# Patient Record
Sex: FEMALE | Race: WHITE | Employment: FULL TIME | URBAN - NONMETROPOLITAN AREA
[De-identification: names, ages, dates, MRNs, and addresses within clinical notes are randomized per-mention and may not be internally consistent; named-entity substitution may affect disease eponyms.]

---

## 2017-12-28 ENCOUNTER — HOSPITAL ENCOUNTER (OUTPATIENT)
Dept: WOMENS IMAGING | Age: 52
Discharge: HOME | End: 2017-12-28
Payer: COMMERCIAL

## 2017-12-28 DIAGNOSIS — R22.31 LUMP IN ARMPIT, RIGHT: ICD-10-CM

## 2017-12-28 PROCEDURE — G0279 TOMOSYNTHESIS, MAMMO: HCPCS

## 2017-12-28 PROCEDURE — 76642 ULTRASOUND BREAST LIMITED: CPT

## 2018-12-31 ENCOUNTER — HOSPITAL ENCOUNTER (OUTPATIENT)
Dept: WOMENS IMAGING | Age: 53
Discharge: HOME | End: 2018-12-31
Payer: COMMERCIAL

## 2018-12-31 DIAGNOSIS — Z12.31 VISIT FOR SCREENING MAMMOGRAM: ICD-10-CM

## 2018-12-31 PROCEDURE — 77063 BREAST TOMOSYNTHESIS BI: CPT

## 2019-01-04 ENCOUNTER — HOSPITAL ENCOUNTER (OUTPATIENT)
Dept: WOMENS IMAGING | Age: 54
Discharge: HOME | End: 2019-01-04
Payer: COMMERCIAL

## 2019-01-04 DIAGNOSIS — R92.0 ABNORMAL FINDING ON MAMMOGRAPHY, MICROCALCIFICATION: ICD-10-CM

## 2019-01-04 PROCEDURE — G0279 TOMOSYNTHESIS, MAMMO: HCPCS

## 2019-01-18 ENCOUNTER — HOSPITAL ENCOUNTER (OUTPATIENT)
Dept: WOMENS IMAGING | Age: 54
Discharge: HOME | End: 2019-01-18
Payer: COMMERCIAL

## 2019-01-18 DIAGNOSIS — R92.1 BREAST CALCIFICATIONS: ICD-10-CM

## 2019-01-18 PROCEDURE — 19081 BX BREAST 1ST LESION STRTCTC: CPT

## 2019-01-18 PROCEDURE — A4648 IMPLANTABLE TISSUE MARKER: HCPCS

## 2019-01-18 PROCEDURE — 2709999900 HC NON-CHARGEABLE SUPPLY

## 2019-01-18 PROCEDURE — 77065 DX MAMMO INCL CAD UNI: CPT

## 2019-01-18 PROCEDURE — 88305 TISSUE EXAM BY PATHOLOGIST: CPT

## 2019-01-18 PROCEDURE — 2720000010 HC SURG SUPPLY STERILE

## 2020-07-21 ENCOUNTER — HOSPITAL ENCOUNTER (OUTPATIENT)
Dept: WOMENS IMAGING | Age: 55
Discharge: HOME | End: 2020-07-21
Payer: COMMERCIAL

## 2020-07-21 PROCEDURE — 77063 BREAST TOMOSYNTHESIS BI: CPT

## 2021-11-08 ENCOUNTER — HOSPITAL ENCOUNTER (OUTPATIENT)
Dept: WOMENS IMAGING | Age: 56
Discharge: HOME | End: 2021-11-08
Payer: COMMERCIAL

## 2021-11-08 DIAGNOSIS — Z12.31 VISIT FOR SCREENING MAMMOGRAM: ICD-10-CM

## 2021-11-08 PROCEDURE — 77063 BREAST TOMOSYNTHESIS BI: CPT

## 2022-11-23 ENCOUNTER — HOSPITAL ENCOUNTER (OUTPATIENT)
Dept: WOMENS IMAGING | Age: 57
Discharge: HOME | End: 2022-11-23
Payer: COMMERCIAL

## 2022-11-23 DIAGNOSIS — Z12.31 VISIT FOR SCREENING MAMMOGRAM: ICD-10-CM

## 2022-11-23 PROCEDURE — 77063 BREAST TOMOSYNTHESIS BI: CPT

## 2022-12-02 ENCOUNTER — HOSPITAL ENCOUNTER (OUTPATIENT)
Dept: ULTRASOUND IMAGING | Age: 57
Discharge: HOME | End: 2022-12-02
Payer: COMMERCIAL

## 2022-12-02 DIAGNOSIS — E04.9 ENLARGEMENT OF THYROID: ICD-10-CM

## 2022-12-02 PROCEDURE — 76536 US EXAM OF HEAD AND NECK: CPT

## 2023-02-17 ENCOUNTER — NURSE ONLY (OUTPATIENT)
Dept: LAB | Age: 58
End: 2023-02-17

## 2023-02-22 ENCOUNTER — TRANSCRIBE ORDERS (OUTPATIENT)
Dept: ADMINISTRATIVE | Age: 58
End: 2023-02-22

## 2023-02-22 DIAGNOSIS — C15.9 MALIGNANT NEOPLASM OF ESOPHAGUS, UNSPECIFIED LOCATION (HCC): Primary | ICD-10-CM

## 2023-02-23 ENCOUNTER — OFFICE VISIT (OUTPATIENT)
Dept: ENT CLINIC | Age: 58
End: 2023-02-23
Payer: COMMERCIAL

## 2023-02-23 VITALS
OXYGEN SATURATION: 94 % | HEIGHT: 63 IN | RESPIRATION RATE: 16 BRPM | TEMPERATURE: 97.5 F | SYSTOLIC BLOOD PRESSURE: 124 MMHG | BODY MASS INDEX: 33.82 KG/M2 | HEART RATE: 83 BPM | DIASTOLIC BLOOD PRESSURE: 76 MMHG | WEIGHT: 190.9 LBS

## 2023-02-23 DIAGNOSIS — C15.5 PRIMARY ADENOCARCINOMA OF DISTAL THIRD OF ESOPHAGUS (HCC): Primary | ICD-10-CM

## 2023-02-23 DIAGNOSIS — E04.2 MULTINODULAR GOITER: ICD-10-CM

## 2023-02-23 PROCEDURE — 99205 OFFICE O/P NEW HI 60 MIN: CPT | Performed by: OTOLARYNGOLOGY

## 2023-02-23 RX ORDER — ACETAMINOPHEN 160 MG
TABLET,DISINTEGRATING ORAL
COMMUNITY

## 2023-02-23 RX ORDER — INSULIN GLARGINE-YFGN 100 [IU]/ML
INJECTION, SOLUTION SUBCUTANEOUS
COMMUNITY
Start: 2023-01-31

## 2023-02-23 RX ORDER — CHLORAL HYDRATE 500 MG
CAPSULE ORAL DAILY
COMMUNITY

## 2023-02-23 RX ORDER — BLOOD SUGAR DIAGNOSTIC
STRIP MISCELLANEOUS
COMMUNITY
Start: 2023-02-02

## 2023-02-23 RX ORDER — AMPICILLIN TRIHYDRATE 250 MG
CAPSULE ORAL
COMMUNITY

## 2023-02-23 RX ORDER — FLUTICASONE PROPIONATE 50 MCG
1 SPRAY, SUSPENSION (ML) NASAL DAILY
COMMUNITY

## 2023-02-23 RX ORDER — OMEPRAZOLE 10 MG/1
10 CAPSULE, DELAYED RELEASE ORAL DAILY
COMMUNITY

## 2023-02-23 RX ORDER — INSULIN LISPRO 100 [IU]/ML
INJECTION, SOLUTION INTRAVENOUS; SUBCUTANEOUS
COMMUNITY
Start: 2023-01-31

## 2023-02-23 RX ORDER — METFORMIN HYDROCHLORIDE 500 MG/1
TABLET, EXTENDED RELEASE ORAL
COMMUNITY
Start: 2023-01-25

## 2023-02-23 ASSESSMENT — ENCOUNTER SYMPTOMS
SHORTNESS OF BREATH: 0
TROUBLE SWALLOWING: 0
NAUSEA: 0
DIARRHEA: 0
ABDOMINAL PAIN: 0
COLOR CHANGE: 0
FACIAL SWELLING: 0
CHEST TIGHTNESS: 0
SORE THROAT: 0
WHEEZING: 0
SINUS PRESSURE: 0
RHINORRHEA: 0
VOMITING: 0
APNEA: 0
VOICE CHANGE: 0
COUGH: 0
STRIDOR: 0
CHOKING: 0

## 2023-02-23 NOTE — PROGRESS NOTES
[address] EAR, NOSE AND THROAT  [address] [place] [address]  [address] [facility],4Th Floor  Dept: [phone]  Dept Fax: [phone]  Loc: [phone]    Rusty Sanon is a 62 y.o. female who was referred byPhilly Blakely DO for:  Chief Complaint   Patient presents with    New Patient     Patient is here for  Enlargement of Thyroid. Referred by Sadie James DO   .    HPI:     Rusty Sanon is a 62 y.o. female who presents today for evaluation of thyroid enlargement. Dr. 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 This coming Monday she has an appointment at [address] [place] [address]  to get a CT of chest and Abdomen. Wednesday she will see Dr. Francesco Sanchez the Medical Oncologist.  On March 9th she will have an endoscopic ultrasound. Former smoker. No drinking. No trouble swallowing. Has an appointment with [employer] today. She's had cold intolerance, broke out in hives her whole life. Had long haul Covid and her hair started falling out after. Had 14 cysts surrounding her ovary had them since she was age 15, they were taken out. Had 48 biopsies and hysterectomy. Hasn't been genetically tested. She's adopted and didn't know any other genetic diseases. Ultrasound:  1. Tiny cystic nodules are seen in both thyroid lobes likely benign. 2. Nonaggressive lymph nodes are seen. 3. A 1 x 0.6 x 0.3 cm hypoechoic nodule is seen inferior to the right thyroid gland is nonspecific. It can represent a parathyroid adenoma. Correlate with calcium and  ionized calcium levels. **ACR TI-RADS Category and recommendations**       TR 1: 0 point. Benign. No FNA    TR 2: 1,2 points-Not suspicious. No FNA    TR 3: 3 points -Mildly suspicion. FNA is recommended for lesions greater than 2.5 cm; follow up if the nodule is greater than or equal to 1.5 cm.  Follow-up can be done at 1, 3 and 5 years. Continued follow-up after 5 years can be obtained if there is no    stability in size. TR 4: 4-6 points: Moderately suspicion. FNA greater than or equal to 1.5 cm; follow-up if the nodule is greater than or equal to 1 cm- follow-up can be done at 1, 2, 3 and 5 years. Continued follow-up after 5 years can be obtained if there is no    stability in size. TR 5: 7+points -Highly Suspicious. FNA greater than 1 cm, follow-up if greater than or equal to 0.5 cm       **This report has been created using voice recognition software. It may contain minor errors which are inherent in voice recognition technology. **       Final report electronically signed by Dr Sully Witt on 12/2/2022 2:35 PM         Study shows TR 1 nodules no further work up needed. Also, there is a small soft tissue mass inferior to the right lobe of the thyroid. Path report showed invasive adeno carcinoma esophagus. A PET/CT would help determine whether the nodule inferior to the right lobe of the thyroid is a metastasis. Her serum calcium is normal.    History:      Allergies   Allergen Reactions    Demerol Hcl [Meperidine] Other (See Comments)     hallucinations    Codeine     Entex Lq [Phenylephrine-Guaifenesin]     Sulfa Antibiotics      Current Outpatient Medications   Medication Sig Dispense Refill    ONETOUCH VERIO strip TEST FIVE TIMES PER DAY      fluticasone (FLONASE) 50 MCG/ACT nasal spray 1 spray by Nasal route daily      SEMGLEE, YFGN, 100 UNIT/ML SOPN ADMINISTER 22 UNITS UNDER THE SKIN DAILY      insulin lispro, 1 Unit Dial, (HUMALOG/ADMELOG) 100 UNIT/ML SOPN       metFORMIN (GLUCOPHAGE-XR) 500 MG extended release tablet TAKE 1 TABLET BY MOUTH DAILY      loratadine-pseudoephedrine (CLARITIN-D 24HR)  MG per extended release tablet Take 1 tablet by mouth daily      omeprazole (PRILOSEC) 10 MG delayed release capsule Take 10 mg by mouth daily      Omega-3 Fatty Acids (FISH OIL) 1000 MG capsule Take by mouth daily      Cholecalciferol (VITAMIN D3) 50 MCG ( UT) CAPS Take by mouth      Red Yeast Rice 600 MG CAPS Take by mouth       No current facility-administered medications for this visit.     Past Medical History:   Diagnosis Date    Ovarian cancer in remission     age 46, no XRT or Chemo per patient, pt stated 2022 that she didn't actually have Ovarian CA      Past Surgical History:   Procedure Laterality Date    COLONOSCOPY  2023    HYSTERECTOMY (CERVIX STATUS UNKNOWN)      total    KAN STEROTACTIC LOC BREAST BIOPSY RIGHT Right 2019    benign    OVARY REMOVAL Bilateral     total    US BREAST BIOPSY W LOC DEVICE 1ST LESION RIGHT Right 2011    benign     Family History   Adopted: Yes   Family history unknown: Yes     Social History     Tobacco Use    Smoking status: Former     Types: Cigarettes     Quit date: 1986     Years since quittin.1    Smokeless tobacco: Never   Substance Use Topics    Alcohol use: Not on file       Subjective:       Review of Systems   Constitutional:  Negative for activity change, appetite change, chills, diaphoresis, fatigue, fever and unexpected weight change.   HENT:  Negative for congestion, dental problem, ear discharge, ear pain, facial swelling, hearing loss, mouth sores, nosebleeds, postnasal drip, rhinorrhea, sinus pressure, sneezing, sore throat, tinnitus, trouble swallowing and voice change.    Eyes:  Negative for visual disturbance.   Respiratory:  Negative for apnea, cough, choking, chest tightness, shortness of breath, wheezing and stridor.    Cardiovascular:  Negative for chest pain, palpitations and leg swelling.   Gastrointestinal:  Negative for abdominal pain, diarrhea, nausea and vomiting.   Endocrine: Negative for cold intolerance, heat intolerance, polydipsia and polyuria.   Genitourinary:  Negative for dysuria, enuresis and hematuria.   Musculoskeletal:  Negative for arthralgias, gait problem, neck pain and neck stiffness.  Skin:  Negative for color change and rash. Allergic/Immunologic: Negative for environmental allergies, food allergies and immunocompromised state. Neurological:  Negative for dizziness, syncope, facial asymmetry, speech difficulty, light-headedness and headaches. Hematological:  Negative for adenopathy. Does not bruise/bleed easily. Psychiatric/Behavioral:  Negative for confusion and sleep disturbance. The patient is not nervous/anxious. Objective:     /76 (Site: Left Upper Arm, Position: Sitting)   Pulse 83   Temp 97.5 °F (36.4 °C) (Infrared)   Resp 16   Ht 5' 3\" (1.6 m)   Wt 190 lb 14.4 oz (86.6 kg)   SpO2 94%   BMI 33.82 kg/m²     Physical Exam  Vitals and nursing note reviewed. Constitutional:       Appearance: She is well-developed. HENT:      Head: Normocephalic and atraumatic. No laceration. Salivary Glands: Right salivary gland is not diffusely enlarged or tender. Left salivary gland is not diffusely enlarged or tender. Comments:        Right Ear: Hearing, tympanic membrane, ear canal and external ear normal. No drainage or swelling. No middle ear effusion. Tympanic membrane is not perforated or erythematous. Left Ear: Hearing, tympanic membrane, ear canal and external ear normal. No drainage or swelling. No middle ear effusion. Tympanic membrane is not perforated or erythematous. Nose: Nose normal. No septal deviation, mucosal edema or rhinorrhea. Mouth/Throat:      Mouth: Mucous membranes are moist. Mucous membranes are not pale and not dry. No oral lesions. Tongue: No lesions. Pharynx: Oropharynx is clear. Uvula midline. No oropharyngeal exudate or posterior oropharyngeal erythema. Comments: LIps: lips normal     Mallampati 1  Base of tongue: symmetric  Mirror exam deferred due to gag reflex. Doesn't feel nodules, no adenopathy  Eyes:      Extraocular Movements: Extraocular movements intact.       Comments: Conjugate gaze   Neck: Thyroid: No thyroid mass or thyromegaly. Trachea: Phonation normal. No tracheal deviation. Comments:     Pulmonary:      Effort: Pulmonary effort is normal. No retractions. Breath sounds: No stridor. Musculoskeletal:      Cervical back: Normal range of motion and neck supple. Lymphadenopathy:      Cervical: No cervical adenopathy. Neurological:      Mental Status: She is alert and oriented to person, place, and time. Cranial Nerves: Cranial nerves 2-12 are intact. Cranial nerve deficit: VIIth N function intact bilat. Psychiatric:         Mood and Affect: Mood and affect normal.         Behavior: Behavior is cooperative. Data:  All of the past medical history, past surgical history, family history,social history, allergies and current medications were reviewed with the patient. Assessment & Plan   Diagnoses and all orders for this visit:     Diagnosis Orders   1. 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 Multinodular goiter            The findings were explained and her questions were answered. Her TSH is more than 3.2 which is considerably higher than the ideal range of 0.5-1.5. Most likely cause of that would be a mild thyroiditis. Options were discussed. I also phoned Dr. Joe Olea and Dr. Alda Christensen and discussed ordering    PET/CT. Will follow-up with Dr. Alda Christensen and Dr. Christiano Pizarro CMA ([facility]), am scribing for, and in the presence of Dr. Jaymie Dinh. Electronically signed by Heydi Flores CMA ([facility]) on 2/23/23 at 11:25 AM EST. (Please note that portions of this note were completed with a voice recognition program. Efforts were made to edit the dictations butoccasionally words are mis-transcribed.)    I agree to the above documentation placed by my scribe. I have personally evaluated this patient. Additional findings are as noted.   I reviewed the scribe's note and agree with the documented findings and plan of care. Any areas of disagreement are corrected. I agree with the chief complaint, past medical history, past surgical history, allergies, medications, social and family history as documented unless otherwise noted below.      Electronically signed by Laura Hogna MD on 3/18/2023 at 8:24 PM

## 2023-02-27 ENCOUNTER — HOSPITAL ENCOUNTER (OUTPATIENT)
Dept: CT IMAGING | Age: 58
Discharge: HOME | End: 2023-02-27
Payer: COMMERCIAL

## 2023-02-27 DIAGNOSIS — C15.9 MALIGNANT NEOPLASM OF ESOPHAGUS, UNSPECIFIED LOCATION (HCC): ICD-10-CM

## 2023-02-27 LAB
CREAT BLD-MCNC: 0.7 MG/DL (ref 0.5–1.2)
GFR SERPL CREATININE-BSD FRML MDRD: > 60 ML/MIN/1.73M2

## 2023-02-27 PROCEDURE — 82565 ASSAY OF CREATININE: CPT

## 2023-02-27 PROCEDURE — 71260 CT THORAX DX C+: CPT

## 2023-02-27 PROCEDURE — 6360000004 HC RX CONTRAST MEDICATION: Performed by: INTERNAL MEDICINE

## 2023-02-27 PROCEDURE — 74160 CT ABDOMEN W/CONTRAST: CPT

## 2023-02-27 RX ADMIN — IOHEXOL 25 ML: 240 INJECTION, SOLUTION INTRATHECAL; INTRAVASCULAR; INTRAVENOUS; ORAL at 13:02

## 2023-02-27 RX ADMIN — IOPAMIDOL 85 ML: 755 INJECTION, SOLUTION INTRAVENOUS at 13:02

## 2023-02-27 NOTE — H&P
[facility]  Sedation/Analgesia History & Physical    Patient: Angel Browning : 1965  Med Rec#: [id number] Acc#: [id number]   Provider Performing Procedure: Elio Gil MD  Primary Care Physician: ADDISON Drummond CNP    PRE-PROCEDURE   Brief History/Pre-Procedure Diagnosis:The patient is a 62 y.o.,  female with significant past medical history of newly diagnosed esophageal cancer. Pt. Here today for staging. MEDICAL HISTORY  []CAD/Valve  []Liver Disease  []Lung Disease []Diabetes  []Hypertension []Renal Disease  [x]Additional information:       has a past medical history of Ovarian cancer in remission. SURGICAL HISTORY   has a past surgical history that includes [address] LOC DEVICE 1ST LESION RIGHT (Right, 2011); KAN STEREO BREAST BX W LOC DEVICE 1ST LESION RIGHT (Right, 2019); Hysterectomy (); Ovary removal (Bilateral, ); and Colonoscopy (2023). Additional information:       ALLERGIES   Allergies as of 2023 - Fully Reviewed 2023   Allergen Reaction Noted    Demerol hcl [meperidine] Other (See Comments) 2019    Codeine  2019    Entex lq [phenylephrine-guaifenesin]  2019    Sulfa antibiotics  2019     Additional information:       MEDICATIONS     No current facility-administered medications for this encounter.     Current Outpatient Medications:     ONETOUCH VERIO strip, TEST FIVE TIMES PER DAY, Disp: , Rfl:     fluticasone (FLONASE) 50 MCG/ACT nasal spray, 1 spray by Nasal route daily, Disp: , Rfl:     SEMGLEE, YFGN, 100 UNIT/ML SOPN, ADMINISTER 22 UNITS UNDER THE SKIN DAILY, Disp: , Rfl:     insulin lispro, 1 Unit Dial, (HUMALOG/ADMELOG) 100 UNIT/ML SOPN, , Disp: , Rfl:     metFORMIN (GLUCOPHAGE-XR) 500 MG extended release tablet, TAKE 1 TABLET BY MOUTH DAILY, Disp: , Rfl:     loratadine-pseudoephedrine (CLARITIN-D 24HR)  MG per extended release tablet, Take 1 tablet by mouth daily, Disp: , Rfl:     omeprazole (PRILOSEC) 10 MG delayed release capsule, Take 10 mg by mouth daily, Disp: , Rfl:     Omega-3 Fatty Acids (FISH OIL) 1000 MG capsule, Take by mouth daily, Disp: , Rfl:     Cholecalciferol (VITAMIN D3) 50 MCG (2000 UT) CAPS, Take by mouth, Disp: , Rfl:     Red Yeast Rice 600 MG CAPS, Take by mouth, Disp: , Rfl:   Prior to Admission medications    Medication Sig Start Date End Date Taking? Authorizing Provider   ONETOUCH VERIO strip TEST FIVE TIMES PER DAY 2/2/23   Historical Provider, MD   fluticasone (FLONASE) 50 MCG/ACT nasal spray 1 spray by Nasal route daily    Historical Provider, MD Cobos Bigger, 100 UNIT/ML SOPN ADMINISTER [address] SKIN DAILY 1/31/23   Historical Provider, MD   insulin lispro, 1 Unit Dial, (HUMALOG/ADMELOG) 100 UNIT/ML SOPN  1/31/23   Historical Provider, MD   metFORMIN (GLUCOPHAGE-XR) 500 MG extended release tablet TAKE 1 TABLET BY MOUTH DAILY 1/25/23   Historical Provider, MD   loratadine-pseudoephedrine (CLARITIN-D 24HR)  MG per extended release tablet Take 1 tablet by mouth daily    Historical Provider, MD   omeprazole (PRILOSEC) 10 MG delayed release capsule Take 10 mg by mouth daily    Historical Provider, MD   Omega-3 Fatty Acids (FISH OIL) 1000 MG capsule Take by mouth daily    Historical Provider, MD   Cholecalciferol (VITAMIN D3) 50 MCG (2000 UT) CAPS Take by mouth    Historical Provider, MD   Red Yeast Rice 600 MG CAPS Take by mouth    Historical Provider, MD     Additional information:       PHYSICAL:    vitals were not taken for this visit. Heart:  [x]Regular rate and rhythm  []Other:    Lungs:  [x]Clear    []Other:    Abdomen: [x]Soft    []Other:    Mental Status: [x]Alert & Oriented  []Other:      VITAL SIGNS   No data found.     PLANNED PROCEDURE   [x]EGD  []Colonoscopy []Flex Sigmoid  []ERCP [x]EUS   []Cystoscopy  [] CATH [] BRONCH       Consent: I have discussed with the patient and/or the patient representative the indication, alternatives, and the possible risks and/or complications of the planned procedure and the anesthesia methods. The patient and/or patient representative appear to understand and agree to proceed. SEDATION (see Anesthesia note)    ASA Classification: Class 2 - A normal healthy patient with mild systemic disease    Airway Assessment: normal    Monitoring and Safety: The patient will be placed on a cardiac monitor and vital signs, pulse oximetry and level of consciousness will be continuously evaluated throughout the procedure. The patient will be closely monitored until recovery from the medications is complete and the patient has returned to baseline status. Respiratory therapy will be on standby during the procedure. [x]Pre-procedure diagnostic studies complete and results available. Comment:    [x]Previous sedation/anesthesia experiences assessed. Comment:    [x]The patient is an appropriate candidate to undergo the planned procedure sedation and anesthesia. (Refer to nursing sedation/analgesia documentation record)  [x]Formulation and discussion of sedation/procedure plan, risks, and expectations with patient and/or responsible adult completed. [x]Patient examined immediately prior to the procedure.  (Refer to nursing sedation/analgesia documentation record)    Gypsy Valdez MD   Electronically signed  2/28/2023 @ [id number]

## 2023-02-27 NOTE — PROCEDURES
[address] [place] [address] Endoscopy   EUS of the Esophagus/ Gastric /Duodenum    Patient: Hassie Duverney  : 1965  Acct#: [de-identified]    BRIEF HISTORY AND INDICATIONS:    The patient is a 62 y.o.,  female with significant past medical history of newly diagnosed esophageal cancer diagnosed by Dr. Jami Amezcua (me). This appeared as a large pedunculated polyp on EGD recently. Has a prior history of ovarian cancer. Pt. Follows with Dr. Jonnathan Garrison (Oncology) and Dr. [facility] ENT. PREMEDICATION:     General anesthesia was used, see Anesthesia note for details. PT. IS AT RISK FOR ASPIRATION , HAS SILENT REFLUX. The risks and benefits of upper endoscopy with biopsy and dilation were  described to the patient, including but not limited to bleeding, infection, poking a hole someplace requiring surgery to fix it, having reaction to medication, and death. The patient understood these risks and provided informed consent. The patient was placed in the left lateral decubitus position. Medications were administered consisting of Propofol anesthesia and other agents administered by anesthesia. The patient was continuously monitored to ensure adequate sedation and patient safety. Under direct visualization, the upper esophagus was intubated. Scope was slowly withdrawn with good views of mucosal surfaces. Findings and maneuvers are listed in impression below. The patient tolerated the procedure well. The scope was removed. The patient was removed to the recovery area. There were no immediate complications. TYPE OF ECHOENDOSCOPE: Radial advanced to the 2ND portion of the duodenum    DESCRIPTION OF PROCEDURE:  . The quality of imaging was good. Esophageal Adenocarcinoma staging. T stage: 1B - Invades the submucosa. N stage: 1 - two lymph nodes. Mx    EUS: Normal gallbladder, pancreas body/tail  Normal left lobe of the liver.      Endoscopic:    Esophagus: Distal esophagus pedunculated tumor over 2.5 cm. Stomach: Normal.     Duodenum: Normal to 2nd portion of the duodenum. Gurmeet Postal EBL : < 10 mL    COMPLICATIONS:  There were no unplanned events during the procedure. IMPRESSION:   1. T 1B, N 1, Mx. - Largest lymph nodes located in the celiac  axis area, perigastric  14.1 mm, 2nd lymph nodes adjacent , 1.0 cm .               2.         Endoscopic findings- Distal esophagus pedunculated tumor over 2.5 cm. Hiatal hernia. Otherwise, normal to the 2nd portion of the duodenum. 3.         EUS - normal gallbladder. No mets in the left lobe of the liver. RECOMMENDATIONS:    1. Bruce Aleman is to follow up with Dr. Burton Trejo, Dr. Dinesh Martinez- ENT. 2. Resume all medications. 3. Has PET scan tomorrow. 4. I will cancel follow up appt. With me on 3/23/2023, can follow up as needed. Will need to start chemotherapy and radiation therapy.                Specimens: were not obtained    (The following sections must be completed)  Post-Sedation Vital Signs: Vital signs were reviewed and were stable after the procedure (see flow sheet for vitals)            Post-Sedation Exam: Lungs: clear to auscultation bilaterally and Cardiovascular: regular rate and rhythm           Complications: none      Ryan Washington MD, [facility]

## 2023-02-28 ENCOUNTER — HOSPITAL ENCOUNTER (OUTPATIENT)
Age: 58
Setting detail: OUTPATIENT SURGERY
Discharge: HOME | End: 2023-02-28
Attending: INTERNAL MEDICINE | Admitting: INTERNAL MEDICINE
Payer: COMMERCIAL

## 2023-02-28 ENCOUNTER — ANESTHESIA (OUTPATIENT)
Dept: ENDOSCOPY | Age: 58
End: 2023-02-28
Payer: COMMERCIAL

## 2023-02-28 ENCOUNTER — ANESTHESIA EVENT (OUTPATIENT)
Dept: ENDOSCOPY | Age: 58
End: 2023-02-28
Payer: COMMERCIAL

## 2023-02-28 VITALS
BODY MASS INDEX: 33.81 KG/M2 | OXYGEN SATURATION: 93 % | TEMPERATURE: 97.4 F | HEIGHT: 63 IN | SYSTOLIC BLOOD PRESSURE: 146 MMHG | DIASTOLIC BLOOD PRESSURE: 86 MMHG | RESPIRATION RATE: 18 BRPM | WEIGHT: 190.8 LBS | HEART RATE: 76 BPM

## 2023-02-28 LAB — GLUCOSE BLD STRIP.AUTO-MCNC: 195 MG/DL (ref 70–108)

## 2023-02-28 PROCEDURE — 7100000010 HC PHASE II RECOVERY - FIRST 15 MIN: Performed by: INTERNAL MEDICINE

## 2023-02-28 PROCEDURE — 7100000001 HC PACU RECOVERY - ADDTL 15 MIN: Performed by: INTERNAL MEDICINE

## 2023-02-28 PROCEDURE — 2500000003 HC RX 250 WO HCPCS: Performed by: NURSE ANESTHETIST, CERTIFIED REGISTERED

## 2023-02-28 PROCEDURE — 2720000010 HC SURG SUPPLY STERILE: Performed by: INTERNAL MEDICINE

## 2023-02-28 PROCEDURE — 3609018500 HC EGD US SCOPE W/ADJACENT STRUCTURES: Performed by: INTERNAL MEDICINE

## 2023-02-28 PROCEDURE — 7100000000 HC PACU RECOVERY - FIRST 15 MIN: Performed by: INTERNAL MEDICINE

## 2023-02-28 PROCEDURE — 2580000003 HC RX 258: Performed by: INTERNAL MEDICINE

## 2023-02-28 PROCEDURE — 82948 REAGENT STRIP/BLOOD GLUCOSE: CPT

## 2023-02-28 PROCEDURE — 3700000001 HC ADD 15 MINUTES (ANESTHESIA): Performed by: INTERNAL MEDICINE

## 2023-02-28 PROCEDURE — 3700000000 HC ANESTHESIA ATTENDED CARE: Performed by: INTERNAL MEDICINE

## 2023-02-28 PROCEDURE — 7100000011 HC PHASE II RECOVERY - ADDTL 15 MIN: Performed by: INTERNAL MEDICINE

## 2023-02-28 PROCEDURE — 6360000002 HC RX W HCPCS: Performed by: NURSE ANESTHETIST, CERTIFIED REGISTERED

## 2023-02-28 PROCEDURE — C1753 CATH, INTRAVAS ULTRASOUND: HCPCS | Performed by: INTERNAL MEDICINE

## 2023-02-28 RX ORDER — PROPOFOL 10 MG/ML
INJECTION, EMULSION INTRAVENOUS PRN
Status: DISCONTINUED | OUTPATIENT
Start: 2023-02-28 | End: 2023-02-28 | Stop reason: SDUPTHER

## 2023-02-28 RX ORDER — ONDANSETRON 2 MG/ML
INJECTION INTRAMUSCULAR; INTRAVENOUS PRN
Status: DISCONTINUED | OUTPATIENT
Start: 2023-02-28 | End: 2023-02-28 | Stop reason: SDUPTHER

## 2023-02-28 RX ORDER — SUCCINYLCHOLINE/SOD CL,ISO/PF 200MG/10ML
SYRINGE (ML) INTRAVENOUS PRN
Status: DISCONTINUED | OUTPATIENT
Start: 2023-02-28 | End: 2023-02-28 | Stop reason: SDUPTHER

## 2023-02-28 RX ORDER — SODIUM CHLORIDE 0.9 % (FLUSH) 0.9 %
5-40 SYRINGE (ML) INJECTION PRN
Status: DISCONTINUED | OUTPATIENT
Start: 2023-02-28 | End: 2023-02-28 | Stop reason: HOSPADM

## 2023-02-28 RX ORDER — DEXAMETHASONE SODIUM PHOSPHATE 4 MG/ML
INJECTION, SOLUTION INTRA-ARTICULAR; INTRALESIONAL; INTRAMUSCULAR; INTRAVENOUS; SOFT TISSUE PRN
Status: DISCONTINUED | OUTPATIENT
Start: 2023-02-28 | End: 2023-02-28 | Stop reason: SDUPTHER

## 2023-02-28 RX ORDER — MORPHINE SULFATE 2 MG/ML
1 INJECTION, SOLUTION INTRAMUSCULAR; INTRAVENOUS EVERY 5 MIN PRN
Status: DISCONTINUED | OUTPATIENT
Start: 2023-02-28 | End: 2023-02-28 | Stop reason: HOSPADM

## 2023-02-28 RX ORDER — FENTANYL CITRATE 50 UG/ML
INJECTION, SOLUTION INTRAMUSCULAR; INTRAVENOUS PRN
Status: DISCONTINUED | OUTPATIENT
Start: 2023-02-28 | End: 2023-02-28 | Stop reason: SDUPTHER

## 2023-02-28 RX ORDER — SODIUM CHLORIDE 9 MG/ML
INJECTION, SOLUTION INTRAVENOUS PRN
Status: DISCONTINUED | OUTPATIENT
Start: 2023-02-28 | End: 2023-02-28 | Stop reason: HOSPADM

## 2023-02-28 RX ORDER — SODIUM CHLORIDE 0.9 % (FLUSH) 0.9 %
5-40 SYRINGE (ML) INJECTION EVERY 12 HOURS SCHEDULED
Status: DISCONTINUED | OUTPATIENT
Start: 2023-02-28 | End: 2023-02-28 | Stop reason: HOSPADM

## 2023-02-28 RX ORDER — SODIUM CHLORIDE, SODIUM LACTATE, POTASSIUM CHLORIDE, CALCIUM CHLORIDE 600; 310; 30; 20 MG/100ML; MG/100ML; MG/100ML; MG/100ML
INJECTION, SOLUTION INTRAVENOUS CONTINUOUS
Status: DISCONTINUED | OUTPATIENT
Start: 2023-02-28 | End: 2023-02-28 | Stop reason: HOSPADM

## 2023-02-28 RX ORDER — LABETALOL HYDROCHLORIDE 5 MG/ML
10 INJECTION, SOLUTION INTRAVENOUS
Status: DISCONTINUED | OUTPATIENT
Start: 2023-02-28 | End: 2023-02-28 | Stop reason: HOSPADM

## 2023-02-28 RX ORDER — FENTANYL CITRATE 50 UG/ML
50 INJECTION, SOLUTION INTRAMUSCULAR; INTRAVENOUS EVERY 5 MIN PRN
Status: DISCONTINUED | OUTPATIENT
Start: 2023-02-28 | End: 2023-02-28 | Stop reason: HOSPADM

## 2023-02-28 RX ADMIN — PROPOFOL 50 MG: 10 INJECTION, EMULSION INTRAVENOUS at 14:21

## 2023-02-28 RX ADMIN — PROPOFOL 150 MG: 10 INJECTION, EMULSION INTRAVENOUS at 14:19

## 2023-02-28 RX ADMIN — DEXAMETHASONE SODIUM PHOSPHATE 8 MG: 4 INJECTION, SOLUTION INTRAMUSCULAR; INTRAVENOUS at 14:32

## 2023-02-28 RX ADMIN — FENTANYL CITRATE 50 MCG: 50 INJECTION, SOLUTION INTRAMUSCULAR; INTRAVENOUS at 14:18

## 2023-02-28 RX ADMIN — FENTANYL CITRATE 50 MCG: 50 INJECTION, SOLUTION INTRAMUSCULAR; INTRAVENOUS at 14:21

## 2023-02-28 RX ADMIN — SODIUM CHLORIDE, POTASSIUM CHLORIDE, SODIUM LACTATE AND CALCIUM CHLORIDE: 600; 310; 30; 20 INJECTION, SOLUTION INTRAVENOUS at 13:57

## 2023-02-28 RX ADMIN — Medication 140 MG: at 14:19

## 2023-02-28 RX ADMIN — ONDANSETRON 4 MG: 2 INJECTION INTRAMUSCULAR; INTRAVENOUS at 14:32

## 2023-02-28 ASSESSMENT — PAIN - FUNCTIONAL ASSESSMENT: PAIN_FUNCTIONAL_ASSESSMENT: 0-10

## 2023-02-28 NOTE — DISCHARGE INSTRUCTIONS
IMPRESSION:   1. T 1B, N 1, Mx. - Largest lymph nodes located in the celiac  axis area, perigastric  14.1 mm, 2nd lymph nodes adjacent , 1.0 cm .               2.         Endoscopic findings- Distal esophagus pedunculated tumor over 2.5 cm. Hiatal hernia. Otherwise, normal to the 2nd portion of the duodenum. 3.         EUS - normal gallbladder. No mets in the left lobe of the liver. RECOMMENDATIONS:    1. [facility] is to follow up with Dr. Ivette White, Dr. Bao Dickson- ENT. 2. Resume all medications. 3. Has PET scan tomorrow. 4. I will cancel follow up appt. With me on 3/23/2023, can follow up as needed. Will need to start chemotherapy and radiation therapy.

## 2023-02-28 NOTE — PROGRESS NOTES
1450 - pt arrives to pacu, respirations unlabored on 2 L NC, pt denies pain, VSS    1505 - pt denies pain at this time    1510 - Dr. Carol Orozco at bedside explaining to pt the plan of care    1520 -pt meets criteria for discharge from pacu at this time, pt transported to [facility] in stable condition

## 2023-02-28 NOTE — ANESTHESIA PRE PROCEDURE
Department of Anesthesiology  Preprocedure Note       Name:  Dotty May   Age:  62 y.o.  :  1965                                          MRN:  [id number]         Date:  2023      Surgeon: Terrell Richardson):  Epi Werner MD    Procedure: Procedure(s):  ENDOSCOPIC ULTRASOUND WITH RADIAL SCOPE    Medications prior to admission:   Prior to Admission medications    Medication Sig Start Date End Date Taking? Authorizing Provider   ONETOUCH VERIO strip TEST FIVE TIMES PER DAY 23   Historical Provider, MD   fluticasone (FLONASE) 50 MCG/ACT nasal spray 1 spray by Nasal route daily    Historical Provider, MD Brooke Soja, 100 UNIT/ML SOPN ADMINISTER [address] [facility] SKIN DAILY 23   Historical Provider, MD   insulin lispro, 1 Unit Dial, (HUMALOG/ADMELOG) 100 UNIT/ML SOPN  23   Historical Provider, MD   metFORMIN (GLUCOPHAGE-XR) 500 MG extended release tablet TAKE 1 TABLET BY MOUTH DAILY 23   Historical Provider, MD   loratadine-pseudoephedrine (CLARITIN-D 24HR)  MG per extended release tablet Take 1 tablet by mouth daily    Historical Provider, MD   omeprazole (PRILOSEC) 10 MG delayed release capsule Take 10 mg by mouth daily    Historical Provider, MD   Omega-3 Fatty Acids (FISH OIL) 1000 MG capsule Take by mouth daily    Historical Provider, MD   Cholecalciferol (VITAMIN D3) 50 MCG ( UT) CAPS Take by mouth    Historical Provider, MD   Red Yeast Rice 600 MG CAPS Take by mouth    Historical Provider, MD       Current medications:    No current facility-administered medications for this encounter. Allergies: Allergies   Allergen Reactions    Demerol Hcl [Meperidine] Other (See Comments)     hallucinations    Codeine     Entex Lq [Phenylephrine-Guaifenesin]     Sulfa Antibiotics        Problem List:  There is no problem list on file for this patient.       Past Medical History:        Diagnosis Date    Ovarian cancer in remission     age 55, no XRT or Chemo per patient, pt stated 2022 that she didn't actually have Ovarian CA       Past Surgical History:        Procedure Laterality Date    COLONOSCOPY  2023    HYSTERECTOMY (CERVIX STATUS UNKNOWN)      total    KAN STEROTACTIC LOC BREAST BIOPSY RIGHT Right 2019    benign    OVARY REMOVAL Bilateral     total    US BREAST BIOPSY W LOC DEVICE 1ST LESION RIGHT Right 2011    benign       Social History:    Social History     Tobacco Use    Smoking status: Former     Types: Cigarettes     Quit date: 1986     Years since quittin.1    Smokeless tobacco: Never   Substance Use Topics    Alcohol use: Not on file                                Counseling given: Not Answered      Vital Signs (Current):   Vitals:    23 1343   BP: (!) 146/101   Pulse: 88   Resp: 16   Temp: 97.1 °F (36.2 °C)   TempSrc: Temporal   SpO2: 97%   Weight: 190 lb 12.8 oz (86.5 kg)   Height: 5' 3\" (1.6 m)                                              BP Readings from Last 3 Encounters:   23 (!) 146/101   23 124/76       NPO Status: Time of last liquid consumption: 630                        Time of last solid consumption:                         Date of last liquid consumption: 23                        Date of last solid food consumption: 23    BMI:   Wt Readings from Last 3 Encounters:   23 190 lb 12.8 oz (86.5 kg)   23 190 lb 14.4 oz (86.6 kg)     Body mass index is 33.8 kg/m².     CBC:   Lab Results   Component Value Date/Time    WBC 5.6 10/07/2011 12:00 PM    RBC 4.48 10/07/2011 12:00 PM    HCT 40.2 10/07/2011 12:00 PM    MCV 89.9 10/07/2011 12:00 PM    RDW 12.8 10/07/2011 12:00 PM     10/07/2011 12:00 PM       CMP:   Lab Results   Component Value Date/Time    CREATININE 0.7 2023 12:57 PM    LABGLOM >60 2023 12:57 PM    CALCIUM 9.20 2022 03:13 PM       POC Tests: No results for input(s): POCGLU, POCNA, POCK, POCCL, POCBUN, POCHEMO, POCHCT in the last 72 hours. Coags: No results found for: PROTIME, INR, APTT    HCG (If Applicable): No results found for: PREGTESTUR, PREGSERUM, HCG, HCGQUANT     ABGs: No results found for: PHART, PO2ART, CYH1OQP, VLE0GHU, BEART, V4WAQYMY     Type & Screen (If Applicable):  No results found for: LABABO, LABRH    Drug/Infectious Status (If Applicable):  No results found for: HIV, HEPCAB    COVID-19 Screening (If Applicable): No results found for: COVID19        Anesthesia Evaluation    Airway: Mallampati: II  TM distance: >3 FB   Neck ROM: full  Mouth opening: > = 3 FB   Dental:          Pulmonary: breath sounds clear to auscultation                             Cardiovascular:            Rhythm: regular                      Neuro/Psych:               GI/Hepatic/Renal:   (+) GERD:,           Endo/Other:    (+) Diabetes, . Abdominal:   (+) obese,           Vascular: Other Findings:           Anesthesia Plan      general     ASA 2       Induction: intravenous. MIPS: Postoperative opioids intended and Prophylactic antiemetics administered. Anesthetic plan and risks discussed with patient. Plan discussed with CRNA.                     Tayla Sandoval MD   2/28/2023

## 2023-03-01 NOTE — ANESTHESIA POSTPROCEDURE EVALUATION
Department of Anesthesiology  Postprocedure Note    Patient: Enzo Jeong  MRN: [id number]  YOB: 1965  Date of evaluation: 3/1/2023      Procedure Summary     Date: 02/28/23 Room / Location: [facility] / [address] [place] [address]    Anesthesia Start: 2124 Anesthesia Stop: 1698    Procedure: ENDOSCOPIC ULTRASOUND WITH RADIAL SCOPE (Left) Diagnosis:       Malignant neoplasm of esophagus, unspecified location ([address]      (Malignant neoplasm of esophagus, unspecified location ([address] [C15.9])    Surgeons: Romi Rolle MD Responsible Provider: Jazmine Mathew MD    Anesthesia Type: general ASA Status: 2          Anesthesia Type: No value filed.     Bharath Phase I: Bharath Score: 10    Bharath Phase II: Bharath Score: 10      Anesthesia Post Evaluation    Patient location during evaluation: PACU  Patient participation: complete - patient participated  Level of consciousness: awake  Airway patency: patent  Nausea & Vomiting: no vomiting and no nausea  Complications: no  Cardiovascular status: hemodynamically stable  Respiratory status: acceptable  Hydration status: stable

## 2023-03-06 ENCOUNTER — HOSPITAL ENCOUNTER (OUTPATIENT)
Dept: PET IMAGING | Age: 58
Discharge: HOME | End: 2023-03-06
Payer: COMMERCIAL

## 2023-03-06 ENCOUNTER — HOSPITAL ENCOUNTER (OUTPATIENT)
Dept: PET IMAGING | Age: 58
Discharge: HOME | End: 2023-03-01

## 2023-03-06 DIAGNOSIS — C15.5 PRIMARY ADENOCARCINOMA OF DISTAL THIRD OF ESOPHAGUS (HCC): ICD-10-CM

## 2023-03-06 PROCEDURE — 3430000000 HC RX DIAGNOSTIC RADIOPHARMACEUTICAL: Performed by: OTOLARYNGOLOGY

## 2023-03-06 PROCEDURE — 78815 PET IMAGE W/CT SKULL-THIGH: CPT

## 2023-03-06 PROCEDURE — A9552 F18 FDG: HCPCS | Performed by: OTOLARYNGOLOGY

## 2023-03-06 RX ORDER — FLUDEOXYGLUCOSE F 18 200 MCI/ML
14.4 INJECTION, SOLUTION INTRAVENOUS
Status: COMPLETED | OUTPATIENT
Start: 2023-03-06 | End: 2023-03-06

## 2023-03-06 RX ADMIN — FLUDEOXYGLUCOSE F 18 14.4 MILLICURIE: 200 INJECTION, SOLUTION INTRAVENOUS at 07:33

## 2023-03-09 ENCOUNTER — HOSPITAL ENCOUNTER (OUTPATIENT)
Dept: CT IMAGING | Age: 58
Discharge: HOME | End: 2023-03-09

## 2023-03-09 ENCOUNTER — HOSPITAL ENCOUNTER (OUTPATIENT)
Dept: RADIATION ONCOLOGY | Age: 58
Discharge: HOME | End: 2023-03-09
Payer: COMMERCIAL

## 2023-03-09 VITALS
RESPIRATION RATE: 16 BRPM | SYSTOLIC BLOOD PRESSURE: 156 MMHG | TEMPERATURE: 98.2 F | OXYGEN SATURATION: 94 % | HEART RATE: 69 BPM | BODY MASS INDEX: 33.95 KG/M2 | DIASTOLIC BLOOD PRESSURE: 85 MMHG | HEIGHT: 63 IN | WEIGHT: 191.6 LBS

## 2023-03-09 DIAGNOSIS — C15.5 MALIGNANT NEOPLASM OF LOWER THIRD OF ESOPHAGUS (HCC): ICD-10-CM

## 2023-03-09 PROCEDURE — 77332 RADIATION TREATMENT AID(S): CPT | Performed by: RADIOLOGY

## 2023-03-09 PROCEDURE — 77470 SPECIAL RADIATION TREATMENT: CPT | Performed by: RADIOLOGY

## 2023-03-09 PROCEDURE — 3209999900 CT GUIDE RADIATION THERAPY NO CHARGE

## 2023-03-09 PROCEDURE — 99202 OFFICE O/P NEW SF 15 MIN: CPT | Performed by: RADIOLOGY

## 2023-03-09 PROCEDURE — 77334 RADIATION TREATMENT AID(S): CPT | Performed by: RADIOLOGY

## 2023-03-09 ASSESSMENT — ENCOUNTER SYMPTOMS
SHORTNESS OF BREATH: 1
COUGH: 1
ABDOMINAL PAIN: 1
NAUSEA: 0
BLOOD IN STOOL: 0
APNEA: 0
TROUBLE SWALLOWING: 0
CHEST TIGHTNESS: 0
SORE THROAT: 1
VOMITING: 0
BACK PAIN: 1
WHEEZING: 0

## 2023-03-09 NOTE — PROGRESS NOTES
Radiation Oncology Consultation  Encounter Date: 3/9/2023     Ms. Fartun Bonilla is a 62 y.o. female  : 1965  MRN: [id number]  [facility]  Requesting Provider: Dr. Jm Castillo     Reason for request: ***      CONSULTANT: Rufina Lay PhD, MD      DIAGNOSIS: ***      ASSESSMENT:  ***      PLAN:  ***      HISTORY OF PRESENT ILLNESS: ***        Past Medical History:   Diagnosis Date    Cancer ([facility]     Esophagus    Diabetes mellitus ([facility]     Ovarian cancer in remission     age 55, no XRT or Chemo per patient, pt stated 2022 that she didn't actually have Ovarian CA        Past Surgical History:   Procedure Laterality Date    COLONOSCOPY  2023    HYSTERECTOMY (CERVIX STATUS UNKNOWN)      total    KAN STEROTACTIC LOC BREAST BIOPSY RIGHT Right 2019    benign    NECK SURGERY      Ruptured disc    OVARY REMOVAL Bilateral     total    TONSILLECTOMY      UPPER GASTROINTESTINAL ENDOSCOPY Left 2023    ENDOSCOPIC ULTRASOUND WITH RADIAL SCOPE performed by Maida Shepherd MD at [address] [place] [address] RIGHT Right 2011    benign       Family History   Adopted: Yes   Family history unknown: Yes       Social History     Socioeconomic History    Marital status:      Spouse name: Jef    Number of children: 1    Years of education: Not on file    Highest education level: Not on file   Occupational History    Not on file   Tobacco Use    Smoking status: Former     Packs/day: 2.50     Years: 11.00     Pack years: 27.50     Types: Cigarettes     Quit date:      Years since quittin.2    Smokeless tobacco: Never   Vaping Use    Vaping Use: Never used   Substance and Sexual Activity    Alcohol use: Not Currently    Drug use: Never    Sexual activity: Not on file   Other Topics Concern    Not on file   Social History Narrative    Not on file     Social Determinants of Health     Financial Resource Strain: Not on file   Food Insecurity: Not on file   Transportation Needs: Not on file   Physical Activity: Not on file   Stress: Not on file   Social Connections: Not on file   Intimate Partner Violence: Not on file   Housing Stability: Not on file     Exposure to Industrial/ environmental Carcinogens: {YES***/NO:60}      ALLERGIES:   Allergies   Allergen Reactions    Demerol Hcl [Meperidine] Other (See Comments)     hallucinations    Codeine     Entex Lq [Phenylephrine-Guaifenesin]     Sulfa Antibiotics           Current Outpatient Medications   Medication Sig Dispense Refill    ONETOUCH VERIO strip TEST FIVE TIMES PER DAY      fluticasone (FLONASE) 50 MCG/ACT nasal spray 1 spray by Nasal route daily      SEMGLEE, YFGN, 100 UNIT/ML SOPN ADMINISTER 22 UNITS UNDER THE SKIN DAILY      insulin lispro, 1 Unit Dial, (HUMALOG/ADMELOG) 100 UNIT/ML SOPN       metFORMIN (GLUCOPHAGE-XR) 500 MG extended release tablet TAKE 1 TABLET BY MOUTH DAILY      loratadine-pseudoephedrine (CLARITIN-D 24HR)  MG per extended release tablet Take 1 tablet by mouth daily      omeprazole (PRILOSEC) 10 MG delayed release capsule Take 10 mg by mouth daily      Omega-3 Fatty Acids (FISH OIL) 1000 MG capsule Take by mouth daily      Cholecalciferol (VITAMIN D3) 50 MCG (2000 UT) CAPS Take by mouth      Red Yeast Rice 600 MG CAPS Take by mouth       No current facility-administered medications for this encounter.      No outpatient medications have been marked as taking for the 3/9/23 encounter [facility] Encounter) with Giovanni Gonzalez MD.            LABORATORY STUDIES: ***  Onc labs: No results found for: PSA, CEA, LDH, AFP    Lab Results   Component Value Date    CREATININE 0.7 02/27/2023     No results found for: BUN    CBC:   Lab Results   Component Value Date/Time    WBC 5.6 10/07/2011 12:00 PM    RBC 4.48 10/07/2011 12:00 PM    HCT 40.2 10/07/2011 12:00 PM    MCV 89.9 10/07/2011 12:00 PM    MCH 30.4 10/07/2011 12:00 PM    MCHC 33.8 10/07/2011 12:00 PM    RDW 12.8 10/07/2011 12:00 PM     10/07/2011 12:00 PM    MPV 8.3 10/07/2011 28:40 PM     MetabolicPanel:  Lab Results   Component Value Date/Time    CREATININE 0.7 2023 12:57 PM    LABGLOM >60 2023 12:57 PM    CALCIUM 9.20 2022 03:13 PM           PATHOLOGY: ***      RADIOLOGIC STUDIES: ***                  Review of Systems   Constitutional:  Positive for fatigue (chronic). Negative for activity change, appetite change and unexpected weight change. HENT:  Positive for sore throat. Negative for congestion and trouble swallowing. Respiratory:  Positive for cough and shortness of breath (at times). Negative for apnea, chest tightness and wheezing. Cardiovascular:  Negative for chest pain and leg swelling. Gastrointestinal:  Positive for abdominal pain (tenderness). Negative for blood in stool, nausea and vomiting. Genitourinary:  Negative for dysuria, frequency, hematuria and urgency. Musculoskeletal:  Positive for back pain. Negative for arthralgias and myalgias. Neurological:  Negative for dizziness, weakness, numbness and headaches. Psychiatric/Behavioral:  Negative for confusion. A complete review of systems was performed and found to be negative except as presented above. PHYSICAL EXAMINATION:   VITAL SIGNS: BP (!) 156/85   Pulse 69   Temp 98.2 °F (36.8 °C) (Infrared)   Resp 16   Ht 5' 3\" (1.6 m)   Wt 191 lb 9.6 oz (86.9 kg)   SpO2 94%   BMI 33.94 kg/m²   ECOG PERFORMANCE STATUS: ***  PAIN RATIN/10  GENERAL: Pleasant well-developed adult ***. In no acute distress. Alert and oriented ×3; clear mentation with appropriate affect  SKIN: Warm and dry, without jaundice, ecchymoses, or petechiae. HEENT: Normocephalic, atraumatic. PERRL/EOMI; ears, nose and lips unremarkable on external examination; oral cavity/oropharyngeal mucosa moist without lesion or exudate  NECK:  No JVD; no palpable cervical adenopathy.   THORAX: No palpable supraclavicular or axillary adenopathy;  LUNGS: clear bilaterally. Good inspiratory effort, no accessory muscle use. CARDIAC: Regular rate and rhythm, without murmur  BREASTS: ***  ABDOMEN: Soft, nontender, bowel sounds present  PELVIS/RECTAL: No significant external hemorrhoidal tissue. Sphincter tone normal. Prostate within limits of this exam, no intrinsic rectal mass is appreciated. EXTREMITIES: ***  NEUROLOGIC: No grossly apparent focal deficits. Cranial nerves grossly intact      Thank you for allowing my assistance in the care of Ms. Hatfielddaniel Avitia PhD MD  Radiation Oncology     ATTESTATION: *** minutes face-to-face time,  >50% spent in counseling/discussion/education. CC: Poonam Almanzar, Torri Georeg, ADDISON  ACC: Tumor Registry: [address]      This document was created using a voice-recognition program.  Computer generated transcription errors may be present.

## 2023-03-09 NOTE — PROGRESS NOTES
Emani Lopez  3/9/2023    Chaperone: No    Advance Directives       Power of  Living Will ACP-Advance Directive ACP-Power of     Not on File Not on File Not on File Not on File            Vitals:    03/09/23 1302   BP: (!) 156/85   Pulse: 69   Resp: 16   Temp: 98.2 °F (36.8 °C)   SpO2: 94%       Wt Readings from Last 1 Encounters:   03/09/23 191 lb 9.6 oz (86.9 kg)        Lab Results   Component Value Date    CREATININE 0.7 02/27/2023     No results found for: BUN    Mediport: no    Pacemaker/ICD: no    Previous XRT: no    Past Medical History:   Diagnosis Date    Cancer ([facility]     Esophagus    Diabetes mellitus ([facility]     Ovarian cancer in remission 2011    age 55, no XRT or Chemo per patient, pt stated 11/23/2022 that she didn't actually have Ovarian CA     Past Surgical History:   Procedure Laterality Date    COLONOSCOPY  02/17/2023    HYSTERECTOMY (CERVIX STATUS UNKNOWN)  2011    total    KAN STEROTACTIC LOC BREAST BIOPSY RIGHT Right 01/18/2019    benign    NECK SURGERY  1989    Ruptured disc    OVARY REMOVAL Bilateral 2011    total    TONSILLECTOMY      UPPER GASTROINTESTINAL ENDOSCOPY Left 02/28/2023    ENDOSCOPIC ULTRASOUND WITH RADIAL SCOPE performed by Emily Patel MD at [address] [place] [address] RIGHT Right 11/25/2011    benign       Allergies   Allergen Reactions    Demerol Hcl [Meperidine] Other (See Comments)     hallucinations    Codeine     Entex Lq [Phenylephrine-Guaifenesin]     Sulfa Antibiotics           Current Outpatient Medications:     ONETOUCH VERIO strip, TEST FIVE TIMES PER DAY, Disp: , Rfl:     fluticasone (FLONASE) 50 MCG/ACT nasal spray, 1 spray by Nasal route daily, Disp: , Rfl:     SEMGLEE, YFGN, 100 UNIT/ML SOPN, ADMINISTER 22 UNITS UNDER THE SKIN DAILY, Disp: , Rfl:     insulin lispro, 1 Unit Dial, (HUMALOG/ADMELOG) 100 UNIT/ML SOPN, , Disp: , Rfl:     metFORMIN (GLUCOPHAGE-XR) 500 MG extended release tablet, TAKE 1 TABLET BY MOUTH DAILY, Disp: , Rfl:     loratadine-pseudoephedrine (CLARITIN-D 24HR)  MG per extended release tablet, Take 1 tablet by mouth daily, Disp: , Rfl:     omeprazole (PRILOSEC) 10 MG delayed release capsule, Take 10 mg by mouth daily, Disp: , Rfl:     Omega-3 Fatty Acids (FISH OIL) 1000 MG capsule, Take by mouth daily, Disp: , Rfl:     Cholecalciferol (VITAMIN D3) 50 MCG (2000 UT) CAPS, Take by mouth, Disp: , Rfl:     Red Yeast Rice 600 MG CAPS, Take by mouth, Disp: , Rfl:       ADDITIONAL COMMENTS: Patient here for consult with Dr. Arnold Stanford.       Soheila Carpio, RN BSN

## 2023-03-14 ENCOUNTER — SOCIAL WORK (OUTPATIENT)
Dept: RADIATION ONCOLOGY | Age: 58
End: 2023-03-14

## 2023-03-14 NOTE — PROGRESS NOTES
Oncology Social Work    Date: 3/14/2023  Time: 9:47 AM  Name: Agustina Hebert  MRN: [id number]     Contact Type: Follow-up    Note:   Situation: This staff contacted Agustina Hebert via phone support to introduce myself as their assigned Oncology Social Worker. Background:  Rima Manzo attended her initial treatment last week and completed her Distress Assessment. During this visit she indicated she would like assistance in completing her Advance Directive (AD). There were no other high stress indicator areas referenced on her assessment. Assessment: Rima Manzo was not available at the time of this call, therefore this staff had to leave a voicemail on her phone. Explained the options to complete her AD during her next visit if she would like. Recommendation: Patient will initiate further follow-up based on need.  provided Rima Manzo with my contact information.  will continue to follow up as needed.       JAI Avendano, DEAN, ESTEBAN  Oncology Social Worker      Electronically signed by JAI Avendano, DEAN, ESTEBAN on 3/14/2023 at 9:47 AM

## 2023-03-15 ENCOUNTER — HOSPITAL ENCOUNTER (OUTPATIENT)
Dept: RADIATION ONCOLOGY | Age: 58
Discharge: HOME | End: 2023-03-15
Payer: COMMERCIAL

## 2023-03-15 PROCEDURE — 77386 HC NTSTY MODUL RAD TX DLVR CPLX: CPT | Performed by: RADIOLOGY

## 2023-03-15 PROCEDURE — 77338 DESIGN MLC DEVICE FOR IMRT: CPT | Performed by: RADIOLOGY

## 2023-03-16 ENCOUNTER — HOSPITAL ENCOUNTER (OUTPATIENT)
Dept: RADIATION ONCOLOGY | Age: 58
Discharge: HOME | End: 2023-03-16
Payer: COMMERCIAL

## 2023-03-16 VITALS
BODY MASS INDEX: 34.29 KG/M2 | RESPIRATION RATE: 16 BRPM | OXYGEN SATURATION: 94 % | HEART RATE: 75 BPM | DIASTOLIC BLOOD PRESSURE: 85 MMHG | SYSTOLIC BLOOD PRESSURE: 141 MMHG | TEMPERATURE: 98.1 F | WEIGHT: 193.56 LBS

## 2023-03-16 PROCEDURE — 77386 HC NTSTY MODUL RAD TX DLVR CPLX: CPT | Performed by: RADIOLOGY

## 2023-03-17 ENCOUNTER — HOSPITAL ENCOUNTER (OUTPATIENT)
Dept: RADIATION ONCOLOGY | Age: 58
Discharge: HOME | End: 2023-03-17
Payer: COMMERCIAL

## 2023-03-17 PROCEDURE — 77386 HC NTSTY MODUL RAD TX DLVR CPLX: CPT | Performed by: RADIOLOGY

## 2023-03-17 NOTE — PROGRESS NOTES
Comprehensive Nutrition Assessment    Type and Reason for Visit:  Initial (lower third esophagus)    Nutrition Recommendations/Plan:   Encouraged PO at best efforts  Modify food texture with any signs of dysphagia  ONS trail (Glucerna, premier protein samples provided), ONS coupons provided  Call RD with needs/questoins      Nutrition Related Findings:    Patient seen and reports that she had chemo a couple days ago so is feeling a bit worn out. Patient says that she has changed how she eats some (more soft foods). Patient denies issues with swallowing. Patient does admit that she has had some diarrhea in the morning and attributes it to her metformin (patient is on extended release metformin) and changes in eating some foods. Patient reports decreased intake, has only had eggs and a small crossiant today so far. Patient says that she has been sipping on gatorade zero. Patient has not had ONS yet but is agreeable to try. Patient denies nutrition impact symptoms: no dry mouth, no sore mouth/throat, no taste changes, no difficulty swallowing. Wound Type: None       Current Nutrition Intake & Therapies:    Average Meal Intake: 26-50%  Average Supplements Intake:  (patient will trial)    Anthropometric Measures:  Height: 5'3\"  Ideal Body Weight (IBW):   ( )    Admission Body Weight: 190 lb (86.2 kg) (per patient)  Current Body Weight: 193 lb (87.5 kg) (3/16/23),   IBW. Current BMI (kg/m2): 34.2  Usual Body Weight: 190 lb (86.2 kg) (per patient)  % Weight Change (Calculated): 1.6  BMI Categories: Obese Class 1 (BMI 30.0-34. 9)    Nutrition Diagnosis:   Inadequate oral intake related to catabolic illness (head and neck cancer on chemo and radiation therapy) as evidenced by intake 26-50%    Nutrition Interventions:   Food and/or Nutrient Delivery: Continue Current Diet, Modify Current Diet (modify diet textures as needed with any signs of dysphagia)  Nutrition Education/Counseling: Education initiated (Provided Nutrition Consideration for Head and Neck patients)  Coordination of Nutrition Care:  (Continue to monitor while on treatment)  Plan of Care discussed with: patient    Goals:  Goal:  (goals set)  Goals: Meet at least 75% of estimated needs, by next RD assessment (maintain weight)     Nutrition Monitoring and Evaluation:   Food/Nutrient Intake Outcomes: Food and Nutrient Intake, Supplement Intake  Physical Signs/Symptoms Outcomes: Chewing or Swallowing, GI Status, Nutrition Focused Physical Findings, Weight      Jennie Gallardo RD, LD  Contact: [phone]

## 2023-03-20 ENCOUNTER — HOSPITAL ENCOUNTER (OUTPATIENT)
Dept: RADIATION ONCOLOGY | Age: 58
Discharge: HOME | End: 2023-03-20
Payer: COMMERCIAL

## 2023-03-20 PROCEDURE — 77386 HC NTSTY MODUL RAD TX DLVR CPLX: CPT | Performed by: RADIOLOGY

## 2023-03-21 ENCOUNTER — HOSPITAL ENCOUNTER (OUTPATIENT)
Dept: RADIATION ONCOLOGY | Age: 58
Discharge: HOME | End: 2023-03-21
Payer: COMMERCIAL

## 2023-03-21 PROCEDURE — 77386 HC NTSTY MODUL RAD TX DLVR CPLX: CPT | Performed by: RADIOLOGY

## 2023-03-21 NOTE — PROGRESS NOTES
[facility] [address]  Phone: [phone]   Toll Free: [phone]   Fax: [phone]    RADIATION ONCOLOGY EDUCATION    CHIEF COMPLAINT: Kenny Almonte presents to radiation oncology today for education regarding treatment to the Esophagus. PLAN:   Expected and potential side effects were discussed in detail, along with written handouts. Skin care and moisturization instructions were discussed in detail. Patient was not agreeable to physical therapy referral. Explained referral could be placed at any time if patient changes their mind. Patient was informed of dietician that is available weekly and as needed. Educated on weekly on treatment visit to meet with Physician to monitor side effects and treatment course. Informed patient that Physician is available at any time to discuss radiation side effects/concerns through out treatment course. Kenny Almonte had the opportunity to ask questions, and indicated that all questions were satisfactorily addressed.

## 2023-03-22 ENCOUNTER — HOSPITAL ENCOUNTER (OUTPATIENT)
Dept: RADIATION ONCOLOGY | Age: 58
Discharge: HOME | End: 2023-03-22
Payer: COMMERCIAL

## 2023-03-22 PROCEDURE — 77386 HC NTSTY MODUL RAD TX DLVR CPLX: CPT | Performed by: RADIOLOGY

## 2023-03-23 ENCOUNTER — HOSPITAL ENCOUNTER (OUTPATIENT)
Dept: RADIATION ONCOLOGY | Age: 58
Discharge: HOME | End: 2023-03-23
Payer: COMMERCIAL

## 2023-03-23 VITALS
RESPIRATION RATE: 16 BRPM | BODY MASS INDEX: 33.9 KG/M2 | HEART RATE: 71 BPM | TEMPERATURE: 98 F | DIASTOLIC BLOOD PRESSURE: 77 MMHG | WEIGHT: 191.36 LBS | OXYGEN SATURATION: 96 % | SYSTOLIC BLOOD PRESSURE: 126 MMHG

## 2023-03-23 PROCEDURE — 77386 HC NTSTY MODUL RAD TX DLVR CPLX: CPT | Performed by: RADIOLOGY

## 2023-03-23 NOTE — PROGRESS NOTES
Outpatient Medications   Medication Sig Dispense Refill    ONETOUCH VERIO strip TEST FIVE TIMES PER DAY      fluticasone (FLONASE) 50 MCG/ACT nasal spray 1 spray by Nasal route daily      SEMGLEE, YFGN, 100 UNIT/ML SOPN ADMINISTER 22 UNITS UNDER THE SKIN DAILY      insulin lispro, 1 Unit Dial, (HUMALOG/ADMELOG) 100 UNIT/ML SOPN       metFORMIN (GLUCOPHAGE-XR) 500 MG extended release tablet TAKE 1 TABLET BY MOUTH DAILY      loratadine-pseudoephedrine (CLARITIN-D 24HR)  MG per extended release tablet Take 1 tablet by mouth daily      omeprazole (PRILOSEC) 10 MG delayed release capsule Take 10 mg by mouth daily      Omega-3 Fatty Acids (FISH OIL) 1000 MG capsule Take by mouth daily      Cholecalciferol (VITAMIN D3) 50 MCG ( UT) CAPS Take by mouth      Red Yeast Rice 600 MG CAPS Take by mouth       No current facility-administered medications for this encounter. * New    PHYSICAL EXAM:       ECO - Asymptomatic (Fully active, able to carry on all pre-disease activities without restriction)     General: NAD, AO x 3, Mentation is clear with appropriate affect. HEENT: Normocephalic, atraumatic  Thorax:  Unlabored  COR: Nontachycardic  Abdomen:  Non-distended  Neuro:  Cranial nerves grossly intact; no focal deficits  Skin - treatment portal: SEE above. Unremarkable    Chemotherapy Update: Concurrent chemotherapy    Treatment Imaging: CBCT and All imaging reviewed and approved by Dr. Debbie Tineo: No significant radiation side effects. New medications, diagnostic results: No new images, medications, or changes to current recommendations    PLAN: Again reviewed potential side effects of radiation for the patient's treatment. Continue local/topical care. Continue current radiation course as prescribed.

## 2023-03-24 ENCOUNTER — HOSPITAL ENCOUNTER (OUTPATIENT)
Dept: RADIATION ONCOLOGY | Age: 58
Discharge: HOME | End: 2023-03-24
Payer: COMMERCIAL

## 2023-03-24 PROCEDURE — 77386 HC NTSTY MODUL RAD TX DLVR CPLX: CPT | Performed by: RADIOLOGY

## 2023-03-27 ENCOUNTER — HOSPITAL ENCOUNTER (OUTPATIENT)
Dept: RADIATION ONCOLOGY | Age: 58
Discharge: HOME | End: 2023-03-27
Payer: COMMERCIAL

## 2023-03-27 DIAGNOSIS — C15.5 MALIGNANT NEOPLASM OF DISTAL THIRD OF ESOPHAGUS (HCC): Primary | ICD-10-CM

## 2023-03-27 PROCEDURE — 77014 CHG CT GUIDANCE RADIATION THERAPY FLDS PLACEMENT: CPT | Performed by: RADIOLOGY

## 2023-03-27 PROCEDURE — 77386 HC NTSTY MODUL RAD TX DLVR CPLX: CPT | Performed by: RADIOLOGY

## 2023-03-28 ENCOUNTER — HOSPITAL ENCOUNTER (OUTPATIENT)
Dept: RADIATION ONCOLOGY | Age: 58
Discharge: HOME | End: 2023-03-28
Payer: COMMERCIAL

## 2023-03-28 PROCEDURE — 77014 CHG CT GUIDANCE RADIATION THERAPY FLDS PLACEMENT: CPT | Performed by: RADIOLOGY

## 2023-03-28 PROCEDURE — 77386 HC NTSTY MODUL RAD TX DLVR CPLX: CPT | Performed by: RADIOLOGY

## 2023-03-29 ENCOUNTER — HOSPITAL ENCOUNTER (OUTPATIENT)
Dept: RADIATION ONCOLOGY | Age: 58
Discharge: HOME | End: 2023-03-29
Payer: COMMERCIAL

## 2023-03-29 PROCEDURE — 77014 CHG CT GUIDANCE RADIATION THERAPY FLDS PLACEMENT: CPT | Performed by: RADIOLOGY

## 2023-03-29 PROCEDURE — 77386 HC NTSTY MODUL RAD TX DLVR CPLX: CPT | Performed by: RADIOLOGY

## 2023-03-30 ENCOUNTER — HOSPITAL ENCOUNTER (OUTPATIENT)
Dept: RADIATION ONCOLOGY | Age: 58
Discharge: HOME | End: 2023-03-30
Payer: COMMERCIAL

## 2023-03-30 VITALS
OXYGEN SATURATION: 98 % | HEART RATE: 83 BPM | RESPIRATION RATE: 16 BRPM | TEMPERATURE: 98 F | SYSTOLIC BLOOD PRESSURE: 142 MMHG | WEIGHT: 186.73 LBS | BODY MASS INDEX: 33.08 KG/M2 | DIASTOLIC BLOOD PRESSURE: 88 MMHG

## 2023-03-30 PROCEDURE — 77386 HC NTSTY MODUL RAD TX DLVR CPLX: CPT | Performed by: RADIOLOGY

## 2023-03-30 ASSESSMENT — PAIN DESCRIPTION - LOCATION: LOCATION: BACK

## 2023-03-30 ASSESSMENT — PAIN SCALES - GENERAL: PAINLEVEL_OUTOF10: 6

## 2023-03-30 NOTE — PROGRESS NOTES
[address] [facility] [address], [address]        [facility], [address] [facility]        Ruthann Jadon: [phone]        F: [phone]       [email or web address]            Dr. Barbara Guzman MD MS          Dr. Zeyad Pepper MD PhD    ON TREATMENT VISIT (OTV) NOTE     Date of Service: 3/30/2023  Patient ID: Adra Sicard   : 1965  MRN: [id number]   Acct Number: [de-identified]     RADIATION ONCOLOGY ATTENDING:  Nehemias Sims PhD, MD    DIAGNOSIS:  Cancer Staging  No matching staging information was found for the patient. Treatment Area: Thoracic    Current Total Dose(cGy): 2160  Current Fraction:   Final/Cumulative Rx. Dose (cGy): 5040    Patient was seen today for weekly visit. Wt Readings from Last 3 Encounters:   23 186 lb 11.7 oz (84.7 kg)   23 191 lb 5.8 oz (86.8 kg)   23 193 lb 9 oz (87.8 kg)       BP (!) 142/88   Pulse 83   Temp 98 °F (36.7 °C) (Infrared)   Resp 16   Wt 186 lb 11.7 oz (84.7 kg)   SpO2 98%   BMI 33.08 kg/m²     Lab Results   Component Value Date    WBC 5.6 10/07/2011     10/07/2011       Comfort Alteration  Fatigue:Able to perform daily activities with rest periods    Pain Location: Back  Pain Intensity (Current): 6 Severe pain  Pain Treatment: N/A, planning on starting OTC meds  Pain Relief: n/a    Emotional Alteration:   Coping: effective    Nutritional Alteration  Anorexia: none   Nausea: 1-2 episodes of nausea in 24 hours, occasionally  Vomiting: No vomiting   Dyspepsia/Heartburn: None  Dysphagia/Esophagitis: Mild dysphagia, but can eat regular diet, some softer foods    Skin Alteration   Skin reaction: Bright erythema SH recommended antifungal cream OTC.     Mucous Membrane Alteration  Mucositis XRT Related: None  Pharynx and Esophagus- Acute: No change over baseline  Voice Changes: Normal    Ventilation Alteration  Cough: None  Hemoptysis: None  Dyspnea: Normal  Mucous

## 2023-03-31 ENCOUNTER — HOSPITAL ENCOUNTER (OUTPATIENT)
Dept: RADIATION ONCOLOGY | Age: 58
Discharge: HOME | End: 2023-03-31
Payer: COMMERCIAL

## 2023-03-31 PROCEDURE — 77386 HC NTSTY MODUL RAD TX DLVR CPLX: CPT | Performed by: RADIOLOGY

## 2023-04-03 ENCOUNTER — HOSPITAL ENCOUNTER (OUTPATIENT)
Dept: RADIATION ONCOLOGY | Age: 58
Discharge: HOME | End: 2023-04-03
Payer: COMMERCIAL

## 2023-04-03 PROCEDURE — 77386 HC NTSTY MODUL RAD TX DLVR CPLX: CPT | Performed by: RADIOLOGY

## 2023-04-04 ENCOUNTER — HOSPITAL ENCOUNTER (OUTPATIENT)
Dept: RADIATION ONCOLOGY | Age: 58
Discharge: HOME | End: 2023-04-04
Payer: COMMERCIAL

## 2023-04-04 ENCOUNTER — HOSPITAL ENCOUNTER (OUTPATIENT)
Dept: PHYSICAL THERAPY | Age: 58
Setting detail: THERAPIES SERIES
Discharge: HOME | End: 2023-04-04
Payer: COMMERCIAL

## 2023-04-04 PROCEDURE — 97162 PT EVAL MOD COMPLEX 30 MIN: CPT

## 2023-04-04 PROCEDURE — 97110 THERAPEUTIC EXERCISES: CPT

## 2023-04-04 PROCEDURE — 77386 HC NTSTY MODUL RAD TX DLVR CPLX: CPT | Performed by: RADIOLOGY

## 2023-04-04 NOTE — PROGRESS NOTES
** PLEASE SIGN, DATE AND TIME CERTIFICATION BELOW AND RETURN TO [facility] OUTPATIENT REHABILITATION (FAX #: [phone]). ATTEST/CO-SIGN IF ACCESSING VIA [employer] [place]. THANK YOU.**    I certify that I have examined the patient below and determined that Physical Medicine and Rehabilitation service is necessary and that I approve the established plan of care for up to 90 days or as specifically noted.   Attestation, signature or co-signature of physician indicates approval of certification requirements.    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 ([facility] [address]    Treatment Diagnosis Deconditioning, generalized muscle weakness   Date of Evaluation 23    Additional Pertinent History DM, ovarian CA       Functional Outcome Measure Used O: BFI   Functional Outcome Score 4.7 (23)       Insurance: Primary: Payor: Marita Plan /  /  / ,   Secondary:    Authorization Information: PRE CERTIFICATION REQUIRED: NO  INSURANCE THERAPY BENEFIT:  21 VISITS ALLOWED, ANY MORE, A MEDICAL REVIEW  [address] [phone]  AQUATIC THERAPY COVERED: YES  MODALITIES COVERED:  YES   Visit # 1, 1/10 for progress note   Visits Allowed: 20   Recertification Date:    Physician Follow-Up: Radiation thru 23, follows with Dr. Salvador Cagle (23) chemo will be on Wednesday    Physician Orders: Preconditioning for GEJ CA   History of Present Illness: 2/15/23 esophageal CA, concurrent chemoradiation with carboplatin and Taxol       SUBJECTIVE: Notes fatigue since starting chemo and her appetite has been

## 2023-04-05 ENCOUNTER — HOSPITAL ENCOUNTER (OUTPATIENT)
Dept: RADIATION ONCOLOGY | Age: 58
Discharge: HOME | End: 2023-04-05
Payer: COMMERCIAL

## 2023-04-05 PROCEDURE — 77386 HC NTSTY MODUL RAD TX DLVR CPLX: CPT | Performed by: RADIOLOGY

## 2023-04-06 ENCOUNTER — HOSPITAL ENCOUNTER (OUTPATIENT)
Dept: RADIATION ONCOLOGY | Age: 58
Discharge: HOME | End: 2023-04-06
Payer: COMMERCIAL

## 2023-04-06 VITALS
BODY MASS INDEX: 33.43 KG/M2 | HEART RATE: 75 BPM | WEIGHT: 188.71 LBS | OXYGEN SATURATION: 96 % | TEMPERATURE: 98 F | RESPIRATION RATE: 16 BRPM | SYSTOLIC BLOOD PRESSURE: 128 MMHG | DIASTOLIC BLOOD PRESSURE: 80 MMHG

## 2023-04-06 PROCEDURE — 77386 HC NTSTY MODUL RAD TX DLVR CPLX: CPT | Performed by: RADIOLOGY

## 2023-04-06 NOTE — PROGRESS NOTES
Mima Aguirre prescribed a medication for this, but her pharmacy had not received it yet as of last evening. Going to check on availability again today. New medications, diagnostic results: No new images, medications, or changes to current recommendations    PLAN: Again reviewed potential side effects of radiation for the patient's treatment. Continue local/topical care. Continue current radiation course as prescribed.

## 2023-04-07 ENCOUNTER — HOSPITAL ENCOUNTER (OUTPATIENT)
Dept: RADIATION ONCOLOGY | Age: 58
Discharge: HOME | End: 2023-04-07
Payer: COMMERCIAL

## 2023-04-07 PROCEDURE — 77386 HC NTSTY MODUL RAD TX DLVR CPLX: CPT | Performed by: RADIOLOGY

## 2023-04-17 ENCOUNTER — HOSPITAL ENCOUNTER (OUTPATIENT)
Dept: PHYSICAL THERAPY | Age: 58
Setting detail: THERAPIES SERIES
End: 2023-04-17
Payer: COMMERCIAL

## 2023-04-17 ENCOUNTER — HOSPITAL ENCOUNTER (OUTPATIENT)
Dept: RADIATION ONCOLOGY | Age: 58
Discharge: HOME | End: 2023-04-17
Payer: COMMERCIAL

## 2023-04-17 PROCEDURE — 77386 HC NTSTY MODUL RAD TX DLVR CPLX: CPT | Performed by: RADIOLOGY

## 2023-04-18 ENCOUNTER — HOSPITAL ENCOUNTER (OUTPATIENT)
Dept: RADIATION ONCOLOGY | Age: 58
Discharge: HOME | End: 2023-04-18
Payer: COMMERCIAL

## 2023-04-18 PROCEDURE — 77338 DESIGN MLC DEVICE FOR IMRT: CPT | Performed by: RADIOLOGY

## 2023-04-18 PROCEDURE — 77386 HC NTSTY MODUL RAD TX DLVR CPLX: CPT | Performed by: RADIOLOGY

## 2023-04-19 ENCOUNTER — HOSPITAL ENCOUNTER (OUTPATIENT)
Dept: RADIATION ONCOLOGY | Age: 58
Discharge: HOME | End: 2023-04-19
Payer: COMMERCIAL

## 2023-04-19 PROCEDURE — 77386 HC NTSTY MODUL RAD TX DLVR CPLX: CPT | Performed by: RADIOLOGY

## 2023-04-20 ENCOUNTER — HOSPITAL ENCOUNTER (OUTPATIENT)
Dept: RADIATION ONCOLOGY | Age: 58
Discharge: HOME | End: 2023-04-20
Payer: COMMERCIAL

## 2023-04-20 VITALS
HEART RATE: 99 BPM | WEIGHT: 184.08 LBS | BODY MASS INDEX: 32.61 KG/M2 | OXYGEN SATURATION: 97 % | DIASTOLIC BLOOD PRESSURE: 78 MMHG | SYSTOLIC BLOOD PRESSURE: 121 MMHG | RESPIRATION RATE: 16 BRPM | TEMPERATURE: 98 F

## 2023-04-20 PROCEDURE — 77386 HC NTSTY MODUL RAD TX DLVR CPLX: CPT | Performed by: RADIOLOGY

## 2023-04-20 NOTE — DISCHARGE INSTRUCTIONS
PATIENT DISCHARGE INSTRUCTIONS    Remember that side effects present at the end of your treatments will improve within a few weeks after the last treatment. Eat well balanced meals even though your treatments are finished. This will help speed the healing process. Continue any special diets prescribed to control side effects until these side effects have been resolved. Get plenty of rest.  If you have experienced fatigue and/or weakness, this may continue for several weeks after your last treatment. Continue with your daily activities according to the way you feel. Continue to be gentle with your skin. Follow your present skin care instructions until your follow-up visit. IF YOU DEVELOP ANY CHANGES IN YOUR SKIN IN THE AREA TREATED WITH RADIATION, PLEASE CALL THE RADIATION ONCOLOGY NURSE [phone]. Protect your skin from any injury and avoid direct sun exposure in the treatment area. The skin in the treated area may always be more sensitive than the rest of your skin. Always use SPF 27 or higher sun block if you will be in the sun and cannot avoid exposure. Please contact your referring physician for a follow-up appointment in addition to your Radiation Oncology appointment. You may receive a survey via text message or e-mail at some point after treatment. We would appreciate your time in filling out this survey and giving us your honest feedback about your experience with us. We strive for excellence and hope that you were \"Very Satisfied\" with your care and our service. Presence of pain: No  Medication Taper: No    See Instructions Dated: N/A  Follow up orders:  To be ordered by Dr. Lisa Murray

## 2023-04-20 NOTE — PROGRESS NOTES
n/a    Additional Comments: Using Eucerin nightly. CeraVe daily. MEDICATIONS:     Current Outpatient Medications   Medication Sig Dispense Refill    fluconazole (DIFLUCAN) 200 MG tablet Take 1 tablet by mouth daily      ONETOUCH VERIO strip TEST FIVE TIMES PER DAY      fluticasone (FLONASE) 50 MCG/ACT nasal spray 1 spray by Nasal route daily      SEMGLEE, YFGN, 100 UNIT/ML SOPN ADMINISTER 22 UNITS UNDER THE SKIN DAILY      insulin lispro, 1 Unit Dial, (HUMALOG/ADMELOG) 100 UNIT/ML SOPN       metFORMIN (GLUCOPHAGE-XR) 500 MG extended release tablet TAKE 1 TABLET BY MOUTH DAILY      loratadine-pseudoephedrine (CLARITIN-D 24HR)  MG per extended release tablet Take 1 tablet by mouth daily      omeprazole (PRILOSEC) 10 MG delayed release capsule Take 10 mg by mouth daily      Omega-3 Fatty Acids (FISH OIL) 1000 MG capsule Take by mouth daily      Cholecalciferol (VITAMIN D3) 50 MCG (2000 UT) CAPS Take by mouth      Red Yeast Rice 600 MG CAPS Take by mouth       No current facility-administered medications for this encounter. * New    PHYSICAL EXAM:       ECO - Asymptomatic (Fully active, able to carry on all pre-disease activities without restriction)     General: NAD, AO x 3, Mentation is clear with appropriate affect. HEENT: Normocephalic, atraumatic  Thorax:  Unlabored  COR: Nontachycardic  Abdomen:  Non-distended  Neuro:  Cranial nerves grossly intact; no focal deficits  Skin - treatment portal: SEE above. Unremarkable    Chemotherapy Update: Concurrent chemotherapy    Treatment Imaging: CBCT and All imaging reviewed and approved by Dr. Escobar Fears: No significant radiation side effects. Skin this week looks fantastic. Previous areas of rash/fungal infection have resolved completely. She denies any complaints this week with the skin. She will be finishing treatment tomorrow.       New medications, diagnostic results: No new images, medications, or changes to current

## 2023-04-21 ENCOUNTER — HOSPITAL ENCOUNTER (OUTPATIENT)
Dept: RADIATION ONCOLOGY | Age: 58
Discharge: HOME | End: 2023-04-21
Payer: COMMERCIAL

## 2023-04-21 PROCEDURE — 77386 HC NTSTY MODUL RAD TX DLVR CPLX: CPT | Performed by: RADIOLOGY

## 2023-05-15 ENCOUNTER — APPOINTMENT (OUTPATIENT)
Dept: PHYSICAL THERAPY | Age: 58
End: 2023-05-15
Payer: COMMERCIAL

## 2023-05-22 ENCOUNTER — APPOINTMENT (OUTPATIENT)
Dept: RADIATION ONCOLOGY | Age: 58
End: 2023-05-22
Payer: COMMERCIAL

## 2023-05-23 ENCOUNTER — HOSPITAL ENCOUNTER (OUTPATIENT)
Dept: PHYSICAL THERAPY | Age: 58
Setting detail: THERAPIES SERIES
Discharge: HOME | End: 2023-05-23
Payer: COMMERCIAL

## 2023-05-23 PROCEDURE — 97110 THERAPEUTIC EXERCISES: CPT

## 2023-05-23 PROCEDURE — 97535 SELF CARE MNGMENT TRAINING: CPT

## 2023-05-23 NOTE — PROGRESS NOTES
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 ([facility] [address]    Treatment Diagnosis Deconditioning, generalized muscle weakness   Date of Evaluation 23    Additional Pertinent History DM, ovarian CA        Functional Outcome Measure Used O: BFI   Functional Outcome Score 4.7 (23)        Insurance: Primary: Payor: Sharyle Blower /  /  / ,   Secondary:    Authorization Information: PRE CERTIFICATION REQUIRED: NO  INSURANCE THERAPY BENEFIT:  21 VISITS ALLOWED, ANY MORE, A MEDICAL REVIEW IS REQUIRED [address] [phone]  AQUATIC THERAPY COVERED: YES  MODALITIES COVERED:  YES   Visit # 2, 0/10 for progress note   Visits Allowed: 20   Recertification Date:    Physician Follow-Up: Dr. Raj Gonzalez 23, 23 Rad Onc Christen Reveal   Physician Orders: Preconditioning for GEJ CA   History of Present Illness: 2/15/23 esophageal CA, concurrent chemoradiation with carboplatin and Taxol thru 23, 23 endoscope, 23 CT scans      SUBJECTIVE: Pt reports by the end of the radiation, started with significant fatigue, weakness and GI issues. Started to require use of shower chair due to fatigue with ADLs. Abel Dumont has now taken 2 showers where she has not needed the AD. Reports missed prior appointments due to these side effects. States has lost 18-20# since starting treatment.     TREATMENT   Precautions: Esophageal CA   Pain: 5/10 center of back     X in shaded column indicates activity completed today   Modalities Parameters/  Location   Notes             Manual Therapy Time/Technique   Notes             Exercise/Intervention     Notes

## 2023-05-30 ENCOUNTER — HOSPITAL ENCOUNTER (OUTPATIENT)
Dept: CT IMAGING | Age: 58
Discharge: HOME | End: 2023-05-30
Payer: COMMERCIAL

## 2023-05-30 DIAGNOSIS — C15.5 MALIGNANT NEOPLASM OF ABDOMINAL ESOPHAGUS (HCC): ICD-10-CM

## 2023-05-30 PROCEDURE — 71260 CT THORAX DX C+: CPT

## 2023-05-30 PROCEDURE — 6360000004 HC RX CONTRAST MEDICATION: Performed by: INTERNAL MEDICINE

## 2023-05-30 RX ADMIN — IOPAMIDOL 80 ML: 755 INJECTION, SOLUTION INTRAVENOUS at 13:05

## 2023-05-31 ENCOUNTER — HOSPITAL ENCOUNTER (OUTPATIENT)
Dept: PHYSICAL THERAPY | Age: 58
Setting detail: THERAPIES SERIES
Discharge: HOME | End: 2023-05-31
Payer: COMMERCIAL

## 2023-05-31 PROCEDURE — 97110 THERAPEUTIC EXERCISES: CPT

## 2023-05-31 NOTE — PROGRESS NOTES
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 ([address]    Treatment Diagnosis Deconditioning, generalized muscle weakness   Date of Evaluation 23    Additional Pertinent History DM, ovarian CA        Functional Outcome Measure Used O: BFI   Functional Outcome Score 4.7 (23)        Insurance: Primary: Payor: Skylar King /  /  / ,   Secondary:    Authorization Information: PRE CERTIFICATION REQUIRED: NO  INSURANCE THERAPY BENEFIT:  21 VISITS ALLOWED, ANY MORE, A MEDICAL REVIEW IS REQUIRED [address] [phone]  AQUATIC THERAPY COVERED: YES  MODALITIES COVERED:  YES   Visit # 3, 1/10 for progress note   Visits Allowed: 20   Recertification Date:    Physician Follow-Up: Dr. Raymundo Powell 23, 23 Rad Onc Rocco Ramirez   Physician Orders: Preconditioning for GEJ CA   History of Present Illness: 2/15/23 esophageal CA, concurrent chemoradiation with carboplatin and Taxol thru 23, 23 endoscope, 23 CT scans      SUBJECTIVE:   Pt arrives reporting \"soreness\" in mid back. Had CT scan just recently but has not got the results back. Has been doing a little walking in her yard.        TREATMENT   Precautions: Esophageal CA   Pain: \"Sore\" center of back     X in shaded column indicates activity completed today   Modalities Parameters/  Location   Notes             Manual Therapy Time/Technique   Notes             Exercise/Intervention     Notes   Nustep  6' L2 x           Standing:       HR/TR 10x  x    3 Way Hip 10x  x    HS Curl 10x  x    Mini Squat 10x  x    Step Ups - fwd/lat 10x  x           Tband: ext, rows, ER,

## 2023-06-07 ENCOUNTER — HOSPITAL ENCOUNTER (OUTPATIENT)
Age: 58
Setting detail: SPECIMEN
Discharge: HOME | End: 2023-06-07
Payer: COMMERCIAL

## 2023-06-07 LAB
PD-L1 BLOCK ID: NORMAL
PD-L1 TISSUE SOURCE: NORMAL

## 2023-06-07 PROCEDURE — 88360 TUMOR IMMUNOHISTOCHEM/MANUAL: CPT

## 2023-06-09 ENCOUNTER — HOSPITAL ENCOUNTER (OUTPATIENT)
Dept: RADIATION ONCOLOGY | Age: 58
Discharge: HOME | End: 2023-06-09
Payer: COMMERCIAL

## 2023-06-09 VITALS
DIASTOLIC BLOOD PRESSURE: 91 MMHG | OXYGEN SATURATION: 95 % | WEIGHT: 181 LBS | RESPIRATION RATE: 16 BRPM | SYSTOLIC BLOOD PRESSURE: 144 MMHG | HEART RATE: 97 BPM | TEMPERATURE: 97.9 F | BODY MASS INDEX: 32.06 KG/M2

## 2023-06-09 PROCEDURE — 99212 OFFICE O/P EST SF 10 MIN: CPT | Performed by: NURSE PRACTITIONER

## 2023-06-09 ASSESSMENT — ENCOUNTER SYMPTOMS
WHEEZING: 0
TROUBLE SWALLOWING: 0
BACK PAIN: 0
BLOOD IN STOOL: 0
NAUSEA: 0
SHORTNESS OF BREATH: 1
APNEA: 0
SORE THROAT: 0
CHEST TIGHTNESS: 0
VOMITING: 0
ABDOMINAL PAIN: 0
COUGH: 0

## 2023-06-09 NOTE — PROGRESS NOTES
to have a lesion in the distal esophagus. Biopsy confirmed invasive adenocarcinoma, with endoscopic ultrasound staging showing uT1b. CT imaging showed lymphadenopathy around the celiac axis, confirmed on subsequent PET scan. Bessie Smart was seen for medical oncology evaluation and received a recommendation for chemoradiotherapy. She was seen in our clinic 3/9/2023 for evaluation and discussion regarding the role of radiation therapy in the management of her node positive distal esophageal cancer. After reviewing the diagnosis, clinical practice guidelines and specifics of the recommended radiation therapy, Bessie Smart was agreeable to proceeding. Bessie Smart tolerated her radiation therapy very well. She had incidental development of a skin rash unrelated to her radiation therapy which appeared to be multifactorial.  There was what appeared to be a fungal rash underneath the breasts and then she also developed a sparse maculopapular rash involving other areas especially on the trunk. She was placed on steroid medication, clotrimazole cream and fluconazole with subsequent resolution of the rash. Otherwise, Bessie Smart had mild dysphagia, but was able to maintain her fluid and nutritional intake. She did not have any unexpected side effects during the treatment course. INTERVAL HISTORY:   Bessie Smart returns to the [facility] today, 06/09/2023, for post-treatment follow-up after receiving radiation therapy for her node positive distal esophageal cancer. She did experience a skin rash that did not appear to be related to her radiation therapy. Subsequent treatment with multiple medications ultimately resolved the skin reaction. She experienced some mild dysphagia but was still able to eat and drink fluids without incident. Bessie Smart denies any complications related to radiation therapy. Since finishing treatment, she has followed with medical oncology and gastroenterology.   Most recent EGD and biopsy were reportedly

## 2023-06-29 ENCOUNTER — HOSPITAL ENCOUNTER (OUTPATIENT)
Dept: PHYSICAL THERAPY | Age: 58
Setting detail: THERAPIES SERIES
Discharge: HOME | End: 2023-06-29
Payer: COMMERCIAL

## 2023-06-29 ENCOUNTER — TRANSCRIBE ORDERS (OUTPATIENT)
Dept: ADMINISTRATIVE | Age: 58
End: 2023-06-29

## 2023-06-29 DIAGNOSIS — C15.5 CANCER OF ABDOMINAL ESOPHAGUS (HCC): Primary | ICD-10-CM

## 2023-06-29 PROCEDURE — 97110 THERAPEUTIC EXERCISES: CPT

## 2023-07-13 ENCOUNTER — HOSPITAL ENCOUNTER (OUTPATIENT)
Dept: PHYSICAL THERAPY | Age: 58
Setting detail: THERAPIES SERIES
End: 2023-07-13
Payer: COMMERCIAL

## 2023-07-24 ENCOUNTER — HOSPITAL ENCOUNTER (OUTPATIENT)
Dept: PET IMAGING | Age: 58
Discharge: HOME | End: 2023-07-24
Attending: INTERNAL MEDICINE
Payer: COMMERCIAL

## 2023-07-24 DIAGNOSIS — C15.5 CANCER OF ABDOMINAL ESOPHAGUS (HCC): ICD-10-CM

## 2023-07-24 PROCEDURE — 78815 PET IMAGE W/CT SKULL-THIGH: CPT

## 2023-07-24 PROCEDURE — A9552 F18 FDG: HCPCS | Performed by: INTERNAL MEDICINE

## 2023-07-24 PROCEDURE — 3430000000 HC RX DIAGNOSTIC RADIOPHARMACEUTICAL: Performed by: INTERNAL MEDICINE

## 2023-07-24 RX ORDER — FLUDEOXYGLUCOSE F 18 200 MCI/ML
14.4 INJECTION, SOLUTION INTRAVENOUS
Status: COMPLETED | OUTPATIENT
Start: 2023-07-24 | End: 2023-07-24

## 2023-07-24 RX ADMIN — FLUDEOXYGLUCOSE F 18 14.4 MILLICURIE: 200 INJECTION, SOLUTION INTRAVENOUS at 07:56

## 2023-07-27 ENCOUNTER — HOSPITAL ENCOUNTER (OUTPATIENT)
Dept: PHYSICAL THERAPY | Age: 58
Setting detail: THERAPIES SERIES
Discharge: HOME | End: 2023-07-27
Payer: COMMERCIAL

## 2023-07-27 PROCEDURE — 97110 THERAPEUTIC EXERCISES: CPT

## 2023-08-10 ENCOUNTER — HOSPITAL ENCOUNTER (OUTPATIENT)
Dept: PHYSICAL THERAPY | Age: 58
Setting detail: THERAPIES SERIES
Discharge: HOME | End: 2023-08-10
Payer: COMMERCIAL

## 2023-08-10 PROCEDURE — 97110 THERAPEUTIC EXERCISES: CPT

## 2023-09-05 ENCOUNTER — HOSPITAL ENCOUNTER (OUTPATIENT)
Dept: PHYSICAL THERAPY | Age: 58
Setting detail: THERAPIES SERIES
End: 2023-09-05
Payer: COMMERCIAL

## 2023-09-07 ENCOUNTER — HOSPITAL ENCOUNTER (OUTPATIENT)
Dept: PHYSICAL THERAPY | Age: 58
Setting detail: THERAPIES SERIES
Discharge: HOME | End: 2023-09-07
Payer: COMMERCIAL

## 2023-09-07 PROCEDURE — 97110 THERAPEUTIC EXERCISES: CPT

## 2023-10-05 ENCOUNTER — HOSPITAL ENCOUNTER (OUTPATIENT)
Dept: PHYSICAL THERAPY | Age: 58
Setting detail: THERAPIES SERIES
Discharge: HOME | End: 2023-10-05
Payer: COMMERCIAL

## 2023-10-05 PROCEDURE — 97110 THERAPEUTIC EXERCISES: CPT

## 2023-10-12 ENCOUNTER — APPOINTMENT (OUTPATIENT)
Dept: PHYSICAL THERAPY | Age: 58
End: 2023-10-12
Payer: COMMERCIAL

## 2023-10-19 ENCOUNTER — HOSPITAL ENCOUNTER (OUTPATIENT)
Dept: PHYSICAL THERAPY | Age: 58
Setting detail: THERAPIES SERIES
Discharge: HOME | End: 2023-10-19
Payer: COMMERCIAL

## 2023-10-19 PROCEDURE — 97110 THERAPEUTIC EXERCISES: CPT

## 2023-10-26 ENCOUNTER — HOSPITAL ENCOUNTER (OUTPATIENT)
Dept: PHYSICAL THERAPY | Age: 58
Setting detail: THERAPIES SERIES
Discharge: HOME | End: 2023-10-26
Payer: COMMERCIAL

## 2023-10-26 PROCEDURE — 97110 THERAPEUTIC EXERCISES: CPT

## 2023-11-02 ENCOUNTER — APPOINTMENT (OUTPATIENT)
Dept: PHYSICAL THERAPY | Age: 58
End: 2023-11-02
Payer: COMMERCIAL

## 2023-11-09 ENCOUNTER — HOSPITAL ENCOUNTER (OUTPATIENT)
Dept: PHYSICAL THERAPY | Age: 58
Setting detail: THERAPIES SERIES
Discharge: HOME | End: 2023-11-09
Payer: COMMERCIAL

## 2023-11-09 PROCEDURE — 97110 THERAPEUTIC EXERCISES: CPT

## 2023-11-09 NOTE — PROGRESS NOTES
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 [phone]  AQUATIC THERAPY COVERED: YES  MODALITIES COVERED:  YES   Visit # 07,  3/3 for progress note   Visits Allowed: 20   Recertification Date:    Physician Follow-Up: 11/10/23 Rad Onc Tima Hassan, Devasculation-23, Esophagectomy-23. Physician Orders: Preconditioning for GEJ CA   History of Present Illness: 2/15/23 esophageal CA, concurrent chemoradiation with carboplatin and Taxol thru 23, 23 endoscope, 23 CT scans      SUBJECTIVE: Patient reports she has not been sleeping well and believes that may be due to anxiety due to upcoming surgery. At times she is feeling like she is getting stronger, but it's not consistent. Brief Fatigue Inventory   Throughout our lives, most of us have times when we feel very tired or fatigued. Have you felt unusually tired or fatigued in the last week? Yes   Scale: 0 (No Fatigue)  <<<<>>>>  10 (As Bad as You Can Imagine)   1. How would you rate your fatigue (weariness, tiredness) right NOW? 6   2.  How

## 2023-11-10 ENCOUNTER — HOSPITAL ENCOUNTER (OUTPATIENT)
Dept: RADIATION ONCOLOGY | Age: 58
Discharge: HOME | End: 2023-11-10
Payer: COMMERCIAL

## 2023-11-10 VITALS
RESPIRATION RATE: 16 BRPM | TEMPERATURE: 97.9 F | OXYGEN SATURATION: 96 % | BODY MASS INDEX: 33.83 KG/M2 | WEIGHT: 191 LBS | HEART RATE: 90 BPM | SYSTOLIC BLOOD PRESSURE: 141 MMHG | DIASTOLIC BLOOD PRESSURE: 84 MMHG

## 2023-11-10 PROCEDURE — 99213 OFFICE O/P EST LOW 20 MIN: CPT | Performed by: NURSE PRACTITIONER

## 2023-11-10 PROCEDURE — 99212 OFFICE O/P EST SF 10 MIN: CPT | Performed by: NURSE PRACTITIONER

## 2023-11-10 ASSESSMENT — ENCOUNTER SYMPTOMS
BACK PAIN: 0
TROUBLE SWALLOWING: 0
COUGH: 1
NAUSEA: 0
SHORTNESS OF BREATH: 1
FACIAL SWELLING: 0
VOICE CHANGE: 0
SINUS PRESSURE: 0
SINUS PAIN: 0
BLOOD IN STOOL: 0
SORE THROAT: 0
RECTAL PAIN: 0
ABDOMINAL PAIN: 0

## 2023-11-10 NOTE — PROGRESS NOTES
[facility],[address], [address] [place] [address]  Phone: [phone]   Toll Free: [phone]   Fax: [phone]    RADIATION ONCOLOGY FOLLOW UP REPORT    PATIENT NAME:  Diego Morrell              : 1965  MEDICAL RECORD NO: [id number]    LOCATION: Radiation Oncology  [facility] NO: [id number]      PROVIDER: Dalton Villalpando CNP    DATE OF SERVICE: 11/10/2023      FOLLOW UP PHYSICIANS: Poonam Laal. Parmjit El; Alisha Bar; Tamar Bill, APRN - CNP       DIAGNOSIS: C15.5 -- Malignant neoplasm of the distal esophagus; Adenocarcinoma, Grade 2   cT1b N2 M0, Stage TARAS  Date of diagnosis: 2/15/2023      ASSESSMENT:  Recuperating well from radiation therapy for distal esophageal cancer. Upcoming surgical procedures on 2023 and 2023. No unexpected complications related to radiation therapy. PLAN:  Follow-up with radiation oncology in 6 months. Reminded Casimer Lieu to call and arrange for an earlier appointment if needed for any problems, questions, or concerns. Continue care in medical oncology, thoracic surgery, gastroenterology, and with all other physicians/providers. Continue surveillance and basic/preventive/supportive health care in accordance with clinical practice guidelines. RADIATION THERAPY TREATMENT HISTORY:   Treatment Course Number: 1     Treatment Site (s) Modality Dose (cGy) From To Fractions/  Elapsed Days   Esophageal Mass + Nodes 6MV Photons 4500 cGy 3/15/2023 2023 25/ 34   Primary Tumor Volume + Node Boost 6MV Photons 540 cGy 2023 2/ 3      Cumulative Dose to Esophageal Mass and Nodes: 5040 cGy        CHAPERONE: Declined      HISTORY OF PRESENT ILLNESS:   Ofelia Donnelly initially presented to radiation oncology as a 80-year-old woman who was complaining of upper abdominal/epigastric discomfort, belching and abdominal bloating.   She underwent upper endoscopy and was found

## 2023-11-22 ENCOUNTER — HOSPITAL ENCOUNTER (EMERGENCY)
Age: 58
Discharge: HOME | End: 2023-11-22
Attending: STUDENT IN AN ORGANIZED HEALTH CARE EDUCATION/TRAINING PROGRAM
Payer: COMMERCIAL

## 2023-11-22 ENCOUNTER — APPOINTMENT (OUTPATIENT)
Dept: CT IMAGING | Age: 58
End: 2023-11-22
Payer: COMMERCIAL

## 2023-11-22 VITALS
BODY MASS INDEX: 33.13 KG/M2 | OXYGEN SATURATION: 99 % | TEMPERATURE: 98.9 F | DIASTOLIC BLOOD PRESSURE: 92 MMHG | HEART RATE: 85 BPM | WEIGHT: 187 LBS | SYSTOLIC BLOOD PRESSURE: 165 MMHG | RESPIRATION RATE: 17 BRPM

## 2023-11-22 DIAGNOSIS — R11.2 NAUSEA AND VOMITING, UNSPECIFIED VOMITING TYPE: Primary | ICD-10-CM

## 2023-11-22 LAB
ALBUMIN SERPL BCG-MCNC: 4.7 G/DL (ref 3.5–5.1)
ALP SERPL-CCNC: 142 U/L (ref 38–126)
ALT SERPL W/O P-5'-P-CCNC: 28 U/L (ref 11–66)
ANION GAP SERPL CALC-SCNC: 14 MEQ/L (ref 8–16)
AST SERPL-CCNC: 29 U/L (ref 5–40)
BACTERIA URNS QL MICRO: ABNORMAL /HPF
BASOPHILS ABSOLUTE: 0 THOU/MM3 (ref 0–0.1)
BASOPHILS NFR BLD AUTO: 0.2 %
BILIRUB CONJ SERPL-MCNC: < 0.2 MG/DL (ref 0–0.3)
BILIRUB SERPL-MCNC: 0.8 MG/DL (ref 0.3–1.2)
BILIRUB UR QL STRIP.AUTO: NEGATIVE
BUN SERPL-MCNC: 13 MG/DL (ref 7–22)
CALCIUM SERPL-MCNC: 10.1 MG/DL (ref 8.5–10.5)
CASTS #/AREA URNS LPF: ABNORMAL /LPF
CASTS 2: ABNORMAL /LPF
CHARACTER UR: CLEAR
CHLORIDE SERPL-SCNC: 96 MEQ/L (ref 98–111)
CO2 SERPL-SCNC: 25 MEQ/L (ref 23–33)
COLOR: YELLOW
CREAT SERPL-MCNC: 0.6 MG/DL (ref 0.4–1.2)
CRYSTALS URNS MICRO: ABNORMAL
DEPRECATED RDW RBC AUTO: 41.1 FL (ref 35–45)
EOSINOPHIL NFR BLD AUTO: 0 %
EOSINOPHILS ABSOLUTE: 0 THOU/MM3 (ref 0–0.4)
EPITHELIAL CELLS, UA: ABNORMAL /HPF
ERYTHROCYTE [DISTWIDTH] IN BLOOD BY AUTOMATED COUNT: 12.8 % (ref 11.5–14.5)
GFR SERPL CREATININE-BSD FRML MDRD: > 60 ML/MIN/1.73M2
GLUCOSE SERPL-MCNC: 271 MG/DL (ref 70–108)
GLUCOSE UR QL STRIP.AUTO: 500 MG/DL
HCT VFR BLD AUTO: 41.1 % (ref 37–47)
HGB BLD-MCNC: 13.4 GM/DL (ref 12–16)
HGB UR QL STRIP.AUTO: NEGATIVE
IMM GRANULOCYTES # BLD AUTO: 0.04 THOU/MM3 (ref 0–0.07)
IMM GRANULOCYTES NFR BLD AUTO: 0.4 %
KETONES UR QL STRIP.AUTO: 40
LIPASE SERPL-CCNC: 17 U/L (ref 5.6–51.3)
LYMPHOCYTES ABSOLUTE: 0.9 THOU/MM3 (ref 1–4.8)
LYMPHOCYTES NFR BLD AUTO: 7.7 %
MCH RBC QN AUTO: 28.8 PG (ref 26–33)
MCHC RBC AUTO-ENTMCNC: 32.6 GM/DL (ref 32.2–35.5)
MCV RBC AUTO: 88.2 FL (ref 81–99)
MISCELLANEOUS 2: ABNORMAL
MONOCYTES ABSOLUTE: 0.9 THOU/MM3 (ref 0.4–1.3)
MONOCYTES NFR BLD AUTO: 8.4 %
NEUTROPHILS NFR BLD AUTO: 83.3 %
NITRITE UR QL STRIP: NEGATIVE
NRBC BLD AUTO-RTO: 0 /100 WBC
OSMOLALITY SERPL CALC.SUM OF ELEC: 279.8 MOSMOL/KG (ref 275–300)
PH UR STRIP.AUTO: 7 [PH] (ref 5–9)
PLATELET # BLD AUTO: 270 THOU/MM3 (ref 130–400)
PMV BLD AUTO: 10.2 FL (ref 9.4–12.4)
POTASSIUM SERPL-SCNC: 4.2 MEQ/L (ref 3.5–5.2)
PROT SERPL-MCNC: 7.5 G/DL (ref 6.1–8)
PROT UR STRIP.AUTO-MCNC: 30 MG/DL
RBC # BLD AUTO: 4.66 MILL/MM3 (ref 4.2–5.4)
RBC URINE: ABNORMAL /HPF
RENAL EPI CELLS #/AREA URNS HPF: ABNORMAL /[HPF]
SEGMENTED NEUTROPHILS ABSOLUTE COUNT: 9.2 THOU/MM3 (ref 1.8–7.7)
SODIUM SERPL-SCNC: 135 MEQ/L (ref 135–145)
SP GR UR REFRACT.AUTO: 1.02 (ref 1–1.03)
UROBILINOGEN, URINE: 0.2 EU/DL (ref 0–1)
WBC # BLD AUTO: 11.1 THOU/MM3 (ref 4.8–10.8)
WBC #/AREA URNS HPF: ABNORMAL /HPF
WBC #/AREA URNS HPF: NEGATIVE /[HPF]
YEAST LIKE FUNGI URNS QL MICRO: ABNORMAL

## 2023-11-22 PROCEDURE — 2580000003 HC RX 258: Performed by: STUDENT IN AN ORGANIZED HEALTH CARE EDUCATION/TRAINING PROGRAM

## 2023-11-22 PROCEDURE — 96361 HYDRATE IV INFUSION ADD-ON: CPT

## 2023-11-22 PROCEDURE — 36415 COLL VENOUS BLD VENIPUNCTURE: CPT

## 2023-11-22 PROCEDURE — 83690 ASSAY OF LIPASE: CPT

## 2023-11-22 PROCEDURE — 96372 THER/PROPH/DIAG INJ SC/IM: CPT

## 2023-11-22 PROCEDURE — 96374 THER/PROPH/DIAG INJ IV PUSH: CPT

## 2023-11-22 PROCEDURE — 6360000002 HC RX W HCPCS: Performed by: STUDENT IN AN ORGANIZED HEALTH CARE EDUCATION/TRAINING PROGRAM

## 2023-11-22 PROCEDURE — 82248 BILIRUBIN DIRECT: CPT

## 2023-11-22 PROCEDURE — 74177 CT ABD & PELVIS W/CONTRAST: CPT

## 2023-11-22 PROCEDURE — 6360000004 HC RX CONTRAST MEDICATION: Performed by: STUDENT IN AN ORGANIZED HEALTH CARE EDUCATION/TRAINING PROGRAM

## 2023-11-22 PROCEDURE — 85025 COMPLETE CBC W/AUTO DIFF WBC: CPT

## 2023-11-22 PROCEDURE — 99285 EMERGENCY DEPT VISIT HI MDM: CPT

## 2023-11-22 PROCEDURE — 80053 COMPREHEN METABOLIC PANEL: CPT

## 2023-11-22 PROCEDURE — 81001 URINALYSIS AUTO W/SCOPE: CPT

## 2023-11-22 RX ORDER — FENTANYL CITRATE 50 UG/ML
50 INJECTION, SOLUTION INTRAMUSCULAR; INTRAVENOUS ONCE
Status: DISCONTINUED | OUTPATIENT
Start: 2023-11-22 | End: 2023-11-22 | Stop reason: HOSPADM

## 2023-11-22 RX ORDER — PROMETHAZINE HYDROCHLORIDE 25 MG/ML
12.5 INJECTION, SOLUTION INTRAMUSCULAR; INTRAVENOUS ONCE
Status: COMPLETED | OUTPATIENT
Start: 2023-11-22 | End: 2023-11-22

## 2023-11-22 RX ORDER — ONDANSETRON 4 MG/1
4 TABLET, ORALLY DISINTEGRATING ORAL 3 TIMES DAILY PRN
Qty: 21 TABLET | Refills: 0 | Status: SHIPPED | OUTPATIENT
Start: 2023-11-22

## 2023-11-22 RX ORDER — 0.9 % SODIUM CHLORIDE 0.9 %
1000 INTRAVENOUS SOLUTION INTRAVENOUS ONCE
Status: COMPLETED | OUTPATIENT
Start: 2023-11-22 | End: 2023-11-22

## 2023-11-22 RX ORDER — ONDANSETRON 2 MG/ML
4 INJECTION INTRAMUSCULAR; INTRAVENOUS ONCE
Status: COMPLETED | OUTPATIENT
Start: 2023-11-22 | End: 2023-11-22

## 2023-11-22 RX ADMIN — PROMETHAZINE HYDROCHLORIDE 12.5 MG: 25 INJECTION INTRAMUSCULAR; INTRAVENOUS at 17:32

## 2023-11-22 RX ADMIN — IOPAMIDOL 80 ML: 755 INJECTION, SOLUTION INTRAVENOUS at 14:36

## 2023-11-22 RX ADMIN — ONDANSETRON 4 MG: 2 INJECTION INTRAMUSCULAR; INTRAVENOUS at 13:48

## 2023-11-22 RX ADMIN — SODIUM CHLORIDE 1000 ML: 9 INJECTION, SOLUTION INTRAVENOUS at 13:47

## 2023-11-22 NOTE — ED PROVIDER NOTES
encounter documents & history available on EMR was reviewed as available. 3)  Treatment and Disposition         ED Reassessment: Patient continues rest comfortably. She had 1 episode of emesis after I left the room for discharge. This was treated with IM Phenergan. She has a prescription for Zofran at the pharmacy. Case discussed with consulting clinician: Dr. Rafa Bruner, [facility] surgeon         Shared Decision-Making was performed and disposition discussed with the        patient/caregiver at bedside with all questions answered. Social determinants of health impacting treatment or disposition: None         Code Status: Not addressed      Summary of Patient Presentation:      MDM  Number of Diagnoses or Management Options  Nausea and vomiting, unspecified vomiting type  Diagnosis management comments: Pleasant 63-year-old female resting on the cot no acute distress. She is 24 hours postop from a procedure to [facility], gastric devascularization. Abdomen is nonperitoneal with generalized tenderness. No hypotension, fever, tachycardia, tachypnea or hypoxia. Low concern for postoperative infection. Suspect this is likely operative/anesthesia related as she had a significant amount of time difficulty resuming consciousness. Symptoms significantly improved with IV Zofran. She has not been taking anything at home and was not discharged with any antiemetics. Nonemergent labs and CT imaging. Reviewed the case with on-call, operative surgeon who is in agreement with supportive care and discharge.   Patient discharged in improved condition with return precautions understood    /  ED Course as of 11/22/23 1707   Wed Nov 22, 2023   1443 BMP(!):    Sodium 135   Potassium 4.2   Chloride 96(!)   CO2 25   Glucose, Random 271(!)   BUN,BUNPL 13   Creatinine 0.6   CALCIUM, SERUM, 781406 10.1  No emergent electrolyte findings [EM]   1443 WBC(!): 11.1  Minimal postoperative leukocytosis [EM]   1506 CT

## 2023-11-22 NOTE — ED NOTES
Pt to ED via intake with c/o nausea after de-vacuolization procedure at [facility]. Pt reports they are on a liquid diet at this time. Pt reports feeling weak and nauseated at this time. Pt VSS. Pt is afebrile. IV inserted.       Cora Shi RN  11/22/23 3849

## 2024-01-22 ENCOUNTER — HOSPITAL ENCOUNTER (OUTPATIENT)
Age: 59
Setting detail: SPECIMEN
Discharge: HOME | End: 2024-01-22

## 2024-01-22 ENCOUNTER — NURSE ONLY (OUTPATIENT)
Dept: FAMILY MEDICINE CLINIC | Age: 59
End: 2024-01-22
Payer: COMMERCIAL

## 2024-01-22 DIAGNOSIS — C15.5 MALIGNANT NEOPLASM OF ABDOMINAL ESOPHAGUS (HCC): Primary | ICD-10-CM

## 2024-01-22 PROCEDURE — 36415 COLL VENOUS BLD VENIPUNCTURE: CPT | Performed by: NURSE PRACTITIONER

## 2024-01-23 LAB
ALBUMIN SERPL-MCNC: 3.5 G/DL (ref 3.5–5.2)
ALBUMIN/GLOB SERPL: 1 {RATIO} (ref 1–2.5)
ALP SERPL-CCNC: 208 U/L (ref 35–104)
ALT SERPL-CCNC: 24 U/L (ref 5–33)
ANION GAP SERPL CALCULATED.3IONS-SCNC: 13 MMOL/L (ref 9–17)
AST SERPL-CCNC: 17 U/L
BASOPHILS # BLD: 0.04 K/UL (ref 0–0.2)
BASOPHILS NFR BLD: 1 % (ref 0–2)
BILIRUB SERPL-MCNC: 0.3 MG/DL (ref 0.3–1.2)
BUN SERPL-MCNC: 14 MG/DL (ref 6–20)
CALCIUM SERPL-MCNC: 9.8 MG/DL (ref 8.6–10.4)
CHLORIDE SERPL-SCNC: 91 MMOL/L (ref 98–107)
CO2 SERPL-SCNC: 28 MMOL/L (ref 20–31)
CREAT SERPL-MCNC: 0.5 MG/DL (ref 0.5–0.9)
EOSINOPHIL # BLD: 0.2 K/UL (ref 0–0.44)
EOSINOPHILS RELATIVE PERCENT: 3 % (ref 1–4)
ERYTHROCYTE [DISTWIDTH] IN BLOOD BY AUTOMATED COUNT: 14.6 % (ref 11.8–14.4)
GFR SERPL CREATININE-BSD FRML MDRD: >60 ML/MIN/1.73M2
GLUCOSE SERPL-MCNC: 314 MG/DL (ref 70–99)
HCT VFR BLD AUTO: 39.6 % (ref 36.3–47.1)
HGB BLD-MCNC: 12.1 G/DL (ref 11.9–15.1)
IMM GRANULOCYTES # BLD AUTO: 0.05 K/UL (ref 0–0.3)
IMM GRANULOCYTES NFR BLD: 1 %
LYMPHOCYTES NFR BLD: 0.84 K/UL (ref 1.1–3.7)
LYMPHOCYTES RELATIVE PERCENT: 11 % (ref 24–43)
MCH RBC QN AUTO: 26.9 PG (ref 25.2–33.5)
MCHC RBC AUTO-ENTMCNC: 30.6 G/DL (ref 28.4–34.8)
MCV RBC AUTO: 88.2 FL (ref 82.6–102.9)
MONOCYTES NFR BLD: 0.76 K/UL (ref 0.1–1.2)
MONOCYTES NFR BLD: 10 % (ref 3–12)
NEUTROPHILS NFR BLD: 74 % (ref 36–65)
NEUTS SEG NFR BLD: 6.04 K/UL (ref 1.5–8.1)
NRBC BLD-RTO: 0 PER 100 WBC
PLATELET # BLD AUTO: 411 K/UL (ref 138–453)
PMV BLD AUTO: 10 FL (ref 8.1–13.5)
POTASSIUM SERPL-SCNC: 4.8 MMOL/L (ref 3.7–5.3)
PROT SERPL-MCNC: 7.1 G/DL (ref 6.4–8.3)
RBC # BLD AUTO: 4.49 M/UL (ref 3.95–5.11)
RBC # BLD: ABNORMAL 10*6/UL
SODIUM SERPL-SCNC: 132 MMOL/L (ref 135–144)
WBC OTHER # BLD: 7.9 K/UL (ref 3.5–11.3)

## 2024-02-09 ENCOUNTER — APPOINTMENT (OUTPATIENT)
Dept: INTERVENTIONAL RADIOLOGY/VASCULAR | Age: 59
End: 2024-02-09
Payer: COMMERCIAL

## 2024-02-09 ENCOUNTER — HOSPITAL ENCOUNTER (EMERGENCY)
Age: 59
Discharge: HOME | End: 2024-02-09
Attending: EMERGENCY MEDICINE
Payer: COMMERCIAL

## 2024-02-09 VITALS
TEMPERATURE: 98.3 F | OXYGEN SATURATION: 95 % | RESPIRATION RATE: 18 BRPM | HEIGHT: 63 IN | DIASTOLIC BLOOD PRESSURE: 86 MMHG | WEIGHT: 165 LBS | BODY MASS INDEX: 29.23 KG/M2 | SYSTOLIC BLOOD PRESSURE: 122 MMHG | HEART RATE: 103 BPM

## 2024-02-09 DIAGNOSIS — Z98.890 HISTORY OF ESOPHAGECTOMY: ICD-10-CM

## 2024-02-09 DIAGNOSIS — Z90.49 HISTORY OF ESOPHAGECTOMY: ICD-10-CM

## 2024-02-09 DIAGNOSIS — T85.528A DISLODGED JEJUNOSTOMY TUBE: Primary | ICD-10-CM

## 2024-02-09 PROCEDURE — 2709999900 IR GUIDED INS DUOD OR JEJUN TUBE PERC

## 2024-02-09 PROCEDURE — 49451 REPLACE DUOD/JEJ TUBE PERC: CPT

## 2024-02-09 PROCEDURE — 99282 EMERGENCY DEPT VISIT SF MDM: CPT

## 2024-02-09 ASSESSMENT — PAIN - FUNCTIONAL ASSESSMENT: PAIN_FUNCTIONAL_ASSESSMENT: NONE - DENIES PAIN

## 2024-02-09 ASSESSMENT — PAIN SCALES - GENERAL: PAINLEVEL_OUTOF10: 0

## 2024-02-09 NOTE — ED TRIAGE NOTES
Pt presents to the ED through lobby with c/o J tube complication. Pt states the tube fell out approx 1-2 hours pta. States she is currently nauseous but denies pain, or vomiting. Denies change in bowel movements.

## 2024-02-09 NOTE — PROGRESS NOTES
1740 Patient received in IR for J tube reinsertion.   1745 This procedure has been fully reviewed with the patient and written informed consent has been obtained.  1818 Procedure started with Dr. Roy.  1823 14fr Jtube replaced without difficulty.   1827 Procedure completed; patient tolerated well. Dressing to abdomen; no bleeding noted.  1829 Patient on cart; comfort ensured.  1830 Patient taken to ER34 via cart.

## 2024-02-09 NOTE — OP NOTE
Department of Radiology  Post Procedure Progress Note      Pre-Procedure Diagnosis:  History of esophageal cancer and esophagectomy.  J-Tube was accidentally pulled out.    Procedure Performed:  J-tube replacement under fluoroscopic guidance    Anesthesia: local    Findings: successful    Immediate Complications:  None    Estimated Blood Loss: minimal    SEE DICTATED PROCEDURE NOTE FOR COMPLETE DETAILS.    Electronically signed by Renaldo Roy MD on 2/9/2024 at 6:39 PM

## 2024-02-09 NOTE — H&P
Formulation and discussion of sedation / procedure plans, risks, benefits, side effects and alternatives with patient and/or responsible adult completed.    Electronically signed by Renaldo Roy MD on 2/9/2024 at 6:39 PM

## 2024-02-09 NOTE — ED PROVIDER NOTES
[facility] EMERGENCY DEPT      EMERGENCY MEDICINE     Room # 34/034A    Pt Name: Susan K Sturgess Meyers  MRN: [id number]  Birthdate 1965  Date of evaluation: 2/9/2024  Provider: Chino Garcia MD    CHIEF COMPLAINT       Chief Complaint   Patient presents with    J Tube Complications     HISTORY OF PRESENT ILLNESS   Susan K Sturgess Meyers is a pleasant 58 y.o. female who presents to the emergency department from from home, as a walk in to the ED lobby for evaluation of J-tube came out 2 hours ago.  The patient had known history of esophageal cancer and underwent radiation and esophagectomy last December 2023 at [facility].  Patient since then had a J-tube that she used for nutrition medication and hydration.  Patient today states that he accidentally came out 2 hours ago..    PASTMEDICAL HISTORY     Past Medical History:   Diagnosis Date    Cancer (HCC)     Esophagus    Diabetes mellitus (HCC)     Ovarian cancer in remission 2011    age 46, no XRT or Chemo per patient, pt stated 11/23/2022 that she didn't actually have Ovarian CA       There is no problem list on file for this patient.    SURGICAL HISTORY       Past Surgical History:   Procedure Laterality Date    COLONOSCOPY  02/17/2023    HYSTERECTOMY (CERVIX STATUS UNKNOWN)  2011    total    IR INS DUOD OR JEJUN TUBE PERC  2/9/2024    IR INS DUOD OR JEJUN TUBE PERC 2/9/2024 [facility] SPECIAL PROCEDURES    KAN STEROTACTIC LOC BREAST BIOPSY RIGHT Right 01/18/2019    benign    NECK SURGERY  1989    Ruptured disc    OVARY REMOVAL Bilateral 2011    total    TONSILLECTOMY      UPPER GASTROINTESTINAL ENDOSCOPY Left 02/28/2023    ENDOSCOPIC ULTRASOUND WITH RADIAL SCOPE performed by Zee Crowe MD at [facility] Endoscopy    US BREAST BIOPSY W LOC DEVICE 1ST LESION RIGHT Right 11/25/2011    benign       CURRENT MEDICATIONS       Previous Medications    CHOLECALCIFEROL (VITAMIN D3) 50 MCG (2000 UT) CAPS    Take by mouth    FLUCONAZOLE (DIFLUCAN)  visit)    MEDICAL DECISION MAKING / ED COURSE:     1) Number and Complexity of Problems            Problem List This Visit:         Chief Complaint   Patient presents with    J Tube Complications            Differential Diagnosis includes (but not limited to):  Dislodged J-tube, history of esophageal cancer with esophagectomy        Diagnoses Considered but I have low suspicion of:   Bowel obstruction             Pertinent Comorbid Conditions:    Esophagectomy secondary to esophageal cancer    2)  Data Reviewed (none if left blank)          My Independent interpretations:       Imaging: Per radiologist    Labs:      None                 Decision Rules/Clinical Scores utilized: None            Controlled Substance Monitoring:                   External Documentation Reviewed:         Previous patient encounter documents & history available on EMR was reviewed NA             See Formal Diagnostic Results above for the lab and radiology tests and orders.    3)  Treatment and Disposition           ED Medications administered this visit:  (None if blank)             Medications - No data to display         ED Reassessment: Medically stable.  Patient was sent to interventional radiologist for replacement of the J-tube.  This was done by IR Dr. Roy and did well         Case discussed with consulting clinician: Interventional radiologist Dr. Roy         Shared Decision-Making was performed and disposition discussed with the        Patient/Family and questions answered          Social determinants of health impacting treatment or disposition: NA         Code Status: Full code      Summary of Patient Presentation:      MDM  Number of Diagnoses or Management Options  Dislodged jejunostomy tube: new, no workup  History of esophagectomy: established, improving     Amount and/or Complexity of Data Reviewed  Tests in the radiology section of CPT®: ordered  Discussion of test results with the performing providers:

## 2024-02-28 ENCOUNTER — HOSPITAL ENCOUNTER (OUTPATIENT)
Age: 59
Discharge: HOME | End: 2024-02-28
Payer: COMMERCIAL

## 2024-02-28 ENCOUNTER — HOSPITAL ENCOUNTER (OUTPATIENT)
Dept: GENERAL RADIOLOGY | Age: 59
Discharge: HOME | End: 2024-02-28
Payer: COMMERCIAL

## 2024-02-28 DIAGNOSIS — K22.89 ESOPHAGEAL FISTULA: ICD-10-CM

## 2024-02-28 PROCEDURE — 71046 X-RAY EXAM CHEST 2 VIEWS: CPT

## 2024-03-31 ENCOUNTER — APPOINTMENT (OUTPATIENT)
Dept: CT IMAGING | Age: 59
DRG: 871 | End: 2024-03-31
Payer: COMMERCIAL

## 2024-03-31 ENCOUNTER — HOSPITAL ENCOUNTER (INPATIENT)
Age: 59
LOS: 1 days | Discharge: ANOTHER HOSPITAL | DRG: 871 | End: 2024-04-01
Attending: EMERGENCY MEDICINE | Admitting: STUDENT IN AN ORGANIZED HEALTH CARE EDUCATION/TRAINING PROGRAM
Payer: COMMERCIAL

## 2024-03-31 ENCOUNTER — APPOINTMENT (OUTPATIENT)
Dept: GENERAL RADIOLOGY | Age: 59
DRG: 871 | End: 2024-03-31
Payer: COMMERCIAL

## 2024-03-31 DIAGNOSIS — J96.01 ACUTE RESPIRATORY FAILURE WITH HYPOXIA (HCC): ICD-10-CM

## 2024-03-31 DIAGNOSIS — J96.01 ACUTE HYPOXIC RESPIRATORY FAILURE (HCC): Primary | ICD-10-CM

## 2024-03-31 DIAGNOSIS — R73.9 HYPERGLYCEMIA: ICD-10-CM

## 2024-03-31 DIAGNOSIS — R79.89 ELEVATED TROPONIN: ICD-10-CM

## 2024-03-31 DIAGNOSIS — R65.21 SEPTIC SHOCK (HCC): ICD-10-CM

## 2024-03-31 DIAGNOSIS — A41.9 SEPTIC SHOCK (HCC): ICD-10-CM

## 2024-03-31 LAB
ACINETOBACTER CALCOACETICUS-BAUMANNII BY PCR: NOT DETECTED
ALBUMIN SERPL BCG-MCNC: 3.9 G/DL (ref 3.5–5.1)
ALP SERPL-CCNC: 189 U/L (ref 38–126)
ALT SERPL W/O P-5'-P-CCNC: 27 U/L (ref 11–66)
ANION GAP SERPL CALC-SCNC: 16 MEQ/L (ref 8–16)
ARTERIAL PATENCY WRIST A: POSITIVE
AST SERPL-CCNC: 24 U/L (ref 5–40)
B-OH-BUTYR SERPL-MSCNC: 15.86 MG/DL (ref 0.2–2.81)
BACTERIA URNS QL MICRO: ABNORMAL /HPF
BASE EXCESS BLDA CALC-SCNC: -1.1 MMOL/L (ref -2.5–2.5)
BASE EXCESS BLDA CALC-SCNC: 0.4 MMOL/L (ref -2.5–2.5)
BASE EXCESS BLDA CALC-SCNC: 2.8 MMOL/L (ref -2–3)
BASOPHILS ABSOLUTE: 0 THOU/MM3 (ref 0–0.1)
BASOPHILS NFR BLD AUTO: 0.4 %
BDY SITE: ABNORMAL
BDY SITE: ABNORMAL
BILIRUB SERPL-MCNC: 0.6 MG/DL (ref 0.3–1.2)
BILIRUB UR QL STRIP.AUTO: NEGATIVE
BLACTX-M ISLT/SPM QL: NOT DETECTED
BLAIMP ISLT/SPM QL: NOT DETECTED
BLAKPC ISLT/SPM QL: NOT DETECTED
BLAOXA-48-LIKE ISLT/SPM QL: NOT DETECTED
BLAVIM ISLT/SPM QL: NOT DETECTED
BREATHS SETTING VENT: 16 BPM
BUN SERPL-MCNC: 12 MG/DL (ref 7–22)
C PNEUM DNA LOWER RESP QL NAA+NON-PROBE: NOT DETECTED
CA-I BLD ISE-SCNC: 1.17 MMOL/L (ref 1.12–1.32)
CALCIUM SERPL-MCNC: 9 MG/DL (ref 8.5–10.5)
CASTS #/AREA URNS LPF: ABNORMAL /LPF
CASTS 2: ABNORMAL /LPF
CHARACTER UR: CLEAR
CHLORIDE BLD-SCNC: 95 MEQ/L (ref 98–109)
CHLORIDE SERPL-SCNC: 87 MEQ/L (ref 98–111)
CHOLEST SERPL-MCNC: 124 MG/DL (ref 100–199)
CO2 SERPL-SCNC: 23 MEQ/L (ref 23–33)
COLLECTED BY:: ABNORMAL
COLOR: YELLOW
CREAT SERPL-MCNC: 0.4 MG/DL (ref 0.4–1.2)
CRP SERPL-MCNC: 16.89 MG/DL (ref 0–1)
CRYSTALS URNS MICRO: ABNORMAL
D DIMER PPP IA.FEU-MCNC: 1174 NG/ML FEU (ref 0–500)
DEPRECATED MEAN GLUCOSE BLD GHB EST-ACNC: 231 MG/DL (ref 70–126)
DEPRECATED RDW RBC AUTO: 51.6 FL (ref 35–45)
DEVICE: ABNORMAL
ENTEROBACTER CLOACAE COMPLEX BY PCR: NOT DETECTED
EOSINOPHIL NFR BLD AUTO: 0.1 %
EOSINOPHILS ABSOLUTE: 0 THOU/MM3 (ref 0–0.4)
EPITHELIAL CELLS, UA: ABNORMAL /HPF
ERYTHROCYTE [DISTWIDTH] IN BLOOD BY AUTOMATED COUNT: 16.4 % (ref 11.5–14.5)
ERYTHROCYTE [SEDIMENTATION RATE] IN BLOOD BY WESTERGREN METHOD: 51 MM/HR (ref 0–20)
ESCHERICHIA COLI BY PCR: DETECTED
FIO2 ON VENT O2 ANALYZER: 100 %
FIO2 ON VENT O2 ANALYZER: 100 %
FIO2 ON VENT O2 ANALYZER: 55 %
FLUAV RNA LOWER RESP QL NAA+NON-PROBE: NOT DETECTED
FLUAV RNA RESP QL NAA+PROBE: NOT DETECTED
FLUBV RNA LOWER RESP QL NAA+NON-PROBE: NOT DETECTED
FLUBV RNA RESP QL NAA+PROBE: NOT DETECTED
GFR SERPL CREATININE-BSD FRML MDRD: > 90 ML/MIN/1.73M2
GLUCOSE BLD STRIP.AUTO-MCNC: 217 MG/DL (ref 70–108)
GLUCOSE BLD STRIP.AUTO-MCNC: 227 MG/DL (ref 70–108)
GLUCOSE BLD STRIP.AUTO-MCNC: 268 MG/DL (ref 70–108)
GLUCOSE BLD STRIP.AUTO-MCNC: 312 MG/DL (ref 70–108)
GLUCOSE BLD STRIP.AUTO-MCNC: 343 MG/DL (ref 70–108)
GLUCOSE BLD STRIP.AUTO-MCNC: 382 MG/DL (ref 70–108)
GLUCOSE BLD-MCNC: 528 MG/DL (ref 70–108)
GLUCOSE SERPL-MCNC: 476 MG/DL (ref 70–108)
GLUCOSE UR QL STRIP.AUTO: 500 MG/DL
HADV DNA LOWER RESP QL NAA+NON-PROBE: NOT DETECTED
HAEMOPHILUS INFLUENZAE BY PCR: NOT DETECTED
HBA1C MFR BLD HPLC: 9.7 % (ref 4.4–6.4)
HCO3 BLDA-SCNC: 25 MMOL/L (ref 23–28)
HCO3 BLDA-SCNC: 27 MMOL/L (ref 23–28)
HCO3 BLDA-SCNC: 30 MMOL/L (ref 23–28)
HCOV RNA LOWER RESP QL NAA+NON-PROBE: ABNORMAL
HCT VFR BLD AUTO: 40.2 % (ref 37–47)
HDLC SERPL-MCNC: 52 MG/DL
HEPARIN UNFRACTIONATED: 0.13 U/ML (ref 0.3–0.7)
HEPARIN UNFRACTIONATED: 0.66 U/ML (ref 0.3–0.7)
HGB BLD-MCNC: 12.6 GM/DL (ref 12–16)
HGB UR QL STRIP.AUTO: NEGATIVE
HMPV RNA LOWER RESP QL NAA+NON-PROBE: NOT DETECTED
HPIV RNA LOWER RESP QL NAA+NON-PROBE: NOT DETECTED
IMM GRANULOCYTES # BLD AUTO: 0.04 THOU/MM3 (ref 0–0.07)
IMM GRANULOCYTES NFR BLD AUTO: 0.3 %
KETONES UR QL STRIP.AUTO: ABNORMAL
KLEBSIELLA AEROGENES BY PCR: NOT DETECTED
KLEBSIELLA OXYTOCA BY PCR: NOT DETECTED
KLEBSIELLA PNEUMONIAE GROUP BY PCR: DETECTED
L PNEUMO DNA LOWER RESP QL NAA+NON-PROBE: NOT DETECTED
LACTATE BLD-SCNC: 1.6 MMOL/L (ref 0.5–1.9)
LACTATE SERPL-SCNC: 1.3 MMOL/L (ref 0.5–2)
LACTIC ACID, SEPSIS: 2.2 MMOL/L (ref 0.5–1.9)
LACTIC ACID, SEPSIS: 2.2 MMOL/L (ref 0.5–1.9)
LACTIC ACID, SEPSIS: 3.4 MMOL/L (ref 0.5–1.9)
LDLC SERPL CALC-MCNC: 61 MG/DL
LYMPHOCYTES ABSOLUTE: 0.8 THOU/MM3 (ref 1–4.8)
LYMPHOCYTES NFR BLD AUTO: 6.7 %
M PNEUMO DNA LOWER RESP QL NAA+NON-PROBE: NOT DETECTED
MAGNESIUM SERPL-MCNC: 1.7 MG/DL (ref 1.6–2.4)
MCH RBC QN AUTO: 26.9 PG (ref 26–33)
MCHC RBC AUTO-ENTMCNC: 31.3 GM/DL (ref 32.2–35.5)
MCV RBC AUTO: 85.9 FL (ref 81–99)
MISCELLANEOUS 2: ABNORMAL
MONOCYTES ABSOLUTE: 0.8 THOU/MM3 (ref 0.4–1.3)
MONOCYTES NFR BLD AUTO: 7.2 %
MORAXELLA CATARRHALIS BY PCR: NOT DETECTED
MRSA DNA SPEC QL NAA+PROBE: NEGATIVE
NEUTROPHILS NFR BLD AUTO: 85.3 %
NITRITE UR QL STRIP: NEGATIVE
NRBC BLD AUTO-RTO: 0 /100 WBC
NT-PROBNP SERPL IA-MCNC: 182.3 PG/ML (ref 0–124)
OSMOLALITY SERPL CALC.SUM OF ELEC: 274.1 MOSMOL/KG (ref 275–300)
PCO2 BLDA: 41 MMHG (ref 35–45)
PCO2 BLDA: 56 MMHG (ref 35–45)
PCO2 TEMP ADJ BLDMV: 52 MMHG (ref 41–51)
PEEP SETTING VENT: 10 MMHG
PEEP SETTING VENT: 10 MMHG
PH BLDA: 7.29 [PH] (ref 7.35–7.45)
PH BLDA: 7.4 [PH] (ref 7.35–7.45)
PH BLDMV: 7.36 [PH] (ref 7.31–7.41)
PH UR STRIP.AUTO: 5.5 [PH] (ref 5–9)
PIP: 18 CMH2O
PIP: 18 CMH2O
PIP: 26 CMH2O
PLATELET # BLD AUTO: 334 THOU/MM3 (ref 130–400)
PMV BLD AUTO: 9.7 FL (ref 9.4–12.4)
PO2 BLDA: 219 MMHG (ref 71–104)
PO2 BLDA: 317 MMHG (ref 71–104)
PO2 BLDMV: 46 MMHG (ref 25–40)
POC CREATININE WHOLE BLOOD: 0.4 MG/DL (ref 0.5–1.2)
POTASSIUM BLD-SCNC: 4.6 MEQ/L (ref 3.5–4.9)
POTASSIUM SERPL-SCNC: 4.3 MEQ/L (ref 3.5–5.2)
PROCALCITONIN SERPL IA-MCNC: 0.28 NG/ML (ref 0.01–0.09)
PROCALCITONIN SERPL IA-MCNC: 1.98 NG/ML (ref 0.01–0.09)
PROT SERPL-MCNC: 7.5 G/DL (ref 6.1–8)
PROT UR STRIP.AUTO-MCNC: ABNORMAL MG/DL
PROTEUS SPECIES BY PCR: NOT DETECTED
PSEUDOMONAS AERUGINOSA BY PCR: NOT DETECTED
RBC # BLD AUTO: 4.68 MILL/MM3 (ref 4.2–5.4)
RBC URINE: ABNORMAL /HPF
RENAL EPI CELLS #/AREA URNS HPF: ABNORMAL /[HPF]
RESISTANT GENE MECA/C & MREJ BY PCR: ABNORMAL
RESISTANT GENE NDM BY PCR: NOT DETECTED
RSV RNA LOWER RESP QL NAA+NON-PROBE: NOT DETECTED
RV+EV RNA LOWER RESP QL NAA+NON-PROBE: NOT DETECTED
SAO2 % BLDA: 100 %
SAO2 % BLDA: 100 %
SAO2 % BLDMV: 79 %
SARS-COV-2 RNA RESP QL NAA+PROBE: NOT DETECTED
SEGMENTED NEUTROPHILS ABSOLUTE COUNT: 10.1 THOU/MM3 (ref 1.8–7.7)
SERRATIA MARCESCENS BY PCR: NOT DETECTED
SET PEEP: 10 MMHG
SODIUM BLD-SCNC: 129 MEQ/L (ref 138–146)
SODIUM SERPL-SCNC: 126 MEQ/L (ref 135–145)
SOURCE: ABNORMAL
SP GR UR REFRACT.AUTO: 1.02 (ref 1–1.03)
SPECIMEN ACCEPTABILITY: ABNORMAL
STAPH AUREUS BY PCR: NOT DETECTED
STREP AGALACTIAE BY PCR: NOT DETECTED
STREP PNEUMONIAE BY PCR: NOT DETECTED
STREP PYOGENES BY PCR: NOT DETECTED
TRIGL SERPL-MCNC: 54 MG/DL (ref 0–199)
TROPONIN, HIGH SENSITIVITY: 24 NG/L (ref 0–12)
TROPONIN, HIGH SENSITIVITY: 48 NG/L (ref 0–12)
TSH SERPL DL<=0.005 MIU/L-ACNC: 2.56 UIU/ML (ref 0.4–4.2)
UROBILINOGEN, URINE: 0.2 EU/DL (ref 0–1)
VENTILATION MODE VENT: ABNORMAL
WBC # BLD AUTO: 11.8 THOU/MM3 (ref 4.8–10.8)
WBC #/AREA URNS HPF: ABNORMAL /HPF
WBC #/AREA URNS HPF: NEGATIVE /[HPF]
YEAST LIKE FUNGI URNS QL MICRO: ABNORMAL

## 2024-03-31 PROCEDURE — 82435 ASSAY OF BLOOD CHLORIDE: CPT

## 2024-03-31 PROCEDURE — 2580000003 HC RX 258: Performed by: PHYSICIAN ASSISTANT

## 2024-03-31 PROCEDURE — 5A1935Z RESPIRATORY VENTILATION, LESS THAN 24 CONSECUTIVE HOURS: ICD-10-PCS

## 2024-03-31 PROCEDURE — 2580000003 HC RX 258: Performed by: INTERNAL MEDICINE

## 2024-03-31 PROCEDURE — 6370000000 HC RX 637 (ALT 250 FOR IP): Performed by: PHYSICIAN ASSISTANT

## 2024-03-31 PROCEDURE — 6360000002 HC RX W HCPCS: Performed by: PHYSICIAN ASSISTANT

## 2024-03-31 PROCEDURE — 85025 COMPLETE CBC W/AUTO DIFF WBC: CPT

## 2024-03-31 PROCEDURE — 87798 DETECT AGENT NOS DNA AMP: CPT

## 2024-03-31 PROCEDURE — 94640 AIRWAY INHALATION TREATMENT: CPT

## 2024-03-31 PROCEDURE — 93010 ELECTROCARDIOGRAM REPORT: CPT | Performed by: INTERNAL MEDICINE

## 2024-03-31 PROCEDURE — 71045 X-RAY EXAM CHEST 1 VIEW: CPT

## 2024-03-31 PROCEDURE — 3E033XZ INTRODUCTION OF VASOPRESSOR INTO PERIPHERAL VEIN, PERCUTANEOUS APPROACH: ICD-10-PCS

## 2024-03-31 PROCEDURE — 6370000000 HC RX 637 (ALT 250 FOR IP)

## 2024-03-31 PROCEDURE — 87150 DNA/RNA AMPLIFIED PROBE: CPT

## 2024-03-31 PROCEDURE — 6360000002 HC RX W HCPCS: Performed by: STUDENT IN AN ORGANIZED HEALTH CARE EDUCATION/TRAINING PROGRAM

## 2024-03-31 PROCEDURE — 94761 N-INVAS EAR/PLS OXIMETRY MLT: CPT

## 2024-03-31 PROCEDURE — 83036 HEMOGLOBIN GLYCOSYLATED A1C: CPT

## 2024-03-31 PROCEDURE — 36556 INSERT NON-TUNNEL CV CATH: CPT | Performed by: INTERNAL MEDICINE

## 2024-03-31 PROCEDURE — 2500000003 HC RX 250 WO HCPCS: Performed by: INTERNAL MEDICINE

## 2024-03-31 PROCEDURE — 87077 CULTURE AEROBIC IDENTIFY: CPT

## 2024-03-31 PROCEDURE — 96375 TX/PRO/DX INJ NEW DRUG ADDON: CPT

## 2024-03-31 PROCEDURE — 82947 ASSAY GLUCOSE BLOOD QUANT: CPT

## 2024-03-31 PROCEDURE — 0BH17EZ INSERTION OF ENDOTRACHEAL AIRWAY INTO TRACHEA, VIA NATURAL OR ARTIFICIAL OPENING: ICD-10-PCS

## 2024-03-31 PROCEDURE — 87581 M.PNEUMON DNA AMP PROBE: CPT

## 2024-03-31 PROCEDURE — 87641 MR-STAPH DNA AMP PROBE: CPT

## 2024-03-31 PROCEDURE — 2500000003 HC RX 250 WO HCPCS

## 2024-03-31 PROCEDURE — 84132 ASSAY OF SERUM POTASSIUM: CPT

## 2024-03-31 PROCEDURE — 93005 ELECTROCARDIOGRAM TRACING: CPT

## 2024-03-31 PROCEDURE — 99223 1ST HOSP IP/OBS HIGH 75: CPT | Performed by: INTERNAL MEDICINE

## 2024-03-31 PROCEDURE — 80061 LIPID PANEL: CPT

## 2024-03-31 PROCEDURE — 99255 IP/OBS CONSLTJ NEW/EST HI 80: CPT | Performed by: INTERNAL MEDICINE

## 2024-03-31 PROCEDURE — 5A09357 ASSISTANCE WITH RESPIRATORY VENTILATION, LESS THAN 24 CONSECUTIVE HOURS, CONTINUOUS POSITIVE AIRWAY PRESSURE: ICD-10-PCS

## 2024-03-31 PROCEDURE — 96365 THER/PROPH/DIAG IV INF INIT: CPT

## 2024-03-31 PROCEDURE — 89220 SPUTUM SPECIMEN COLLECTION: CPT

## 2024-03-31 PROCEDURE — 87486 CHLMYD PNEUM DNA AMP PROBE: CPT

## 2024-03-31 PROCEDURE — 87040 BLOOD CULTURE FOR BACTERIA: CPT

## 2024-03-31 PROCEDURE — 36600 WITHDRAWAL OF ARTERIAL BLOOD: CPT

## 2024-03-31 PROCEDURE — 6360000004 HC RX CONTRAST MEDICATION: Performed by: EMERGENCY MEDICINE

## 2024-03-31 PROCEDURE — 82948 REAGENT STRIP/BLOOD GLUCOSE: CPT

## 2024-03-31 PROCEDURE — 2580000003 HC RX 258

## 2024-03-31 PROCEDURE — 83880 ASSAY OF NATRIURETIC PEPTIDE: CPT

## 2024-03-31 PROCEDURE — 87070 CULTURE OTHR SPECIMN AEROBIC: CPT

## 2024-03-31 PROCEDURE — 94660 CPAP INITIATION&MGMT: CPT

## 2024-03-31 PROCEDURE — 02HV33Z INSERTION OF INFUSION DEVICE INTO SUPERIOR VENA CAVA, PERCUTANEOUS APPROACH: ICD-10-PCS

## 2024-03-31 PROCEDURE — 6360000002 HC RX W HCPCS: Performed by: INTERNAL MEDICINE

## 2024-03-31 PROCEDURE — 6370000000 HC RX 637 (ALT 250 FOR IP): Performed by: STUDENT IN AN ORGANIZED HEALTH CARE EDUCATION/TRAINING PROGRAM

## 2024-03-31 PROCEDURE — 99285 EMERGENCY DEPT VISIT HI MDM: CPT

## 2024-03-31 PROCEDURE — 82010 KETONE BODYS QUAN: CPT

## 2024-03-31 PROCEDURE — 51702 INSERT TEMP BLADDER CATH: CPT

## 2024-03-31 PROCEDURE — 86140 C-REACTIVE PROTEIN: CPT

## 2024-03-31 PROCEDURE — 93005 ELECTROCARDIOGRAM TRACING: CPT | Performed by: STUDENT IN AN ORGANIZED HEALTH CARE EDUCATION/TRAINING PROGRAM

## 2024-03-31 PROCEDURE — 2700000000 HC OXYGEN THERAPY PER DAY

## 2024-03-31 PROCEDURE — 71275 CT ANGIOGRAPHY CHEST: CPT

## 2024-03-31 PROCEDURE — 80053 COMPREHEN METABOLIC PANEL: CPT

## 2024-03-31 PROCEDURE — 87636 SARSCOV2 & INF A&B AMP PRB: CPT

## 2024-03-31 PROCEDURE — 96376 TX/PRO/DX INJ SAME DRUG ADON: CPT

## 2024-03-31 PROCEDURE — 81001 URINALYSIS AUTO W/SCOPE: CPT

## 2024-03-31 PROCEDURE — 6360000002 HC RX W HCPCS

## 2024-03-31 PROCEDURE — 87631 RESP VIRUS 3-5 TARGETS: CPT

## 2024-03-31 PROCEDURE — 31500 INSERT EMERGENCY AIRWAY: CPT

## 2024-03-31 PROCEDURE — 85520 HEPARIN ASSAY: CPT

## 2024-03-31 PROCEDURE — 36415 COLL VENOUS BLD VENIPUNCTURE: CPT

## 2024-03-31 PROCEDURE — 82803 BLOOD GASES ANY COMBINATION: CPT

## 2024-03-31 PROCEDURE — 82330 ASSAY OF CALCIUM: CPT

## 2024-03-31 PROCEDURE — 84443 ASSAY THYROID STIM HORMONE: CPT

## 2024-03-31 PROCEDURE — 2580000003 HC RX 258: Performed by: STUDENT IN AN ORGANIZED HEALTH CARE EDUCATION/TRAINING PROGRAM

## 2024-03-31 PROCEDURE — 84484 ASSAY OF TROPONIN QUANT: CPT

## 2024-03-31 PROCEDURE — 87541 LEGION PNEUMO DNA AMP PROB: CPT

## 2024-03-31 PROCEDURE — 96374 THER/PROPH/DIAG INJ IV PUSH: CPT

## 2024-03-31 PROCEDURE — 84145 PROCALCITONIN (PCT): CPT

## 2024-03-31 PROCEDURE — 87186 SC STD MICRODIL/AGAR DIL: CPT

## 2024-03-31 PROCEDURE — 85651 RBC SED RATE NONAUTOMATED: CPT

## 2024-03-31 PROCEDURE — C9113 INJ PANTOPRAZOLE SODIUM, VIA: HCPCS

## 2024-03-31 PROCEDURE — 84295 ASSAY OF SERUM SODIUM: CPT

## 2024-03-31 PROCEDURE — 85379 FIBRIN DEGRADATION QUANT: CPT

## 2024-03-31 PROCEDURE — 87205 SMEAR GRAM STAIN: CPT

## 2024-03-31 PROCEDURE — 82565 ASSAY OF CREATININE: CPT

## 2024-03-31 PROCEDURE — 83605 ASSAY OF LACTIC ACID: CPT

## 2024-03-31 PROCEDURE — 31500 INSERT EMERGENCY AIRWAY: CPT | Performed by: INTERNAL MEDICINE

## 2024-03-31 PROCEDURE — 2100000000 HC CCU R&B

## 2024-03-31 PROCEDURE — 83735 ASSAY OF MAGNESIUM: CPT

## 2024-03-31 PROCEDURE — 94002 VENT MGMT INPAT INIT DAY: CPT

## 2024-03-31 RX ORDER — LORAZEPAM 2 MG/ML
0.5 INJECTION INTRAMUSCULAR ONCE
Status: COMPLETED | OUTPATIENT
Start: 2024-03-31 | End: 2024-03-31

## 2024-03-31 RX ORDER — HEPARIN SODIUM 1000 [USP'U]/ML
2000 INJECTION, SOLUTION INTRAVENOUS; SUBCUTANEOUS PRN
Status: DISCONTINUED | OUTPATIENT
Start: 2024-03-31 | End: 2024-03-31

## 2024-03-31 RX ORDER — NOREPINEPHRINE BITARTRATE 0.06 MG/ML
INJECTION, SOLUTION INTRAVENOUS
Status: COMPLETED
Start: 2024-03-31 | End: 2024-03-31

## 2024-03-31 RX ORDER — HEPARIN SODIUM 10000 [USP'U]/100ML
5-30 INJECTION, SOLUTION INTRAVENOUS CONTINUOUS
Status: DISCONTINUED | OUTPATIENT
Start: 2024-03-31 | End: 2024-03-31

## 2024-03-31 RX ORDER — NOREPINEPHRINE BITARTRATE 0.06 MG/ML
1-100 INJECTION, SOLUTION INTRAVENOUS CONTINUOUS
Status: DISCONTINUED | OUTPATIENT
Start: 2024-03-31 | End: 2024-04-01 | Stop reason: HOSPADM

## 2024-03-31 RX ORDER — ETOMIDATE 2 MG/ML
20 INJECTION INTRAVENOUS ONCE
Status: COMPLETED | OUTPATIENT
Start: 2024-03-31 | End: 2024-03-31

## 2024-03-31 RX ORDER — INSULIN LISPRO 100 [IU]/ML
0-16 INJECTION, SOLUTION INTRAVENOUS; SUBCUTANEOUS EVERY 4 HOURS
Status: DISCONTINUED | OUTPATIENT
Start: 2024-03-31 | End: 2024-04-01 | Stop reason: HOSPADM

## 2024-03-31 RX ORDER — PROPOFOL 10 MG/ML
5-50 INJECTION, EMULSION INTRAVENOUS CONTINUOUS
Status: DISCONTINUED | OUTPATIENT
Start: 2024-03-31 | End: 2024-04-01 | Stop reason: HOSPADM

## 2024-03-31 RX ORDER — POTASSIUM CHLORIDE 20 MEQ/1
40 TABLET, EXTENDED RELEASE ORAL PRN
Status: DISCONTINUED | OUTPATIENT
Start: 2024-03-31 | End: 2024-04-01 | Stop reason: HOSPADM

## 2024-03-31 RX ORDER — POLYETHYLENE GLYCOL 3350 17 G/17G
17 POWDER, FOR SOLUTION ORAL DAILY PRN
Status: DISCONTINUED | OUTPATIENT
Start: 2024-03-31 | End: 2024-04-01 | Stop reason: HOSPADM

## 2024-03-31 RX ORDER — DEXTROSE MONOHYDRATE 100 MG/ML
INJECTION, SOLUTION INTRAVENOUS CONTINUOUS PRN
Status: DISCONTINUED | OUTPATIENT
Start: 2024-03-31 | End: 2024-04-01 | Stop reason: HOSPADM

## 2024-03-31 RX ORDER — FUROSEMIDE 10 MG/ML
40 INJECTION INTRAMUSCULAR; INTRAVENOUS ONCE
Status: COMPLETED | OUTPATIENT
Start: 2024-03-31 | End: 2024-03-31

## 2024-03-31 RX ORDER — ASPIRIN 81 MG/1
324 TABLET, CHEWABLE ORAL ONCE
Status: COMPLETED | OUTPATIENT
Start: 2024-03-31 | End: 2024-03-31

## 2024-03-31 RX ORDER — NITROGLYCERIN 20 MG/100ML
5-200 INJECTION INTRAVENOUS CONTINUOUS
Status: DISCONTINUED | OUTPATIENT
Start: 2024-03-31 | End: 2024-03-31

## 2024-03-31 RX ORDER — SODIUM CHLORIDE 0.9 % (FLUSH) 0.9 %
5-40 SYRINGE (ML) INJECTION EVERY 12 HOURS SCHEDULED
Status: DISCONTINUED | OUTPATIENT
Start: 2024-03-31 | End: 2024-04-01 | Stop reason: HOSPADM

## 2024-03-31 RX ORDER — INSULIN LISPRO 100 [IU]/ML
0-8 INJECTION, SOLUTION INTRAVENOUS; SUBCUTANEOUS EVERY 6 HOURS
Status: DISCONTINUED | OUTPATIENT
Start: 2024-03-31 | End: 2024-03-31

## 2024-03-31 RX ORDER — ACETAMINOPHEN 325 MG/1
650 TABLET ORAL EVERY 6 HOURS PRN
Status: DISCONTINUED | OUTPATIENT
Start: 2024-03-31 | End: 2024-03-31

## 2024-03-31 RX ORDER — POTASSIUM CHLORIDE 7.45 MG/ML
10 INJECTION INTRAVENOUS PRN
Status: DISCONTINUED | OUTPATIENT
Start: 2024-03-31 | End: 2024-04-01 | Stop reason: HOSPADM

## 2024-03-31 RX ORDER — NITROGLYCERIN 0.4 MG/1
0.4 TABLET SUBLINGUAL ONCE
Status: DISCONTINUED | OUTPATIENT
Start: 2024-03-31 | End: 2024-03-31

## 2024-03-31 RX ORDER — LEVALBUTEROL INHALATION SOLUTION 1.25 MG/3ML
1.25 SOLUTION RESPIRATORY (INHALATION)
Status: DISCONTINUED | OUTPATIENT
Start: 2024-03-31 | End: 2024-04-01 | Stop reason: HOSPADM

## 2024-03-31 RX ORDER — GLUCAGON 1 MG/ML
1 KIT INJECTION PRN
Status: DISCONTINUED | OUTPATIENT
Start: 2024-03-31 | End: 2024-04-01 | Stop reason: HOSPADM

## 2024-03-31 RX ORDER — MAGNESIUM SULFATE IN WATER 40 MG/ML
2000 INJECTION, SOLUTION INTRAVENOUS ONCE
Status: COMPLETED | OUTPATIENT
Start: 2024-03-31 | End: 2024-03-31

## 2024-03-31 RX ORDER — ACETAMINOPHEN 650 MG/1
650 SUPPOSITORY RECTAL EVERY 6 HOURS PRN
Status: DISCONTINUED | OUTPATIENT
Start: 2024-03-31 | End: 2024-03-31

## 2024-03-31 RX ORDER — SODIUM CHLORIDE 9 MG/ML
INJECTION, SOLUTION INTRAVENOUS PRN
Status: DISCONTINUED | OUTPATIENT
Start: 2024-03-31 | End: 2024-04-01 | Stop reason: HOSPADM

## 2024-03-31 RX ORDER — SODIUM CHLORIDE 9 MG/ML
INJECTION, SOLUTION INTRAVENOUS ONCE
Status: COMPLETED | OUTPATIENT
Start: 2024-03-31 | End: 2024-03-31

## 2024-03-31 RX ORDER — MAGNESIUM SULFATE IN WATER 40 MG/ML
2000 INJECTION, SOLUTION INTRAVENOUS PRN
Status: DISCONTINUED | OUTPATIENT
Start: 2024-03-31 | End: 2024-04-01 | Stop reason: HOSPADM

## 2024-03-31 RX ORDER — INSULIN GLARGINE 100 [IU]/ML
5 INJECTION, SOLUTION SUBCUTANEOUS NIGHTLY
Status: DISCONTINUED | OUTPATIENT
Start: 2024-03-31 | End: 2024-04-01 | Stop reason: HOSPADM

## 2024-03-31 RX ORDER — SUCCINYLCHOLINE/SOD CL,ISO/PF 200MG/10ML
0.6 SYRINGE (ML) INTRAVENOUS ONCE
Status: COMPLETED | OUTPATIENT
Start: 2024-03-31 | End: 2024-03-31

## 2024-03-31 RX ORDER — ONDANSETRON 4 MG/1
4 TABLET, ORALLY DISINTEGRATING ORAL EVERY 8 HOURS PRN
Status: DISCONTINUED | OUTPATIENT
Start: 2024-03-31 | End: 2024-04-01 | Stop reason: HOSPADM

## 2024-03-31 RX ORDER — ACETAMINOPHEN 325 MG/1
650 TABLET ORAL EVERY 6 HOURS PRN
Status: DISCONTINUED | OUTPATIENT
Start: 2024-03-31 | End: 2024-04-01 | Stop reason: HOSPADM

## 2024-03-31 RX ORDER — HEPARIN SODIUM 1000 [USP'U]/ML
60 INJECTION, SOLUTION INTRAVENOUS; SUBCUTANEOUS ONCE
Status: DISCONTINUED | OUTPATIENT
Start: 2024-03-31 | End: 2024-03-31 | Stop reason: CLARIF

## 2024-03-31 RX ORDER — FENTANYL CITRATE-0.9 % NACL/PF 10 MCG/ML
25-200 PLASTIC BAG, INJECTION (ML) INTRAVENOUS CONTINUOUS
Status: DISCONTINUED | OUTPATIENT
Start: 2024-03-31 | End: 2024-04-01 | Stop reason: HOSPADM

## 2024-03-31 RX ORDER — 0.9 % SODIUM CHLORIDE 0.9 %
1000 INTRAVENOUS SOLUTION INTRAVENOUS ONCE
Status: COMPLETED | OUTPATIENT
Start: 2024-03-31 | End: 2024-03-31

## 2024-03-31 RX ORDER — FENTANYL CITRATE-0.9 % NACL/PF 20 MCG/2ML
50 SYRINGE (ML) INTRAVENOUS EVERY 30 MIN PRN
Status: DISCONTINUED | OUTPATIENT
Start: 2024-03-31 | End: 2024-04-01 | Stop reason: HOSPADM

## 2024-03-31 RX ORDER — SODIUM CHLORIDE 0.9 % (FLUSH) 0.9 %
5-40 SYRINGE (ML) INJECTION PRN
Status: DISCONTINUED | OUTPATIENT
Start: 2024-03-31 | End: 2024-04-01 | Stop reason: HOSPADM

## 2024-03-31 RX ORDER — ACETAMINOPHEN 650 MG/1
650 SUPPOSITORY RECTAL EVERY 6 HOURS PRN
Status: DISCONTINUED | OUTPATIENT
Start: 2024-03-31 | End: 2024-04-01 | Stop reason: HOSPADM

## 2024-03-31 RX ORDER — MIDAZOLAM HYDROCHLORIDE 1 MG/ML
2 INJECTION INTRAMUSCULAR; INTRAVENOUS ONCE
Status: COMPLETED | OUTPATIENT
Start: 2024-03-31 | End: 2024-03-31

## 2024-03-31 RX ORDER — LEVALBUTEROL INHALATION SOLUTION 1.25 MG/3ML
1.25 SOLUTION RESPIRATORY (INHALATION) EVERY 8 HOURS PRN
Status: DISCONTINUED | OUTPATIENT
Start: 2024-03-31 | End: 2024-03-31

## 2024-03-31 RX ORDER — SODIUM CHLORIDE 9 MG/ML
INJECTION, SOLUTION INTRAVENOUS CONTINUOUS
Status: DISCONTINUED | OUTPATIENT
Start: 2024-03-31 | End: 2024-04-01 | Stop reason: HOSPADM

## 2024-03-31 RX ORDER — HEPARIN SODIUM 1000 [USP'U]/ML
4000 INJECTION, SOLUTION INTRAVENOUS; SUBCUTANEOUS PRN
Status: DISCONTINUED | OUTPATIENT
Start: 2024-03-31 | End: 2024-03-31

## 2024-03-31 RX ORDER — ONDANSETRON 2 MG/ML
4 INJECTION INTRAMUSCULAR; INTRAVENOUS EVERY 6 HOURS PRN
Status: DISCONTINUED | OUTPATIENT
Start: 2024-03-31 | End: 2024-04-01 | Stop reason: HOSPADM

## 2024-03-31 RX ORDER — HEPARIN SODIUM 1000 [USP'U]/ML
4000 INJECTION, SOLUTION INTRAVENOUS; SUBCUTANEOUS ONCE
Status: COMPLETED | OUTPATIENT
Start: 2024-03-31 | End: 2024-03-31

## 2024-03-31 RX ORDER — PANTOPRAZOLE SODIUM 40 MG/10ML
40 INJECTION, POWDER, LYOPHILIZED, FOR SOLUTION INTRAVENOUS DAILY
Status: DISCONTINUED | OUTPATIENT
Start: 2024-03-31 | End: 2024-04-01 | Stop reason: HOSPADM

## 2024-03-31 RX ADMIN — FUROSEMIDE 40 MG: 10 INJECTION, SOLUTION INTRAMUSCULAR; INTRAVENOUS at 02:20

## 2024-03-31 RX ADMIN — HEPARIN SODIUM 2000 UNITS: 1000 INJECTION INTRAVENOUS; SUBCUTANEOUS at 13:51

## 2024-03-31 RX ADMIN — LORAZEPAM 0.5 MG: 2 INJECTION INTRAMUSCULAR; INTRAVENOUS at 02:41

## 2024-03-31 RX ADMIN — ASPIRIN 324 MG: 81 TABLET, CHEWABLE ORAL at 06:01

## 2024-03-31 RX ADMIN — SODIUM CHLORIDE, PRESERVATIVE FREE 10 ML: 5 INJECTION INTRAVENOUS at 20:52

## 2024-03-31 RX ADMIN — HEPARIN SODIUM 4000 UNITS: 1000 INJECTION INTRAVENOUS; SUBCUTANEOUS at 06:37

## 2024-03-31 RX ADMIN — INSULIN LISPRO 4 UNITS: 100 INJECTION, SOLUTION INTRAVENOUS; SUBCUTANEOUS at 18:13

## 2024-03-31 RX ADMIN — HEPARIN SODIUM AND DEXTROSE 12 UNITS/KG/HR: 10000; 5 INJECTION INTRAVENOUS at 06:39

## 2024-03-31 RX ADMIN — INSULIN LISPRO 6 UNITS: 100 INJECTION, SOLUTION INTRAVENOUS; SUBCUTANEOUS at 09:16

## 2024-03-31 RX ADMIN — SODIUM CHLORIDE: 9 INJECTION, SOLUTION INTRAVENOUS at 16:34

## 2024-03-31 RX ADMIN — INSULIN LISPRO 4 UNITS: 100 INJECTION, SOLUTION INTRAVENOUS; SUBCUTANEOUS at 20:52

## 2024-03-31 RX ADMIN — Medication 80 MG: at 16:07

## 2024-03-31 RX ADMIN — NOREPINEPHRINE BITARTRATE 5 MCG/MIN: 0.06 INJECTION, SOLUTION INTRAVENOUS at 16:53

## 2024-03-31 RX ADMIN — PROPOFOL 20 MCG/KG/MIN: 10 INJECTION, EMULSION INTRAVENOUS at 16:05

## 2024-03-31 RX ADMIN — LORAZEPAM 0.5 MG: 2 INJECTION, SOLUTION INTRAMUSCULAR; INTRAVENOUS at 06:01

## 2024-03-31 RX ADMIN — Medication 50 MCG/HR: at 16:44

## 2024-03-31 RX ADMIN — SODIUM CHLORIDE, PRESERVATIVE FREE 10 ML: 5 INJECTION INTRAVENOUS at 09:19

## 2024-03-31 RX ADMIN — SODIUM CHLORIDE: 9 INJECTION, SOLUTION INTRAVENOUS at 15:30

## 2024-03-31 RX ADMIN — VANCOMYCIN HYDROCHLORIDE 1750 MG: 5 INJECTION, POWDER, LYOPHILIZED, FOR SOLUTION INTRAVENOUS at 11:02

## 2024-03-31 RX ADMIN — MIDAZOLAM 2 MG: 1 INJECTION INTRAMUSCULAR; INTRAVENOUS at 15:58

## 2024-03-31 RX ADMIN — LEVALBUTEROL HYDROCHLORIDE 1.25 MG: 1.25 SOLUTION RESPIRATORY (INHALATION) at 16:16

## 2024-03-31 RX ADMIN — ETOMIDATE 18 MG: 2 INJECTION, SOLUTION INTRAVENOUS at 15:59

## 2024-03-31 RX ADMIN — LEVALBUTEROL HYDROCHLORIDE 1.25 MG: 1.25 SOLUTION RESPIRATORY (INHALATION) at 21:55

## 2024-03-31 RX ADMIN — PIPERACILLIN AND TAZOBACTAM 4500 MG: 4; .5 INJECTION, POWDER, LYOPHILIZED, FOR SOLUTION INTRAVENOUS; PARENTERAL at 09:57

## 2024-03-31 RX ADMIN — Medication 5 MCG/MIN: at 16:53

## 2024-03-31 RX ADMIN — INSULIN GLARGINE 5 UNITS: 100 INJECTION, SOLUTION SUBCUTANEOUS at 20:52

## 2024-03-31 RX ADMIN — LEVALBUTEROL HYDROCHLORIDE 1.25 MG: 1.25 SOLUTION RESPIRATORY (INHALATION) at 08:50

## 2024-03-31 RX ADMIN — ACETAMINOPHEN 650 MG: 650 SUPPOSITORY RECTAL at 09:55

## 2024-03-31 RX ADMIN — VANCOMYCIN HYDROCHLORIDE 1750 MG: 5 INJECTION, POWDER, LYOPHILIZED, FOR SOLUTION INTRAVENOUS at 22:22

## 2024-03-31 RX ADMIN — MAGNESIUM SULFATE HEPTAHYDRATE 2000 MG: 40 INJECTION, SOLUTION INTRAVENOUS at 03:10

## 2024-03-31 RX ADMIN — SODIUM CHLORIDE 1000 ML: 9 INJECTION, SOLUTION INTRAVENOUS at 06:01

## 2024-03-31 RX ADMIN — Medication 10 UNITS: at 03:07

## 2024-03-31 RX ADMIN — PIPERACILLIN AND TAZOBACTAM 3375 MG: 3; .375 INJECTION, POWDER, LYOPHILIZED, FOR SOLUTION INTRAVENOUS at 15:43

## 2024-03-31 RX ADMIN — INSULIN LISPRO 8 UNITS: 100 INJECTION, SOLUTION INTRAVENOUS; SUBCUTANEOUS at 14:28

## 2024-03-31 RX ADMIN — PANTOPRAZOLE SODIUM 40 MG: 40 INJECTION, POWDER, FOR SOLUTION INTRAVENOUS at 09:18

## 2024-03-31 RX ADMIN — PROPOFOL 30 MCG/KG/MIN: 10 INJECTION, EMULSION INTRAVENOUS at 22:10

## 2024-03-31 RX ADMIN — IOPAMIDOL 80 ML: 755 INJECTION, SOLUTION INTRAVENOUS at 07:48

## 2024-03-31 RX ADMIN — ACETAMINOPHEN 650 MG: 650 SUPPOSITORY RECTAL at 15:09

## 2024-03-31 ASSESSMENT — PAIN - FUNCTIONAL ASSESSMENT
PAIN_FUNCTIONAL_ASSESSMENT: ADULT NONVERBAL PAIN SCALE (NPVS)
PAIN_FUNCTIONAL_ASSESSMENT: NONE - DENIES PAIN

## 2024-03-31 ASSESSMENT — PULMONARY FUNCTION TESTS
PIF_VALUE: 26
PIF_VALUE: 23

## 2024-03-31 NOTE — PROCEDURES
PROCEDURE NOTE  Date: 3/31/2024   Name: Susan K Sturgess Meyers  YOB: 1965    Central Line    Date/Time: 3/31/2024 6:01 PM    Performed by: Melchor Cardoza DO  Authorized by: Melchor aCrdoza DO  Consent: Verbal consent obtained.  Risks and benefits: risks, benefits and alternatives were discussed  Consent given by: spouse  Patient understanding: patient states understanding of the procedure being performed  Patient consent: the patient's understanding of the procedure matches consent given  Procedure consent: procedure consent matches procedure scheduled  Test results: test results available and properly labeled  Patient identity confirmed: arm band  Time out: Immediately prior to procedure a \"time out\" was called to verify the correct patient, procedure, equipment, support staff and site/side marked as required.  Indications: vascular access  Anesthesia: local infiltration    Anesthesia:  Local Anesthetic: lidocaine 1% without epinephrine  Anesthetic total: 3 mL    Sedation:  Patient sedated: yes  Sedatives: propofol  Analgesia: fentanyl    Preparation: skin prepped with 2% chlorhexidine  Skin prep agent dried: skin prep agent completely dried prior to procedure  Sterile barriers: all five maximum sterile barriers used - cap, mask, sterile gown, sterile gloves, and large sterile sheet  Hand hygiene: hand hygiene performed prior to central venous catheter insertion  Location details: right internal jugular  Patient position: Trendelenburg  Catheter type: triple lumen  Catheter size: 7 Fr  Ultrasound guidance: yes  Sterile ultrasound techniques: sterile gel and sterile probe covers were used  Number of attempts: 1  Successful placement: yes  Post-procedure: line sutured and dressing applied  Assessment: blood return through all ports, free fluid flow, placement verified by x-ray and no pneumothorax on x-ray  Patient tolerance: patient tolerated the procedure well with no immediate

## 2024-03-31 NOTE — PLAN OF CARE
Patient was admitted after midnight.  She is currently awaiting a bed at [facility] secondary to [facility] for continuity of care.    She is at this time developed a fever of 102 -she continues to be tachypneic and tachycardic.  Patient was started on IV antibiotics with broad-spectrum including IV Zosyn and IV vancomycin.      Currently she is on BiPAP. ABG with pH 7.36, pCO2 52, pO2 46, HCO3 30.    Please see this from [facility] H&P  \"Susan Sturgess-Meyers is a 58 y.o. female with a history of distal esophageal cancer s/p neoadjuvant chemotherapy and robotic Dave Federico esophagectomy on 12/05/23 (final pathology negative for residual disease) which was complicated by a contained leak requiring esophageal stent and jejunostomy tube placement on 12/18/23. She was admitted to the hospital for EGD, removal of esophageal stent (persistent fistula noted), esophageal stent reinsertion and flexible bronchoscopy on 2/12/24. Her post operative course was complicated by hypoxia and she was admitted for observation overnight. She was weaned to room air and discharged to home on 2/13/24.   She presents today for an EGD with esophageal stent removal and needs jejunostomy tube replaced. She continues to endorse coughing and spitting up phlegm/bile that she called in about 1 month ago. She did go to her local ED and CXR showed esophageal stent in the mid esophagus. 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 She underwent botox injection of the pylorus, placement of NG/esophageal endovac to cover fistula site; replacement of jejunostomy tube.\"    Pt may require ICU care should she continue to be tachypneic while on BiPAP.      Evaluated patient, she continues to be tachypneic. Discussed with pt's . Agreeable to intubation. Discussed with ICU. She is to

## 2024-03-31 NOTE — PROCEDURES
PROCEDURE NOTE  Date: 3/31/2024   Name: Susan K Sturgess Meyers  YOB: 1965    Intubation    Date/Time: 3/31/2024 4:09 PM    Performed by: Matthieu Gooden MD  Authorized by: Matthieu Gooden MD  Consent: Verbal consent obtained.  Risks and benefits: risks, benefits and alternatives were discussed  Consent given by: patient  Patient understanding: patient states understanding of the procedure being performed  Patient consent: the patient's understanding of the procedure matches consent given  Procedure consent: procedure consent matches procedure scheduled  Relevant documents: relevant documents present and verified  Imaging studies: imaging studies available  Required items: required blood products, implants, devices, and special equipment available  Patient identity confirmed: arm band and hospital-assigned identification number  Time out: Immediately prior to procedure a \"time out\" was called to verify the correct patient, procedure, equipment, support staff and site/side marked as required.  Indications: airway protection  Intubation method: video-assisted  Patient status: sedated  Preoxygenation: BVM  Pretreatment medications: midazolam  Sedatives: etomidate  Tube size: 7.5 mm  Tube type: cuffed  Number of attempts: 1  Cords visualized: yes  Post-procedure assessment: chest rise, ETCO2 monitor, CO2 detector and esophageal detector  Breath sounds: equal  Cuff inflated: yes  ETT to lip: 23 cm  Tube secured with: ETT camilo  Chest x-ray interpreted by radiologist.  Chest x-ray findings: endotracheal tube in appropriate position  Patient tolerance: patient tolerated the procedure well with no immediate complications  Comments: Dr. Connors proctoring at bedside for assistance as needed.        Addendum by Dr. Dory MD:  I was present at the bed side during the above described procedure.  Matthieu Linder MD performed the above described procedure under my direct supervision.  The procedure

## 2024-03-31 NOTE — PLAN OF CARE
Problem: Discharge Planning  Goal: Discharge to home or other facility with appropriate resources  Outcome: Progressing  Flowsheets (Taken 3/31/2024 1239 by Chastity Sanchez RN)  Discharge to home or other facility with appropriate resources:   Identify barriers to discharge with patient and caregiver   Arrange for needed discharge resources and transportation as appropriate   Identify discharge learning needs (meds, wound care, etc)     Problem: Pain  Goal: Verbalizes/displays adequate comfort level or baseline comfort level  Outcome: Progressing     Problem: Chronic Conditions and Co-morbidities  Goal: Patient's chronic conditions and co-morbidity symptoms are monitored and maintained or improved  Outcome: Progressing     Problem: Safety - Adult  Goal: Free from fall injury  Outcome: Progressing     Problem: Safety - Medical Restraint  Goal: Remains free of injury from restraints (Restraint for Interference with Medical Device)  Description: INTERVENTIONS:  1. Determine that other, less restrictive measures have been tried or would not be effective before applying the restraint  2. Evaluate the patient's condition at the time of restraint application  3. Inform patient/family regarding the reason for restraint  4. Q2H: Monitor safety, psychosocial status, comfort, nutrition and hydration  Outcome: Progressing     Care plan reviewed with patient.  Patient unable to verbalize understanding of the plan of care and contribute to goal setting.

## 2024-03-31 NOTE — PROGRESS NOTES
Pharmacy Note - Extended Infusion Beta-Lactam Adjustment    Piperacillin/Tazobactam  3375 mg Q8H  for treatment of Sepsis of unknown etiology. Per [facility] Extended Infusion Beta-Lactam Policy, piperacillin/tazobactam will be changed to 4500mg loading dose followed by 3375mg Q8h extended infusion    Estimated Creatinine Clearance: Estimated Creatinine Clearance: 146 mL/min (based on SCr of 0.4 mg/dL).    BMI: Body mass index is 28.34 kg/m².    Please call with any questions.    Thank you,    Avelina Dorsey, [facility]

## 2024-03-31 NOTE — ED NOTES
Pt. Resting in bed with even and unlabored respirations. Pt. Resting with eyes closed and lights off. Medicated per mar. Pt. Updated about plan of care and treatment. Family at bedside. Pt. Has no further concerns, questions or needs at this time. Call light within reach.

## 2024-03-31 NOTE — H&P
Hospitalist History and Physical          Patient Info:   Name: Susan K Sturgess Meyers, : 1965, PCP: Torri George, [facility]  Unit/Bed:      Admitted on: 3/31/2024  Date of Service: Pt seen/examined on 24 and Admitted to Inpatient with expected LOS greater than two midnights due to medical therapy.       Assessment and Plan:  Acute respiratory failure with hypoxia, query Sepsis: 2/4 SIRS criteria met but no clear source of infection, RR 31, , T 97.8, WBC 11.8. ABG showed respiratory Acidosis with PH 7.29, PCO2 56, PO2 317, HCO3 27. No prior history of COPD or Asthma located in chart. Query pneumonia as source of possible sepsis. Will need to obtain rectal temperature for accurateness  CTA chest pending to rule out PE  Low threshold for transfer to ICU as patient may decompensate rapidly. There are no beds for stepdown at this time and patient may only last a few hours on stepdown.  Echo pending  Repeat EKG pending  Good pulmonary hygiene  Titrate oxygen to > 92% due to concern of ACS  Inflammatory markers pending    Hyperglycemia: Start on q4h accuchecks with medium scale, hypoglycemia protocol.     NSTEMI: troponin increased from 24 to 48. Initial EKG in ED showed Sinus tachycardia without signs of ischemia noted.  Heparin drip  Cardiology consulted  Repeat EKG pending  Lipid panel and hemoglobin A1c pending    Mild Hyponatremia: Na 126 upon admission, which when corrected for blood glucose of 476 is 132.    Noted history of esophageal cancer with recent dilation, stenting of esophagus and J tube placement while at [facility]. Patient has a pending transfer to [facility] for continuity of care.      Data reviewed  EKG:  I have reviewed the EKG with the following interpretation: Sinus tachycardia  Imaging: I have reviewed chest x-ray with the following interpretation: no acute findings      ===================================================================      Chief Complaint:  shortness of

## 2024-03-31 NOTE — ED NOTES
ED to inpatient nurses report      Chief Complaint:  Chief Complaint   Patient presents with    Shortness of Breath     Present to ED from: home    MOA:     LOC: alert and orientated to name, place, date  Mobility: Requires assistance * 1  Oxygen Baseline: none    Current needs required: Bipap     Code Status:   Prior    What abnormal results were found and what did you give/do to treat them? See labs, imaging.   Any procedures or intervention occur? See MAR    Mental Status:  Level of Consciousness: Alert (0)    Psych Assessment:        Vitals:  Patient Vitals for the past 24 hrs:   BP Temp Temp src Pulse Resp SpO2 Height Weight   03/31/24 0557 110/83 -- -- (!) 132 27 100 % -- --   03/31/24 0537 102/68 -- -- (!) 133 (!) 33 -- -- --   03/31/24 0527 113/83 -- -- (!) 134 27 -- -- --   03/31/24 0508 106/79 -- -- (!) 134 (!) 35 97 % -- --   03/31/24 0459 -- 97.8 °F (36.6 °C) -- -- -- -- -- --   03/31/24 0458 (!) 122/94 -- -- (!) 131 29 96 % -- --   03/31/24 0438 117/88 -- -- (!) 133 (!) 32 99 % -- --   03/31/24 0428 97/63 -- -- (!) 134 (!) 33 91 % -- --   03/31/24 0408 (!) 124/93 -- -- (!) 133 30 92 % -- --   03/31/24 0338 117/89 -- -- (!) 132 (!) 32 92 % -- --   03/31/24 0328 108/80 -- -- (!) 134 30 94 % -- --   03/31/24 0318 111/82 -- -- -- (!) 32 97 % -- --   03/31/24 0258 (!) 125/98 -- -- -- (!) 31 96 % -- --   03/31/24 0243 (!) 131/94 -- -- (!) 138 (!) 31 99 % -- --   03/31/24 0226 -- -- -- -- (!) 32 -- -- --   03/31/24 0223 (!) 151/95 -- -- (!) 129 (!) 32 100 % -- --   03/31/24 0215 -- -- -- -- -- 96 % -- --   03/31/24 0213 (!) 131/92 -- -- -- -- -- -- 72.6 kg (160 lb)   03/31/24 0211 -- -- -- -- -- 91 % -- --   03/31/24 0210 -- -- Oral (!) 140 (!) 32 (!) 88 % 1.6 m (5' 3\") --        LDAs:   Peripheral IV 03/31/24 Left Antecubital (Active)   Site Assessment Clean, dry & intact 03/31/24 0212   Line Status Blood return noted;Flushed 03/31/24 0212       Peripheral IV 03/31/24 Left Wrist (Active)   Site Assessment

## 2024-03-31 NOTE — ED NOTES
ED to inpatient nurses report      Chief Complaint:  Chief Complaint   Patient presents with    Shortness of Breath     Present to ED from: home     MOA:     LOC: alert and orientated to name, place, date  Mobility: Requires assistance * 1  Oxygen Baseline: room air     Current needs required: BIPAP     Code Status:   Full Code    What abnormal results were found and what did you give/do to treat them? Resp failure   Any procedures or intervention occur? Antibiotics, insulin, BIPAP, heparin     Mental Status:  Level of Consciousness: Responds to voice (1)    Psych Assessment:        Vitals:  Patient Vitals for the past 24 hrs:   BP Temp Temp src Pulse Resp SpO2 Height Weight   03/31/24 1219 116/80 -- -- (!) 122 30 99 % -- --   03/31/24 1204 103/84 -- -- (!) 123 26 99 % -- --   03/31/24 1105 110/75 -- -- (!) 123 (!) 31 98 % -- --   03/31/24 1000 99/87 -- -- (!) 123 30 98 % -- --   03/31/24 0926 108/80 (!) 102 °F (38.9 °C) Rectal (!) 128 28 95 % -- --   03/31/24 0830 114/74 -- -- (!) 123 (!) 33 96 % -- --   03/31/24 0829 -- -- -- (!) 130 (!) 35 -- -- --   03/31/24 0712 95/82 -- -- (!) 117 26 100 % -- --   03/31/24 0638 105/81 -- -- (!) 123 (!) 33 99 % -- --   03/31/24 0628 102/77 -- -- (!) 128 (!) 33 98 % -- --   03/31/24 0613 107/86 -- -- (!) 129 (!) 31 98 % -- --   03/31/24 0557 110/83 -- -- (!) 132 27 100 % -- --   03/31/24 0537 102/68 -- -- (!) 133 (!) 33 -- -- --   03/31/24 0527 113/83 -- -- (!) 134 27 -- -- --   03/31/24 0508 106/79 -- -- (!) 134 (!) 35 97 % -- --   03/31/24 0459 -- 97.8 °F (36.6 °C) -- -- -- -- -- --   03/31/24 0458 (!) 122/94 -- -- (!) 131 29 96 % -- --   03/31/24 0438 117/88 -- -- (!) 133 (!) 32 99 % -- --   03/31/24 0428 97/63 -- -- (!) 134 (!) 33 91 % -- --   03/31/24 0408 (!) 124/93 -- -- (!) 133 30 92 % -- --   03/31/24 0338 117/89 -- -- (!) 132 (!) 32 92 % -- --   03/31/24 0328 108/80 -- -- (!) 134 30 94 % -- --   03/31/24 0318 111/82 -- -- -- (!) 32 97 % -- --   03/31/24 0258 (!) 125/98

## 2024-03-31 NOTE — ED NOTES
Pt. Resting in bed with labored respirations. Pt.denies any pain. Medicated per mar. Pt. Updated about plan of care and treatment. Family at bedside. Pt. Has no further concerns, questions or needs at this time. Call light within reach.

## 2024-03-31 NOTE — PROGRESS NOTES
[facility]   Pharmacy Pharmacokinetic Monitoring Service - Vancomycin     Susan K Sturgess Meyers is a 58 y.o. female starting on vancomycin therapy for sepsis of unknown etiology. Pharmacy consulted by KADIE Morales for monitoring and adjustment.    Target Concentration: Goal AUC/ALEX 400-600 mg*hr/L    Additional Antimicrobials: zosyn    Pertinent Laboratory Values:   Wt Readings from Last 1 Encounters:   03/31/24 72.6 kg (160 lb)     Temp Readings from Last 1 Encounters:   03/31/24 (!) 102 °F (38.9 °C) (Rectal)     Estimated Creatinine Clearance: 146 mL/min (based on SCr of 0.4 mg/dL).  Recent Labs     03/31/24  0220   CREATININE 0.4   BUN 12   WBC 11.8*     Pertinent Cultures: None ordered at this time   MRSA Nasal Swab:  ordered but not collected     Plan:  Dosing recommendations based on Bayesian software  Start vancomycin 1750mg (24.1 mg/kg) load x1 followed by 1750mg q12h  Anticipated AUC of 521 and trough concentration of 10.9 at steady state  Renal labs as indicated   Vancomycin concentration ordered for 4/2/24 @ 0600   Pharmacy will continue to monitor patient and adjust therapy as indicated    Thank you for the consult,  Meli Rogers, PharmD   Pharmacy Resident  3/31/2024 10:37 AM

## 2024-03-31 NOTE — ED NOTES
Pt. Resting in bed with even and unlabored respirations. Pt. Denies any pain. Medicated per mar. Pt febrile, provider messaged.  Pt. Updated about plan of care and treatment. Family at bedside. Pt. Has no further concerns, questions or needs at this time. Call light within reach.

## 2024-03-31 NOTE — PROGRESS NOTES
Patient transferred to [facility] in stable condition with all patient belongings. Report given to Yeni MCDONALD. Patient's , Jef, updated on the plan of care.

## 2024-03-31 NOTE — CONSULTS
warm and dry.  Lymph:  No supraclavicular adenopathy.      Data: (All radiographs, tracings, PFTs, and imaging are personally viewed and interpreted unless otherwise noted).   Sodium 129, potassium 4.3, chloride 87, CO2 23, creatinine 0.4, BUN 12  WBC 11.8, RBC 4.7, hemoglobin 12.6, MCV 86, platelet count 334  Telemetry shows sinus rhythm  Chest x-ray diffuse alveolar reticular opacities bilaterally more prominent on bibasilar with atelectasis.  CTA chest without contrast-no PE. Diffuse scattered groundglass and nodular opacities throughout both lungs. There is also an area of consolidation near the right lung base     Seen with multidisciplinary ICU team .  Meets Continued ICU Level Care Criteria:    [x] Yes   [] No - Transfer Planned to listed location:  [] HOSPITALIST CONTACTED-      Case and plan discussed with Dr. Dory ALDANA.  Electronically signed by Loi Serrano DO, PGY-3    Addendum by Dr. Dory MD:  Patient seen by me independently today I.e on 4/1/24 including key components of medical care. Face to face evaluation and examination was performed today I.e on 4/1/24. Case discussed with Dr. Loi Serrano DO-resident physician. Agree with resident's findings and plan as documented in the resident's note. Italicized font, if present,  represents changes to the note made by me.   More than 50% of the encounter time involved with patient care by the Pulmonary & Critical care service team spent by me.    Please see my modifications mentioned below:  Patient remained critically ill  Remained on the ventilator with sedation    Vent Settings:  Vent Mode: AC/PC (PCV+) Resp Rate (Set): 16 bpm/ / /FiO2 : 40 %    Lab Results   Component Value Date/Time    PH 7.40 03/31/2024 05:24 PM    PCO2 41 03/31/2024 05:24 PM    PO2 219 03/31/2024 05:24 PM    HCO3 25 03/31/2024 05:24 PM    O2SAT 100 03/31/2024 05:24 PM     Lab Results   Component Value Date/Time    IFIO2 100 03/31/2024 05:24 PM    MODE PC/PS 
effusion or pneumothorax. There are postsurgical changes from a previous gastric pull-through surgery and there is a large stent within the lumen. There are some prominent lymph nodes near the diaphragmatic hiatus. There are some degenerative changes in the spine.     1. Diffuse scattered groundglass and nodular opacities throughout both lungs. There is also an area of consolidation near the right lung base. These findings are likely related to multifocal pneumonia. 2. No pulmonary embolism. **This report has been created using voice recognition software. It may contain minor errors which are inherent in voice recognition technology.** Final report electronically signed by Dr Renaldo Roy on 3/31/2024 8:14 AM    XR CHEST PORTABLE    Result Date: 3/31/2024  1 view chest x-ray Comparison: DX/KO - XR CHEST STANDARD (2 VW) - 02/28/2024 02:30 PM EST Findings: Heart size normal. Pleural effusion or pneumothorax. No acute airspace opacity. Stent has been extended/elongated in the interval since 02/28/2024 exam.     1. No acute findings. This document has been electronically signed by: Armand Vilchis MD on 03/31/2024 02:38 AM      LABS:  No results for input(s): \"CKTOTAL\", \"CKMB\", \"CKMBINDEX\", \"TROPONINT\" in the last 72 hours.  CBC:   Lab Results   Component Value Date/Time    WBC 11.8 03/31/2024 02:20 AM    RBC 4.68 03/31/2024 02:20 AM    RBC 4.48 10/07/2011 12:00 PM    HGB 12.6 03/31/2024 02:20 AM    HCT 40.2 03/31/2024 02:20 AM    MCV 85.9 03/31/2024 02:20 AM    MCH 26.9 03/31/2024 02:20 AM    MCHC 31.3 03/31/2024 02:20 AM    RDW 14.6 01/22/2024 11:28 PM     03/31/2024 02:20 AM    MPV 9.7 03/31/2024 02:20 AM     BMP:    Lab Results   Component Value Date/Time     03/31/2024 02:37 AM     03/31/2024 02:20 AM    K 4.6 03/31/2024 02:37 AM    K 4.3 03/31/2024 02:20 AM    CL 87 03/31/2024 02:20 AM    CO2 23 03/31/2024 02:20 AM    BUN 12 03/31/2024 02:20 AM    LABALBU 3.9 03/31/2024 02:20 AM    CREATININE

## 2024-03-31 NOTE — PROGRESS NOTES
Dr. Connors, Dr. Gooden, Dr. Garcia, and WANG Dubon, NP at bedside for intubation.  Yeni, RN, Art, RN, Ester, RT, and student RT at [facility] to assist with intubation.  1558 Versed 2mg given.  1559 Etomidate 18mg given.  1600 Succinylcholine 80mg given.  1602 7.5 ETT 23 at the lip.  Good color change on colometric CO2 detector.  Lung sounds equal bilaterally.

## 2024-03-31 NOTE — ED PROVIDER NOTES
[SC]   0256 Glucose, Random(!): 476  Will give 10 units of insulin [SC]   0345 Rechecked patient, decreased work of breathing, on auscultation improved aeration, decreased crackles.  Discussed with patient transfer to [facility] if she wants to go with what ever her team at [facility] recommends. [SC]   0508 Call to [facility] Transfer [facility] - discussed case with transfer center nurse, she will triage case. Facesheet sent. Likely no beds until tomorrow. [SC]   0541 Call to [facility] [SC]   0545 Referral for admission sent to Varghese Kent NP (Hospitalist Service) via [employer] Text Message.   [SC]   0546 Troponin, High Sensitivity(!): 48 [SC]      ED Course User Index  [SC] Jorge A Crespo MD       Procedures: (None if left blank)  Procedures:     Consultants:    Documentation:  N/A    MEDICATION CHANGES     New Prescriptions    No medications on file       FINAL IMPRESSION     Final diagnoses:   Acute hypoxic respiratory failure (HCC)   Elevated troponin   Hyperglycemia       DISPOSITION   DISPOSITION Admitted 03/31/2024 06:14:17 AM      Results and plan discussed with patient at bedside. Patient is agreeable to plan.       This transcription was electronically signed. Parts of this transcriptions may have been dictated by use of voice recognition software and electronically transcribed. The transcription may contain errors not detected in proofreading. Please refer to my supervising physician's documentation if my documentation differs.    Electronically Signed: Jorge A Crespo MD, 03/31/24, 7:23 AM

## 2024-03-31 NOTE — ED NOTES
Pt transported to [facility] by cart in stable condition.   Called [facility] and informed Bridget that the patient was on their way to the unit.

## 2024-03-31 NOTE — ED NOTES
Report provided by HARRY agudelo. Pt. Resting in bed with labored respirations. Pt. Resting with eyes closed, tolerating bipap.  Pt. Has no further concerns, questions or needs at this time. Call light within reach.

## 2024-03-31 NOTE — PROGRESS NOTES
Pt admitted to [facility] from ED.     Complaints: Shortness of breath.      IV site free of s/s of infection or infiltration.     Vital signs obtained. Assessment and data collection initiated. Two nurse skin assessment performed by Chastity MCDONALD and Yeni MCDONALD.    Swallow Screen completed and documented for all patients ages 45 and older.   If patient fails bedside swallow, obtain order for speech therapy consult and keep patient NPO.    Policies and procedures for  explained. All questions answered with no further questions at this time. Fall prevention and safety brochure discussed with patient.  Bed alarm on. Call light in reach. Oriented to room.

## 2024-04-01 ENCOUNTER — APPOINTMENT (OUTPATIENT)
Age: 59
DRG: 871 | End: 2024-04-01
Payer: COMMERCIAL

## 2024-04-01 ENCOUNTER — APPOINTMENT (OUTPATIENT)
Dept: GENERAL RADIOLOGY | Age: 59
DRG: 871 | End: 2024-04-01
Payer: COMMERCIAL

## 2024-04-01 VITALS
TEMPERATURE: 101.7 F | WEIGHT: 161.82 LBS | SYSTOLIC BLOOD PRESSURE: 96 MMHG | BODY MASS INDEX: 27.63 KG/M2 | HEIGHT: 64 IN | RESPIRATION RATE: 30 BRPM | HEART RATE: 116 BPM | DIASTOLIC BLOOD PRESSURE: 53 MMHG | OXYGEN SATURATION: 93 %

## 2024-04-01 PROBLEM — R65.21 SEPTIC SHOCK (HCC): Status: ACTIVE | Noted: 2024-04-01

## 2024-04-01 PROBLEM — A41.9 SEPTIC SHOCK (HCC): Status: ACTIVE | Noted: 2024-04-01

## 2024-04-01 LAB
ANION GAP SERPL CALC-SCNC: 12 MEQ/L (ref 8–16)
BASOPHILS ABSOLUTE: 0 THOU/MM3 (ref 0–0.1)
BASOPHILS NFR BLD AUTO: 0.3 %
BUN SERPL-MCNC: 9 MG/DL (ref 7–22)
BURR CELLS BLD QL SMEAR: ABNORMAL
CA-I BLD ISE-SCNC: 1.18 MMOL/L (ref 1.12–1.32)
CALCIUM SERPL-MCNC: 8.1 MG/DL (ref 8.5–10.5)
CHLORIDE SERPL-SCNC: 103 MEQ/L (ref 98–111)
CO2 SERPL-SCNC: 23 MEQ/L (ref 23–33)
CREAT SERPL-MCNC: 0.4 MG/DL (ref 0.4–1.2)
DEPRECATED RDW RBC AUTO: 53.1 FL (ref 35–45)
ECHO AO ASC DIAM: 3.1 CM
ECHO AO ASCENDING AORTA INDEX: 1.73 CM/M2
ECHO AV CUSP MM: 1.5 CM
ECHO AV PEAK GRADIENT: 15 MMHG
ECHO AV PEAK VELOCITY: 2 M/S
ECHO AV VELOCITY RATIO: 0.55
ECHO BSA: 1.84 M2
ECHO EST RA PRESSURE: 10 MMHG
ECHO LA AREA 2C: 12.9 CM2
ECHO LA AREA 4C: 10.5 CM2
ECHO LA DIAMETER INDEX: 1.28 CM/M2
ECHO LA DIAMETER: 2.3 CM
ECHO LA MAJOR AXIS: 4.7 CM
ECHO LA MINOR AXIS: 4.2 CM
ECHO LA VOL BP: 26 ML (ref 22–52)
ECHO LA VOL MOD A2C: 33 ML (ref 22–52)
ECHO LA VOL MOD A4C: 19 ML (ref 22–52)
ECHO LA VOL/BSA BIPLANE: 15 ML/M2 (ref 16–34)
ECHO LA VOLUME INDEX MOD A2C: 18 ML/M2 (ref 16–34)
ECHO LA VOLUME INDEX MOD A4C: 11 ML/M2 (ref 16–34)
ECHO LV FRACTIONAL SHORTENING: 32 % (ref 28–44)
ECHO LV INTERNAL DIMENSION DIASTOLE INDEX: 2.29 CM/M2
ECHO LV INTERNAL DIMENSION DIASTOLIC: 4.1 CM (ref 3.9–5.3)
ECHO LV INTERNAL DIMENSION SYSTOLIC INDEX: 1.56 CM/M2
ECHO LV INTERNAL DIMENSION SYSTOLIC: 2.8 CM
ECHO LV ISOVOLUMETRIC RELAXATION TIME (IVRT): 77 MS
ECHO LV IVSD: 1.1 CM (ref 0.6–0.9)
ECHO LV MASS 2D: 132.1 G (ref 67–162)
ECHO LV MASS INDEX 2D: 73.8 G/M2 (ref 43–95)
ECHO LV POSTERIOR WALL DIASTOLIC: 0.9 CM (ref 0.6–0.9)
ECHO LV RELATIVE WALL THICKNESS RATIO: 0.44
ECHO LVOT PEAK GRADIENT: 5 MMHG
ECHO LVOT PEAK VELOCITY: 1.1 M/S
ECHO MV A VELOCITY: 1.2 M/S
ECHO MV E DECELERATION TIME (DT): 278 MS
ECHO MV E VELOCITY: 0.97 M/S
ECHO MV E/A RATIO: 0.81
ECHO MV REGURGITANT PEAK GRADIENT: 36 MMHG
ECHO MV REGURGITANT PEAK VELOCITY: 3 M/S
ECHO PV MAX VELOCITY: 1 M/S
ECHO PV PEAK GRADIENT: 4 MMHG
ECHO RIGHT VENTRICULAR SYSTOLIC PRESSURE (RVSP): 49 MMHG
ECHO RV INTERNAL DIMENSION: 1.9 CM
ECHO TV E WAVE: 0.7 M/S
ECHO TV REGURGITANT MAX VELOCITY: 3.11 M/S
ECHO TV REGURGITANT PEAK GRADIENT: 39 MMHG
EOSINOPHIL NFR BLD AUTO: 1.2 %
EOSINOPHILS ABSOLUTE: 0.1 THOU/MM3 (ref 0–0.4)
ERYTHROCYTE [DISTWIDTH] IN BLOOD BY AUTOMATED COUNT: 16.7 % (ref 11.5–14.5)
GFR SERPL CREATININE-BSD FRML MDRD: > 90 ML/MIN/1.73M2
GLUCOSE BLD STRIP.AUTO-MCNC: 171 MG/DL (ref 70–108)
GLUCOSE BLD STRIP.AUTO-MCNC: 199 MG/DL (ref 70–108)
GLUCOSE BLD STRIP.AUTO-MCNC: 201 MG/DL (ref 70–108)
GLUCOSE SERPL-MCNC: 227 MG/DL (ref 70–108)
HCT VFR BLD AUTO: 32.1 % (ref 37–47)
HGB BLD-MCNC: 10.2 GM/DL (ref 12–16)
IMM GRANULOCYTES # BLD AUTO: 0.19 THOU/MM3 (ref 0–0.07)
IMM GRANULOCYTES NFR BLD AUTO: 2 %
LYMPHOCYTES ABSOLUTE: 0.4 THOU/MM3 (ref 1–4.8)
LYMPHOCYTES NFR BLD AUTO: 4.6 %
MAGNESIUM SERPL-MCNC: 1.8 MG/DL (ref 1.6–2.4)
MCH RBC QN AUTO: 27.3 PG (ref 26–33)
MCHC RBC AUTO-ENTMCNC: 31.8 GM/DL (ref 32.2–35.5)
MCV RBC AUTO: 86.1 FL (ref 81–99)
MONOCYTES ABSOLUTE: 0.3 THOU/MM3 (ref 0.4–1.3)
MONOCYTES NFR BLD AUTO: 2.9 %
NEUTROPHILS NFR BLD AUTO: 89 %
NRBC BLD AUTO-RTO: 0 /100 WBC
PHOSPHATE SERPL-MCNC: 1.8 MG/DL (ref 2.4–4.7)
PLATELET # BLD AUTO: 277 THOU/MM3 (ref 130–400)
PLATELET BLD QL SMEAR: ADEQUATE
PMV BLD AUTO: 9.8 FL (ref 9.4–12.4)
POIKILOCYTES: ABNORMAL
POTASSIUM SERPL-SCNC: 3.8 MEQ/L (ref 3.5–5.2)
RBC # BLD AUTO: 3.73 MILL/MM3 (ref 4.2–5.4)
SCAN OF BLOOD SMEAR: NORMAL
SEGMENTED NEUTROPHILS ABSOLUTE COUNT: 8.4 THOU/MM3 (ref 1.8–7.7)
SODIUM SERPL-SCNC: 138 MEQ/L (ref 135–145)
TOXIC GRANULES BLD QL SMEAR: PRESENT
WBC # BLD AUTO: 9.4 THOU/MM3 (ref 4.8–10.8)

## 2024-04-01 PROCEDURE — 84100 ASSAY OF PHOSPHORUS: CPT

## 2024-04-01 PROCEDURE — 2580000003 HC RX 258

## 2024-04-01 PROCEDURE — 2580000003 HC RX 258: Performed by: INTERNAL MEDICINE

## 2024-04-01 PROCEDURE — 6360000002 HC RX W HCPCS

## 2024-04-01 PROCEDURE — 6370000000 HC RX 637 (ALT 250 FOR IP): Performed by: PHYSICIAN ASSISTANT

## 2024-04-01 PROCEDURE — 2700000000 HC OXYGEN THERAPY PER DAY

## 2024-04-01 PROCEDURE — 83735 ASSAY OF MAGNESIUM: CPT

## 2024-04-01 PROCEDURE — 80048 BASIC METABOLIC PNL TOTAL CA: CPT

## 2024-04-01 PROCEDURE — C9113 INJ PANTOPRAZOLE SODIUM, VIA: HCPCS

## 2024-04-01 PROCEDURE — 93306 TTE W/DOPPLER COMPLETE: CPT | Performed by: INTERNAL MEDICINE

## 2024-04-01 PROCEDURE — 36592 COLLECT BLOOD FROM PICC: CPT

## 2024-04-01 PROCEDURE — 36415 COLL VENOUS BLD VENIPUNCTURE: CPT

## 2024-04-01 PROCEDURE — 82948 REAGENT STRIP/BLOOD GLUCOSE: CPT

## 2024-04-01 PROCEDURE — 94640 AIRWAY INHALATION TREATMENT: CPT

## 2024-04-01 PROCEDURE — 94003 VENT MGMT INPAT SUBQ DAY: CPT

## 2024-04-01 PROCEDURE — 6360000002 HC RX W HCPCS: Performed by: INTERNAL MEDICINE

## 2024-04-01 PROCEDURE — 2580000003 HC RX 258: Performed by: PHYSICIAN ASSISTANT

## 2024-04-01 PROCEDURE — 82330 ASSAY OF CALCIUM: CPT

## 2024-04-01 PROCEDURE — 94761 N-INVAS EAR/PLS OXIMETRY MLT: CPT

## 2024-04-01 PROCEDURE — 6360000002 HC RX W HCPCS: Performed by: PHYSICIAN ASSISTANT

## 2024-04-01 PROCEDURE — 71045 X-RAY EXAM CHEST 1 VIEW: CPT

## 2024-04-01 PROCEDURE — 85025 COMPLETE CBC W/AUTO DIFF WBC: CPT

## 2024-04-01 PROCEDURE — 93306 TTE W/DOPPLER COMPLETE: CPT

## 2024-04-01 PROCEDURE — 99291 CRITICAL CARE FIRST HOUR: CPT | Performed by: INTERNAL MEDICINE

## 2024-04-01 PROCEDURE — 2500000003 HC RX 250 WO HCPCS: Performed by: INTERNAL MEDICINE

## 2024-04-01 RX ORDER — ENOXAPARIN SODIUM 100 MG/ML
40 INJECTION SUBCUTANEOUS DAILY
Status: DISCONTINUED | OUTPATIENT
Start: 2024-04-01 | End: 2024-04-01 | Stop reason: HOSPADM

## 2024-04-01 RX ADMIN — PANTOPRAZOLE SODIUM 40 MG: 40 INJECTION, POWDER, FOR SOLUTION INTRAVENOUS at 07:37

## 2024-04-01 RX ADMIN — PROPOFOL 35 MCG/KG/MIN: 10 INJECTION, EMULSION INTRAVENOUS at 14:22

## 2024-04-01 RX ADMIN — LEVALBUTEROL HYDROCHLORIDE 1.25 MG: 1.25 SOLUTION RESPIRATORY (INHALATION) at 08:55

## 2024-04-01 RX ADMIN — PIPERACILLIN AND TAZOBACTAM 3375 MG: 3; .375 INJECTION, POWDER, LYOPHILIZED, FOR SOLUTION INTRAVENOUS at 00:43

## 2024-04-01 RX ADMIN — PROPOFOL 35 MCG/KG/MIN: 10 INJECTION, EMULSION INTRAVENOUS at 10:59

## 2024-04-01 RX ADMIN — ACETAMINOPHEN 650 MG: 325 TABLET ORAL at 04:04

## 2024-04-01 RX ADMIN — INSULIN LISPRO 4 UNITS: 100 INJECTION, SOLUTION INTRAVENOUS; SUBCUTANEOUS at 05:59

## 2024-04-01 RX ADMIN — LEVALBUTEROL HYDROCHLORIDE 1.25 MG: 1.25 SOLUTION RESPIRATORY (INHALATION) at 12:07

## 2024-04-01 RX ADMIN — SODIUM CHLORIDE, PRESERVATIVE FREE 10 ML: 5 INJECTION INTRAVENOUS at 07:33

## 2024-04-01 RX ADMIN — PIPERACILLIN AND TAZOBACTAM 3375 MG: 3; .375 INJECTION, POWDER, LYOPHILIZED, FOR SOLUTION INTRAVENOUS at 07:43

## 2024-04-01 RX ADMIN — SODIUM CHLORIDE: 9 INJECTION, SOLUTION INTRAVENOUS at 03:52

## 2024-04-01 RX ADMIN — Medication 50 MCG/HR: at 07:29

## 2024-04-01 RX ADMIN — ACETAMINOPHEN 650 MG: 650 SUPPOSITORY RECTAL at 13:32

## 2024-04-01 RX ADMIN — VANCOMYCIN HYDROCHLORIDE 1750 MG: 5 INJECTION, POWDER, LYOPHILIZED, FOR SOLUTION INTRAVENOUS at 11:18

## 2024-04-01 RX ADMIN — PROPOFOL 35 MCG/KG/MIN: 10 INJECTION, EMULSION INTRAVENOUS at 06:08

## 2024-04-01 RX ADMIN — SODIUM CHLORIDE: 9 INJECTION, SOLUTION INTRAVENOUS at 13:44

## 2024-04-01 ASSESSMENT — PULMONARY FUNCTION TESTS
PIF_VALUE: 26
PIF_VALUE: 23
PIF_VALUE: 20
PIF_VALUE: 19

## 2024-04-01 NOTE — PROGRESS NOTES
CRITICAL CARE PROGRESS NOTE      Patient:  Susan K Sturgess Meyers    Unit/Bed:4B-02/002-A  YOB: 1965  MRN: [id number]   PCP: Torri George, APRN - CNP  Date of Admission: 3/31/2024  Chief Complaint:- Shortness of Breath      Assessment and Plan:      Patient is being transferred to [facility] for higher level of care not available at this facility, continuity of care patient had esophageal cancer treatment at [facility].    Acute hypoxic respiratory failure 2/2 acute pneumonia: Patient is intubated and sedated on vent.  Pneumonia panel positive for E. coli, Klebsiella pneumonia.  There is suspicion for aspiration event so started Zosyn 3/31, vancomycin start 3/31.  BC's, respiratory culture pending.  Modify antibiotics based on results.  Wean off vent and sedation as able.  T2DM, uncontrolled: On insulin.  Continue Lantus and sliding scale.  Hypoglycemia protocol in place, POC glucose checks.  Target glucose 140-180  Moderate hyponatremia: Likely related to poor p.o. intake and CHF.  Improving, monitor closely.  Elevated troponin: High-sensitivity troponin 3/31 24, repeat 48.  EKG 3/31 sinus tachycardia, otherwise normal.  Cardiology consulted, appreciated.  Echo pending  J-tube: Noted  Esophageal cancer s/p esophageal stenting, s/p radiation/surgery/chemo: Noted  Deconditioning/debilitated: Has multiple conditions contributing, diabetes, esophageal cancer s/p chemo/radiation/surgery with stent.  Social work consulted, appreciated.  Patient may need social aid.  PT/OT once patient is extubated.  GERD: On Protonix    INITIAL H AND P AND ICU COURSE:  Susan K Sturgess Meyers is a 58 y.o. female with PMHx of DMII, esophageal cancer who presents to [facility] with shortness of breath.  Patient is non-contributory for HPI due to not being able to speak well at this time while on bipap. She is however able to answer simple questions with yes and no. She does appear to be in moderate-severe respiratory

## 2024-04-01 NOTE — DISCHARGE SUMMARY
Patient transferred to [facility] via [facility] at this time. No further concerns from family at bedside.     
Tachycardia  Micro: Pneumonia panel E. coli, Klebsiella pneumonia.  Respiratory culture gram-negative bacilli        Seen with multidisciplinary ICU team yes.  Meets Continued ICU Level Care Criteria:    [x] Yes   [] No - Transfer Planned to listed location:  [] HOSPITALIST CONTACTED-      Case and plan discussed with Dr. Sam.        Electronically signed by Lazaro Kuhn MD  CRITICAL CARE SPECIALIST  Patient seen by me including key components of medical care.  Case discussed with resident physician.  Patient transferred in stable but critical condition to [facility].  Italicized font, if present,  represents changes to the note made by me.  My total CC time is 45 minutes.  Time was discontiguous. Time does not include procedure. Time does include my direct assessment of the patient and coordination of care.  Time represents more than 50% of the time involved with patient care by the CC team.  Electronically signed by Steven Sam MD.

## 2024-04-01 NOTE — PLAN OF CARE
Problem: Discharge Planning  Goal: Discharge to home or other facility with appropriate resources  4/1/2024 0812 by Prachi Lindsey RN  Outcome: Progressing  Flowsheets (Taken 3/31/2024 1239 by Chastity Sanchez, RN)  Discharge to home or other facility with appropriate resources:   Identify barriers to discharge with patient and caregiver   Arrange for needed discharge resources and transportation as appropriate   Identify discharge learning needs (meds, wound care, etc)  Note: She is from home with her  and plans on returning there at discharge.    3/31/2024 1946 by Yeni Monzon  Outcome: Progressing  Flowsheets (Taken 3/31/2024 1239 by Chastity Sanchez, RN)  Discharge to home or other facility with appropriate resources:   Identify barriers to discharge with patient and caregiver   Arrange for needed discharge resources and transportation as appropriate   Identify discharge learning needs (meds, wound care, etc)     Problem: Pain  Goal: Verbalizes/displays adequate comfort level or baseline comfort level  4/1/2024 0812 by Prachi Lindsey RN  Outcome: Progressing  Flowsheets (Taken 4/1/2024 0812)  Verbalizes/displays adequate comfort level or baseline comfort level: Encourage patient to monitor pain and request assistance  Note: No signs of pain so far this shift, will continue to monitor.  3/31/2024 1946 by Yeni Monzon  Outcome: Progressing     Problem: Chronic Conditions and Co-morbidities  Goal: Patient's chronic conditions and co-morbidity symptoms are monitored and maintained or improved  4/1/2024 0812 by Prachi Lindsey RN  Outcome: Progressing  Flowsheets (Taken 4/1/2024 0812)  Care Plan - Patient's Chronic Conditions and Co-Morbidity Symptoms are Monitored and Maintained or Improved: Monitor and assess patient's chronic conditions and comorbid symptoms for stability, deterioration, or improvement  Note: Glucose checked & covered per physician's orders.    3/31/2024 1946 by Nicolás

## 2024-04-01 NOTE — FLOWSHEET NOTE
The patient has been accepted to the [facility], [facility] room 1148.  Gave report to Pete the nurse.  His direct phone # is [phone]. Will update with time of transfer when known.

## 2024-04-01 NOTE — PLAN OF CARE
Problem: Respiratory - Adult  Goal: Achieves optimal ventilation and oxygenation  Outcome: Progressing                                                  Patient Weaning Progress    The patient's vent settings was able to be weaned this shift.      Ventilator settings that were weaned              [] Mode   [] Pressure support weaned   [x] Fio2 weaned   [x] Peep weaned      Spontaneous weaning trial  was not attempted.  Due to defined parameters for SBT (spontaneous breathing trial) not being met.     *Results of SBT documented under SBTOUTCOME note.    Reason that defined ventilator parameters for SBT was not met              [] Patient condition requires increased ventilator settings  [] Requires increased sedation   [x] Settings not within weaning range   [] SAT not completed   [] Physician orders    Evac tube was  hooked up with continuous low suction(20-30mmHg)      Cuff was not  deflated to determine cuff leak.     Unable to get agreement for goals because no family is present and patient cannot respond.

## 2024-04-01 NOTE — CARE COORDINATION
04/01/24 1212   Service Assessment   Patient Orientation Sedated  (on vent)   Cognition Other (see comment)  (Sedated on vent)   History Provided By Medical Record  (Pt transferring to [facility] today)   Primary Caregiver Other (Comment)  (RENUKA)   Accompanied By/Relationship n/a   Support Systems Spouse/Significant Other   Patient's Healthcare Decision Maker is: Legal Next of Kin   PCP Verified by CM No  (On vent - transferring to [facility])   Prior Functional Level Other (see comment)  (RENUKA; on vent, transferring to [facility])   Current Functional Level Other (see comment)  (RENUKA; sedated on vent)   Can patient return to prior living arrangement Unknown at present   Ability to make needs known: Other (see comment)  (RENUKA; sedated on vent)   Family able to assist with home care needs: Other (comment)  (RENUKA; on vent, transferring to [facility])   Would you like for me to discuss the discharge plan with any other family members/significant others, and if so, who? No   Financial Resources Other (Comment)  (Social [employer] Benefits)   Community Resources Other (Comment)  (RENUKA; on vent, transferring to [facility])   Social/Functional History   Active  No  (RENUKA; on vent, transferring to [facility])   Discharge Planning   Type of Residence House   Living Arrangements Spouse/Significant Other   Current Services Prior To Admission Other (Comment)  (RENUKA; on vent, transferring to [facility])   Potential Assistance Needed Home Care;Skilled Nursing Facility   DME Ordered? No   Potential Assistance Purchasing Medications No   Type of Home Care Services None   Patient expects to be discharged to: Unknown   Services At/After Discharge   Confirm Follow Up Transport Other (see comment)  (RENUKA; on vent, transferring to [facility])     Patient Goals/Plan/Treatment Preferences: From home with . Presented to ED with c/o SOB. Placed on bipap and admitted to . Failed bipap and was intubated on 3/31. Hx of esophageal cancer s/p esophagectomy and stent placement. Remains

## 2024-04-03 LAB
BACTERIA SPEC RESP CULT: ABNORMAL
GRAM STN SPEC: ABNORMAL
ORGANISM: ABNORMAL
ORGANISM: ABNORMAL

## 2024-04-04 LAB
EKG ATRIAL RATE: 130 BPM
EKG ATRIAL RATE: 130 BPM
EKG ATRIAL RATE: 132 BPM
EKG P AXIS: 42 DEGREES
EKG P AXIS: 53 DEGREES
EKG P AXIS: 68 DEGREES
EKG P-R INTERVAL: 140 MS
EKG P-R INTERVAL: 142 MS
EKG P-R INTERVAL: 148 MS
EKG Q-T INTERVAL: 286 MS
EKG Q-T INTERVAL: 292 MS
EKG Q-T INTERVAL: 300 MS
EKG QRS DURATION: 66 MS
EKG QRS DURATION: 66 MS
EKG QRS DURATION: 76 MS
EKG QTC CALCULATION (BAZETT): 420 MS
EKG QTC CALCULATION (BAZETT): 432 MS
EKG QTC CALCULATION (BAZETT): 441 MS
EKG R AXIS: 0 DEGREES
EKG R AXIS: 27 DEGREES
EKG R AXIS: 4 DEGREES
EKG T AXIS: 25 DEGREES
EKG T AXIS: 41 DEGREES
EKG T AXIS: 64 DEGREES
EKG VENTRICULAR RATE: 130 BPM
EKG VENTRICULAR RATE: 130 BPM
EKG VENTRICULAR RATE: 132 BPM

## 2024-04-04 NOTE — PROGRESS NOTES
Physician Progress Note      PATIENT:               STURGESS MEYERS, SUSAN  [facility] #:                  [id number]  :                       1965  ADMIT DATE:       3/31/2024 2:08 AM  DISCH DATE:        2024 3:01 PM  RESPONDING  PROVIDER #:        Emery Connors MD          QUERY TEXT:    Pt admitted with SOB. Pt noted to have , RR 31, WBC 11.8, procal 1.98,   lactic 2.2, PNA, acute respiratory failure. If possible, please document in   the progress notes and discharge summary if you are evaluating and /or   treating any of the following:    The medical record reflects the following:  Risk Factors: Cancer, DM  Clinical Indicators: , RR 31, WBC 11.8, procal 1.98, lactic 2.2, PNA,   acute respiratory failure  Treatment: ICU placement, [facility] transfer, lab monitoring, imaging, IV ATB's    Thank You!  Nasima Travis RN, CRCR  RN Clinical Documentation Integrity  Options provided:  -- Sepsis, present on admission  -- PNA without Sepsis  -- Other - I will add my own diagnosis  -- Disagree - Not applicable / Not valid  -- Disagree - Clinically unable to determine / Unknown  -- Refer to Clinical Documentation Reviewer    PROVIDER RESPONSE TEXT:    This patient has sepsis which was present on admission.    Query created by: Nasima Travis on 4/3/2024 8:57 AM      Electronically signed by:  Emery Connors MD 4/3/2024 11:36 PM

## 2024-04-05 LAB
BACTERIA BLD AEROBE CULT: NORMAL
BACTERIA BLD AEROBE CULT: NORMAL

## 2024-10-01 ENCOUNTER — HOSPITAL ENCOUNTER (OUTPATIENT)
Dept: CT IMAGING | Age: 59
Discharge: HOME | End: 2024-10-01
Attending: RADIOLOGY

## 2024-10-01 ENCOUNTER — HOSPITAL ENCOUNTER (OUTPATIENT)
Dept: GENERAL RADIOLOGY | Age: 59
Discharge: HOME | End: 2024-10-01

## 2024-10-01 DIAGNOSIS — Z00.6 EXAMINATION FOR NORMAL COMPARISON FOR CLINICAL RESEARCH: ICD-10-CM

## 2024-12-04 ENCOUNTER — APPOINTMENT (OUTPATIENT)
Dept: CT IMAGING | Age: 59
DRG: 193 | End: 2024-12-04
Payer: COMMERCIAL

## 2024-12-04 ENCOUNTER — HOSPITAL ENCOUNTER (INPATIENT)
Age: 59
LOS: 7 days | Discharge: ANOTHER HOSPITAL | DRG: 193 | End: 2024-12-11
Attending: EMERGENCY MEDICINE
Payer: COMMERCIAL

## 2024-12-04 ENCOUNTER — APPOINTMENT (OUTPATIENT)
Dept: GENERAL RADIOLOGY | Age: 59
DRG: 193 | End: 2024-12-04
Payer: COMMERCIAL

## 2024-12-04 DIAGNOSIS — R06.02 SHORTNESS OF BREATH: ICD-10-CM

## 2024-12-04 DIAGNOSIS — E87.5 HYPERKALEMIA: ICD-10-CM

## 2024-12-04 DIAGNOSIS — E87.1 HYPONATREMIA: ICD-10-CM

## 2024-12-04 DIAGNOSIS — R53.1 GENERAL WEAKNESS: Primary | ICD-10-CM

## 2024-12-04 DIAGNOSIS — I26.99 PULMONARY EMBOLISM, UNSPECIFIED CHRONICITY, UNSPECIFIED PULMONARY EMBOLISM TYPE, UNSPECIFIED WHETHER ACUTE COR PULMONALE PRESENT (HCC): ICD-10-CM

## 2024-12-04 DIAGNOSIS — A41.9 SEPTICEMIA (HCC): ICD-10-CM

## 2024-12-04 PROBLEM — J96.01 ACUTE RESPIRATORY FAILURE WITH HYPOXIA: Status: ACTIVE | Noted: 2024-12-04

## 2024-12-04 LAB
ALBUMIN SERPL BCG-MCNC: 3 G/DL (ref 3.5–5.1)
ALP SERPL-CCNC: 196 U/L (ref 38–126)
ALT SERPL W/O P-5'-P-CCNC: 11 U/L (ref 11–66)
ANION GAP SERPL CALC-SCNC: 13 MEQ/L (ref 8–16)
ANION GAP SERPL CALC-SCNC: 16 MEQ/L (ref 8–16)
APTT PPP: 23.1 SECONDS (ref 22–38)
AST SERPL-CCNC: 7 U/L (ref 5–40)
BACTERIA: NORMAL
BASOPHILS ABSOLUTE: 0 THOU/MM3 (ref 0–0.1)
BASOPHILS NFR BLD AUTO: 0.2 %
BILIRUB CONJ SERPL-MCNC: 0.2 MG/DL (ref 0.1–13.8)
BILIRUB SERPL-MCNC: 0.6 MG/DL (ref 0.3–1.2)
BILIRUB UR QL STRIP: NEGATIVE
BUN SERPL-MCNC: 21 MG/DL (ref 7–22)
BUN SERPL-MCNC: 26 MG/DL (ref 7–22)
CALCIUM SERPL-MCNC: 8.6 MG/DL (ref 8.5–10.5)
CALCIUM SERPL-MCNC: 9.4 MG/DL (ref 8.5–10.5)
CASTS #/AREA URNS LPF: NORMAL /LPF
CASTS #/AREA URNS LPF: NORMAL /LPF
CHARACTER UR: CLEAR
CHARCOAL URNS QL MICRO: NORMAL
CHLORIDE SERPL-SCNC: 82 MEQ/L (ref 98–111)
CHLORIDE SERPL-SCNC: 93 MEQ/L (ref 98–111)
CO2 SERPL-SCNC: 21 MEQ/L (ref 23–33)
CO2 SERPL-SCNC: 22 MEQ/L (ref 23–33)
COLOR UR: YELLOW
CREAT SERPL-MCNC: 0.5 MG/DL (ref 0.4–1.2)
CREAT SERPL-MCNC: 0.6 MG/DL (ref 0.4–1.2)
CRYSTALS URNS QL MICRO: NORMAL
DEPRECATED RDW RBC AUTO: 41.1 FL (ref 35–45)
EOSINOPHIL NFR BLD AUTO: 0.1 %
EOSINOPHILS ABSOLUTE: 0 THOU/MM3 (ref 0–0.4)
EPITHELIAL CELLS, UA: NORMAL /HPF
ERYTHROCYTE [DISTWIDTH] IN BLOOD BY AUTOMATED COUNT: 14.2 % (ref 11.5–14.5)
GFR SERPL CREATININE-BSD FRML MDRD: > 90 ML/MIN/1.73M2
GFR SERPL CREATININE-BSD FRML MDRD: > 90 ML/MIN/1.73M2
GLUCOSE BLD STRIP.AUTO-MCNC: 196 MG/DL (ref 70–108)
GLUCOSE SERPL-MCNC: 180 MG/DL (ref 70–108)
GLUCOSE SERPL-MCNC: 310 MG/DL (ref 70–108)
GLUCOSE UR QL STRIP.AUTO: NEGATIVE MG/DL
HCT VFR BLD AUTO: 33.4 % (ref 37–47)
HGB BLD-MCNC: 11.2 GM/DL (ref 12–16)
HGB UR QL STRIP.AUTO: NEGATIVE
IMM GRANULOCYTES # BLD AUTO: 0.16 THOU/MM3 (ref 0–0.07)
IMM GRANULOCYTES NFR BLD AUTO: 0.8 %
INR PPP: 1.16 (ref 0.85–1.13)
KETONES UR QL STRIP.AUTO: NEGATIVE
LACTIC ACID, SEPSIS: 0.9 MMOL/L (ref 0.5–1.9)
LACTIC ACID, SEPSIS: 1.1 MMOL/L (ref 0.5–1.9)
LEUKOCYTE ESTERASE UR QL STRIP.AUTO: NEGATIVE
LIPASE SERPL-CCNC: 12.8 U/L (ref 5.6–51.3)
LYMPHOCYTES ABSOLUTE: 0.6 THOU/MM3 (ref 1–4.8)
LYMPHOCYTES NFR BLD AUTO: 2.8 %
MAGNESIUM SERPL-MCNC: 2 MG/DL (ref 1.6–2.4)
MCH RBC QN AUTO: 26.6 PG (ref 26–33)
MCHC RBC AUTO-ENTMCNC: 33.5 GM/DL (ref 32.2–35.5)
MCV RBC AUTO: 79.3 FL (ref 81–99)
MONOCYTES ABSOLUTE: 0.7 THOU/MM3 (ref 0.4–1.3)
MONOCYTES NFR BLD AUTO: 3.5 %
NEUTROPHILS ABSOLUTE: 19.5 THOU/MM3 (ref 1.8–7.7)
NEUTROPHILS NFR BLD AUTO: 92.6 %
NITRITE UR QL STRIP.AUTO: NEGATIVE
NRBC BLD AUTO-RTO: 0 /100 WBC
NT-PROBNP SERPL IA-MCNC: 292.2 PG/ML (ref 0–124)
OSMOLALITY SERPL CALC.SUM OF ELEC: 258.7 MOSMOL/KG (ref 275–300)
PH UR STRIP.AUTO: 5.5 [PH] (ref 5–9)
PLATELET # BLD AUTO: 614 THOU/MM3 (ref 130–400)
PMV BLD AUTO: 8.8 FL (ref 9.4–12.4)
POTASSIUM SERPL-SCNC: 4.6 MEQ/L (ref 3.5–5.2)
POTASSIUM SERPL-SCNC: 5.3 MEQ/L (ref 3.5–5.2)
PROCALCITONIN SERPL IA-MCNC: 0.24 NG/ML (ref 0.01–0.09)
PROT SERPL-MCNC: 7 G/DL (ref 6.1–8)
PROT UR STRIP.AUTO-MCNC: NEGATIVE MG/DL
RBC # BLD AUTO: 4.21 MILL/MM3 (ref 4.2–5.4)
RBC #/AREA URNS HPF: NORMAL /HPF
RENAL EPI CELLS #/AREA URNS HPF: NORMAL /[HPF]
SODIUM SERPL-SCNC: 120 MEQ/L (ref 135–145)
SODIUM SERPL-SCNC: 127 MEQ/L (ref 135–145)
SODIUM UR-SCNC: < 20 MEQ/L
SPECIFIC GRAVITY UA: 1.01 (ref 1–1.03)
TROPONIN, HIGH SENSITIVITY: 11 NG/L (ref 0–12)
TSH SERPL DL<=0.005 MIU/L-ACNC: 1.6 UIU/ML (ref 0.4–4.2)
UROBILINOGEN, URINE: 0.2 EU/DL (ref 0–1)
WBC # BLD AUTO: 21.1 THOU/MM3 (ref 4.8–10.8)
WBC #/AREA URNS HPF: NORMAL /HPF
YEAST LIKE FUNGI URNS QL MICRO: NORMAL

## 2024-12-04 PROCEDURE — 82948 REAGENT STRIP/BLOOD GLUCOSE: CPT

## 2024-12-04 PROCEDURE — 81001 URINALYSIS AUTO W/SCOPE: CPT

## 2024-12-04 PROCEDURE — 85025 COMPLETE CBC W/AUTO DIFF WBC: CPT

## 2024-12-04 PROCEDURE — 83930 ASSAY OF BLOOD OSMOLALITY: CPT

## 2024-12-04 PROCEDURE — 84540 ASSAY OF URINE/UREA-N: CPT

## 2024-12-04 PROCEDURE — 84145 PROCALCITONIN (PCT): CPT

## 2024-12-04 PROCEDURE — 2580000003 HC RX 258: Performed by: EMERGENCY MEDICINE

## 2024-12-04 PROCEDURE — 84300 ASSAY OF URINE SODIUM: CPT

## 2024-12-04 PROCEDURE — 2060000000 HC ICU INTERMEDIATE R&B

## 2024-12-04 PROCEDURE — 99285 EMERGENCY DEPT VISIT HI MDM: CPT

## 2024-12-04 PROCEDURE — 71046 X-RAY EXAM CHEST 2 VIEWS: CPT

## 2024-12-04 PROCEDURE — 80053 COMPREHEN METABOLIC PANEL: CPT

## 2024-12-04 PROCEDURE — 82248 BILIRUBIN DIRECT: CPT

## 2024-12-04 PROCEDURE — 6360000004 HC RX CONTRAST MEDICATION

## 2024-12-04 PROCEDURE — 96375 TX/PRO/DX INJ NEW DRUG ADDON: CPT

## 2024-12-04 PROCEDURE — 51798 US URINE CAPACITY MEASURE: CPT

## 2024-12-04 PROCEDURE — 87040 BLOOD CULTURE FOR BACTERIA: CPT

## 2024-12-04 PROCEDURE — 84443 ASSAY THYROID STIM HORMONE: CPT

## 2024-12-04 PROCEDURE — 36415 COLL VENOUS BLD VENIPUNCTURE: CPT

## 2024-12-04 PROCEDURE — 85610 PROTHROMBIN TIME: CPT

## 2024-12-04 PROCEDURE — 6360000002 HC RX W HCPCS

## 2024-12-04 PROCEDURE — 83935 ASSAY OF URINE OSMOLALITY: CPT

## 2024-12-04 PROCEDURE — 93005 ELECTROCARDIOGRAM TRACING: CPT | Performed by: EMERGENCY MEDICINE

## 2024-12-04 PROCEDURE — 82570 ASSAY OF URINE CREATININE: CPT

## 2024-12-04 PROCEDURE — 85730 THROMBOPLASTIN TIME PARTIAL: CPT

## 2024-12-04 PROCEDURE — 83690 ASSAY OF LIPASE: CPT

## 2024-12-04 PROCEDURE — 2580000003 HC RX 258

## 2024-12-04 PROCEDURE — 71275 CT ANGIOGRAPHY CHEST: CPT

## 2024-12-04 PROCEDURE — 96374 THER/PROPH/DIAG INJ IV PUSH: CPT

## 2024-12-04 PROCEDURE — 83605 ASSAY OF LACTIC ACID: CPT

## 2024-12-04 PROCEDURE — 83880 ASSAY OF NATRIURETIC PEPTIDE: CPT

## 2024-12-04 PROCEDURE — 6370000000 HC RX 637 (ALT 250 FOR IP)

## 2024-12-04 PROCEDURE — 84484 ASSAY OF TROPONIN QUANT: CPT

## 2024-12-04 PROCEDURE — 83735 ASSAY OF MAGNESIUM: CPT

## 2024-12-04 RX ORDER — INSULIN GLARGINE 100 [IU]/ML
20 INJECTION, SOLUTION SUBCUTANEOUS NIGHTLY
Status: DISCONTINUED | OUTPATIENT
Start: 2024-12-04 | End: 2024-12-10

## 2024-12-04 RX ORDER — ACETAMINOPHEN 325 MG/1
650 TABLET ORAL EVERY 6 HOURS PRN
Status: DISCONTINUED | OUTPATIENT
Start: 2024-12-04 | End: 2024-12-11 | Stop reason: HOSPADM

## 2024-12-04 RX ORDER — POLYETHYLENE GLYCOL 3350 17 G/17G
17 POWDER, FOR SOLUTION ORAL DAILY PRN
Status: DISCONTINUED | OUTPATIENT
Start: 2024-12-04 | End: 2024-12-11 | Stop reason: HOSPADM

## 2024-12-04 RX ORDER — ONDANSETRON 2 MG/ML
4 INJECTION INTRAMUSCULAR; INTRAVENOUS EVERY 6 HOURS PRN
Status: DISCONTINUED | OUTPATIENT
Start: 2024-12-04 | End: 2024-12-11 | Stop reason: HOSPADM

## 2024-12-04 RX ORDER — ALBUTEROL SULFATE 90 UG/1
2 INHALANT RESPIRATORY (INHALATION) EVERY 4 HOURS PRN
COMMUNITY
Start: 2024-08-21

## 2024-12-04 RX ORDER — 0.9 % SODIUM CHLORIDE 0.9 %
1000 INTRAVENOUS SOLUTION INTRAVENOUS ONCE
Status: COMPLETED | OUTPATIENT
Start: 2024-12-04 | End: 2024-12-04

## 2024-12-04 RX ORDER — NUTRITIONAL SUPPLEMENT/FIBER 0.06 G-1.4
80 LIQUID (ML) ORAL DAILY
COMMUNITY
Start: 2024-04-16

## 2024-12-04 RX ORDER — GLUCAGON 1 MG/ML
1 KIT INJECTION PRN
Status: DISCONTINUED | OUTPATIENT
Start: 2024-12-04 | End: 2024-12-11 | Stop reason: HOSPADM

## 2024-12-04 RX ORDER — ACETAMINOPHEN 160 MG/5ML
325 LIQUID ORAL EVERY 6 HOURS PRN
COMMUNITY
Start: 2023-12-22

## 2024-12-04 RX ORDER — SODIUM CHLORIDE 0.9 % (FLUSH) 0.9 %
5-40 SYRINGE (ML) INJECTION PRN
Status: DISCONTINUED | OUTPATIENT
Start: 2024-12-04 | End: 2024-12-11 | Stop reason: HOSPADM

## 2024-12-04 RX ORDER — POTASSIUM CHLORIDE 1500 MG/1
40 TABLET, EXTENDED RELEASE ORAL PRN
Status: DISCONTINUED | OUTPATIENT
Start: 2024-12-04 | End: 2024-12-11 | Stop reason: HOSPADM

## 2024-12-04 RX ORDER — MAGNESIUM SULFATE IN WATER 40 MG/ML
2000 INJECTION, SOLUTION INTRAVENOUS PRN
Status: DISCONTINUED | OUTPATIENT
Start: 2024-12-04 | End: 2024-12-11 | Stop reason: HOSPADM

## 2024-12-04 RX ORDER — POTASSIUM CHLORIDE 7.45 MG/ML
10 INJECTION INTRAVENOUS PRN
Status: DISCONTINUED | OUTPATIENT
Start: 2024-12-04 | End: 2024-12-11 | Stop reason: HOSPADM

## 2024-12-04 RX ORDER — OXYCODONE HCL 5 MG/5 ML
5 SOLUTION, ORAL ORAL EVERY 6 HOURS
COMMUNITY
Start: 2024-11-27

## 2024-12-04 RX ORDER — ACETAMINOPHEN 650 MG/1
650 SUPPOSITORY RECTAL EVERY 6 HOURS PRN
Status: DISCONTINUED | OUTPATIENT
Start: 2024-12-04 | End: 2024-12-11 | Stop reason: HOSPADM

## 2024-12-04 RX ORDER — SODIUM CHLORIDE 0.9 % (FLUSH) 0.9 %
5-40 SYRINGE (ML) INJECTION EVERY 12 HOURS SCHEDULED
Status: DISCONTINUED | OUTPATIENT
Start: 2024-12-04 | End: 2024-12-11 | Stop reason: HOSPADM

## 2024-12-04 RX ORDER — SODIUM CHLORIDE 9 MG/ML
INJECTION, SOLUTION INTRAVENOUS PRN
Status: DISCONTINUED | OUTPATIENT
Start: 2024-12-04 | End: 2024-12-11 | Stop reason: HOSPADM

## 2024-12-04 RX ORDER — GUAIFENESIN AND DEXTROMETHORPHAN HYDROBROMIDE 10; 100 MG/5ML; MG/5ML
5 SYRUP ORAL EVERY 4 HOURS PRN
COMMUNITY

## 2024-12-04 RX ORDER — DEXTROSE MONOHYDRATE 100 MG/ML
INJECTION, SOLUTION INTRAVENOUS CONTINUOUS PRN
Status: DISCONTINUED | OUTPATIENT
Start: 2024-12-04 | End: 2024-12-11 | Stop reason: HOSPADM

## 2024-12-04 RX ORDER — IOPAMIDOL 755 MG/ML
80 INJECTION, SOLUTION INTRAVASCULAR
Status: COMPLETED | OUTPATIENT
Start: 2024-12-04 | End: 2024-12-04

## 2024-12-04 RX ORDER — INSULIN LISPRO 100 [IU]/ML
0-4 INJECTION, SOLUTION INTRAVENOUS; SUBCUTANEOUS
Status: DISCONTINUED | OUTPATIENT
Start: 2024-12-04 | End: 2024-12-09

## 2024-12-04 RX ORDER — ONDANSETRON 4 MG/1
4 TABLET, ORALLY DISINTEGRATING ORAL EVERY 8 HOURS PRN
Status: DISCONTINUED | OUTPATIENT
Start: 2024-12-04 | End: 2024-12-11 | Stop reason: HOSPADM

## 2024-12-04 RX ADMIN — SODIUM CHLORIDE 1000 ML: 9 INJECTION, SOLUTION INTRAVENOUS at 18:58

## 2024-12-04 RX ADMIN — INSULIN LISPRO 1 UNITS: 100 INJECTION, SOLUTION INTRAVENOUS; SUBCUTANEOUS at 23:37

## 2024-12-04 RX ADMIN — WATER 2000 MG: 1 INJECTION INTRAMUSCULAR; INTRAVENOUS; SUBCUTANEOUS at 19:06

## 2024-12-04 RX ADMIN — IOPAMIDOL 80 ML: 755 INJECTION, SOLUTION INTRAVENOUS at 20:03

## 2024-12-04 RX ADMIN — INSULIN GLARGINE 20 UNITS: 100 INJECTION, SOLUTION SUBCUTANEOUS at 23:38

## 2024-12-04 RX ADMIN — SODIUM CHLORIDE 1000 ML: 9 INJECTION, SOLUTION INTRAVENOUS at 17:03

## 2024-12-04 RX ADMIN — VANCOMYCIN HYDROCHLORIDE 1750 MG: 1 INJECTION, POWDER, LYOPHILIZED, FOR SOLUTION INTRAVENOUS at 20:13

## 2024-12-04 ASSESSMENT — PAIN - FUNCTIONAL ASSESSMENT: PAIN_FUNCTIONAL_ASSESSMENT: NONE - DENIES PAIN

## 2024-12-04 NOTE — ED NOTES
Presents to ED with complaints of congestion and fatigue that has been ongoing, but worsening for the past 3 days. Pt reports she has cancer and is currently getting treatments at [facility]. Pt noted to have a frequent congested cough. EKG completed.

## 2024-12-04 NOTE — ED PROVIDER NOTES
[facility] EMERGENCY DEPT  EMERGENCY DEPARTMENT ENCOUNTER          Pt Name: Susan K Sturgess Meyers  MRN: [id number]  Birthdate 1965  Date of evaluation: 12/4/2024  Physician: Nahum Dumont MD  Supervising Attending Physician: Sj Wilkinson DO       CHIEF COMPLAINT       Chief Complaint   Patient presents with    Shortness of Breath    Fatigue         HISTORY OF PRESENT ILLNESS    HPI  Susan K Sturgess Meyers is a 59 y.o. female with past medical history of acute hypoxic respiratory failure, septic shock, ovarian cancer, diabetes mellitus, who presents to the emergency department from home for evaluation of weakness and shortness of breath. The patient stated that the weakness started 3 days ago and can not walk and ca not do anything without her both legs given up on her. The patient has history of the esophagectomy and she is on TPN and J-tube in place. The patient also report congestion and fatigue that has been ongoing for while but worsening for the past 3 days. The patient report yellowish productive cough for while now and stated that after her surgery past year.   The patient denies fever, chills, headache, lightheadedness, dizziness, vision changes, tinnitus, cough, congestion, sore throat, neck pain/stiffness, chest pain, shortness of breath, abdominal pain, nausea, vomiting, constipation, diarrhea, dysuria, blood in the urine or stool, numbness/tingling/weakness in extremities.    The patient has no other acute complaints at this time.      PAST MEDICAL AND SURGICAL HISTORY     Past Medical History:   Diagnosis Date    Cancer (HCC)     Esophagus    Diabetes mellitus (HCC)     Ovarian cancer in remission 2011    age 46, no XRT or Chemo per patient, pt stated 11/23/2022 that she didn't actually have Ovarian CA     Past Surgical History:   Procedure Laterality Date    COLONOSCOPY  02/17/2023    HYSTERECTOMY (CERVIX STATUS UNKNOWN)  2011    total    IR INS DUOD OR JEJUN TUBE PERC

## 2024-12-04 NOTE — ED PROVIDER NOTES
I performed a history and physical examination of the patient and discussed management with the resident. I reviewed the resident’s note and agree with the documented findings and plan of care. Any areas of disagreement are noted on the chart. I was personally present for the key portions of any procedures. I have documented in the chart those procedures where I was not present during the key portions. I have reviewed the emergency nurses triage note. I agree with the chief complaint, past medical history, past surgical history, allergies, medications, social and family history as documented unless otherwise noted below. Documentation of the HPI, Physical Exam and Medical Decision Making performed by medical students or scribes is based on my personal performance of the HPI, PE and MDM. For Phys Assistant/ Nurse Practitioner cases/documentation I have personally evaluated this patient and have completed at least one if not all key elements of the E/M (history, physical exam, and MDM). My findings are as noted below.    In other words, I personally saw and examined the patient I have reviewed and agreed with the resident findings including all diagnostic interpretations and treatment plans as written.  I was present for the key portion of any procedures performed and the inclusive time noted in any critical care statement.    Patient presents today for not feeling well fatigue shortness of breath.  Patient has a history remotely cervical/ovarian cancer, she is recently being treated for esophageal cancer.  They did an esophageal surgery, she apparently has a perforation in this.  She states that she has shortness of breath she is on oxygen.  She has audible rales, she thinks she has had fevers at home although she has not taken her temperature.  She states that she is having some difficulty breathing.  She states that she is not eating or drinking very well.  Patient is awake alert and oriented.  She is slightly  93 (*)     Glucose 165 (*)     All other components within normal limits   CBC - Abnormal; Notable for the following components:    RBC 3.39 (*)     Hemoglobin 8.8 (*)     Hematocrit 28.4 (*)     MCHC 31.0 (*)     Platelets 485 (*)     All other components within normal limits   SODIUM - Abnormal; Notable for the following components:    Sodium 127 (*)     All other components within normal limits   SODIUM - Abnormal; Notable for the following components:    Sodium 128 (*)     All other components within normal limits   SODIUM - Abnormal; Notable for the following components:    Sodium 131 (*)     All other components within normal limits   SODIUM - Abnormal; Notable for the following components:    Sodium 132 (*)     All other components within normal limits   BASIC METABOLIC PANEL - Abnormal; Notable for the following components:    Sodium 134 (*)     Chloride 96 (*)     Glucose 147 (*)     All other components within normal limits   CBC - Abnormal; Notable for the following components:    RBC 3.61 (*)     Hemoglobin 9.3 (*)     Hematocrit 29.9 (*)     MCH 25.8 (*)     MCHC 31.1 (*)     Platelets 468 (*)     MPV 8.8 (*)     All other components within normal limits   HEPATIC FUNCTION PANEL - Abnormal; Notable for the following components:    Albumin 2.4 (*)     Total Bilirubin <0.2 (*)     Alkaline Phosphatase 179 (*)     All other components within normal limits   BASIC METABOLIC PANEL - Abnormal; Notable for the following components:    Sodium 129 (*)     Chloride 92 (*)     Glucose 185 (*)     All other components within normal limits   CBC - Abnormal; Notable for the following components:    RBC 3.72 (*)     Hemoglobin 9.6 (*)     Hematocrit 30.7 (*)     MCH 25.8 (*)     MCHC 31.3 (*)     Platelets 458 (*)     MPV 8.7 (*)     All other components within normal limits   SODIUM - Abnormal; Notable for the following components:    Sodium 131 (*)     All other components within normal limits   ANTI-XA,

## 2024-12-05 ENCOUNTER — APPOINTMENT (OUTPATIENT)
Dept: CT IMAGING | Age: 59
DRG: 193 | End: 2024-12-05
Payer: COMMERCIAL

## 2024-12-05 ENCOUNTER — APPOINTMENT (OUTPATIENT)
Dept: INTERVENTIONAL RADIOLOGY/VASCULAR | Age: 59
DRG: 193 | End: 2024-12-05
Payer: COMMERCIAL

## 2024-12-05 PROBLEM — R53.1 GENERAL WEAKNESS: Status: ACTIVE | Noted: 2024-12-05

## 2024-12-05 PROBLEM — I26.99 ACUTE PULMONARY EMBOLISM (HCC): Status: ACTIVE | Noted: 2024-12-05

## 2024-12-05 PROBLEM — I47.19 AVNRT (AV NODAL RE-ENTRY TACHYCARDIA) (HCC): Status: ACTIVE | Noted: 2024-12-05

## 2024-12-05 PROBLEM — E87.1 HYPONATREMIA: Status: ACTIVE | Noted: 2024-12-05

## 2024-12-05 PROBLEM — E44.0 MODERATE MALNUTRITION (HCC): Chronic | Status: ACTIVE | Noted: 2024-12-05

## 2024-12-05 PROBLEM — J18.9 PNEUMONIA DUE TO INFECTIOUS ORGANISM: Status: ACTIVE | Noted: 2024-12-05

## 2024-12-05 LAB
ACINETOBACTER CALCOACETICUS-BAUMANNII BY PCR: NOT DETECTED
ANION GAP SERPL CALC-SCNC: 12 MEQ/L (ref 8–16)
ANION GAP SERPL CALC-SCNC: 12 MEQ/L (ref 8–16)
APTT PPP: 24.4 SECONDS (ref 22–38)
BLACTX-M ISLT/SPM QL: NOT DETECTED
BLAIMP ISLT/SPM QL: NOT DETECTED
BLAKPC ISLT/SPM QL: NOT DETECTED
BLAOXA-48-LIKE ISLT/SPM QL: NOT DETECTED
BLAVIM ISLT/SPM QL: NOT DETECTED
BUN SERPL-MCNC: 16 MG/DL (ref 7–22)
BUN SERPL-MCNC: 20 MG/DL (ref 7–22)
C PNEUM DNA LOWER RESP QL NAA+NON-PROBE: NOT DETECTED
CALCIUM SERPL-MCNC: 8.7 MG/DL (ref 8.5–10.5)
CALCIUM SERPL-MCNC: 9.1 MG/DL (ref 8.5–10.5)
CHLORIDE SERPL-SCNC: 93 MEQ/L (ref 98–111)
CHLORIDE SERPL-SCNC: 94 MEQ/L (ref 98–111)
CO2 SERPL-SCNC: 23 MEQ/L (ref 23–33)
CO2 SERPL-SCNC: 23 MEQ/L (ref 23–33)
CREAT SERPL-MCNC: 0.6 MG/DL (ref 0.4–1.2)
CREAT SERPL-MCNC: 0.6 MG/DL (ref 0.4–1.2)
CREAT UR-MCNC: 49.7 MG/DL
DEPRECATED RDW RBC AUTO: 43.6 FL (ref 35–45)
ECHO BSA: 1.76 M2
EKG ATRIAL RATE: 106 BPM
EKG ATRIAL RATE: 120 BPM
EKG ATRIAL RATE: 128 BPM
EKG P AXIS: 27 DEGREES
EKG P AXIS: 33 DEGREES
EKG P-R INTERVAL: 142 MS
EKG P-R INTERVAL: 144 MS
EKG P-R INTERVAL: 168 MS
EKG Q-T INTERVAL: 292 MS
EKG Q-T INTERVAL: 300 MS
EKG Q-T INTERVAL: 318 MS
EKG QRS DURATION: 66 MS
EKG QTC CALCULATION (BAZETT): 422 MS
EKG QTC CALCULATION (BAZETT): 424 MS
EKG QTC CALCULATION (BAZETT): 426 MS
EKG R AXIS: -17 DEGREES
EKG R AXIS: -17 DEGREES
EKG R AXIS: -25 DEGREES
EKG T AXIS: 138 DEGREES
EKG T AXIS: 33 DEGREES
EKG T AXIS: 37 DEGREES
EKG VENTRICULAR RATE: 106 BPM
EKG VENTRICULAR RATE: 120 BPM
EKG VENTRICULAR RATE: 128 BPM
ENTEROBACTER CLOACAE COMPLEX BY PCR: NOT DETECTED
ERYTHROCYTE [DISTWIDTH] IN BLOOD BY AUTOMATED COUNT: 14.4 % (ref 11.5–14.5)
ESCHERICHIA COLI BY PCR: DETECTED
FERRITIN SERPL IA-MCNC: 395 NG/ML (ref 10–291)
FLUAV RNA LOWER RESP QL NAA+NON-PROBE: NOT DETECTED
FLUBV RNA LOWER RESP QL NAA+NON-PROBE: NOT DETECTED
FOLATE SERPL-MCNC: 9.9 NG/ML (ref 4.8–24.2)
GFR SERPL CREATININE-BSD FRML MDRD: > 90 ML/MIN/1.73M2
GFR SERPL CREATININE-BSD FRML MDRD: > 90 ML/MIN/1.73M2
GLUCOSE BLD STRIP.AUTO-MCNC: 131 MG/DL (ref 70–108)
GLUCOSE BLD STRIP.AUTO-MCNC: 151 MG/DL (ref 70–108)
GLUCOSE BLD STRIP.AUTO-MCNC: 174 MG/DL (ref 70–108)
GLUCOSE BLD STRIP.AUTO-MCNC: 191 MG/DL (ref 70–108)
GLUCOSE SERPL-MCNC: 149 MG/DL (ref 70–108)
GLUCOSE SERPL-MCNC: 90 MG/DL (ref 70–108)
GRAM STN SPEC: NORMAL
HADV DNA LOWER RESP QL NAA+NON-PROBE: NOT DETECTED
HAEMOPHILUS INFLUENZAE BY PCR: NOT DETECTED
HCOV RNA LOWER RESP QL NAA+NON-PROBE: NOT DETECTED
HCT VFR BLD AUTO: 27.6 % (ref 37–47)
HCT VFR BLD AUTO: 29.3 % (ref 37–47)
HEPARIN UNFRACTIONATED: 0.16 U/ML (ref 0.3–0.7)
HEPARIN UNFRACTIONATED: 0.31 U/ML (ref 0.3–0.7)
HEPARIN UNFRACTIONATED: 0.35 U/ML (ref 0.3–0.7)
HGB BLD-MCNC: 8.9 GM/DL (ref 12–16)
HGB BLD-MCNC: 9.6 GM/DL (ref 12–16)
HGB RETIC QN AUTO: 27.3 PG (ref 28.2–35.7)
HMPV RNA LOWER RESP QL NAA+NON-PROBE: NOT DETECTED
HPIV RNA LOWER RESP QL NAA+NON-PROBE: NOT DETECTED
IMM RETICS NFR: 14 % (ref 3–15.9)
INR PPP: 1.16 (ref 0.85–1.13)
IRON SATN MFR SERPL: 7 % (ref 20–50)
IRON SERPL-MCNC: 12 UG/DL (ref 50–170)
KLEBSIELLA AEROGENES BY PCR: NOT DETECTED
KLEBSIELLA OXYTOCA BY PCR: NOT DETECTED
KLEBSIELLA PNEUMONIAE GROUP BY PCR: DETECTED
L PNEUMO DNA LOWER RESP QL NAA+NON-PROBE: NOT DETECTED
M PNEUMO DNA LOWER RESP QL NAA+NON-PROBE: NOT DETECTED
MAGNESIUM SERPL-MCNC: 2.1 MG/DL (ref 1.6–2.4)
MCH RBC QN AUTO: 27 PG (ref 26–33)
MCHC RBC AUTO-ENTMCNC: 32.8 GM/DL (ref 32.2–35.5)
MCV RBC AUTO: 82.3 FL (ref 81–99)
MORAXELLA CATARRHALIS BY PCR: NOT DETECTED
MRSA DNA SPEC QL NAA+PROBE: NEGATIVE
OSMOLALITY SERPL: NORMAL MOSMOL/KG (ref 275–295)
OSMOLALITY SERPL: NORMAL MOSMOL/KG (ref 275–295)
OSMOLALITY UR: NORMAL MOSMOL/KG (ref 250–750)
OSMOLALITY UR: NORMAL MOSMOL/KG (ref 250–750)
PHOSPHATE SERPL-MCNC: 3.7 MG/DL (ref 2.4–4.7)
PLATELET # BLD AUTO: 522 THOU/MM3 (ref 130–400)
PMV BLD AUTO: 8.8 FL (ref 9.4–12.4)
POTASSIUM SERPL-SCNC: 4.3 MEQ/L (ref 3.5–5.2)
POTASSIUM SERPL-SCNC: 4.5 MEQ/L (ref 3.5–5.2)
PROTEUS SPECIES BY PCR: NOT DETECTED
PSEUDOMONAS AERUGINOSA BY PCR: NOT DETECTED
RBC # BLD AUTO: 3.56 MILL/MM3 (ref 4.2–5.4)
REASON FOR REJECTION: NORMAL
REJECTED TEST: NORMAL
RESISTANT GENE MECA/C & MREJ BY PCR: ABNORMAL
RESISTANT GENE NDM BY PCR: NOT DETECTED
RETICS # AUTO: 43 THOU/MM3 (ref 20–115)
RETICS/RBC NFR AUTO: 1.3 % (ref 0.5–2)
RSV RNA LOWER RESP QL NAA+NON-PROBE: NOT DETECTED
RV+EV RNA LOWER RESP QL NAA+NON-PROBE: NOT DETECTED
SERRATIA MARCESCENS BY PCR: NOT DETECTED
SODIUM SERPL-SCNC: 124 MEQ/L (ref 135–145)
SODIUM SERPL-SCNC: 128 MEQ/L (ref 135–145)
SODIUM SERPL-SCNC: 129 MEQ/L (ref 135–145)
SODIUM SERPL-SCNC: 130 MEQ/L (ref 135–145)
SODIUM UR-SCNC: < 20 MEQ/L
SOURCE: ABNORMAL
SPECIMEN ACCEPTABILITY: ABNORMAL
STAPH AUREUS BY PCR: NOT DETECTED
STREP AGALACTIAE BY PCR: NOT DETECTED
STREP PNEUMONIAE BY PCR: NOT DETECTED
STREP PYOGENES BY PCR: NOT DETECTED
TIBC SERPL-MCNC: 174 UG/DL (ref 171–450)
TSH SERPL DL<=0.005 MIU/L-ACNC: 1.68 UIU/ML (ref 0.4–4.2)
UUN 24H UR-MCNC: 802 MG/DL
VIT B12 SERPL-MCNC: 250 PG/ML (ref 211–911)
WBC # BLD AUTO: 13.7 THOU/MM3 (ref 4.8–10.8)

## 2024-12-05 PROCEDURE — 87486 CHLMYD PNEUM DNA AMP PROBE: CPT

## 2024-12-05 PROCEDURE — 2060000000 HC ICU INTERMEDIATE R&B

## 2024-12-05 PROCEDURE — 82746 ASSAY OF FOLIC ACID SERUM: CPT

## 2024-12-05 PROCEDURE — 82607 VITAMIN B-12: CPT

## 2024-12-05 PROCEDURE — 85610 PROTHROMBIN TIME: CPT

## 2024-12-05 PROCEDURE — 6370000000 HC RX 637 (ALT 250 FOR IP): Performed by: STUDENT IN AN ORGANIZED HEALTH CARE EDUCATION/TRAINING PROGRAM

## 2024-12-05 PROCEDURE — 87798 DETECT AGENT NOS DNA AMP: CPT

## 2024-12-05 PROCEDURE — 82948 REAGENT STRIP/BLOOD GLUCOSE: CPT

## 2024-12-05 PROCEDURE — 87541 LEGION PNEUMO DNA AMP PROB: CPT

## 2024-12-05 PROCEDURE — 6360000002 HC RX W HCPCS

## 2024-12-05 PROCEDURE — 87150 DNA/RNA AMPLIFIED PROBE: CPT

## 2024-12-05 PROCEDURE — 84295 ASSAY OF SERUM SODIUM: CPT

## 2024-12-05 PROCEDURE — 82728 ASSAY OF FERRITIN: CPT

## 2024-12-05 PROCEDURE — 85018 HEMOGLOBIN: CPT

## 2024-12-05 PROCEDURE — 84100 ASSAY OF PHOSPHORUS: CPT

## 2024-12-05 PROCEDURE — 93970 EXTREMITY STUDY: CPT

## 2024-12-05 PROCEDURE — 85027 COMPLETE CBC AUTOMATED: CPT

## 2024-12-05 PROCEDURE — 93010 ELECTROCARDIOGRAM REPORT: CPT | Performed by: INTERNAL MEDICINE

## 2024-12-05 PROCEDURE — 83930 ASSAY OF BLOOD OSMOLALITY: CPT

## 2024-12-05 PROCEDURE — 51702 INSERT TEMP BLADDER CATH: CPT

## 2024-12-05 PROCEDURE — 87581 M.PNEUMON DNA AMP PROBE: CPT

## 2024-12-05 PROCEDURE — 83735 ASSAY OF MAGNESIUM: CPT

## 2024-12-05 PROCEDURE — 87205 SMEAR GRAM STAIN: CPT

## 2024-12-05 PROCEDURE — 83550 IRON BINDING TEST: CPT

## 2024-12-05 PROCEDURE — 87631 RESP VIRUS 3-5 TARGETS: CPT

## 2024-12-05 PROCEDURE — 84443 ASSAY THYROID STIM HORMONE: CPT

## 2024-12-05 PROCEDURE — 6370000000 HC RX 637 (ALT 250 FOR IP)

## 2024-12-05 PROCEDURE — 85730 THROMBOPLASTIN TIME PARTIAL: CPT

## 2024-12-05 PROCEDURE — 99223 1ST HOSP IP/OBS HIGH 75: CPT | Performed by: INTERNAL MEDICINE

## 2024-12-05 PROCEDURE — 70450 CT HEAD/BRAIN W/O DYE: CPT

## 2024-12-05 PROCEDURE — 85046 RETICYTE/HGB CONCENTRATE: CPT

## 2024-12-05 PROCEDURE — 80048 BASIC METABOLIC PNL TOTAL CA: CPT

## 2024-12-05 PROCEDURE — 93005 ELECTROCARDIOGRAM TRACING: CPT

## 2024-12-05 PROCEDURE — 2580000003 HC RX 258

## 2024-12-05 PROCEDURE — 85520 HEPARIN ASSAY: CPT

## 2024-12-05 PROCEDURE — 85014 HEMATOCRIT: CPT

## 2024-12-05 PROCEDURE — 36415 COLL VENOUS BLD VENIPUNCTURE: CPT

## 2024-12-05 PROCEDURE — 83540 ASSAY OF IRON: CPT

## 2024-12-05 PROCEDURE — 87641 MR-STAPH DNA AMP PROBE: CPT

## 2024-12-05 RX ORDER — ADENOSINE 3 MG/ML
INJECTION, SOLUTION INTRAVENOUS
Status: DISPENSED
Start: 2024-12-05 | End: 2024-12-06

## 2024-12-05 RX ORDER — HEPARIN SODIUM 10000 [USP'U]/100ML
5-30 INJECTION, SOLUTION INTRAVENOUS CONTINUOUS
Status: DISCONTINUED | OUTPATIENT
Start: 2024-12-05 | End: 2024-12-07

## 2024-12-05 RX ORDER — HEPARIN SODIUM 1000 [USP'U]/ML
80 INJECTION, SOLUTION INTRAVENOUS; SUBCUTANEOUS PRN
Status: DISCONTINUED | OUTPATIENT
Start: 2024-12-05 | End: 2024-12-07

## 2024-12-05 RX ORDER — METOPROLOL TARTRATE 25 MG/1
25 TABLET, FILM COATED ORAL 2 TIMES DAILY
Status: DISCONTINUED | OUTPATIENT
Start: 2024-12-05 | End: 2024-12-05

## 2024-12-05 RX ORDER — HEPARIN SODIUM 1000 [USP'U]/ML
80 INJECTION, SOLUTION INTRAVENOUS; SUBCUTANEOUS ONCE
Status: COMPLETED | OUTPATIENT
Start: 2024-12-05 | End: 2024-12-05

## 2024-12-05 RX ORDER — PANTOPRAZOLE SODIUM 40 MG/1
40 TABLET, DELAYED RELEASE ORAL
Status: DISCONTINUED | OUTPATIENT
Start: 2024-12-05 | End: 2024-12-06

## 2024-12-05 RX ORDER — AZITHROMYCIN 250 MG/1
500 TABLET, FILM COATED ORAL DAILY
Status: COMPLETED | OUTPATIENT
Start: 2024-12-05 | End: 2024-12-07

## 2024-12-05 RX ORDER — ALBUTEROL SULFATE 90 UG/1
2 INHALANT RESPIRATORY (INHALATION) EVERY 4 HOURS PRN
Status: DISCONTINUED | OUTPATIENT
Start: 2024-12-05 | End: 2024-12-11 | Stop reason: HOSPADM

## 2024-12-05 RX ORDER — METOPROLOL TARTRATE 25 MG/1
25 TABLET, FILM COATED ORAL 2 TIMES DAILY
Status: DISCONTINUED | OUTPATIENT
Start: 2024-12-05 | End: 2024-12-06

## 2024-12-05 RX ORDER — ADENOSINE 3 MG/ML
INJECTION, SOLUTION INTRAVENOUS
Status: DISCONTINUED
Start: 2024-12-05 | End: 2024-12-05 | Stop reason: WASHOUT

## 2024-12-05 RX ORDER — HEPARIN SODIUM 1000 [USP'U]/ML
40 INJECTION, SOLUTION INTRAVENOUS; SUBCUTANEOUS PRN
Status: DISCONTINUED | OUTPATIENT
Start: 2024-12-05 | End: 2024-12-07

## 2024-12-05 RX ORDER — DEXTROSE MONOHYDRATE 50 MG/ML
INJECTION, SOLUTION INTRAVENOUS CONTINUOUS
Status: DISCONTINUED | OUTPATIENT
Start: 2024-12-05 | End: 2024-12-06

## 2024-12-05 RX ORDER — OXYCODONE HCL 5 MG/5 ML
5 SOLUTION, ORAL ORAL EVERY 6 HOURS PRN
Status: DISCONTINUED | OUTPATIENT
Start: 2024-12-05 | End: 2024-12-11 | Stop reason: HOSPADM

## 2024-12-05 RX ADMIN — HEPARIN SODIUM 18 UNITS/KG/HR: 10000 INJECTION, SOLUTION INTRAVENOUS at 03:39

## 2024-12-05 RX ADMIN — HEPARIN SODIUM 20 UNITS/KG/HR: 10000 INJECTION, SOLUTION INTRAVENOUS at 16:40

## 2024-12-05 RX ADMIN — HEPARIN SODIUM 2800 UNITS: 1000 INJECTION INTRAVENOUS; SUBCUTANEOUS at 16:48

## 2024-12-05 RX ADMIN — CEFEPIME 2000 MG: 2 INJECTION, POWDER, FOR SOLUTION INTRAVENOUS at 18:43

## 2024-12-05 RX ADMIN — INSULIN GLARGINE 20 UNITS: 100 INJECTION, SOLUTION SUBCUTANEOUS at 21:16

## 2024-12-05 RX ADMIN — AZITHROMYCIN DIHYDRATE 500 MG: 250 TABLET ORAL at 08:06

## 2024-12-05 RX ADMIN — DEXTROSE MONOHYDRATE: 50 INJECTION, SOLUTION INTRAVENOUS at 03:35

## 2024-12-05 RX ADMIN — DEXTROSE MONOHYDRATE: 50 INJECTION, SOLUTION INTRAVENOUS at 17:16

## 2024-12-05 RX ADMIN — SODIUM CHLORIDE, PRESERVATIVE FREE 10 ML: 5 INJECTION INTRAVENOUS at 20:16

## 2024-12-05 RX ADMIN — CEFEPIME 2000 MG: 2 INJECTION, POWDER, FOR SOLUTION INTRAVENOUS at 09:33

## 2024-12-05 RX ADMIN — CEFEPIME 2000 MG: 2 INJECTION, POWDER, FOR SOLUTION INTRAVENOUS at 03:33

## 2024-12-05 RX ADMIN — HEPARIN SODIUM 5600 UNITS: 1000 INJECTION INTRAVENOUS; SUBCUTANEOUS at 03:29

## 2024-12-05 RX ADMIN — INSULIN LISPRO 1 UNITS: 100 INJECTION, SOLUTION INTRAVENOUS; SUBCUTANEOUS at 21:15

## 2024-12-05 RX ADMIN — METOPROLOL TARTRATE 25 MG: 25 TABLET, FILM COATED ORAL at 18:39

## 2024-12-05 ASSESSMENT — PAIN SCALES - GENERAL: PAINLEVEL_OUTOF10: 5

## 2024-12-05 ASSESSMENT — PAIN - FUNCTIONAL ASSESSMENT: PAIN_FUNCTIONAL_ASSESSMENT: ACTIVITIES ARE NOT PREVENTED

## 2024-12-05 ASSESSMENT — PAIN DESCRIPTION - FREQUENCY: FREQUENCY: INTERMITTENT

## 2024-12-05 ASSESSMENT — PAIN DESCRIPTION - ORIENTATION: ORIENTATION: MID

## 2024-12-05 ASSESSMENT — PAIN DESCRIPTION - LOCATION: LOCATION: BACK

## 2024-12-05 ASSESSMENT — PAIN DESCRIPTION - ONSET: ONSET: ON-GOING

## 2024-12-05 ASSESSMENT — PAIN DESCRIPTION - DESCRIPTORS: DESCRIPTORS: DULL

## 2024-12-05 NOTE — PROCEDURES
PROCEDURE NOTE  Date: 12/5/2024   Name: Susan K Sturgess Meyers  YOB: 1965    Procedures  12 lead EKG completed. Results handed to Dr. Recinos. Hortencia SOLORIO

## 2024-12-05 NOTE — PROGRESS NOTES
[facility]  PHYSICAL THERAPY MISSED TREATMENT NOTE  STRZ ICU STEPDOWN TELEMETRY 4K    Date: 2024  Patient Name: Susan K Sturgess Meyers        MRN: [id number]   : 1965  (59 y.o.)  Gender: female                REASON FOR MISSED TREATMENT:  Hold Treatment per Nursing; patient was a rapid response this a.m. for increased HR and has not been on Heparin for 24 hours to allow mobility with PE. PT to check back another date.

## 2024-12-05 NOTE — PROCEDURES
PROCEDURE NOTE  Date: 12/5/2024   Name: Susan K Sturgess Meyers  YOB: 1965    Procedures  12 lead EKG completed. Results handed to Megan MCDONALD.

## 2024-12-05 NOTE — ED NOTES
Phone call placed to KATHLEEN Mtz to approve pt transport to [facility] in stable condition.

## 2024-12-05 NOTE — ED NOTES
This RN to bedside. Pt straight cathed per Dr. Wilkinson due to fluid bolus and build up of pressure per patient. 800 mL noted in return patient states she is feeling better at this time. Pt updated on possible admission. No acute distress noted at this time.

## 2024-12-05 NOTE — ED NOTES
ED to inpatient nurses report      Chief Complaint:  Chief Complaint   Patient presents with    Shortness of Breath    Fatigue     Present to ED from: home    MOA:     LOC: alert and orientated to name, place, date  Mobility: Requires assistance * 1  Oxygen Baseline: room air    Current needs required: room air     Code Status:   Prior    What abnormal results were found and what did you give/do to treat them? Septicemia, hyponatremia, weakness, see MAR, antibiotics  Any procedures or intervention occur? CT, x-ray    Mental Status:  Level of Consciousness: Alert (0)    Psych Assessment:        Vitals:  Patient Vitals for the past 24 hrs:   BP Temp Temp src Pulse Resp SpO2 Height Weight   12/04/24 2050 120/81 -- -- (!) 106 25 98 % -- --   12/04/24 2035 111/80 -- -- (!) 104 21 97 % -- --   12/04/24 1950 106/81 -- -- (!) 112 21 98 % -- --   12/04/24 1905 111/89 -- -- (!) 105 27 98 % -- --   12/04/24 1820 (!) 118/90 -- -- (!) 113 22 98 % -- --   12/04/24 1805 114/82 -- -- (!) 112 26 97 % -- --   12/04/24 1750 122/84 -- -- (!) 112 24 95 % -- --   12/04/24 1735 120/80 -- -- (!) 106 22 96 % -- --   12/04/24 1650 (!) 112/100 -- -- (!) 115 24 93 % -- --   12/04/24 1635 115/86 99 °F (37.2 °C) -- (!) 122 22 92 % -- --   12/04/24 1605 95/66 -- -- (!) 123 24 98 % -- --   12/04/24 1542 103/83 99 °F (37.2 °C) Oral (!) 127 24 94 % 1.626 m (5' 4\") 72.6 kg (160 lb)   12/04/24 1539 -- -- -- -- -- (!) 88 % -- --        LDAs:   Peripheral IV 12/04/24 Right Cephalic (Active)   Site Assessment Clean, dry & intact 12/04/24 5719       Ambulatory Status:  No data recorded    Diagnosis:  DISPOSITION Admitted 12/04/2024 08:55:05 PM   Final diagnoses:   General weakness   Hyponatremia   Hyperkalemia   Septicemia (HCC)        Consults:  None     Pain Score:  Pain Assessment  Pain Assessment: None - Denies Pain    C-SSRS:   Risk of Suicide: No Risk    Sepsis Screening:       [place] Fall Risk:       Swallow Screening        Preferred Language:

## 2024-12-05 NOTE — SIGNIFICANT EVENT
IM Resident/Hospitalist Significant Event Note     Rapid response called at 0641.  Presented to bedside.  Heart rate in the low 200s.  Patient denying any chest pain or palpitations.  BP borderline but stable.  EKG is not yet available to confirm rhythm.  Vagal maneuvers utilized including bearing down and patient blowing into straw..  Patient converted to sinus tachycardia with heart rate ~107.  EKG completed which noted sinus tachycardia, .  Patient continues to be asymptomatic.  No medications needed at this time.  Daytime attending and residents updated.  Likely SVT due to heart strain from PE.      Electronically signed by Chastity Mchugh DO on 12/5/2024 at 7:37 AM

## 2024-12-05 NOTE — PROGRESS NOTES
1747   HR found to be 205  Pacer pads/attached to crash cart monitor  Patient denies CP or SOB  /79    1748   Patient instructed to blow into syringe/straw    Resident team called for assistance    1749  Resident team at bedside      1750  Dr. Behl at bedside  Resource RN called for assistance  EKG called      1751   Resource RN at bedside  Verbal orders to prepare IV Adenosine      1752  EKG at bedside    Patient having CP    1754  Patient instructed to blow into syringe/straw     Patient states CP has resolved    1755  EKG completed  HR 97  /84    1757  Lab at bedside  HR 99  Verbal order to hold IV Adenosine  Discussion on starting PO Metoprolol

## 2024-12-05 NOTE — CARE COORDINATION
Case Management Assessment Initial Evaluation    Date/Time of Evaluation: 12/5/2024 12:34 PM  Assessment Completed by: Leonard Tam RN    If patient is discharged prior to next notation, then this note serves as note for discharge by case management.    Patient Name: Susan K Sturgess Meyers                   YOB: 1965  Diagnosis: Hyperkalemia [E87.5]  Hyponatremia [E87.1]  Septicemia (HCC) [A41.9]  General weakness [R53.1]  Acute respiratory failure with hypoxia [J96.01]                   Date / Time: 12/4/2024  3:23 PM  Location: [address] [place] [address]     Patient Admission Status: Inpatient   Readmission Risk Low 0-14, Mod 15-19), High > 20: Readmission Risk Score: 14.1    Current PCP: Torri George, ADDISON - CNP  Health Care Decision Makers:     Additional Case Management Notes:   Pulmonary Emboli r/t Esophageal Malignancy/E.COLI, Klebsiella Pneumoniae PNA/Hyponatremia  History: Recent Fall, Esophageal Cancer, Esophagectomy/Stent, 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 Luiz Harrington, [facility], Dr. Elias; plans 12/20 for [facility] there, has [facility] love; therapy following; [facility] in past          12/05/24 1230   Service Assessment   Patient Orientation Alert and Oriented   Cognition Alert   History Provided By Patient;Medical Record   Primary Caregiver Self   Accompanied By/Relationship none   Support Systems Family Members   Patient's Healthcare Decision Maker is: Legal Next of Kin   PCP Verified by CM Yes   Last Visit to PCP Within last 3 months   Prior Functional Level Independent in ADLs/IADLs   Current Functional Level Independent in

## 2024-12-05 NOTE — PROGRESS NOTES
[facility]  OCCUPATIONAL THERAPY MISSED TREATMENT NOTE  STRZ ICU STEPDOWN TELEMETRY 4K  4K-22/022-A      Date: 2024  Patient Name: Susan K Sturgess Meyers        CSN: [id number]   : 1965  (59 y.o.)  Gender: female                REASON FOR MISSED TREATMENT: Hold Treatment per Nursing; patient was a rapid response this a.m. for increased HR and has not been on Heparin for 24 hours to allow mobility. OT to check back another date.

## 2024-12-05 NOTE — H&P
History & Physical  Internal Medicine Resident         Patient: Susan K Sturgess Meyers 59 y.o. female      : 1965  Date of Admission: 2024  Date of Service: Pt seen/examined on 24 and Admitted to Inpatient with expected LOS greater than two midnights due to medical therapy.       ASSESSMENT AND PLAN  Acute hypoxic respiratory failure  Due to pneumonia and PE. Not on oxygen at baseline. Currently on 3 L NC.  -Management for pneumonia and PE as below  -Attempt to wean supplemental oxygen as tolerated, maintaining SpO2 > 90%     Pneumonia  Presented due to worsening congestion and fatigue over the last 3 days.  Afebrile.  Leukocytosis with WBC 21.1.  CTA chest shows mild reticular nodular pulmonary opacities bilaterally, right middle lobe and right lower lobe consolidation, suspicious for pneumonia.  S/p vancomycin, cefepime, and 2 L NS in ED.  Patient high risk due to current esophageal cancer.  SIRS 3/4 on admission.  -Cefepime 2 g every 8 hours for 7 day course  -Azithromycin 500 mg for 3 day course  -Acapella/I-S  -Blood cultures x 2 pending  -Pneumonia panel, respiratory culture ordered  -MRSA by PCR pending    PE  CTA chest shows subtle threadlike opacities in the left main pulmonary artery suspicious for pulmonary emboli, suggestion of right heart strain.  Patient currently hemodynamically stable.  Heparin initially delayed due to concern for possible intracranial cause for hyponatremia, as below.  CT head negative for any bleed or other intracranial pathology.  Case was also discussed briefly with on-call IR, Dr. Oakley.  Due to patient hemodynamically stable, does not need emergent thrombectomy at this time.  Likely etiology related to patient's hypercoagulable state due to known malignancy, esophageal cancer.  Also could be due to DVT from decreased mobility due to current weakness.  -Heparin drip  -Echo ordered to evaluate for RV strain  -Bilateral lower extremity venous Doppler

## 2024-12-05 NOTE — CONSULTS
Comprehensive Nutrition Assessment    Type and Reason for Visit:  Initial, Consult (Tube Feed Order and Management)    Nutrition Recommendations/Plan:   When appropriate and able to restart patient's home tube regimen, recommend [employer] 1.2 Glucose Support (patient brought in formula from home and keeps it in her room) at 75 ml/hr x 16 hours (7:30 pm-11:30 am).  If IV fluids stopped, recommend consider flushing 145 ml every 4 hours and flush 15 ml at start and stop of tube feed or free water per Provider. (Note: patient with hyponatremia)  Continue NPO per provider.  Will monitor nutritional intake/tolerance, weight changes, labs, medications, skin integrity, and GI function.     Malnutrition Assessment:  Malnutrition Status:  Moderate malnutrition (12/05/24 1000)    Context:  Chronic Illness     Findings of the 6 clinical characteristics of malnutrition:  Energy Intake:  No decrease in energy intake (Tolerating tube feed at goal over the past year.)  Weight Loss:   (-20% weight loss in 13 months per EMR)     Body Fat Loss:  Mild body fat loss Orbital   Muscle Mass Loss:  Mild muscle mass loss Clavicles (pectoralis & deltoids), Temples (temporalis)  Fluid Accumulation:  No fluid accumulation Extremities   Strength:  Not Performed    Nutrition Assessment: Pt. moderately malnourished AEB criteria as listed above. At risk for further nutrition compromise r/t admit acute hypoxic respiratory failure, pneumonia, PE, hyponatremia, weakness and fatigue, s/p robotic [place] Federico esophagectomy on 12/5/23 c/b persistent leak s/p open J-tube on 12/18/23 and multiple esophageal stents and plans to return to ONS this month to repair the esophageal fistula per chart view and underlying medical condition (Hx: esophageal adenocarcinoma s/p chemotherapy, DM, GERD).        Nutrition Related Findings:    Pt. Report/Treatments/Miscellaneous: Note: pt was a rapid response this morning. Per RN, tube feed will probably not be started

## 2024-12-05 NOTE — PLAN OF CARE
Problem: Chronic Conditions and Co-morbidities  Goal: Patient's chronic conditions and co-morbidity symptoms are monitored and maintained or improved  Outcome: Progressing  Flowsheets (Taken 12/5/2024 0612)  Care Plan - Patient's Chronic Conditions and Co-Morbidity Symptoms are Monitored and Maintained or Improved:   Monitor and assess patient's chronic conditions and comorbid symptoms for stability, deterioration, or improvement   Collaborate with multidisciplinary team to address chronic and comorbid conditions and prevent exacerbation or deterioration   Update acute care plan with appropriate goals if chronic or comorbid symptoms are exacerbated and prevent overall improvement and discharge     Problem: Discharge Planning  Goal: Discharge to home or other facility with appropriate resources  Outcome: Progressing  Flowsheets (Taken 12/5/2024 0612)  Discharge to home or other facility with appropriate resources:   Identify barriers to discharge with patient and caregiver   Arrange for needed discharge resources and transportation as appropriate   Identify discharge learning needs (meds, wound care, etc)     Problem: Safety - Adult  Goal: Free from fall injury  Outcome: Progressing  Flowsheets (Taken 12/5/2024 0612)  Free From Fall Injury: Instruct family/caregiver on patient safety     Problem: ABCDS Injury Assessment  Goal: Absence of physical injury  Outcome: Progressing  Flowsheets (Taken 12/5/2024 0612)  Absence of Physical Injury: Implement safety measures based on patient assessment

## 2024-12-05 NOTE — PROGRESS NOTES
0638 Pt HR noted to be 212 on the monitor. Upon arrival to the room pt pale, diaphoretic, denies any chest pain or palpitations    0639  Call for STAT EJG. Call to Resource RN    0641 Rapid Response called    0644 Lolita Mead at bedside, HR sustaining 210, /78, pt attempting to bear down    0646 BP 95/82    0647 EKG at bedside    0648 pt blowing into a straw, converted , /78

## 2024-12-06 ENCOUNTER — APPOINTMENT (OUTPATIENT)
Dept: CT IMAGING | Age: 59
DRG: 193 | End: 2024-12-06
Payer: COMMERCIAL

## 2024-12-06 ENCOUNTER — APPOINTMENT (OUTPATIENT)
Age: 59
DRG: 193 | End: 2024-12-06
Payer: COMMERCIAL

## 2024-12-06 LAB
ANION GAP SERPL CALC-SCNC: 9 MEQ/L (ref 8–16)
BUN SERPL-MCNC: 17 MG/DL (ref 7–22)
CALCIUM SERPL-MCNC: 8.6 MG/DL (ref 8.5–10.5)
CHLORIDE SERPL-SCNC: 92 MEQ/L (ref 98–111)
CO2 SERPL-SCNC: 24 MEQ/L (ref 23–33)
CREAT SERPL-MCNC: 0.5 MG/DL (ref 0.4–1.2)
DEPRECATED RDW RBC AUTO: 43.8 FL (ref 35–45)
ECHO AO ASC DIAM: 3.4 CM
ECHO AO ASCENDING AORTA INDEX: 1.97 CM/M2
ECHO AV CUSP MM: 1.9 CM
ECHO AV PEAK GRADIENT: 9 MMHG
ECHO AV PEAK VELOCITY: 1.5 M/S
ECHO AV VELOCITY RATIO: 0.67
ECHO BSA: 1.76 M2
ECHO LA AREA 2C: 15.2 CM2
ECHO LA AREA 4C: 12.9 CM2
ECHO LA DIAMETER INDEX: 1.79 CM/M2
ECHO LA DIAMETER: 3.1 CM
ECHO LA MAJOR AXIS: 4.4 CM
ECHO LA MINOR AXIS: 4.7 CM
ECHO LA VOL BP: 34 ML (ref 22–52)
ECHO LA VOL MOD A2C: 38 ML (ref 22–52)
ECHO LA VOL MOD A4C: 29 ML (ref 22–52)
ECHO LA VOL/BSA BIPLANE: 20 ML/M2 (ref 16–34)
ECHO LA VOLUME INDEX MOD A2C: 22 ML/M2 (ref 16–34)
ECHO LA VOLUME INDEX MOD A4C: 17 ML/M2 (ref 16–34)
ECHO LV E' LATERAL VELOCITY: 10.1 CM/S
ECHO LV E' SEPTAL VELOCITY: 6 CM/S
ECHO LV EJECTION FRACTION BIPLANE: 61 % (ref 55–100)
ECHO LV FRACTIONAL SHORTENING: 33 % (ref 28–44)
ECHO LV INTERNAL DIMENSION DIASTOLE INDEX: 2.66 CM/M2
ECHO LV INTERNAL DIMENSION DIASTOLIC: 4.6 CM (ref 3.9–5.3)
ECHO LV INTERNAL DIMENSION SYSTOLIC INDEX: 1.79 CM/M2
ECHO LV INTERNAL DIMENSION SYSTOLIC: 3.1 CM
ECHO LV ISOVOLUMETRIC RELAXATION TIME (IVRT): 67 MS
ECHO LV IVSD: 1 CM (ref 0.6–0.9)
ECHO LV MASS 2D: 169.9 G (ref 67–162)
ECHO LV MASS INDEX 2D: 98.2 G/M2 (ref 43–95)
ECHO LV POSTERIOR WALL DIASTOLIC: 1.1 CM (ref 0.6–0.9)
ECHO LV RELATIVE WALL THICKNESS RATIO: 0.48
ECHO LVOT PEAK GRADIENT: 4 MMHG
ECHO LVOT PEAK VELOCITY: 1 M/S
ECHO MV A VELOCITY: 0.99 M/S
ECHO MV E DECELERATION TIME (DT): 373 MS
ECHO MV E VELOCITY: 0.85 M/S
ECHO MV E/A RATIO: 0.86
ECHO MV E/E' LATERAL: 8.42
ECHO MV E/E' RATIO (AVERAGED): 11.29
ECHO MV E/E' SEPTAL: 14.17
ECHO MV REGURGITANT PEAK GRADIENT: 88 MMHG
ECHO MV REGURGITANT PEAK VELOCITY: 4.7 M/S
ECHO PULMONARY ARTERY END DIASTOLIC PRESSURE: 3 MMHG
ECHO PV MAX VELOCITY: 0.6 M/S
ECHO PV PEAK GRADIENT: 2 MMHG
ECHO PV REGURGITANT MAX VELOCITY: 0.9 M/S
ECHO RV GLOBAL SYSTOLIC STRAIN (GLS): -19.5 %
ECHO RV INTERNAL DIMENSION: 2.4 CM
ECHO RV TAPSE: 2.3 CM (ref 1.7–?)
ECHO TV E WAVE: 0.8 M/S
ECHO TV REGURGITANT MAX VELOCITY: 2.54 M/S
ECHO TV REGURGITANT PEAK GRADIENT: 26 MMHG
EKG ATRIAL RATE: 97 BPM
EKG P AXIS: 41 DEGREES
EKG P-R INTERVAL: 154 MS
EKG Q-T INTERVAL: 326 MS
EKG QRS DURATION: 72 MS
EKG QTC CALCULATION (BAZETT): 414 MS
EKG R AXIS: -16 DEGREES
EKG T AXIS: 79 DEGREES
EKG VENTRICULAR RATE: 97 BPM
ERYTHROCYTE [DISTWIDTH] IN BLOOD BY AUTOMATED COUNT: 14.5 % (ref 11.5–14.5)
GFR SERPL CREATININE-BSD FRML MDRD: > 90 ML/MIN/1.73M2
GLUCOSE BLD STRIP.AUTO-MCNC: 143 MG/DL (ref 70–108)
GLUCOSE BLD STRIP.AUTO-MCNC: 191 MG/DL (ref 70–108)
GLUCOSE BLD STRIP.AUTO-MCNC: 225 MG/DL (ref 70–108)
GLUCOSE BLD STRIP.AUTO-MCNC: 257 MG/DL (ref 70–108)
GLUCOSE SERPL-MCNC: 204 MG/DL (ref 70–108)
HCT VFR BLD AUTO: 27.9 % (ref 37–47)
HEPARIN UNFRACTIONATED: 0.24 U/ML (ref 0.3–0.7)
HEPARIN UNFRACTIONATED: 0.39 U/ML (ref 0.3–0.7)
HEPARIN UNFRACTIONATED: 0.42 U/ML (ref 0.3–0.7)
HGB BLD-MCNC: 8.9 GM/DL (ref 12–16)
MAGNESIUM SERPL-MCNC: 1.9 MG/DL (ref 1.6–2.4)
MCH RBC QN AUTO: 26.4 PG (ref 26–33)
MCHC RBC AUTO-ENTMCNC: 31.9 GM/DL (ref 32.2–35.5)
MCV RBC AUTO: 82.8 FL (ref 81–99)
ORIGINAL SAMPLE NUMBER: NORMAL
PHOSPHATE SERPL-MCNC: 2.8 MG/DL (ref 2.4–4.7)
PLATELET # BLD AUTO: 452 THOU/MM3 (ref 130–400)
PMV BLD AUTO: 8.9 FL (ref 9.4–12.4)
POTASSIUM SERPL-SCNC: 4.4 MEQ/L (ref 3.5–5.2)
RBC # BLD AUTO: 3.37 MILL/MM3 (ref 4.2–5.4)
REFERENCE LOCATION: NORMAL
REFERENCE RANGE: NORMAL
SODIUM SERPL-SCNC: 125 MEQ/L (ref 135–145)
SODIUM SERPL-SCNC: 125 MEQ/L (ref 135–145)
SODIUM SERPL-SCNC: 131 MEQ/L (ref 135–145)
TEST RESULTS WITH UNITS: 428
TEST RESULTS WITH UNITS: NORMAL
TEST(S) BEING PERFORMED: NORMAL
WBC # BLD AUTO: 10 THOU/MM3 (ref 4.8–10.8)

## 2024-12-06 PROCEDURE — 6370000000 HC RX 637 (ALT 250 FOR IP)

## 2024-12-06 PROCEDURE — 2060000000 HC ICU INTERMEDIATE R&B

## 2024-12-06 PROCEDURE — 85027 COMPLETE CBC AUTOMATED: CPT

## 2024-12-06 PROCEDURE — 93356 MYOCRD STRAIN IMG SPCKL TRCK: CPT | Performed by: INTERNAL MEDICINE

## 2024-12-06 PROCEDURE — 6360000002 HC RX W HCPCS

## 2024-12-06 PROCEDURE — 83735 ASSAY OF MAGNESIUM: CPT

## 2024-12-06 PROCEDURE — 84295 ASSAY OF SERUM SODIUM: CPT

## 2024-12-06 PROCEDURE — 84100 ASSAY OF PHOSPHORUS: CPT

## 2024-12-06 PROCEDURE — 93306 TTE W/DOPPLER COMPLETE: CPT | Performed by: INTERNAL MEDICINE

## 2024-12-06 PROCEDURE — 6360000004 HC RX CONTRAST MEDICATION: Performed by: INTERNAL MEDICINE

## 2024-12-06 PROCEDURE — 97535 SELF CARE MNGMENT TRAINING: CPT

## 2024-12-06 PROCEDURE — 80048 BASIC METABOLIC PNL TOTAL CA: CPT

## 2024-12-06 PROCEDURE — 6370000000 HC RX 637 (ALT 250 FOR IP): Performed by: STUDENT IN AN ORGANIZED HEALTH CARE EDUCATION/TRAINING PROGRAM

## 2024-12-06 PROCEDURE — 82948 REAGENT STRIP/BLOOD GLUCOSE: CPT

## 2024-12-06 PROCEDURE — 99233 SBSQ HOSP IP/OBS HIGH 50: CPT | Performed by: INTERNAL MEDICINE

## 2024-12-06 PROCEDURE — 36415 COLL VENOUS BLD VENIPUNCTURE: CPT

## 2024-12-06 PROCEDURE — 2580000003 HC RX 258

## 2024-12-06 PROCEDURE — 97530 THERAPEUTIC ACTIVITIES: CPT

## 2024-12-06 PROCEDURE — 85520 HEPARIN ASSAY: CPT

## 2024-12-06 PROCEDURE — 2700000000 HC OXYGEN THERAPY PER DAY

## 2024-12-06 PROCEDURE — 97167 OT EVAL HIGH COMPLEX 60 MIN: CPT

## 2024-12-06 PROCEDURE — 93306 TTE W/DOPPLER COMPLETE: CPT

## 2024-12-06 PROCEDURE — 94669 MECHANICAL CHEST WALL OSCILL: CPT

## 2024-12-06 PROCEDURE — 93010 ELECTROCARDIOGRAM REPORT: CPT | Performed by: INTERNAL MEDICINE

## 2024-12-06 PROCEDURE — 94761 N-INVAS EAR/PLS OXIMETRY MLT: CPT

## 2024-12-06 PROCEDURE — 71275 CT ANGIOGRAPHY CHEST: CPT

## 2024-12-06 RX ORDER — MAGNESIUM SULFATE 1 G/100ML
1000 INJECTION INTRAVENOUS ONCE
Status: COMPLETED | OUTPATIENT
Start: 2024-12-06 | End: 2024-12-06

## 2024-12-06 RX ORDER — PANTOPRAZOLE SODIUM 40 MG/10ML
40 INJECTION, POWDER, LYOPHILIZED, FOR SOLUTION INTRAVENOUS
Status: DISCONTINUED | OUTPATIENT
Start: 2024-12-06 | End: 2024-12-11 | Stop reason: HOSPADM

## 2024-12-06 RX ORDER — METOPROLOL TARTRATE 25 MG/1
25 TABLET, FILM COATED ORAL 2 TIMES DAILY
Status: DISCONTINUED | OUTPATIENT
Start: 2024-12-06 | End: 2024-12-11 | Stop reason: HOSPADM

## 2024-12-06 RX ORDER — METOPROLOL TARTRATE 25 MG/1
12.5 TABLET, FILM COATED ORAL EVERY 8 HOURS
Status: DISCONTINUED | OUTPATIENT
Start: 2024-12-06 | End: 2024-12-06

## 2024-12-06 RX ORDER — IOPAMIDOL 755 MG/ML
80 INJECTION, SOLUTION INTRAVASCULAR
Status: COMPLETED | OUTPATIENT
Start: 2024-12-06 | End: 2024-12-06

## 2024-12-06 RX ADMIN — HEPARIN SODIUM 2800 UNITS: 1000 INJECTION INTRAVENOUS; SUBCUTANEOUS at 07:01

## 2024-12-06 RX ADMIN — INSULIN LISPRO 1 UNITS: 100 INJECTION, SOLUTION INTRAVENOUS; SUBCUTANEOUS at 21:42

## 2024-12-06 RX ADMIN — INSULIN GLARGINE 20 UNITS: 100 INJECTION, SOLUTION SUBCUTANEOUS at 21:40

## 2024-12-06 RX ADMIN — SODIUM CHLORIDE, PRESERVATIVE FREE 10 ML: 5 INJECTION INTRAVENOUS at 21:43

## 2024-12-06 RX ADMIN — CEFEPIME 2000 MG: 2 INJECTION, POWDER, FOR SOLUTION INTRAVENOUS at 03:19

## 2024-12-06 RX ADMIN — WATER 2000 MG: 1 INJECTION INTRAMUSCULAR; INTRAVENOUS; SUBCUTANEOUS at 16:30

## 2024-12-06 RX ADMIN — HEPARIN SODIUM 22 UNITS/KG/HR: 10000 INJECTION, SOLUTION INTRAVENOUS at 16:44

## 2024-12-06 RX ADMIN — METOPROLOL TARTRATE 12.5 MG: 25 TABLET, FILM COATED ORAL at 08:41

## 2024-12-06 RX ADMIN — ACETAMINOPHEN 650 MG: 325 TABLET ORAL at 07:29

## 2024-12-06 RX ADMIN — HEPARIN SODIUM 20 UNITS/KG/HR: 10000 INJECTION, SOLUTION INTRAVENOUS at 00:27

## 2024-12-06 RX ADMIN — AZITHROMYCIN DIHYDRATE 500 MG: 250 TABLET ORAL at 08:13

## 2024-12-06 RX ADMIN — INSULIN LISPRO 1 UNITS: 100 INJECTION, SOLUTION INTRAVENOUS; SUBCUTANEOUS at 12:36

## 2024-12-06 RX ADMIN — INSULIN LISPRO 2 UNITS: 100 INJECTION, SOLUTION INTRAVENOUS; SUBCUTANEOUS at 08:27

## 2024-12-06 RX ADMIN — MAGNESIUM SULFATE HEPTAHYDRATE 1000 MG: 10 INJECTION, SOLUTION INTRAVENOUS at 10:10

## 2024-12-06 RX ADMIN — PANTOPRAZOLE SODIUM 40 MG: 40 INJECTION, POWDER, FOR SOLUTION INTRAVENOUS at 06:43

## 2024-12-06 RX ADMIN — DEXTROSE MONOHYDRATE: 50 INJECTION, SOLUTION INTRAVENOUS at 03:23

## 2024-12-06 RX ADMIN — IOPAMIDOL 80 ML: 755 INJECTION, SOLUTION INTRAVENOUS at 12:57

## 2024-12-06 RX ADMIN — METOPROLOL TARTRATE 25 MG: 25 TABLET, FILM COATED ORAL at 21:40

## 2024-12-06 ASSESSMENT — PAIN DESCRIPTION - ORIENTATION
ORIENTATION: LOWER;MID
ORIENTATION: MID;LOWER
ORIENTATION: MID;LOWER

## 2024-12-06 ASSESSMENT — PAIN SCALES - GENERAL
PAINLEVEL_OUTOF10: 5

## 2024-12-06 ASSESSMENT — PAIN DESCRIPTION - FREQUENCY
FREQUENCY: CONTINUOUS

## 2024-12-06 ASSESSMENT — PAIN DESCRIPTION - DESCRIPTORS
DESCRIPTORS: ACHING

## 2024-12-06 ASSESSMENT — PAIN - FUNCTIONAL ASSESSMENT
PAIN_FUNCTIONAL_ASSESSMENT: PREVENTS OR INTERFERES SOME ACTIVE ACTIVITIES AND ADLS

## 2024-12-06 ASSESSMENT — PAIN DESCRIPTION - LOCATION
LOCATION: BACK

## 2024-12-06 ASSESSMENT — PAIN DESCRIPTION - ONSET
ONSET: ON-GOING

## 2024-12-06 ASSESSMENT — PAIN DESCRIPTION - PAIN TYPE: TYPE: CHRONIC PAIN

## 2024-12-06 NOTE — PROGRESS NOTES
[facility]  PHYSICAL THERAPY MISSED TREATMENT NOTE  STRZ ICU STEPDOWN TELEMETRY 4K    Date: 2024  Patient Name: Susan K Sturgess Meyers        MRN: [id number]   : 1965  (59 y.o.)  Gender: female                REASON FOR MISSED TREATMENT:  RN reports the PT could check with pt to see if willing to participate. Pt reports feeling to fatigued due to not getting much sleep last night and requested to wait and try tomorrow.      Lexi Samuels, MPT [id number]

## 2024-12-06 NOTE — PROGRESS NOTES
ED bladder scanned and straight cath for 800 and 1800 12/4. Patient came up complaining of pressure in her bladder/said she was feeling full. Dr Mchugh saw patient. It was then discussed that she was having retention issues going on for a week. I bladder scanned patient at 2200 there was more then 1L in bladder. I messaged the hospitalist at this time and was given the order to place shepard. Right away I got 1575 on of bladder. Patient expressed relief with bladder pressure.

## 2024-12-06 NOTE — PLAN OF CARE
Problem: Safety - Adult  Goal: Free from fall injury  Outcome: Progressing     Problem: ABCDS Injury Assessment  Goal: Absence of physical injury  Outcome: Progressing     Problem: Nutrition Deficit:  Goal: Optimize nutritional status  12/5/2024 2256 by Karen Ruby, RN  Outcome: Progressing  12/5/2024 1056 by Judy Haynes, MS, RD, LD  Flowsheets (Taken 12/5/2024 1056)  Nutrient intake appropriate for improving, restoring, or maintaining nutritional needs:   Assess nutritional status and recommend course of action   Monitor oral intake, labs, and treatment plans   Recommend appropriate diets, oral nutritional supplements, and vitamin/mineral supplements     Problem: Pain  Goal: Verbalizes/displays adequate comfort level or baseline comfort level  Outcome: Progressing  Flowsheets (Taken 12/5/2024 2015 by Bibi Campbell, RN)  Verbalizes/displays adequate comfort level or baseline comfort level: Encourage patient to monitor pain and request assistance     Problem: Skin/Tissue Integrity  Goal: Absence of new skin breakdown  Description: 1.  Monitor for areas of redness and/or skin breakdown  2.  Assess vascular access sites hourly  3.  Every 4-6 hours minimum:  Change oxygen saturation probe site  4.  Every 4-6 hours:  If on nasal continuous positive airway pressure, respiratory therapy assess nares and determine need for appliance change or resting period.  Outcome: Progressing

## 2024-12-06 NOTE — PROGRESS NOTES
Hospitalist Progress Note  Internal Medicine Resident      Patient: Susan K Sturgess Meyers 59 y.o. female      Unit/Bed: 4-22/022-A    Admit Date: 12/4/2024      ASSESSMENT AND PLAN  Active Problems  Acute hypoxic respiratory failure secondary to PNA: Patient presented with shortness of breath.  Patient was placed on 3 L nasal cannula.  CTA reported suspicious for PE.  Infiltrates in the right lower lobe.  Pneumonia panel positive for E. coli and Klebsiella.  Previous culture susceptibility sensitive to ceftriaxone.  On 3 L oxygen at 99%, actively weaning off as tolerated. (Hx of COPD). Goal oxygen 88-92%.   Continue with PNA treatment.  PNA: Pneumonia panel Positive for E. coli, Klebsiella.  Previous cultures sensitive for ceftriaxone.  Respiratory cultures pending, blood cultures pending.  Leukocytosis improving 13.7 from 21.1.  No lactic acidosis.  Procalcitonin 0.24 OA.   Cefepime and will be de-escalated to ceftriaxone.  Added azithromycin.  Currently on 3 L of oxygen, actively weaning off.  Any home oxygen.  Provoked PE secondary to esophageal malignancy: Presented with increased shortness of breath.  CTA reported subtotal threadlike opacities in the left main pulmonary artery suspicious for pulmonary emboli, With right heart strain.  IR was consulted, recommended no mechanical thrombectomy due to low burden.  Continue on heparin GGT.  Echo ordered.  Hyponatremia secondary to post urine retention diuresis: Patient reportedly urinated 2775 mL of urine after straight cath.  On admission noted to have a sodium value of 120, 2 close corrected to 123.  Post straight cath, patient sodium increased to 127, patient started on D5 at 100 mL to prevent overcorrection.  Continue every 4hr, sodium checks.  Continue D5 at 100 mL/h.   Today's goal for sodium no more than 128.  If repeat is more than 128 can order DDAVP.  New onset of AVNRT: Rapid response was called overnight after admission for patient heart rate at  with normal bowel sounds.  Musculoskeletal: No joint swelling or tenderness. Normal tone. No abnormal movements.   Skin: Warm and dry. No rashes or lesions.  Neurologic:  No focal sensory/motor deficits in the upper or lower extremities. Cranial nerves:  grossly non-focal 2-12.     Psychiatric: Alert and oriented, normal insight and thought content.   Capillary Refill: Brisk,< 3 seconds.  Peripheral Pulses: +2 palpable, equal bilaterally.       Labs/Radiology: See chart or assessment above.     Electronically signed by Harjinder Recinos DO on 12/5/2024 at 10:36 PM  Case was discussed with Attending, Dr.Kyle Hernando DO.

## 2024-12-06 NOTE — PROGRESS NOTES
[facility]  INPATIENT OCCUPATIONAL THERAPY  STRZ ICU STEPDOWN TELEMETRY 4K  EVALUATION      Discharge Recommendations: Continue to assess pending progress, Patient would benefit from continued therapy after discharge  Equipment Recommendations: Yes May need a shower chair      Time In: 1140  Time Out: 1220  Timed Code Treatment Minutes: 25 Minutes  Minutes: 40          Date: 2024  Patient Name: Susan K Sturgess Meyers,   Gender: female      MRN: [id number]  : 1965  (59 y.o.)  Referring Practitioner: Dr. FRANCOIS Mchugh DO  Diagnosis: Acute respiratory failure with hypoxia  Additional Pertinent Hx: Pt with past medical history of esophageal carcinoma, esophageal fistula, IDDM2, GERD, COPD who presented to the [facility] with fatigue and increased shortness of breath.  Patient reports increased weakness over the last week and a half.  Also reported worsening shortness of breath for the past 3 days associated with productive yellow sputum.  Patient also reported fall 3 weeks ago stating that she hit her head and fell to the ground without any loss of consciousness/lightheadedness/dizziness.  In ED patient had respiratory rate of 24, heart rate of 127, blood pressure 103/83, SpO2 88% on room air.  Patient was placed on 3 L nasal cannula with improvement of SpO2 to 98%.  Labs significant for sodium of 120, serum glucose of 310, WBC of 21.2.  EKG showed sinus tachycardia at 128.  CTA chest reported suspicious for PE.  And Infiltrates in the right lower lobe.  Molecular panel positive for Klebsiella, E. coli.  Patient was started on cefepime and given a dose of vancomycin.  Patient was admitted to stepdown unit for PE/pneumonia management.  Rapid response was called overnight after admission for heart rate at 210.  Unable to obtain EKG during the rapid heart rate.  Carotid massage/Valsalva maneuvers were performed with resolution of tachycardia to ~90 bpm.  Patient noted to have 600 mL on bladder scan,

## 2024-12-07 ENCOUNTER — APPOINTMENT (OUTPATIENT)
Dept: CT IMAGING | Age: 59
DRG: 193 | End: 2024-12-07
Payer: COMMERCIAL

## 2024-12-07 LAB
ANION GAP SERPL CALC-SCNC: 8 MEQ/L (ref 8–16)
BUN SERPL-MCNC: 17 MG/DL (ref 7–22)
CALCIUM SERPL-MCNC: 8.9 MG/DL (ref 8.5–10.5)
CHLORIDE SERPL-SCNC: 93 MEQ/L (ref 98–111)
CO2 SERPL-SCNC: 27 MEQ/L (ref 23–33)
CREAT SERPL-MCNC: 0.6 MG/DL (ref 0.4–1.2)
DEPRECATED RDW RBC AUTO: 44.3 FL (ref 35–45)
ERYTHROCYTE [DISTWIDTH] IN BLOOD BY AUTOMATED COUNT: 14.4 % (ref 11.5–14.5)
GFR SERPL CREATININE-BSD FRML MDRD: > 90 ML/MIN/1.73M2
GLUCOSE BLD STRIP.AUTO-MCNC: 114 MG/DL (ref 70–108)
GLUCOSE BLD STRIP.AUTO-MCNC: 118 MG/DL (ref 70–108)
GLUCOSE BLD STRIP.AUTO-MCNC: 230 MG/DL (ref 70–108)
GLUCOSE BLD STRIP.AUTO-MCNC: 240 MG/DL (ref 70–108)
GLUCOSE SERPL-MCNC: 165 MG/DL (ref 70–108)
HCT VFR BLD AUTO: 28.4 % (ref 37–47)
HEPARIN UNFRACTIONATED: 0.31 U/ML (ref 0.3–0.7)
HGB BLD-MCNC: 8.8 GM/DL (ref 12–16)
MAGNESIUM SERPL-MCNC: 2.1 MG/DL (ref 1.6–2.4)
MCH RBC QN AUTO: 26 PG (ref 26–33)
MCHC RBC AUTO-ENTMCNC: 31 GM/DL (ref 32.2–35.5)
MCV RBC AUTO: 83.8 FL (ref 81–99)
PHOSPHATE SERPL-MCNC: 3.1 MG/DL (ref 2.4–4.7)
PLATELET # BLD AUTO: 485 THOU/MM3 (ref 130–400)
PMV BLD AUTO: 9.5 FL (ref 9.4–12.4)
POTASSIUM SERPL-SCNC: 4.4 MEQ/L (ref 3.5–5.2)
RBC # BLD AUTO: 3.39 MILL/MM3 (ref 4.2–5.4)
SODIUM SERPL-SCNC: 125 MEQ/L (ref 135–145)
SODIUM SERPL-SCNC: 127 MEQ/L (ref 135–145)
SODIUM SERPL-SCNC: 128 MEQ/L (ref 135–145)
SODIUM SERPL-SCNC: 128 MEQ/L (ref 135–145)
SODIUM SERPL-SCNC: 131 MEQ/L (ref 135–145)
SODIUM SERPL-SCNC: 132 MEQ/L (ref 135–145)
WBC # BLD AUTO: 9.1 THOU/MM3 (ref 4.8–10.8)

## 2024-12-07 PROCEDURE — 85520 HEPARIN ASSAY: CPT

## 2024-12-07 PROCEDURE — 6360000002 HC RX W HCPCS

## 2024-12-07 PROCEDURE — 36415 COLL VENOUS BLD VENIPUNCTURE: CPT

## 2024-12-07 PROCEDURE — 2580000003 HC RX 258

## 2024-12-07 PROCEDURE — 6370000000 HC RX 637 (ALT 250 FOR IP): Performed by: STUDENT IN AN ORGANIZED HEALTH CARE EDUCATION/TRAINING PROGRAM

## 2024-12-07 PROCEDURE — 72131 CT LUMBAR SPINE W/O DYE: CPT

## 2024-12-07 PROCEDURE — 80048 BASIC METABOLIC PNL TOTAL CA: CPT

## 2024-12-07 PROCEDURE — 99233 SBSQ HOSP IP/OBS HIGH 50: CPT | Performed by: INTERNAL MEDICINE

## 2024-12-07 PROCEDURE — 6370000000 HC RX 637 (ALT 250 FOR IP)

## 2024-12-07 PROCEDURE — 82948 REAGENT STRIP/BLOOD GLUCOSE: CPT

## 2024-12-07 PROCEDURE — 85027 COMPLETE CBC AUTOMATED: CPT

## 2024-12-07 PROCEDURE — 83735 ASSAY OF MAGNESIUM: CPT

## 2024-12-07 PROCEDURE — 84295 ASSAY OF SERUM SODIUM: CPT

## 2024-12-07 PROCEDURE — 84100 ASSAY OF PHOSPHORUS: CPT

## 2024-12-07 PROCEDURE — 76376 3D RENDER W/INTRP POSTPROCES: CPT

## 2024-12-07 PROCEDURE — 2060000000 HC ICU INTERMEDIATE R&B

## 2024-12-07 RX ORDER — ENOXAPARIN SODIUM 100 MG/ML
1 INJECTION SUBCUTANEOUS 2 TIMES DAILY
Status: DISCONTINUED | OUTPATIENT
Start: 2024-12-07 | End: 2024-12-11 | Stop reason: HOSPADM

## 2024-12-07 RX ADMIN — SODIUM CHLORIDE, PRESERVATIVE FREE 10 ML: 5 INJECTION INTRAVENOUS at 10:19

## 2024-12-07 RX ADMIN — METOPROLOL TARTRATE 25 MG: 25 TABLET, FILM COATED ORAL at 10:16

## 2024-12-07 RX ADMIN — HEPARIN SODIUM 22 UNITS/KG/HR: 10000 INJECTION, SOLUTION INTRAVENOUS at 10:33

## 2024-12-07 RX ADMIN — PANTOPRAZOLE SODIUM 40 MG: 40 INJECTION, POWDER, FOR SOLUTION INTRAVENOUS at 06:10

## 2024-12-07 RX ADMIN — AZITHROMYCIN DIHYDRATE 500 MG: 250 TABLET ORAL at 10:15

## 2024-12-07 RX ADMIN — ENOXAPARIN SODIUM 70 MG: 100 INJECTION SUBCUTANEOUS at 21:08

## 2024-12-07 RX ADMIN — ENOXAPARIN SODIUM 70 MG: 100 INJECTION SUBCUTANEOUS at 12:15

## 2024-12-07 RX ADMIN — METOPROLOL TARTRATE 25 MG: 25 TABLET, FILM COATED ORAL at 21:08

## 2024-12-07 RX ADMIN — WATER 2000 MG: 1 INJECTION INTRAMUSCULAR; INTRAVENOUS; SUBCUTANEOUS at 16:03

## 2024-12-07 RX ADMIN — SODIUM CHLORIDE, PRESERVATIVE FREE 10 ML: 5 INJECTION INTRAVENOUS at 21:13

## 2024-12-07 RX ADMIN — INSULIN GLARGINE 20 UNITS: 100 INJECTION, SOLUTION SUBCUTANEOUS at 21:09

## 2024-12-07 RX ADMIN — INSULIN LISPRO 1 UNITS: 100 INJECTION, SOLUTION INTRAVENOUS; SUBCUTANEOUS at 10:16

## 2024-12-07 RX ADMIN — INSULIN LISPRO 1 UNITS: 100 INJECTION, SOLUTION INTRAVENOUS; SUBCUTANEOUS at 12:22

## 2024-12-07 ASSESSMENT — PAIN DESCRIPTION - ONSET: ONSET: ON-GOING

## 2024-12-07 ASSESSMENT — PAIN DESCRIPTION - FREQUENCY: FREQUENCY: CONTINUOUS

## 2024-12-07 ASSESSMENT — PAIN DESCRIPTION - PAIN TYPE: TYPE: CHRONIC PAIN

## 2024-12-07 ASSESSMENT — PAIN DESCRIPTION - ORIENTATION: ORIENTATION: LOWER;MID

## 2024-12-07 ASSESSMENT — PAIN - FUNCTIONAL ASSESSMENT: PAIN_FUNCTIONAL_ASSESSMENT: PREVENTS OR INTERFERES SOME ACTIVE ACTIVITIES AND ADLS

## 2024-12-07 ASSESSMENT — PAIN SCALES - GENERAL
PAINLEVEL_OUTOF10: 4
PAINLEVEL_OUTOF10: 6

## 2024-12-07 ASSESSMENT — PAIN DESCRIPTION - LOCATION: LOCATION: BACK

## 2024-12-07 ASSESSMENT — PAIN DESCRIPTION - DESCRIPTORS: DESCRIPTORS: ACHING

## 2024-12-07 NOTE — PROGRESS NOTES
Humalog sliding scale, metformin 500 Mg.   Continue 20 units + SDSS.   POCT glucose and hypoglycemia protocol in place  GERD: Continue pantoprazole 40 Mg.  COPD: No PFTs to review. Continue with Albuterol PRN.       LDA: []CVC / []PICC / []Midline / [x]Urinary catheter / [x]J-tube / []Mediport / []None  Antibiotics: Cefepime and azithromycin.  Steroids: None  Labs (still needed?): [x]Yes / []No  IVF (still needed?): [x]Yes / []No    Level of care: [x]Step Down / []Med-Surg  Bed Status: [x]Inpatient / []Observation  Telemetry: [x]Yes / []No  PT/OT: [x]Yes / []No    DVT Prophylaxis: [] Lovenox / [] Heparin / [] SCDs / [] Already on Systemic Anticoagulation / [] None     Expected discharge date:  TBD  Disposition: TBD     Code status: Full Code     ===================================================================    Chief Complaint: Increased fatigue/shortness of breath.  Subjective (past 24 hours):   Patient seen at bedside.  AOx3.  Denies any chest pain, chest palpitation, shortness of breath.  Repeat CTA 12/6 reported no filling defects, no PE.  Worsening of bilateral pleural effusions/consolidation, right> left.  Continue current antibiotic regiment.  Monitor for fever, leukocytosis, worsening respiratory status.  Echo reported no right heart strain, EF 55-60%.  Will transition heparin to Lovenox, patient may follow-up with her thoracic surgeon for further management and scheduling of esophageal surgery.    HPI / Hospital Course:  Patient is a 59-year-old female with past medical history of esophageal carcinoma, esophageal fistula, IDDM2, GERD, COPD who presented to the [facility] with fatigue and increased shortness of breath.  Patient reports increased weakness over the last week and a half.  Also reported worsening shortness of breath for the past 3 days associated with productive yellow sputum.  Patient also reported fall 3 weeks ago stating that she hit her head and fell to the ground without any loss of  consciousness/lightheadedness/dizziness.  In ED patient had respiratory rate of 24, heart rate of 127, blood pressure 103/83, SpO2 88% on room air.  Patient was placed on 3 L nasal cannula with improvement of SpO2 to 98%.  Labs significant for sodium of 120, serum glucose of 310, WBC of 21.2.  EKG showed sinus tachycardia at 128.  CTA chest reported suspicious for PE.  And Infiltrates in the right lower lobe.  Molecular panel positive for Klebsiella, E. coli.  Patient was started on cefepime and given a dose of vancomycin.  Patient was admitted to stepdown unit for PE/pneumonia management.  Rapid response was called overnight after admission for heart rate at 210.  Unable to obtain EKG during the rapid heart rate.  Carotid massage/Valsalva maneuvers were performed with resolution of tachycardia to ~90 bpm.  Patient noted to have 600 mL on bladder scan, patient was straight cath with about 2755 mL of.  Patient's hyponatremia corrected to 128, due to concern for overcorrection patient was started on D5. CTA findings are discussed with IR acute intervention due to low suspicion/burden of PE.  Patient continued on heparin GGT.  In the evening patient had a heart rate in 200s, resolved with Valsalva maneuver.  Patient started on metoprolol 25 Mg/twice daily.  Continuing telemonitoring.  Antibiotic was de-escalated to ceftriaxone and azithromycin.      Medications:    Infusion Medications    heparin (PORCINE) Infusion 22 Units/kg/hr (12/06/24 1644)    dextrose 50 mL/hr at 12/06/24 1703    dextrose      sodium chloride      Scheduled Medications    pantoprazole  40 mg IntraVENous QAM AC    cefTRIAXone (ROCEPHIN) IV  2,000 mg IntraVENous Q24H    metoprolol tartrate  25 mg Oral BID    azithromycin  500 mg Oral Daily    insulin glargine  20 Units SubCUTAneous Nightly    insulin lispro  0-4 Units SubCUTAneous 4x Daily AC & HS    sodium chloride flush  5-40 mL IntraVENous 2 times per day    PRN Meds: perflutren lipid

## 2024-12-07 NOTE — PLAN OF CARE
Problem: Chronic Conditions and Co-morbidities  Goal: Patient's chronic conditions and co-morbidity symptoms are monitored and maintained or improved  Outcome: Progressing  Flowsheets (Taken 12/7/2024 0349)  Care Plan - Patient's Chronic Conditions and Co-Morbidity Symptoms are Monitored and Maintained or Improved: Monitor and assess patient's chronic conditions and comorbid symptoms for stability, deterioration, or improvement     Problem: Discharge Planning  Goal: Discharge to home or other facility with appropriate resources  Outcome: Progressing  Flowsheets (Taken 12/5/2024 0612 by Megan Abebe, RN)  Discharge to home or other facility with appropriate resources:   Identify barriers to discharge with patient and caregiver   Arrange for needed discharge resources and transportation as appropriate   Identify discharge learning needs (meds, wound care, etc)     Problem: Safety - Adult  Goal: Free from fall injury  Outcome: Progressing  Flowsheets (Taken 12/5/2024 0612 by Megan Abebe, RN)  Free From Fall Injury: Instruct family/caregiver on patient safety     Problem: ABCDS Injury Assessment  Goal: Absence of physical injury  Outcome: Progressing  Flowsheets (Taken 12/7/2024 0349)  Absence of Physical Injury: Implement safety measures based on patient assessment     Problem: Nutrition Deficit:  Goal: Optimize nutritional status  Outcome: Progressing  Flowsheets (Taken 12/5/2024 1056 by Judy Haynes, MS, RD, LD)  Nutrient intake appropriate for improving, restoring, or maintaining nutritional needs:   Assess nutritional status and recommend course of action   Monitor oral intake, labs, and treatment plans   Recommend appropriate diets, oral nutritional supplements, and vitamin/mineral supplements     Problem: Pain  Goal: Verbalizes/displays adequate comfort level or baseline comfort level  Outcome: Progressing  Flowsheets (Taken 12/5/2024 2015 by Bibi Campbell, RN)  Verbalizes/displays adequate

## 2024-12-07 NOTE — PROGRESS NOTES
Physician Progress Note      PATIENT:               STURGESS MEYERS, SUSAN  [facility] #:                  [id number]  :                       1965  ADMIT DATE:       2024 3:23 PM  DISCH DATE:  RESPONDING  PROVIDER #:        Harjinder Recinos DO          QUERY TEXT:    Pt admitted with Pneumonia and PE Pt noted to have WBC 13.7, LA 1.1, procal   0.24, HR -127/min, and Resp 22-25/min. Pt on Zithromax and Cefepime.  If   possible, please document in the progress notes and discharge summary if you   are evaluating and /or treating any of the following:  The medical record reflects the following:  Risk Factors: Pneumonia  Clinical Indicators: WBC 13.7, LA 1.1, procal 0.24, HR -127/min, and   Resp 22-25/min.  Treatment: Zithromax and Cefepime    Thank you.  Lillian Keller RN, Clinical Documentation Integrity, [employer]   [employer], [facility], [facility]  Options provided:  -- *** (organism, if known) Sepsis, present on admission  -- *** (organism if known) Sepsis, present on admission, now resolved  -- *** (organism if known) Sepsis, developed following admission  -- *** (localized infection such as pneumonia, UTI, or cellulitis) without   Sepsis  -- Sepsis was ruled out  -- Other - I will add my own diagnosis  -- Disagree - Not applicable / Not valid  -- Disagree - Clinically unable to determine / Unknown  -- Refer to Clinical Documentation Reviewer    PROVIDER RESPONSE TEXT:    This patient has *** (localized infection such as pneumonia, UTI, or   cellulitis) without Sepsis.    Query created by: Lillian Keller on 2024 11:40 AM      Electronically signed by:  Harjinder Recinos DO 2024 7:15 AM

## 2024-12-08 LAB
ALBUMIN SERPL BCG-MCNC: 2.4 G/DL (ref 3.5–5.1)
ALP SERPL-CCNC: 179 U/L (ref 38–126)
ALT SERPL W/O P-5'-P-CCNC: 11 U/L (ref 11–66)
ANION GAP SERPL CALC-SCNC: 11 MEQ/L (ref 8–16)
AST SERPL-CCNC: 13 U/L (ref 5–40)
BILIRUB CONJ SERPL-MCNC: < 0.1 MG/DL (ref 0.1–13.8)
BILIRUB SERPL-MCNC: < 0.2 MG/DL (ref 0.3–1.2)
BUN SERPL-MCNC: 17 MG/DL (ref 7–22)
CALCIUM SERPL-MCNC: 9.1 MG/DL (ref 8.5–10.5)
CHLORIDE SERPL-SCNC: 96 MEQ/L (ref 98–111)
CO2 SERPL-SCNC: 27 MEQ/L (ref 23–33)
CREAT SERPL-MCNC: 0.6 MG/DL (ref 0.4–1.2)
DEPRECATED RDW RBC AUTO: 43.1 FL (ref 35–45)
ERYTHROCYTE [DISTWIDTH] IN BLOOD BY AUTOMATED COUNT: 14.2 % (ref 11.5–14.5)
GFR SERPL CREATININE-BSD FRML MDRD: > 90 ML/MIN/1.73M2
GLUCOSE BLD STRIP.AUTO-MCNC: 138 MG/DL (ref 70–108)
GLUCOSE BLD STRIP.AUTO-MCNC: 153 MG/DL (ref 70–108)
GLUCOSE BLD STRIP.AUTO-MCNC: 185 MG/DL (ref 70–108)
GLUCOSE BLD STRIP.AUTO-MCNC: 190 MG/DL (ref 70–108)
GLUCOSE SERPL-MCNC: 147 MG/DL (ref 70–108)
HCT VFR BLD AUTO: 29.9 % (ref 37–47)
HEPARIN UNFRACTIONATED: 0.4 U/ML (ref 0.3–0.7)
HGB BLD-MCNC: 9.3 GM/DL (ref 12–16)
MAGNESIUM SERPL-MCNC: 2 MG/DL (ref 1.6–2.4)
MCH RBC QN AUTO: 25.8 PG (ref 26–33)
MCHC RBC AUTO-ENTMCNC: 31.1 GM/DL (ref 32.2–35.5)
MCV RBC AUTO: 82.8 FL (ref 81–99)
PHOSPHATE SERPL-MCNC: 3.7 MG/DL (ref 2.4–4.7)
PLATELET # BLD AUTO: 468 THOU/MM3 (ref 130–400)
PMV BLD AUTO: 8.8 FL (ref 9.4–12.4)
POTASSIUM SERPL-SCNC: 4.5 MEQ/L (ref 3.5–5.2)
PROT SERPL-MCNC: 6.7 G/DL (ref 6.1–8)
RBC # BLD AUTO: 3.61 MILL/MM3 (ref 4.2–5.4)
SODIUM SERPL-SCNC: 134 MEQ/L (ref 135–145)
WBC # BLD AUTO: 9 THOU/MM3 (ref 4.8–10.8)

## 2024-12-08 PROCEDURE — 6360000002 HC RX W HCPCS

## 2024-12-08 PROCEDURE — 99233 SBSQ HOSP IP/OBS HIGH 50: CPT | Performed by: INTERNAL MEDICINE

## 2024-12-08 PROCEDURE — 85520 HEPARIN ASSAY: CPT

## 2024-12-08 PROCEDURE — 6370000000 HC RX 637 (ALT 250 FOR IP)

## 2024-12-08 PROCEDURE — 2580000003 HC RX 258

## 2024-12-08 PROCEDURE — 6370000000 HC RX 637 (ALT 250 FOR IP): Performed by: STUDENT IN AN ORGANIZED HEALTH CARE EDUCATION/TRAINING PROGRAM

## 2024-12-08 PROCEDURE — 84100 ASSAY OF PHOSPHORUS: CPT

## 2024-12-08 PROCEDURE — 97110 THERAPEUTIC EXERCISES: CPT

## 2024-12-08 PROCEDURE — 82248 BILIRUBIN DIRECT: CPT

## 2024-12-08 PROCEDURE — 83735 ASSAY OF MAGNESIUM: CPT

## 2024-12-08 PROCEDURE — 97530 THERAPEUTIC ACTIVITIES: CPT

## 2024-12-08 PROCEDURE — 97162 PT EVAL MOD COMPLEX 30 MIN: CPT

## 2024-12-08 PROCEDURE — 80053 COMPREHEN METABOLIC PANEL: CPT

## 2024-12-08 PROCEDURE — 82948 REAGENT STRIP/BLOOD GLUCOSE: CPT

## 2024-12-08 PROCEDURE — 2060000000 HC ICU INTERMEDIATE R&B

## 2024-12-08 PROCEDURE — 36415 COLL VENOUS BLD VENIPUNCTURE: CPT

## 2024-12-08 PROCEDURE — 85027 COMPLETE CBC AUTOMATED: CPT

## 2024-12-08 RX ORDER — ACETYLCYSTEINE 200 MG/ML
600 SOLUTION ORAL; RESPIRATORY (INHALATION)
Status: DISCONTINUED | OUTPATIENT
Start: 2024-12-08 | End: 2024-12-08

## 2024-12-08 RX ORDER — ACETYLCYSTEINE 200 MG/ML
600 SOLUTION ORAL; RESPIRATORY (INHALATION) 2 TIMES DAILY PRN
Status: DISCONTINUED | OUTPATIENT
Start: 2024-12-08 | End: 2024-12-11 | Stop reason: HOSPADM

## 2024-12-08 RX ORDER — BENZONATATE 100 MG/1
100 CAPSULE ORAL 3 TIMES DAILY PRN
Status: DISCONTINUED | OUTPATIENT
Start: 2024-12-08 | End: 2024-12-11 | Stop reason: HOSPADM

## 2024-12-08 RX ADMIN — INSULIN GLARGINE 20 UNITS: 100 INJECTION, SOLUTION SUBCUTANEOUS at 22:06

## 2024-12-08 RX ADMIN — ENOXAPARIN SODIUM 70 MG: 100 INJECTION SUBCUTANEOUS at 08:39

## 2024-12-08 RX ADMIN — SODIUM CHLORIDE, PRESERVATIVE FREE 10 ML: 5 INJECTION INTRAVENOUS at 23:41

## 2024-12-08 RX ADMIN — METOPROLOL TARTRATE 25 MG: 25 TABLET, FILM COATED ORAL at 20:38

## 2024-12-08 RX ADMIN — SODIUM CHLORIDE, PRESERVATIVE FREE 10 ML: 5 INJECTION INTRAVENOUS at 08:40

## 2024-12-08 RX ADMIN — ENOXAPARIN SODIUM 70 MG: 100 INJECTION SUBCUTANEOUS at 22:05

## 2024-12-08 RX ADMIN — METOPROLOL TARTRATE 25 MG: 25 TABLET, FILM COATED ORAL at 08:40

## 2024-12-08 RX ADMIN — INSULIN LISPRO 1 UNITS: 100 INJECTION, SOLUTION INTRAVENOUS; SUBCUTANEOUS at 08:40

## 2024-12-08 RX ADMIN — WATER 2000 MG: 1 INJECTION INTRAMUSCULAR; INTRAVENOUS; SUBCUTANEOUS at 15:56

## 2024-12-08 RX ADMIN — PANTOPRAZOLE SODIUM 40 MG: 40 INJECTION, POWDER, FOR SOLUTION INTRAVENOUS at 06:13

## 2024-12-08 ASSESSMENT — PAIN SCALES - GENERAL
PAINLEVEL_OUTOF10: 0
PAINLEVEL_OUTOF10: 2

## 2024-12-08 NOTE — PLAN OF CARE
Problem: Chronic Conditions and Co-morbidities  Goal: Patient's chronic conditions and co-morbidity symptoms are monitored and maintained or improved  Outcome: Progressing  Flowsheets  Taken 12/8/2024 0343  Care Plan - Patient's Chronic Conditions and Co-Morbidity Symptoms are Monitored and Maintained or Improved: Monitor and assess patient's chronic conditions and comorbid symptoms for stability, deterioration, or improvement  Taken 12/7/2024 2000  Care Plan - Patient's Chronic Conditions and Co-Morbidity Symptoms are Monitored and Maintained or Improved: Monitor and assess patient's chronic conditions and comorbid symptoms for stability, deterioration, or improvement     Problem: Discharge Planning  Goal: Discharge to home or other facility with appropriate resources  12/8/2024 0347 by Mireya Sanderson RN  Outcome: Progressing  Flowsheets (Taken 12/8/2024 0347)  Discharge to home or other facility with appropriate resources: Identify barriers to discharge with patient and caregiver  12/8/2024 0343 by Mireya Sanderson RN  Outcome: Progressing  Flowsheets  Taken 12/8/2024 0343  Discharge to home or other facility with appropriate resources: Identify barriers to discharge with patient and caregiver  Taken 12/7/2024 2000  Discharge to home or other facility with appropriate resources:   Identify barriers to discharge with patient and caregiver   Identify discharge learning needs (meds, wound care, etc)     Problem: Safety - Adult  Goal: Free from fall injury  Outcome: Progressing  Flowsheets (Taken 12/8/2024 0347)  Free From Fall Injury: Instruct family/caregiver on patient safety     Problem: Pain  Goal: Verbalizes/displays adequate comfort level or baseline comfort level  Recent Flowsheet Documentation  Taken 12/7/2024 1600 by Bridget Mccarthy RN  Verbalizes/displays adequate comfort level or baseline comfort level: Encourage patient to monitor pain and request assistance

## 2024-12-08 NOTE — PROGRESS NOTES
Physician Progress Note      PATIENT:               STURGESS MEYERS, SUSAN  [facility] #:                  [id number]  :                       1965  ADMIT DATE:       2024 3:23 PM  DISCH DATE:  RESPONDING  PROVIDER #:        Judson Villagomez DO          QUERY TEXT:    Patient admitted with Pneumonia and PE. Noted to have Moderate malnutrition by   dietician. If possible, please document in progress notes and discharge   summary if you are evaluating and /or treating any of the following:    The medical record reflects the following:  Risk Factors: Context:  Chronic Illness  Findings of the 6 clinical characteristics of malnutrition:  Energy Intake:  No decrease in energy intake (Tolerating tube feed at goal   over the past year.)  Weight Loss:   (-20% weight loss in 13 months per EMR)  Body Fat Loss:  Mild body fat loss Orbital  Muscle Mass Loss:  Mild muscle mass loss Clavicles (pectoralis & deltoids),   Temples (temporalis)  Fluid Accumulation:  No fluid accumulation Extremities  Clinical Indicators: Nutrition Assessment: Pt. moderately malnourished AEB   criteria as listed above. At risk for further nutrition compromise r/t admit   acute hypoxic respiratory failure, pneumonia, PE, hyponatremia, weakness and   fatigue, s/p robotic [place] Federico esophagectomy on 23 c/b persistent leak   s/p open J-tube on 23 and multiple esophageal stents and plans to return   to [facility] this month to repair the esophageal fistula per chart view and   underlying medical condition (Hx: esophageal adenocarcinoma s/p chemotherapy,   DM, GERD).  Treatment: ADULT TUBE FEEDING; Jejunostomy    ASPEN Criteria:    https://aspenjournals.onlinelibrary.mao.com/doi/full/10.1177/278748218447357  5    Thank you. Lillian Keller RN, Clinical Documentation Integrity, [employer]   [employer], [facility], CRCR  Options provided:  -- Moderate Protein Calorie Malnutrition confirmed  -- Moderate Protein Calorie Malnutrition ruled out  -- Other - I  4:36 PM

## 2024-12-08 NOTE — PROGRESS NOTES
Hospitalist Progress Note  Internal Medicine Resident      Patient: Susan K Sturgess Meyers 59 y.o. female      Unit/Bed: 4K-22/022-A    Admit Date: 12/4/2024      ASSESSMENT AND PLAN  Active Problems  PNA: Pneumonia panel Positive for E. coli, Klebsiella.  Previous cultures sensitive for ceftriaxone.  Respiratory cultures pending, blood cultures pending.  Leukocytosis resolved 10 from 13.7.  No lactic acidosis OA.  Procalcitonin 0.24 OA.  Blood cultures NGTD.  Respiratory culture, suggestive of contaminant of the upper respiratory kiara.  Repeat CTA on 12/6/2024 showed worsening of right-sided consolidation/effusion.   With worsening effusion/consolidation, will monitor for fever, increasing oxygen requirement, or leukocytosis.  Cefepime de-escalated to ceftriaxone(12/6-12/11).  Continue azithromycin (12/5-12/11). For total of 5 days.  Hyponatremia secondary to post urine retention diuresis, improving: Patient reportedly urinated 2775 mL of urine after straight cath.  On admission noted to have a sodium value of 120, 2 close corrected to 123.  Post straight cath, patient sodium increased to 127, patient started on D5 at 100 mL to prevent overcorrection (12/5).  Patient sodium dropped to 125 due to D5, post discontinuation repeat sodium improved to 131.  Will monitor for sodium without any intervention.  Continue every 4hr, sodium checks.  Discontinued D5.   New onset of AVNRT: Rapid response x2 (12/4-12/5)  were called for heart rate ~200bpm.  Unable to obtain EKG during the rapid heart rate.  Carotid massage/Valsalva maneuvers were performed with resolution of tachycardia to ~90 bpm.  Reviewing of telemetry suspicious for AVNRT.  EKG 12/5 showed normal sinus rhythm at 97, QTc 414 ms.  Continue on metoprolol 25 Mg/twice daily, patient tolerating well.  Continue telemonitoring.  Advised to use Valsalva prior to pharmacological treatment.  Will require Holter monitor/cardiology follow-up as an outpatient.  Acute

## 2024-12-08 NOTE — PROGRESS NOTES
Physical Therapy  [facility]  INPATIENT PHYSICAL THERAPY  EVALUATION  [facility] ICU STEPDOWN TELEMETRY 4K - 4K-22/022-A    Discharge Recommendations: Continue to assess pending progress, Patient would benefit from continued therapy after discharge, Therapy recommended at discharge, Long Term Care with PT, IP Rehab  Equipment Recommendations:    continue to monitor         Time In: 1014  Time Out: 1053  Timed Code Treatment Minutes: 24 Minutes  Minutes: 39          Date: 2024  Patient Name: Susan K Sturgess Meyers,  Gender:  female        MRN: [id number]  : 1965  (59 y.o.)      Referring Practitioner: Dr. FRANCOIS Mchugh DO  Diagnosis: Acute respiratory failure with hypoxia  Additional Pertinent Hx: Per chart review: Susan K Sturgess Meyers is a 59 y.o. female with PMHx of esophageal carcinoma, diabetes mellitus, GERD who presents to [facility] with fatigue and increased shortness of breath.  Patient reports that she has had increasing weakness over the last week and a half.  Patient also reports that she is feeling more short of breath, worsened over the last 3 days.  Patient also reports that she has been having issues with urination over the last week, seeing that she is \"only dribbling\".  Patient ports that she feels more congested compared to baseline.  Patient having cough productive of yellow sputum.  Patient reports that she had a fall 3 weeks ago stating that she hit her head and then hit her back on the floor.  Patient denies any loss of consciousness at time of fall.  Patient did not seek any medical attention at the time.  Patient denies any current fever, chills, headache, lightheadedness, dizziness, N/V/C/D, chest pain.  Patient denies any saddle paresthesia.  Patient reports that she still has bowel movements.  Patient reports that she still has full sensation she has flatulence.CT spineIMPRESSION:     No fracture or spondylolisthesis of the lumbar spine.

## 2024-12-08 NOTE — PROGRESS NOTES
Hospitalist Progress Note  Internal Medicine Resident      Patient: Susan K Sturgess Meyers 59 y.o. female      Unit/Bed: 4K-22/022-A    Admit Date: 12/4/2024      ASSESSMENT AND PLAN  Active Problems  PNA: Pneumonia panel Positive for E. coli, Klebsiella.  Previous cultures sensitive for ceftriaxone.  Respiratory cultures pending, blood cultures pending.  Leukocytosis resolved 10 from 13.7.  No lactic acidosis OA.  Procalcitonin 0.24 OA.  Blood cultures NGTD.  Respiratory culture, suggestive of contaminant of the upper respiratory kiara.  Repeat CTA on 12/6/2024 showed worsening of right-sided consolidation/effusion.   With worsening effusion/consolidation, will monitor for fever, increasing oxygen requirement, or leukocytosis.  Cefepime de-escalated to ceftriaxone(12/6-12/11).  Continue azithromycin (12/5-12/11). For total of 5 days.  Hyponatremia secondary to post urine retention diuresis, improving: Patient reportedly urinated 2775 mL of urine after straight cath.  On admission noted to have a sodium value of 120, 2 close corrected to 123.  Post straight cath, patient sodium increased to 127, patient started on D5 at 100 mL to prevent overcorrection (12/5).  Patient sodium dropped to 125 due to D5, post discontinuation repeat sodium improved to 131.  Will continue to monitor for sodium without any intervention.  Follow up BMP  Discontinued D5.   New onset of AVNRT: Rapid response x2 (12/4-12/5)  were called for heart rate ~200bpm.  Unable to obtain EKG during the rapid heart rate.  Carotid massage/Valsalva maneuvers were performed with resolution of tachycardia to ~90 bpm.  Reviewing of telemetry suspicious for AVNRT.  EKG 12/5 showed normal sinus rhythm at 97, QTc 414 ms.  Continue on metoprolol 25 Mg/twice daily, patient tolerating well.  Continue telemonitoring.  Advised to use Valsalva prior to pharmacological treatment.  Will require Holter monitor/cardiology follow-up as an outpatient.  Acute anemia  scan, patient was straight cath with about 2755 mL of.  Patient's hyponatremia corrected to 128, due to concern for overcorrection patient was started on D5. CTA findings are discussed with IR acute intervention due to low suspicion/burden of PE.  Patient continued on heparin GGT.  In the evening patient had a heart rate in 200s, resolved with Valsalva maneuver.  Patient started on metoprolol 25 Mg/twice daily.  Continuing telemonitoring.  Antibiotic was de-escalated to ceftriaxone and azithromycin.  Repeat CTA 12/6 reported no filling defects, no PE.  Worsening of bilateral pleural effusions/consolidation, right> left.  Continue current antibiotic regiment.  Monitor for fever, leukocytosis, worsening respiratory status.  Echo reported no right heart strain, EF 55-60%.  transition heparin to Lovenox.     Medications:    Infusion Medications    dextrose      sodium chloride      Scheduled Medications    enoxaparin  1 mg/kg SubCUTAneous BID    pantoprazole  40 mg IntraVENous QAM AC    cefTRIAXone (ROCEPHIN) IV  2,000 mg IntraVENous Q24H    metoprolol tartrate  25 mg Oral BID    insulin glargine  20 Units SubCUTAneous Nightly    insulin lispro  0-4 Units SubCUTAneous 4x Daily AC & HS    sodium chloride flush  5-40 mL IntraVENous 2 times per day    PRN Meds: benzonatate, perflutren lipid microspheres, oxyCODONE, albuterol sulfate HFA, glucose, dextrose bolus **OR** dextrose bolus, glucagon (rDNA), dextrose, sodium chloride flush, sodium chloride, potassium chloride **OR** potassium alternative oral replacement **OR** potassium chloride, magnesium sulfate, ondansetron **OR** ondansetron, polyethylene glycol, acetaminophen **OR** acetaminophen    Exam:  /74   Pulse 100   Temp 99.2 °F (37.3 °C) (Oral)   Resp 20   Ht 1.6 m (5' 3\")   Wt 71.5 kg (157 lb 10.1 oz)   SpO2 96%   BMI 27.92 kg/m²   General: Acutely distressed.  Eyes:  PERRL. Conjunctivae/corneas clear.  HENT: Head normal appearing. Nares normal. Oral

## 2024-12-08 NOTE — PLAN OF CARE
Problem: Chronic Conditions and Co-morbidities  Goal: Patient's chronic conditions and co-morbidity symptoms are monitored and maintained or improved  Outcome: Progressing  Flowsheets  Taken 12/8/2024 0343  Care Plan - Patient's Chronic Conditions and Co-Morbidity Symptoms are Monitored and Maintained or Improved: Monitor and assess patient's chronic conditions and comorbid symptoms for stability, deterioration, or improvement  Taken 12/7/2024 2000  Care Plan - Patient's Chronic Conditions and Co-Morbidity Symptoms are Monitored and Maintained or Improved: Monitor and assess patient's chronic conditions and comorbid symptoms for stability, deterioration, or improvement     Problem: Discharge Planning  Goal: Discharge to home or other facility with appropriate resources  Outcome: Progressing  Flowsheets  Taken 12/8/2024 0343  Discharge to home or other facility with appropriate resources: Identify barriers to discharge with patient and caregiver  Taken 12/7/2024 2000  Discharge to home or other facility with appropriate resources:   Identify barriers to discharge with patient and caregiver   Identify discharge learning needs (meds, wound care, etc)

## 2024-12-09 ENCOUNTER — APPOINTMENT (OUTPATIENT)
Dept: MRI IMAGING | Age: 59
DRG: 193 | End: 2024-12-09
Payer: COMMERCIAL

## 2024-12-09 LAB
ANION GAP SERPL CALC-SCNC: 10 MEQ/L (ref 8–16)
APTT PPP: 28.1 SECONDS (ref 22–38)
BACTERIA BLD AEROBE CULT: NORMAL
BACTERIA BLD AEROBE CULT: NORMAL
BUN SERPL-MCNC: 20 MG/DL (ref 7–22)
CALCIUM SERPL-MCNC: 9.5 MG/DL (ref 8.5–10.5)
CHLORIDE SERPL-SCNC: 92 MEQ/L (ref 98–111)
CO2 SERPL-SCNC: 27 MEQ/L (ref 23–33)
CREAT SERPL-MCNC: 0.6 MG/DL (ref 0.4–1.2)
DEPRECATED RDW RBC AUTO: 42.9 FL (ref 35–45)
ERYTHROCYTE [DISTWIDTH] IN BLOOD BY AUTOMATED COUNT: 14.2 % (ref 11.5–14.5)
GFR SERPL CREATININE-BSD FRML MDRD: > 90 ML/MIN/1.73M2
GLUCOSE BLD STRIP.AUTO-MCNC: 134 MG/DL (ref 70–108)
GLUCOSE BLD STRIP.AUTO-MCNC: 163 MG/DL (ref 70–108)
GLUCOSE BLD STRIP.AUTO-MCNC: 211 MG/DL (ref 70–108)
GLUCOSE BLD STRIP.AUTO-MCNC: 232 MG/DL (ref 70–108)
GLUCOSE SERPL-MCNC: 185 MG/DL (ref 70–108)
HCT VFR BLD AUTO: 30.7 % (ref 37–47)
HEPARIN UNFRACTIONATED: 0.13 U/ML (ref 0.3–0.7)
HGB BLD-MCNC: 9.6 GM/DL (ref 12–16)
INR PPP: 1.08 (ref 0.85–1.13)
MAGNESIUM SERPL-MCNC: 2.1 MG/DL (ref 1.6–2.4)
MCH RBC QN AUTO: 25.8 PG (ref 26–33)
MCHC RBC AUTO-ENTMCNC: 31.3 GM/DL (ref 32.2–35.5)
MCV RBC AUTO: 82.5 FL (ref 81–99)
PHOSPHATE SERPL-MCNC: 3.6 MG/DL (ref 2.4–4.7)
PLATELET # BLD AUTO: 458 THOU/MM3 (ref 130–400)
PMV BLD AUTO: 8.7 FL (ref 9.4–12.4)
POTASSIUM SERPL-SCNC: 4.6 MEQ/L (ref 3.5–5.2)
RBC # BLD AUTO: 3.72 MILL/MM3 (ref 4.2–5.4)
SODIUM SERPL-SCNC: 129 MEQ/L (ref 135–145)
SODIUM SERPL-SCNC: 131 MEQ/L (ref 135–145)
WBC # BLD AUTO: 7 THOU/MM3 (ref 4.8–10.8)

## 2024-12-09 PROCEDURE — 72158 MRI LUMBAR SPINE W/O & W/DYE: CPT

## 2024-12-09 PROCEDURE — 6360000002 HC RX W HCPCS

## 2024-12-09 PROCEDURE — 80048 BASIC METABOLIC PNL TOTAL CA: CPT

## 2024-12-09 PROCEDURE — A9579 GAD-BASE MR CONTRAST NOS,1ML: HCPCS | Performed by: INTERNAL MEDICINE

## 2024-12-09 PROCEDURE — 84295 ASSAY OF SERUM SODIUM: CPT

## 2024-12-09 PROCEDURE — 2580000003 HC RX 258

## 2024-12-09 PROCEDURE — 6360000002 HC RX W HCPCS: Performed by: STUDENT IN AN ORGANIZED HEALTH CARE EDUCATION/TRAINING PROGRAM

## 2024-12-09 PROCEDURE — 85027 COMPLETE CBC AUTOMATED: CPT

## 2024-12-09 PROCEDURE — 6370000000 HC RX 637 (ALT 250 FOR IP): Performed by: STUDENT IN AN ORGANIZED HEALTH CARE EDUCATION/TRAINING PROGRAM

## 2024-12-09 PROCEDURE — 6370000000 HC RX 637 (ALT 250 FOR IP)

## 2024-12-09 PROCEDURE — 85610 PROTHROMBIN TIME: CPT

## 2024-12-09 PROCEDURE — 83735 ASSAY OF MAGNESIUM: CPT

## 2024-12-09 PROCEDURE — 82948 REAGENT STRIP/BLOOD GLUCOSE: CPT

## 2024-12-09 PROCEDURE — 6360000004 HC RX CONTRAST MEDICATION: Performed by: INTERNAL MEDICINE

## 2024-12-09 PROCEDURE — 97530 THERAPEUTIC ACTIVITIES: CPT

## 2024-12-09 PROCEDURE — 72156 MRI NECK SPINE W/O & W/DYE: CPT

## 2024-12-09 PROCEDURE — 36415 COLL VENOUS BLD VENIPUNCTURE: CPT

## 2024-12-09 PROCEDURE — 97110 THERAPEUTIC EXERCISES: CPT

## 2024-12-09 PROCEDURE — 85730 THROMBOPLASTIN TIME PARTIAL: CPT

## 2024-12-09 PROCEDURE — 84100 ASSAY OF PHOSPHORUS: CPT

## 2024-12-09 PROCEDURE — 2060000000 HC ICU INTERMEDIATE R&B

## 2024-12-09 PROCEDURE — 72157 MRI CHEST SPINE W/O & W/DYE: CPT

## 2024-12-09 PROCEDURE — 6360000002 HC RX W HCPCS: Performed by: INTERNAL MEDICINE

## 2024-12-09 PROCEDURE — 85520 HEPARIN ASSAY: CPT

## 2024-12-09 RX ORDER — INSULIN LISPRO 100 [IU]/ML
0-16 INJECTION, SOLUTION INTRAVENOUS; SUBCUTANEOUS
Status: DISCONTINUED | OUTPATIENT
Start: 2024-12-09 | End: 2024-12-11 | Stop reason: HOSPADM

## 2024-12-09 RX ORDER — HEPARIN SODIUM 1000 [USP'U]/ML
80 INJECTION, SOLUTION INTRAVENOUS; SUBCUTANEOUS PRN
Status: DISCONTINUED | OUTPATIENT
Start: 2024-12-09 | End: 2024-12-11 | Stop reason: HOSPADM

## 2024-12-09 RX ORDER — INSULIN LISPRO 100 [IU]/ML
0-8 INJECTION, SOLUTION INTRAVENOUS; SUBCUTANEOUS NIGHTLY
Status: DISCONTINUED | OUTPATIENT
Start: 2024-12-09 | End: 2024-12-11 | Stop reason: HOSPADM

## 2024-12-09 RX ORDER — HEPARIN SODIUM 10000 [USP'U]/100ML
5-30 INJECTION, SOLUTION INTRAVENOUS CONTINUOUS
Status: DISCONTINUED | OUTPATIENT
Start: 2024-12-09 | End: 2024-12-11 | Stop reason: HOSPADM

## 2024-12-09 RX ORDER — DEXAMETHASONE SODIUM PHOSPHATE 4 MG/ML
8 INJECTION, SOLUTION INTRA-ARTICULAR; INTRALESIONAL; INTRAMUSCULAR; INTRAVENOUS; SOFT TISSUE EVERY 8 HOURS
Status: DISCONTINUED | OUTPATIENT
Start: 2024-12-09 | End: 2024-12-11 | Stop reason: HOSPADM

## 2024-12-09 RX ORDER — HEPARIN SODIUM 1000 [USP'U]/ML
40 INJECTION, SOLUTION INTRAVENOUS; SUBCUTANEOUS PRN
Status: DISCONTINUED | OUTPATIENT
Start: 2024-12-09 | End: 2024-12-11 | Stop reason: HOSPADM

## 2024-12-09 RX ADMIN — METOPROLOL TARTRATE 25 MG: 25 TABLET, FILM COATED ORAL at 21:49

## 2024-12-09 RX ADMIN — SODIUM CHLORIDE, PRESERVATIVE FREE 10 ML: 5 INJECTION INTRAVENOUS at 10:47

## 2024-12-09 RX ADMIN — ENOXAPARIN SODIUM 70 MG: 100 INJECTION SUBCUTANEOUS at 10:27

## 2024-12-09 RX ADMIN — INSULIN GLARGINE 20 UNITS: 100 INJECTION, SOLUTION SUBCUTANEOUS at 21:47

## 2024-12-09 RX ADMIN — OXYCODONE HYDROCHLORIDE 5 MG: 5 SOLUTION ORAL at 15:29

## 2024-12-09 RX ADMIN — INSULIN LISPRO 1 UNITS: 100 INJECTION, SOLUTION INTRAVENOUS; SUBCUTANEOUS at 18:19

## 2024-12-09 RX ADMIN — METOPROLOL TARTRATE 25 MG: 25 TABLET, FILM COATED ORAL at 10:28

## 2024-12-09 RX ADMIN — DEXAMETHASONE SODIUM PHOSPHATE 8 MG: 4 INJECTION, SOLUTION INTRA-ARTICULAR; INTRALESIONAL; INTRAMUSCULAR; INTRAVENOUS; SOFT TISSUE at 18:22

## 2024-12-09 RX ADMIN — PANTOPRAZOLE SODIUM 40 MG: 40 INJECTION, POWDER, FOR SOLUTION INTRAVENOUS at 10:28

## 2024-12-09 RX ADMIN — HEPARIN SODIUM 18 UNITS/KG/HR: 10000 INJECTION, SOLUTION INTRAVENOUS at 23:29

## 2024-12-09 RX ADMIN — INSULIN LISPRO 1 UNITS: 100 INJECTION, SOLUTION INTRAVENOUS; SUBCUTANEOUS at 10:28

## 2024-12-09 RX ADMIN — SODIUM CHLORIDE, PRESERVATIVE FREE 10 ML: 5 INJECTION INTRAVENOUS at 21:47

## 2024-12-09 RX ADMIN — GADOTERIDOL 15 ML: 279.3 INJECTION, SOLUTION INTRAVENOUS at 12:54

## 2024-12-09 ASSESSMENT — PAIN DESCRIPTION - DESCRIPTORS: DESCRIPTORS: ACHING

## 2024-12-09 ASSESSMENT — PAIN SCALES - GENERAL
PAINLEVEL_OUTOF10: 0
PAINLEVEL_OUTOF10: 8
PAINLEVEL_OUTOF10: 2

## 2024-12-09 ASSESSMENT — PAIN DESCRIPTION - LOCATION: LOCATION: BACK

## 2024-12-09 ASSESSMENT — PAIN DESCRIPTION - ORIENTATION: ORIENTATION: LEFT;POSTERIOR

## 2024-12-09 NOTE — PLAN OF CARE
Problem: Discharge Planning  Goal: Discharge to home or other facility with appropriate resources  Outcome: Progressing  Flowsheets  Taken 12/9/2024 1513 by Nessa Smith, MSW, LSW  Discharge to home or other facility with appropriate resources: Identify barriers to discharge with patient and caregiver  Taken 12/9/2024 0800 by Bridget Mccarthy, RN  Discharge to home or other facility with appropriate resources: Identify barriers to discharge with patient and caregiver  Note: Possible need for rehab, see SW notes 12/9/24

## 2024-12-09 NOTE — PROGRESS NOTES
Assessment:  This  attempted to visit Cammy, a 59 year old female admitted on 4K due to acute respiratory failure with hypoxia. During this visit, patient is found non responsive, with no family present. No encounter is held at this time.  This  offered prayer at patient's bed side and continue to round on the unit to provide spiritual and emotional support to other patients as needed.   Spiritual Health  staff will continue to provide support to patient as needed.

## 2024-12-09 NOTE — PROGRESS NOTES
Comprehensive Nutrition Assessment    Type and Reason for Visit:  Reassess    Nutrition Recommendations/Plan:   I perfect served Dr Harjinder Chambers & await free water flush order clarification. \" Pt reports at home  she flushes with 180ml free water every 4 hrs  only while she is receiving tube feeding via the pump including at start time of 730pm & end time 1130 Am This yields ~900ml water flush ( not including water flushes with meds). This yields ~ 28ml/kgm wt of 71.5kgm 12/7.  Tube feeding pump is set for 180ml every 4 hrs I notice however orders in computer state 145ml every 4 hrs& flush 15ml water at start & stop of tube feeding. Notice pt has Na of 129. Please clarify free water flush orders\". Hyponatremia pt.   Continue NPO per provider.   Continue [employer] [place] 1.2 Glucose Support 75ml/hr from  7:30pm to 11:30am.  Monitor tube feeds, labs, & wt.     Malnutrition Assessment:  Malnutrition Status:  Moderate malnutrition (12/05/24 1000)    Context:  Chronic Illness     Findings of the 6 clinical characteristics of malnutrition:  Energy Intake:  No decrease in energy intake (Tolerating tube feed at goal over the past year.)  Weight Loss:   (-20% weight loss in 13 months per EMR)     Body Fat Loss:  Mild body fat loss Orbital   Muscle Mass Loss:  Mild muscle mass loss Clavicles (pectoralis & deltoids), Temples (temporalis)  Fluid Accumulation:  No fluid accumulation Extremities   Strength:  Not Performed    Nutrition Assessment:      Pt. Improving from a nutritional standpoint AEB pt mentions she is tolerating tube feeding  at goal rate 75ml/hr& denies N/V. At risk for further nutrition compromise r/t admit acute hypoxic respiratory failure, pneumonia,moderate malnutrition, PE, hyponatremia, weakness and fatigue, s/p robotic [place] Federico esophagectomy on 12/5/23 c/b persistent leak s/p open J-tube on 12/18/23 and multiple esophageal stents and plans to return to ONS this month to repair the esophageal fistula per

## 2024-12-09 NOTE — PROGRESS NOTES
judgement, see nursing flowsheet    COGNITION: Decreased Insight and self limiting     ADL:   No ADL's completed this session.  Patient had just completed all ADLs in bed with tech. .    IADL:   Not Tested    BALANCE:  Sitting Balance:  Supervision. Seated EOB    BED MOBILITY:  Supine to Sit: Moderate Assistance cues for tech and sequencing  Sit to Supine: Maximum Assistance and increased time to position in middle of bed.     ADDITIONAL ACTIVITIES:  -edu patient on teena steady as possible option for functional transfers with patient appearing open to trying next session.     -completed 1 set x 5-10 reps of UB exer with 1# free wt with rest breaks to increase UB strength for functional transfers while seated EOB,        Modified Bacon Scale:  Not Applicable    ASSESSMENT:     Activity Tolerance:  Patient tolerance of  treatment: Fair treatment tolerance      Plan: Times Per Week: 3-5x  Specific Instructions for Next Treatment: Functional transfers as able; ADLs and sitting tolerance; UE exercise program  Current Treatment Recommendations: Strengthening, Balance training, Endurance training, Functional mobility training, Self-Care / ADL, Patient/Caregiver education & training  Additional Comments: Pt would benefit from continue skilled OT services when medically stable and discharged from Acute.  HHOT recommended.    Education:  Learners: Patient  Role of OT, Plan of Care, Home Exercise Program, Precautions, Reviewed Prior Education, Importance of Increasing Activity, and Fall Prevention    Goals  Short Term Goals  Time Frame for Short Term Goals: By discharge  Short Term Goal 1: Pt will complete lower body dressing or bathing while using any AE needed and sitting at the edge of bed with SBA to increase her independence with self care.  Short Term Goal 2: Pt will complete functional transfers with OTR to prepare for doing her toileting routine more independently.  Short Term Goal 3: Pt will complete BUE ROM/moderate  resistance exercises while following any handout needed to increase her strength and endurance for ease of doing her ADLs and functional mobility.  Short Term Goal 4: Pt will complete dynamic reaching tasks with SBA while sittting at the edge of bed for over 12 minutes to increase her endurance and sitting balance for ease of doing upper body ADLs.  Additional Goals?: No    Following session, patient left in safe position with all fall risk precautions in place.

## 2024-12-09 NOTE — CARE COORDINATION
12/9/24, 3:09 PM EST    DISCHARGE PLANNING EVALUATION       SW notified of possible need for ECF at discharge. Patient was independent prior to admission and then today is unable to stand. Patient is agreeable to a rehab stay and asked about something at the hospital if possible.  SW asked about 3 hours of therapy a day and Patient did state she feels she may not be up to that at this time.  SW provided Patient with list and she prefers to stay close to [place]. First choice is [facility] [place] and second choice is [place].     CRYSTAL spoke with Melissa and she indicate that [facility] is usually not the provider and they assign a company like [employer] and others.  CRYSTAL asked for insurance card. Melissa is out of the building and CRYSTAL faxed facesheet for her review tomorrow.

## 2024-12-09 NOTE — CARE COORDINATION
12/9/24, 12:55 PM EST    DISCHARGE ON GOING EVALUATION    [facility] day: [facility]  Location: -22/022-A Reason for admit: Hyperkalemia [E87.5]  Hyponatremia [E87.1]  Septicemia (HCC) [A41.9]  General weakness [R53.1]  Acute respiratory failure with hypoxia [J96.01]     Barriers to Discharge:   Pulmonary Emboli r/t Esophageal Malignancy/E.COLI, Klebsiella Pneumoniae PNA/Hyponatremia  History: Recent Fall, Esophageal Cancer, Esophagectomy/Stent, 11/4 Esophageal Fistula w Stent  Na+ 129, monitor  Febrile last 24h; monitor  Oxygen 2L  IV PPI  Await MRI Spine  Procedures:   12/5 Rapid Response x2: /s  12/9 MRI Spine planned  Patient Goals/Plan/Treatment Preferences: lives w spouse Jef, has [facility] for [place] [facility], follows w [facility], Dr. Luiz Harrington, [facility], Dr. Elias; plans 12/20 for [facility] PTA, has ivan, WC; therapy recommends not safe home, plan SNF (precert); [facility] HH in past, insurance card left for 12/10 Holley ROJAS (has [facility]); faxed card to [employer], CRYSTAL Hui  PCP: Torri George APRN - CNP  Readmission Risk Score: 14.1

## 2024-12-10 LAB
ANION GAP SERPL CALC-SCNC: 11 MEQ/L (ref 8–16)
APTT PPP: 40.1 SECONDS (ref 22–38)
APTT PPP: 65 SECONDS (ref 22–38)
APTT PPP: 73.5 SECONDS (ref 22–38)
BUN SERPL-MCNC: 21 MG/DL (ref 7–22)
CALCIUM SERPL-MCNC: 9.5 MG/DL (ref 8.5–10.5)
CHLORIDE SERPL-SCNC: 94 MEQ/L (ref 98–111)
CO2 SERPL-SCNC: 25 MEQ/L (ref 23–33)
CREAT SERPL-MCNC: 0.6 MG/DL (ref 0.4–1.2)
DEPRECATED RDW RBC AUTO: 43.3 FL (ref 35–45)
ERYTHROCYTE [DISTWIDTH] IN BLOOD BY AUTOMATED COUNT: 14.2 % (ref 11.5–14.5)
GFR SERPL CREATININE-BSD FRML MDRD: > 90 ML/MIN/1.73M2
GLUCOSE BLD STRIP.AUTO-MCNC: 184 MG/DL (ref 70–108)
GLUCOSE BLD STRIP.AUTO-MCNC: 216 MG/DL (ref 70–108)
GLUCOSE BLD STRIP.AUTO-MCNC: 268 MG/DL (ref 70–108)
GLUCOSE BLD STRIP.AUTO-MCNC: 367 MG/DL (ref 70–108)
GLUCOSE SERPL-MCNC: 243 MG/DL (ref 70–108)
HCT VFR BLD AUTO: 32.6 % (ref 37–47)
HGB BLD-MCNC: 10.1 GM/DL (ref 12–16)
MAGNESIUM SERPL-MCNC: 2.1 MG/DL (ref 1.6–2.4)
MCH RBC QN AUTO: 25.7 PG (ref 26–33)
MCHC RBC AUTO-ENTMCNC: 31 GM/DL (ref 32.2–35.5)
MCV RBC AUTO: 83 FL (ref 81–99)
PHOSPHATE SERPL-MCNC: 3.4 MG/DL (ref 2.4–4.7)
PLATELET # BLD AUTO: 479 THOU/MM3 (ref 130–400)
PMV BLD AUTO: 8.9 FL (ref 9.4–12.4)
POTASSIUM SERPL-SCNC: 5.1 MEQ/L (ref 3.5–5.2)
RBC # BLD AUTO: 3.93 MILL/MM3 (ref 4.2–5.4)
SODIUM SERPL-SCNC: 130 MEQ/L (ref 135–145)
WBC # BLD AUTO: 9.9 THOU/MM3 (ref 4.8–10.8)

## 2024-12-10 PROCEDURE — 6370000000 HC RX 637 (ALT 250 FOR IP): Performed by: INTERNAL MEDICINE

## 2024-12-10 PROCEDURE — 85027 COMPLETE CBC AUTOMATED: CPT

## 2024-12-10 PROCEDURE — 6360000002 HC RX W HCPCS: Performed by: INTERNAL MEDICINE

## 2024-12-10 PROCEDURE — 85730 THROMBOPLASTIN TIME PARTIAL: CPT

## 2024-12-10 PROCEDURE — 36415 COLL VENOUS BLD VENIPUNCTURE: CPT

## 2024-12-10 PROCEDURE — 82948 REAGENT STRIP/BLOOD GLUCOSE: CPT

## 2024-12-10 PROCEDURE — 84100 ASSAY OF PHOSPHORUS: CPT

## 2024-12-10 PROCEDURE — 83735 ASSAY OF MAGNESIUM: CPT

## 2024-12-10 PROCEDURE — 2580000003 HC RX 258

## 2024-12-10 PROCEDURE — 6360000002 HC RX W HCPCS

## 2024-12-10 PROCEDURE — 6370000000 HC RX 637 (ALT 250 FOR IP): Performed by: STUDENT IN AN ORGANIZED HEALTH CARE EDUCATION/TRAINING PROGRAM

## 2024-12-10 PROCEDURE — 2060000000 HC ICU INTERMEDIATE R&B

## 2024-12-10 PROCEDURE — 80048 BASIC METABOLIC PNL TOTAL CA: CPT

## 2024-12-10 PROCEDURE — 6360000002 HC RX W HCPCS: Performed by: STUDENT IN AN ORGANIZED HEALTH CARE EDUCATION/TRAINING PROGRAM

## 2024-12-10 RX ORDER — INSULIN GLARGINE 100 [IU]/ML
20 INJECTION, SOLUTION SUBCUTANEOUS 2 TIMES DAILY
Status: DISCONTINUED | OUTPATIENT
Start: 2024-12-10 | End: 2024-12-11 | Stop reason: HOSPADM

## 2024-12-10 RX ADMIN — SODIUM CHLORIDE, PRESERVATIVE FREE 10 ML: 5 INJECTION INTRAVENOUS at 07:36

## 2024-12-10 RX ADMIN — INSULIN LISPRO 2 UNITS: 100 INJECTION, SOLUTION INTRAVENOUS; SUBCUTANEOUS at 21:46

## 2024-12-10 RX ADMIN — DEXAMETHASONE SODIUM PHOSPHATE 8 MG: 4 INJECTION, SOLUTION INTRA-ARTICULAR; INTRALESIONAL; INTRAMUSCULAR; INTRAVENOUS; SOFT TISSUE at 01:48

## 2024-12-10 RX ADMIN — INSULIN GLARGINE 20 UNITS: 100 INJECTION, SOLUTION SUBCUTANEOUS at 21:45

## 2024-12-10 RX ADMIN — INSULIN LISPRO 4 UNITS: 100 INJECTION, SOLUTION INTRAVENOUS; SUBCUTANEOUS at 18:11

## 2024-12-10 RX ADMIN — DEXAMETHASONE SODIUM PHOSPHATE 8 MG: 4 INJECTION, SOLUTION INTRA-ARTICULAR; INTRALESIONAL; INTRAMUSCULAR; INTRAVENOUS; SOFT TISSUE at 10:18

## 2024-12-10 RX ADMIN — HEPARIN SODIUM 2900 UNITS: 1000 INJECTION INTRAVENOUS; SUBCUTANEOUS at 06:14

## 2024-12-10 RX ADMIN — PANTOPRAZOLE SODIUM 40 MG: 40 INJECTION, POWDER, FOR SOLUTION INTRAVENOUS at 04:05

## 2024-12-10 RX ADMIN — METOPROLOL TARTRATE 25 MG: 25 TABLET, FILM COATED ORAL at 07:35

## 2024-12-10 RX ADMIN — INSULIN LISPRO 16 UNITS: 100 INJECTION, SOLUTION INTRAVENOUS; SUBCUTANEOUS at 08:25

## 2024-12-10 RX ADMIN — HEPARIN SODIUM 21 UNITS/KG/HR: 10000 INJECTION, SOLUTION INTRAVENOUS at 17:28

## 2024-12-10 RX ADMIN — DEXAMETHASONE SODIUM PHOSPHATE 8 MG: 4 INJECTION, SOLUTION INTRA-ARTICULAR; INTRALESIONAL; INTRAMUSCULAR; INTRAVENOUS; SOFT TISSUE at 18:11

## 2024-12-10 RX ADMIN — INSULIN GLARGINE 20 UNITS: 100 INJECTION, SOLUTION SUBCUTANEOUS at 10:19

## 2024-12-10 RX ADMIN — SODIUM CHLORIDE, PRESERVATIVE FREE 10 ML: 5 INJECTION INTRAVENOUS at 21:46

## 2024-12-10 RX ADMIN — INSULIN LISPRO 8 UNITS: 100 INJECTION, SOLUTION INTRAVENOUS; SUBCUTANEOUS at 14:20

## 2024-12-10 RX ADMIN — METOPROLOL TARTRATE 25 MG: 25 TABLET, FILM COATED ORAL at 21:49

## 2024-12-10 ASSESSMENT — PAIN DESCRIPTION - DESCRIPTORS
DESCRIPTORS: ACHING;DISCOMFORT
DESCRIPTORS: ACHING;DISCOMFORT
DESCRIPTORS: ACHING;DULL

## 2024-12-10 ASSESSMENT — PAIN - FUNCTIONAL ASSESSMENT
PAIN_FUNCTIONAL_ASSESSMENT: PREVENTS OR INTERFERES SOME ACTIVE ACTIVITIES AND ADLS
PAIN_FUNCTIONAL_ASSESSMENT: PREVENTS OR INTERFERES SOME ACTIVE ACTIVITIES AND ADLS

## 2024-12-10 ASSESSMENT — PAIN DESCRIPTION - FREQUENCY
FREQUENCY: CONTINUOUS

## 2024-12-10 ASSESSMENT — PAIN SCALES - GENERAL
PAINLEVEL_OUTOF10: 6
PAINLEVEL_OUTOF10: 0
PAINLEVEL_OUTOF10: 0
PAINLEVEL_OUTOF10: 6
PAINLEVEL_OUTOF10: 6

## 2024-12-10 ASSESSMENT — PAIN DESCRIPTION - LOCATION
LOCATION: BACK
LOCATION: BACK
LOCATION: BACK;GENERALIZED

## 2024-12-10 ASSESSMENT — PAIN DESCRIPTION - ORIENTATION
ORIENTATION: MID
ORIENTATION: RIGHT;LEFT;MID
ORIENTATION: RIGHT;LEFT;MID

## 2024-12-10 ASSESSMENT — PAIN DESCRIPTION - ONSET
ONSET: ON-GOING

## 2024-12-10 ASSESSMENT — PAIN DESCRIPTION - PAIN TYPE
TYPE: CHRONIC PAIN

## 2024-12-10 NOTE — PLAN OF CARE
home or other facility with appropriate resources:   Identify barriers to discharge with patient and caregiver   Arrange for needed discharge resources and transportation as appropriate   Identify discharge learning needs (meds, wound care, etc)   Refer to discharge planning if patient needs post-hospital services based on physician order or complex needs related to functional status, cognitive ability or social support system     Problem: Safety - Adult  Goal: Free from fall injury  12/10/2024 0959 by Cielo Winn RN  Outcome: Progressing  12/10/2024 0217 by Stacia Ramos RN  Outcome: Progressing  Flowsheets (Taken 12/10/2024 0217)  Free From Fall Injury:   Instruct family/caregiver on patient safety   Based on caregiver fall risk screen, instruct family/caregiver to ask for assistance with transferring infant if caregiver noted to have fall risk factors     Problem: ABCDS Injury Assessment  Goal: Absence of physical injury  12/10/2024 0959 by Cielo Winn RN  Outcome: Progressing  12/10/2024 0217 by Stacia Ramos RN  Outcome: Progressing  Flowsheets (Taken 12/10/2024 0217)  Absence of Physical Injury: Implement safety measures based on patient assessment     Problem: Nutrition Deficit:  Goal: Optimize nutritional status  12/10/2024 0959 by Cielo Winn RN  Outcome: Progressing  12/10/2024 0217 by Stacia Ramos RN  Outcome: Progressing  Flowsheets (Taken 12/10/2024 0217)  Nutrient intake appropriate for improving, restoring, or maintaining nutritional needs:   Assess nutritional status and recommend course of action   Monitor oral intake, labs, and treatment plans   Recommend appropriate diets, oral nutritional supplements, and vitamin/mineral supplements   Recommend, monitor, and adjust tube feedings and TPN/PPN based on assessed needs   Provide specific nutrition education to patient or family as appropriate   Order, calculate, and assess calorie counts as needed     Problem: Pain  Goal:  Verbalizes/displays adequate comfort level or baseline comfort level  12/10/2024 0959 by Cielo Winn RN  Outcome: Progressing  12/10/2024 0217 by Stacia Ramos RN  Outcome: Progressing  Flowsheets (Taken 12/10/2024 0217)  Verbalizes/displays adequate comfort level or baseline comfort level:   Encourage patient to monitor pain and request assistance   Assess pain using appropriate pain scale   Administer analgesics based on type and severity of pain and evaluate response   Implement non-pharmacological measures as appropriate and evaluate response   Consider cultural and social influences on pain and pain management   Notify Licensed Independent Practitioner if interventions unsuccessful or patient reports new pain     Problem: Skin/Tissue Integrity  Goal: Absence of new skin breakdown  Description: 1.  Monitor for areas of redness and/or skin breakdown  2.  Assess vascular access sites hourly  3.  Every 4-6 hours minimum:  Change oxygen saturation probe site  4.  Every 4-6 hours:  If on nasal continuous positive airway pressure, respiratory therapy assess nares and determine need for appliance change or resting period.  12/10/2024 0959 by Cielo Winn RN  Outcome: Progressing  12/10/2024 0217 by Stacia Ramos RN  Outcome: Progressing  Note: No new skin breakdown noted. Patient agreeable to plan of care.   Care plan reviewed with patient.  Patient verbalizes understanding of the plan of care and contributes to goal setting.

## 2024-12-10 NOTE — PLAN OF CARE
Problem: Chronic Conditions and Co-morbidities  Goal: Patient's chronic conditions and co-morbidity symptoms are monitored and maintained or improved  Outcome: Progressing  Flowsheets (Taken 12/10/2024 0217)  Care Plan - Patient's Chronic Conditions and Co-Morbidity Symptoms are Monitored and Maintained or Improved:   Monitor and assess patient's chronic conditions and comorbid symptoms for stability, deterioration, or improvement   Collaborate with multidisciplinary team to address chronic and comorbid conditions and prevent exacerbation or deterioration   Update acute care plan with appropriate goals if chronic or comorbid symptoms are exacerbated and prevent overall improvement and discharge     Problem: Discharge Planning  Goal: Discharge to home or other facility with appropriate resources  12/10/2024 0217 by Stacia Ramos RN  Outcome: Progressing  Flowsheets (Taken 12/10/2024 0217)  Discharge to home or other facility with appropriate resources:   Identify barriers to discharge with patient and caregiver   Arrange for needed discharge resources and transportation as appropriate   Identify discharge learning needs (meds, wound care, etc)   Refer to discharge planning if patient needs post-hospital services based on physician order or complex needs related to functional status, cognitive ability or social support system     Problem: Safety - Adult  Goal: Free from fall injury  Outcome: Progressing  Flowsheets (Taken 12/10/2024 0217)  Free From Fall Injury:   Instruct family/caregiver on patient safety   Based on caregiver fall risk screen, instruct family/caregiver to ask for assistance with transferring infant if caregiver noted to have fall risk factors     Problem: ABCDS Injury Assessment  Goal: Absence of physical injury  Outcome: Progressing  Flowsheets (Taken 12/10/2024 0217)  Absence of Physical Injury: Implement safety measures based on patient assessment     Problem: Nutrition Deficit:  Goal:  Optimize nutritional status  Outcome: Progressing  Flowsheets (Taken 12/10/2024 0217)  Nutrient intake appropriate for improving, restoring, or maintaining nutritional needs:   Assess nutritional status and recommend course of action   Monitor oral intake, labs, and treatment plans   Recommend appropriate diets, oral nutritional supplements, and vitamin/mineral supplements   Recommend, monitor, and adjust tube feedings and TPN/PPN based on assessed needs   Provide specific nutrition education to patient or family as appropriate   Order, calculate, and assess calorie counts as needed     Problem: Pain  Goal: Verbalizes/displays adequate comfort level or baseline comfort level  Outcome: Progressing  Flowsheets (Taken 12/10/2024 0217)  Verbalizes/displays adequate comfort level or baseline comfort level:   Encourage patient to monitor pain and request assistance   Assess pain using appropriate pain scale   Administer analgesics based on type and severity of pain and evaluate response   Implement non-pharmacological measures as appropriate and evaluate response   Consider cultural and social influences on pain and pain management   Notify Licensed Independent Practitioner if interventions unsuccessful or patient reports new pain     Problem: Skin/Tissue Integrity  Goal: Absence of new skin breakdown  Description: 1.  Monitor for areas of redness and/or skin breakdown  2.  Assess vascular access sites hourly  3.  Every 4-6 hours minimum:  Change oxygen saturation probe site  4.  Every 4-6 hours:  If on nasal continuous positive airway pressure, respiratory therapy assess nares and determine need for appliance change or resting period.  Outcome: Progressing  Note: No new skin breakdown noted. Patient agreeable to plan of care.   Patient agreeable to plan of care.

## 2024-12-10 NOTE — CONSULTS
Inpatient Consultation    Susan K Sturgess Meyers (1965)  12/10/2024    Reason for Consult:  Leg weakness  Requesting Physician: Dr. Judson Villagomez    CHIEF COMPLAINT:  Bilateral L>R leg weakness    History Obtained From:  patient    HISTORY OF PRESENT ILLNESS:                The patient is a 59 y.o. female who presents with above chief complaint. She has PMH of esophageal cancer for which she underwent chemotherapy, radiation as well as esophagectomy at [facility]. She notes starting aabout 2 weeks ago developing urinary retention and a weak stream, as well as over a weak of subjetive weakness in her legs and difficulty with ambulation. She presented to the ED and was found to have penumonia, PE and SVT for which she has undergone treatment. She eventually had MRI imaging of her spine which revealed evidence of thoracic cord compression after which spine was consulted. She notes thoracic back pain as well as numbness in her feet. She also notes bowel incontinence feeling as tho she is losing bowel content but unable to stop this.     Past Medical History:        Diagnosis Date    Cancer (HCC)     Esophagus    Diabetes mellitus (HCC)     Ovarian cancer in remission 2011    age 46, no XRT or Chemo per patient, pt stated 11/23/2022 that she didn't actually have Ovarian CA     Past Surgical History:        Procedure Laterality Date    COLONOSCOPY  02/17/2023    HYSTERECTOMY (CERVIX STATUS UNKNOWN)  2011    total    IR INS DUOD OR JEJUN TUBE PERC  2/9/2024    IR INS DUOD OR JEJUN TUBE PERC 2/9/2024 [facility] SPECIAL PROCEDURES    KAN STEROTACTIC LOC BREAST BIOPSY RIGHT Right 01/18/2019    benign    NECK SURGERY  1989    Ruptured disc    OVARY REMOVAL Bilateral 2011    total    TONSILLECTOMY      UPPER GASTROINTESTINAL ENDOSCOPY Left 02/28/2023    ENDOSCOPIC ULTRASOUND WITH RADIAL SCOPE performed by Zee Crowe MD at [facility] Endoscopy    US BREAST BIOPSY W LOC DEVICE 1ST LESION RIGHT Right 11/25/2011    benign     Current

## 2024-12-10 NOTE — PROGRESS NOTES
Hospitalist Progress Note  Internal Medicine Resident      Patient: Susan K Sturgess Meyers 59 y.o. female      Unit/Bed: -22/022-A    Admit Date: 12/4/2024      ASSESSMENT AND PLAN  Active Problems  PNA: Pneumonia panel Positive for E. coli, Klebsiella.  Previous cultures sensitive for ceftriaxone.  Respiratory cultures 12/05 NG (contaminated), blood cultures, 12/04, NGTD.  Leukocytosis resolved. Repeat CTA on 12/6/2024 showed worsening of right-sided consolidation/effusion.   Continue ceftriaxone(12/6-12/11) and azithromycin (12/5-12/11). For total of 5 days.  Hyponatremia secondary due increased free water flush with tube feeds: Patient initial hyponatremia due to postobstructive diuresis resolved. Worsened due to increased free water flushes.   Follow up BMP.   Reduced to free water flushes with tube feeds, will discuss further with dietitian for bolus tube feed versus continuous tube feed.  Sodium recheck 131.   New onset of AVNRT: Rapid response x2 (12/4-12/5)  were called for heart rate ~200bpm.  Unable to obtain EKG during the rapid heart rate.  Carotid massage/Valsalva maneuvers were performed with resolution of tachycardia to ~90 bpm.  Reviewing of telemetry suspicious for AVNRT.  EKG 12/5 showed normal sinus rhythm at 97, QTc 414 ms.  Continue on metoprolol 25 Mg/twice daily, patient tolerating well.  Continue telemonitoring.  Advised to use Valsalva prior to pharmacological treatment.  Will require Holter monitor/cardiology follow-up as an outpatient.    Bilateral paresthesia, secondary lesion at T8-T9 causing moderate canal stenosis and cord compression: Patient complained of ongoing tingling sensation bilaterally in the lower extremities mainly located in the foot.  Complained of worsening bladder control.  Patient denies saddle anesthesia.  On PE noted to have diminished reflex.  B12 250 (12/5), normal lower limit.  CT lumbar 12/7 reported narrowing and osteophyte formation, most severe at  esophageal carcinoma, esophageal fistula, IDDM2, GERD, COPD who presented to the [facility] with fatigue and increased shortness of breath.  Patient reports increased weakness over the last week and a half.  Also reported worsening shortness of breath for the past 3 days associated with productive yellow sputum.  Patient also reported fall 3 weeks ago stating that she hit her head and fell to the ground without any loss of consciousness/lightheadedness/dizziness.  In ED patient had respiratory rate of 24, heart rate of 127, blood pressure 103/83, SpO2 88% on room air.  Patient was placed on 3 L nasal cannula with improvement of SpO2 to 98%.  Labs significant for sodium of 120, serum glucose of 310, WBC of 21.2.  EKG showed sinus tachycardia at 128.  CTA chest reported suspicious for PE.  And Infiltrates in the right lower lobe.  Molecular panel positive for Klebsiella, E. coli.  Patient was started on cefepime and given a dose of vancomycin.  Patient was admitted to stepdown unit for PE/pneumonia management.  Rapid response was called overnight after admission for heart rate at 210.  Unable to obtain EKG during the rapid heart rate.  Carotid massage/Valsalva maneuvers were performed with resolution of tachycardia to ~90 bpm.  Patient noted to have 600 mL on bladder scan, patient was straight cath with about 2755 mL of.  Patient's hyponatremia corrected to 128, due to concern for overcorrection patient was started on D5. CTA findings are discussed with IR acute intervention due to low suspicion/burden of PE.  Patient continued on heparin GGT.  In the evening patient had a heart rate in 200s, resolved with Valsalva maneuver.  Patient started on metoprolol 25 Mg/twice daily.  Continuing telemonitoring.  Antibiotic was de-escalated to ceftriaxone and azithromycin.  Repeat CTA 12/6 reported no filling defects, no PE.  Worsening of bilateral pleural effusions/consolidation, right> left.  Continue current antibiotic regiment.

## 2024-12-11 VITALS
BODY MASS INDEX: 27.15 KG/M2 | HEART RATE: 88 BPM | DIASTOLIC BLOOD PRESSURE: 69 MMHG | HEIGHT: 63 IN | OXYGEN SATURATION: 95 % | SYSTOLIC BLOOD PRESSURE: 118 MMHG | RESPIRATION RATE: 18 BRPM | TEMPERATURE: 98.1 F | WEIGHT: 153.22 LBS

## 2024-12-11 PROCEDURE — 6360000002 HC RX W HCPCS: Performed by: STUDENT IN AN ORGANIZED HEALTH CARE EDUCATION/TRAINING PROGRAM

## 2024-12-11 RX ADMIN — DEXAMETHASONE SODIUM PHOSPHATE 8 MG: 4 INJECTION, SOLUTION INTRA-ARTICULAR; INTRALESIONAL; INTRAMUSCULAR; INTRAVENOUS; SOFT TISSUE at 02:21

## 2024-12-11 ASSESSMENT — PAIN DESCRIPTION - PAIN TYPE: TYPE: ACUTE PAIN;CHRONIC PAIN

## 2024-12-11 ASSESSMENT — PAIN DESCRIPTION - DESCRIPTORS: DESCRIPTORS: ACHING;DISCOMFORT

## 2024-12-11 ASSESSMENT — PAIN SCALES - GENERAL: PAINLEVEL_OUTOF10: 5

## 2024-12-11 ASSESSMENT — PAIN DESCRIPTION - LOCATION: LOCATION: BACK

## 2024-12-11 ASSESSMENT — PAIN DESCRIPTION - FREQUENCY: FREQUENCY: CONTINUOUS

## 2024-12-11 ASSESSMENT — PAIN DESCRIPTION - ONSET: ONSET: ON-GOING

## 2024-12-11 ASSESSMENT — PAIN - FUNCTIONAL ASSESSMENT: PAIN_FUNCTIONAL_ASSESSMENT: PREVENTS OR INTERFERES SOME ACTIVE ACTIVITIES AND ADLS

## 2024-12-11 ASSESSMENT — PAIN DESCRIPTION - ORIENTATION: ORIENTATION: RIGHT;LEFT;MID

## 2024-12-11 NOTE — PROGRESS NOTES
LACP here to  patient at 0430 to transfer to [facility] in [place], [place]. Pt comfortable, all questions answered and no concerns voiced. Pt will be going to floor 19C.

## 2024-12-11 NOTE — PROGRESS NOTES
This RN called report at [phone] to nurse Perry.     Charge RN gave phone number to this RN, charge RN phone number [phone]. Unit clerk phone number [phone].    ***

## 2024-12-13 NOTE — DISCHARGE SUMMARY
Hospital Medicine Discharge Summary      Patient Identification:   Susan K Sturgess Meyers   : 1965  MRN: [id number]   Account: [id number]   Patient's PCP: Torri George, ADDISON - CNP    Admit Date: 2024   Discharge Date: 2024    Discharge Disposition: to [facility] Tertiary facility, for continuity and complexity of case.     Admitting Physician: Loi Serrano DO  Discharge Physician: Judson Villagomez DO     Patient discharged early in morning prior to day team's evaluation. Included below is the attestation and resident A&P from most recent note.           Agree with note. Started on high dose steroids per Ortho spine recs. Transferring to [facility], will hold off biopsy and defer to accepting facility (discussed with Dr. Leahy) to avoid repeated invasive procedures and since it is very closely involved to the area of prior esophageal cancer, which was being managed by Thoracic Surgery at [facility].  Concern for this being recurrent malignancy vs inflammatory related to the prior surgery.  Noted that blood cultures on arrival were negative, patient remains on treatment for pneumonia at this time.   Insulin adjusted for high dose steroids. GI ppx is in place.           \"ASSESSMENT AND PLAN  Active Problems  Bilateral paresthesia, secondary lesion at T8-T9 causing moderate canal stenosis and cord compression: Patient complained of ongoing tingling sensation bilaterally in the lower extremities mainly located in the foot.  Complained of worsening bladder control.  Patient denies saddle anesthesia.  On PE noted to have diminished reflex.  B12 250 (), normal lower limit.  CT lumbar  reported narrowing and osteophyte formation, most severe at L1/L2, L2-L3 and L5-S1.  No fracture or spondylolisthesis.  CT thoracic spine  reported disc space narrowing and osteophyte formation throughout the thoracic spine.  No fracture or subluxation.  MRI thoracic spine showed enhancing lesion at T8-T9 vertebral  bodies compression on T8-9.  Continue with Vit B12.   Orthopedic surgeon, Dr. Leahy, following.   Scheduled biopsy was canceled, as the patient may require complicated costotransversectomy with thoracic surgery/spine surgical intervention. Pt already has an established thoracic surgeon at [facility] and follows up for esophageal cancer.  [facility] transfer initiated on 12/10/2024, accepted bed assignment pending.  Continue dexamethasone 8 mg q8hr.   PNA: Pneumonia panel Positive for E. coli, Klebsiella.  Previous cultures sensitive for ceftriaxone.  Respiratory cultures 12/05 NG (contaminated), blood cultures, 12/04, NGTD.  Leukocytosis resolved. Repeat CTA on 12/6/2024 showed worsening of right-sided consolidation/effusion.   Continue ceftriaxone(12/6-12/11) and azithromycin (12/5-12/11). For total of 5 days.  Hyponatremia secondary due increased free water flush with tube feeds, stable: Na 130 from 131. Patient initial hyponatremia due to postobstructive diuresis resolved. Worsened due to increased free water flushes.   Follow up BMP.   Reduced to free water flushes with tube feeds, will discuss further with dietitian for bolus tube feed versus continuous tube feed.  New onset of AVNRT: Rapid response x2 (12/4-12/5)  were called for heart rate ~200bpm.  Unable to obtain EKG during the rapid heart rate.  Carotid massage/Valsalva maneuvers were performed with resolution of tachycardia to ~90 bpm.  Reviewing of telemetry suspicious for AVNRT.  EKG 12/5 showed normal sinus rhythm at 97, QTc 414 ms.  Continue on metoprolol 25 Mg/twice daily, patient tolerating well.  Continue telemonitoring.  Advised to use Valsalva prior to pharmacological treatment.  Will require Holter monitor/cardiology follow-up as an outpatient.  Acute anemia likely due to decrease bone marrow response, improving: Hemoglobin 10.1 from 11.2 OA, baseline 12.0.  Patient does not have any active signs of bleed.  Ferritin 395, iron 12, TIBC 174, iron saturation

## 2024-12-16 PROBLEM — J15.69 PNEUMONIA DUE TO GRAM-NEGATIVE BACTERIA (HCC): Status: ACTIVE | Noted: 2024-12-16

## 2024-12-16 NOTE — PROGRESS NOTES
Physician Progress Note      PATIENT:               STURGESS MEYERS, SUSAN  [facility] #:                  [id number]  :                       1965  ADMIT DATE:       2024 3:23 PM  DISCH DATE:        2024 4:30 AM  RESPONDING  PROVIDER #:        Judson Villagomez DO          QUERY TEXT:    Patient Presented due to worsening congestion and fatigue, Leukocytosis with   WBC 21.1.found to have Pneumonia, Cefepime 2 g every 8 hours for 7 day course,   Azithromycin 500  mg for 3 day course. Pneumonia panel, respiratory culture ordered. DS states   Pneumonia panel Positive for E. coli, Klebsiella (Gram Negative Species).   Respiratory cultures 12/05 NG  (contaminated), Leukocytosis resolved. Repeat CTA on 2024 showed   worsening of right-sided consolidation/effusion. Continue   ceftriaxone(-) and azithromycin (-).  For total of 5 days. If possible, please document in the progress notes and   discharge summary if you are evaluating and/or treating any of the following:    Note: CAP and HCAP indicate where the pneumonia was acquired, not a specific   type.    The medical record reflects the following:  Risk Factors: congestion and fatigue, Leukocytosis  Clinical Indicators:  with WBC 21.1.found to have Pneumonia, Cefepime 2 g   every 8 hours for 7 day course, Azithromycin 500 mg for 3 day course.   Pneumonia panel, respiratory culture ordered. DS states Pneumonia panel   Positive for E. coli, Klebsiella (Gram Negative Species). Respiratory cultures   12/05 NG (contaminated), Leukocytosis resolved  Treatment: Repeat CTA on 2024 showed worsening of right-sided   consolidation/effusion. Continue ceftriaxone(-) and azithromycin   (-).  For total of 5 days.    Thank you. Lillian Keller RN, Clinical Documentation Integrity, [employer], [facility], CRCR  Options provided:  -- Pneumonia, treating for both gram positive and gram negative organisms  -- Gram negative

## 2025-01-09 PROBLEM — I26.99 ACUTE PULMONARY EMBOLISM (HCC): Status: RESOLVED | Noted: 2024-12-05 | Resolved: 2025-01-09

## 2025-01-09 PROBLEM — G95.20 SPINAL CORD COMPRESSION (HCC): Status: ACTIVE | Noted: 2025-01-09

## 2025-01-10 LAB
BUN / CREAT RATIO: 23 (ref 7–25)
BUN BLDV-MCNC: 14 MG/DL (ref 3–29)
CALCIUM SERPL-MCNC: 9.5 MG/DL (ref 8.5–10.5)
CHLORIDE BLD-SCNC: 90 MEQ/L (ref 96–110)
CO2: 29 MEQ/L (ref 19–32)
CREAT SERPL-MCNC: 0.6 MG/DL (ref 0.5–1.2)
ESTIMATED GLOMERULAR FILTRATION RATE CREATININE EQUATION: 103 MLS/MIN/1.73M2
FASTING STATUS: ABNORMAL
GLUCOSE BLD-MCNC: 213 MG/DL (ref 70–99)
HCT VFR BLD CALC: 32.4 % (ref 34–49)
HEMOGLOBIN: 10.1 G/DL (ref 11.2–15.7)
MCH RBC QN AUTO: 26 PG (ref 26–34)
MCHC RBC AUTO-ENTMCNC: 31.2 G/DL (ref 30.7–35.5)
MCV RBC AUTO: 83.3 FL (ref 80–100)
PDW BLD-RTO: 15.4 %
PLATELET # BLD: 501 K/UL (ref 140–400)
PMV BLD AUTO: 9.3 FL (ref 7.2–11.7)
POTASSIUM SERPL-SCNC: 5.7 MEQ/L (ref 3.4–5.3)
RBC # BLD: 3.89 M/UL (ref 3.95–5.26)
SODIUM BLD-SCNC: 129 MEQ/L (ref 135–148)
WBC # BLD: 10.7 K/UL (ref 3.5–10.9)

## 2025-01-13 ENCOUNTER — HOSPITAL ENCOUNTER (EMERGENCY)
Age: 60
Discharge: HOME | End: 2025-01-13
Attending: STUDENT IN AN ORGANIZED HEALTH CARE EDUCATION/TRAINING PROGRAM
Payer: COMMERCIAL

## 2025-01-13 ENCOUNTER — APPOINTMENT (OUTPATIENT)
Dept: GENERAL RADIOLOGY | Age: 60
End: 2025-01-13
Payer: COMMERCIAL

## 2025-01-13 VITALS
DIASTOLIC BLOOD PRESSURE: 67 MMHG | OXYGEN SATURATION: 100 % | BODY MASS INDEX: 26.22 KG/M2 | RESPIRATION RATE: 18 BRPM | SYSTOLIC BLOOD PRESSURE: 108 MMHG | WEIGHT: 148 LBS | TEMPERATURE: 98.2 F | HEIGHT: 63 IN | HEART RATE: 76 BPM

## 2025-01-13 DIAGNOSIS — T85.528A JEJUNOSTOMY TUBE FELL OUT: Primary | ICD-10-CM

## 2025-01-13 LAB
BUN / CREAT RATIO: 22 (ref 7–25)
BUN BLDV-MCNC: 13 MG/DL (ref 3–29)
CALCIUM SERPL-MCNC: 9 MG/DL (ref 8.5–10.5)
CHLORIDE BLD-SCNC: 91 MEQ/L (ref 96–110)
CO2: 33 MEQ/L (ref 19–32)
CREAT SERPL-MCNC: 0.6 MG/DL (ref 0.5–1.2)
ESTIMATED GLOMERULAR FILTRATION RATE CREATININE EQUATION: 103 MLS/MIN/1.73M2
FASTING STATUS: ABNORMAL
GLUCOSE BLD-MCNC: 108 MG/DL (ref 70–99)
HCT VFR BLD CALC: 32.2 % (ref 34–49)
HEMOGLOBIN: 9.9 G/DL (ref 11.2–15.7)
MCH RBC QN AUTO: 26.1 PG (ref 26–34)
MCHC RBC AUTO-ENTMCNC: 30.7 G/DL (ref 30.7–35.5)
MCV RBC AUTO: 85 FL (ref 80–100)
PDW BLD-RTO: 15.3 %
PLATELET # BLD: 422 K/UL (ref 140–400)
PMV BLD AUTO: 9.3 FL (ref 7.2–11.7)
POTASSIUM SERPL-SCNC: 3.6 MEQ/L (ref 3.4–5.3)
RBC # BLD: 3.79 M/UL (ref 3.95–5.26)
SODIUM BLD-SCNC: 135 MEQ/L (ref 135–148)
WBC # BLD: 14.4 K/UL (ref 3.5–10.9)

## 2025-01-13 PROCEDURE — 49465 FLUORO EXAM OF G/COLON TUBE: CPT

## 2025-01-13 PROCEDURE — 6360000004 HC RX CONTRAST MEDICATION: Performed by: EMERGENCY MEDICINE

## 2025-01-13 PROCEDURE — 99284 EMERGENCY DEPT VISIT MOD MDM: CPT

## 2025-01-13 RX ORDER — DIATRIZOATE MEGLUMINE AND DIATRIZOATE SODIUM 660; 100 MG/ML; MG/ML
30 SOLUTION ORAL; RECTAL
Status: DISCONTINUED | OUTPATIENT
Start: 2025-01-13 | End: 2025-01-13 | Stop reason: HOSPADM

## 2025-01-13 RX ADMIN — DIATRIZOATE MEGLUMINE AND DIATRIZOATE SODIUM 30 ML: 600; 100 SOLUTION ORAL; RECTAL at 05:05

## 2025-01-13 ASSESSMENT — PAIN DESCRIPTION - ORIENTATION: ORIENTATION: MID

## 2025-01-13 ASSESSMENT — PAIN DESCRIPTION - LOCATION: LOCATION: BACK

## 2025-01-13 ASSESSMENT — PAIN - FUNCTIONAL ASSESSMENT: PAIN_FUNCTIONAL_ASSESSMENT: 0-10

## 2025-01-13 ASSESSMENT — PAIN SCALES - GENERAL: PAINLEVEL_OUTOF10: 8

## 2025-01-13 NOTE — DISCHARGE INSTRUCTIONS
You were seen here today for a J tube that fell out. Continue feeds as planned. Follow up with your cancer team as needed.

## 2025-01-13 NOTE — ED NOTES
Pt reports to the ED from [facility] due to J tube falling out. Pt states J tube fell out approximately 20 minutes prior to arrival. Pt was receiving night medications and coughed when J tube fell out. Pt has mid back pain rating 8.5 out of 10. Pt is unsure of what size J tube pt had. Pt wearing 3 L NC for the past month and on arrival.

## 2025-01-13 NOTE — ED PROVIDER NOTES
[facility] EMERGENCY DEPARTMENT      EMERGENCY MEDICINE     Pt Name: Susan K Sturgess Meyers  MRN: [id number]  Birthdate 1965  Date of evaluation: 1/13/2025  Provider: Gopal Hernandez DO  Supervising Physician: Raghav Suarez DO    CHIEF COMPLAINT     No chief complaint on file.  J tube fell out  HISTORY OF PRESENT ILLNESS   Susan K Sturgess Meyers is a 59 y.o. female with a h/o esophageal CA s/p esophagectomy, asthma, DM, ovarian CA who presents to the emergency department from [facility] via EMS for evaluation of a J tube that fell out this morning while they were administering medications. Pt reports she's had it for more than 1 year, most recently replaced at [facility] a couple weeks ago. Pt with no other acute complaints.     PASTMEDICAL HISTORY     Past Medical History:   Diagnosis Date    Asthma, moderate persistent     DM2 (diabetes mellitus, type 2) (HCC)     History of esophageal cancer     Esophagus    Ovarian cancer in remission 2011    age 46, no XRT or Chemo per patient, pt stated 11/23/2022 that she didn't actually have Ovarian CA       Patient Active Problem List   Diagnosis Code    Acute hypoxic respiratory failure J96.01    Septic shock ([facility]) A41.9, R65.21    Acute respiratory failure with hypoxia J96.01    Moderate malnutrition ([facility]) E44.0    General weakness R53.1    Pneumonia due to infectious organism J18.9    Hyponatremia E87.1    AVNRT (AV william re-entry tachycardia) ([facility]) I47.19    Pneumonia due to gram-negative bacteria ([facility]) J15.69    Spinal cord compression, thoracic ([facility]) G95.20     SURGICAL HISTORY       Past Surgical History:   Procedure Laterality Date    COLONOSCOPY  02/17/2023    CT BIOPSY DEEP BONE PERCUTANEOUS  12/17/2024    CT BIOPSY DEEP BONE PERCUTANEOUS 12/17/2024    HYSTERECTOMY (CERVIX STATUS UNKNOWN)  2011    total    IR GUIDED INS DUOD OR JEJUN TUBE PERC  2/9/2024    IR INS DUOD OR JEJUN TUBE PERC 2/9/2024 [facility] SPECIAL PROCEDURES    KAN STEREO BREAST BX W LOC DEVICE 1ST

## 2025-01-16 ENCOUNTER — CLINICAL DOCUMENTATION (OUTPATIENT)
Dept: PALLATIVE CARE | Age: 60
End: 2025-01-16

## 2025-01-16 LAB
BACTERIA, URINE: ABNORMAL /HPF
BILIRUBIN, URINE: NEGATIVE MG/DL
BILIRUBIN, URINE: NEGATIVE MG/DL
BUN / CREAT RATIO: 25 (ref 7–25)
BUN BLDV-MCNC: 15 MG/DL (ref 3–29)
CALCIUM SERPL-MCNC: 9.1 MG/DL (ref 8.5–10.5)
CHLORIDE BLD-SCNC: 91 MEQ/L (ref 96–110)
CLARITY, UA: CLEAR
CLARITY, UA: CLEAR
CO2: 21 MEQ/L (ref 19–32)
COLOR, UA: YELLOW
COLOR, UA: YELLOW
CREAT SERPL-MCNC: 0.6 MG/DL (ref 0.5–1.2)
EPITHELIAL CELLS, UA: ABNORMAL /HPF (ref 0–5)
EPITHELIAL CELLS, UA: ABNORMAL /HPF (ref 0–5)
ERYTHROCYTES URINE: ABNORMAL /HPF (ref 0–2)
ESTIMATED GLOMERULAR FILTRATION RATE CREATININE EQUATION: 103 MLS/MIN/1.73M2
FASTING STATUS: ABNORMAL
GLUCOSE BLD-MCNC: 119 MG/DL (ref 70–99)
GLUCOSE URINE: NEGATIVE MG/DL
GLUCOSE URINE: NEGATIVE MG/DL
HCT VFR BLD CALC: 31.8 % (ref 34–49)
HEMOGLOBIN: 9.8 G/DL (ref 11.2–15.7)
KETONES, URINE: NEGATIVE MG/DL
KETONES, URINE: NEGATIVE MG/DL
LEUKOCYTE CLUMPS IN URINE BY AUTOMATED COUNT: ABNORMAL (ref 0–1)
LEUKOCYTE ESTERASE, URINE: ABNORMAL
LEUKOCYTE ESTERASE, URINE: ABNORMAL
LEUKOCYTES, UA: ABNORMAL /HPF (ref 0–5)
MCH RBC QN AUTO: 25.9 PG (ref 26–34)
MCHC RBC AUTO-ENTMCNC: 30.8 G/DL (ref 30.7–35.5)
MCV RBC AUTO: 83.9 FL (ref 80–100)
MUCUS: PRESENT
NITRITE, URINE: POSITIVE
NITRITE, URINE: POSITIVE
PDW BLD-RTO: 15.2 %
PH, URINE: 7.5 (ref 4.5–8)
PH, URINE: 7.5 (ref 4.5–8)
PLATELET # BLD: 412 K/UL (ref 140–400)
PMV BLD AUTO: 9.7 FL (ref 7.2–11.7)
POTASSIUM SERPL-SCNC: ABNORMAL MEQ/L (ref 3.4–5.3)
PROTEIN, URINE: NEGATIVE MG/DL
PROTEIN, URINE: NEGATIVE MG/DL
RBC # BLD: 3.79 M/UL (ref 3.95–5.26)
SODIUM BLD-SCNC: 130 MEQ/L (ref 135–148)
SPECIFIC GRAVITY UA: 1.01 (ref 1–1.03)
SPECIFIC GRAVITY UA: 1.01 (ref 1–1.03)
URINE HGB: ABNORMAL MG/DL
URINE HGB: ABNORMAL MG/DL
UROBILINOGEN, URINE: <2 MG/DL (ref 0–2)
UROBILINOGEN, URINE: <2 MG/DL (ref 0–2)
WBC # BLD: 11.9 K/UL (ref 3.5–10.9)

## 2025-01-16 NOTE — ACP (ADVANCE CARE PLANNING)
Chart review conducted this date for ACP planning. Pt confirms that she does not have any Advance Directives but is interested in completing them. Pt's roommate has company at this time, but will monitor for a good time to hold discussion.    Eliana Fairbanks RN, BSN, [facility]  Population Health-ACP Specialist  Respecting Choices®  Advanced Steps Facilitator  Work Cell: [phone]  Email: [email or web address]

## 2025-01-20 LAB
BUN / CREAT RATIO: 23 (ref 7–25)
BUN BLDV-MCNC: 14 MG/DL (ref 3–29)
CALCIUM SERPL-MCNC: 9.1 MG/DL (ref 8.5–10.5)
CHLORIDE BLD-SCNC: 93 MEQ/L (ref 96–110)
CO2: 30 MEQ/L (ref 19–32)
CREAT SERPL-MCNC: 0.6 MG/DL (ref 0.5–1.2)
ESTIMATED GLOMERULAR FILTRATION RATE CREATININE EQUATION: 103 MLS/MIN/1.73M2
FASTING STATUS: ABNORMAL
GLUCOSE BLD-MCNC: 160 MG/DL (ref 70–99)
HCT VFR BLD CALC: 29.6 % (ref 34–49)
HEMOGLOBIN: 9.2 G/DL (ref 11.2–15.7)
MCH RBC QN AUTO: 25.9 PG (ref 26–34)
MCHC RBC AUTO-ENTMCNC: 31.1 G/DL (ref 30.7–35.5)
MCV RBC AUTO: 83.4 FL (ref 80–100)
PDW BLD-RTO: 15 %
PLATELET # BLD: 352 K/UL (ref 140–400)
PMV BLD AUTO: 9.7 FL (ref 7.2–11.7)
POTASSIUM SERPL-SCNC: 4.9 MEQ/L (ref 3.4–5.3)
RBC # BLD: 3.55 M/UL (ref 3.95–5.26)
SODIUM BLD-SCNC: 131 MEQ/L (ref 135–148)
WBC # BLD: 8.2 K/UL (ref 3.5–10.9)

## 2025-01-22 ENCOUNTER — CLINICAL DOCUMENTATION (OUTPATIENT)
Dept: PALLATIVE CARE | Age: 60
End: 2025-01-22

## 2025-01-22 NOTE — ACP (ADVANCE CARE PLANNING)
Advance Care Planning       Advanced Steps Advance Care Planning Conversation  POLST (Goals of Care for Serious Illness and/or Frailty)    Susan K. Sturgess-Meyers    Date of conversation: 01/22/25  Location: [place], [place]:  [facility]  Length (minutes):  60 minutes    Advanced Steps® ACP Facilitator: Eliana Fairbanks, RN, BSN    Participants:    [x] Patient and spouse, Jef. Pt remained alert and oriented with consistent responses entire conversation.    Healthcare Decision Maker:    Primary Decision Maker: Sturgess-Meyers,Brad - Spouse - [phone]    Secondary Decision Maker: Sturgess Meyers,Josh - Child - [phone]     Conversation Topics    Understanding of Medical Condition/s AND Potential Complications:    Patient response: Pt shared about having cancer at first encounter when appointment set up.  Healthcare Agent/Other Surrogate:  He attends appointments and has been there for surgery, states understanding    Patient's hospital experiences that may influence future decision making:  She has her own cancer journey as well as stories she shared about several close relatives that have passed in different ways, but that where a common thread was needing to make healthcare decisions.     Identifies the following as important for living well (quality of life):  Pt maintaining as much independence as possible and was taking calls from MD facilities to be a part of her own care plan.    Hopes:  To return home with her  and kids.      Worries/Fears about Medical Condition:  None expressed. Pt remains positive and made comments such as \"I am on feedings, I cannot take food by mouth..yet.\"    Sources of support/comfort described as: Spouse visits often.    Personal, cultural, Jainism, or spiritual beliefs described as: Asked and no special care needs expressed.    Would like to discuss with spiritual/Jainism advisor: [] Yes  [x] Not requested this date    More information requested about illness and

## 2025-01-23 ENCOUNTER — APPOINTMENT (OUTPATIENT)
Dept: GENERAL RADIOLOGY | Age: 60
End: 2025-01-23
Payer: COMMERCIAL

## 2025-01-23 ENCOUNTER — HOSPITAL ENCOUNTER (EMERGENCY)
Age: 60
Discharge: ANOTHER HOSPITAL | End: 2025-01-24
Attending: EMERGENCY MEDICINE
Payer: COMMERCIAL

## 2025-01-23 DIAGNOSIS — Z86.69 HISTORY OF SPINAL CORD COMPRESSION: ICD-10-CM

## 2025-01-23 DIAGNOSIS — R29.898 BILATERAL LEG WEAKNESS: Primary | ICD-10-CM

## 2025-01-23 LAB
ANION GAP SERPL CALC-SCNC: 8 MEQ/L (ref 8–16)
BASOPHILS ABSOLUTE: 0.1 THOU/MM3 (ref 0–0.1)
BASOPHILS NFR BLD AUTO: 0.5 %
BUN / CREAT RATIO: 28 (ref 7–25)
BUN BLDV-MCNC: 17 MG/DL (ref 3–29)
BUN SERPL-MCNC: 17 MG/DL (ref 7–22)
CALCIUM SERPL-MCNC: 9.3 MG/DL (ref 8.5–10.5)
CALCIUM SERPL-MCNC: 9.8 MG/DL (ref 8.5–10.5)
CHLORIDE BLD-SCNC: 90 MEQ/L (ref 96–110)
CHLORIDE SERPL-SCNC: 91 MEQ/L (ref 98–111)
CO2 SERPL-SCNC: 31 MEQ/L (ref 23–33)
CO2: 29 MEQ/L (ref 19–32)
CREAT SERPL-MCNC: 0.5 MG/DL (ref 0.4–1.2)
CREAT SERPL-MCNC: 0.6 MG/DL (ref 0.5–1.2)
DEPRECATED RDW RBC AUTO: 48.1 FL (ref 35–45)
EKG ATRIAL RATE: 100 BPM
EKG P AXIS: 36 DEGREES
EKG P-R INTERVAL: 140 MS
EKG Q-T INTERVAL: 334 MS
EKG QRS DURATION: 66 MS
EKG QTC CALCULATION (BAZETT): 430 MS
EKG R AXIS: -11 DEGREES
EKG T AXIS: 49 DEGREES
EKG VENTRICULAR RATE: 100 BPM
EOSINOPHIL NFR BLD AUTO: 1.9 %
EOSINOPHILS ABSOLUTE: 0.2 THOU/MM3 (ref 0–0.4)
ERYTHROCYTE [DISTWIDTH] IN BLOOD BY AUTOMATED COUNT: 15.9 % (ref 11.5–14.5)
ESTIMATED GLOMERULAR FILTRATION RATE CREATININE EQUATION: 103 MLS/MIN/1.73M2
FASTING STATUS: ABNORMAL
FLUAV RNA RESP QL NAA+PROBE: NOT DETECTED
FLUBV RNA RESP QL NAA+PROBE: NOT DETECTED
GFR SERPL CREATININE-BSD FRML MDRD: > 90 ML/MIN/1.73M2
GLUCOSE BLD-MCNC: 225 MG/DL (ref 70–99)
GLUCOSE SERPL-MCNC: 163 MG/DL (ref 70–108)
HCT VFR BLD AUTO: 31.8 % (ref 37–47)
HCT VFR BLD CALC: 32.7 % (ref 34–49)
HEMOGLOBIN: 9.9 G/DL (ref 11.2–15.7)
HGB BLD-MCNC: 9.7 GM/DL (ref 12–16)
IMM GRANULOCYTES # BLD AUTO: 0.08 THOU/MM3 (ref 0–0.07)
IMM GRANULOCYTES NFR BLD AUTO: 0.8 %
LYMPHOCYTES ABSOLUTE: 0.7 THOU/MM3 (ref 1–4.8)
LYMPHOCYTES NFR BLD AUTO: 6.6 %
MCH RBC QN AUTO: 25.4 PG (ref 26–34)
MCH RBC QN AUTO: 25.6 PG (ref 26–33)
MCHC RBC AUTO-ENTMCNC: 30.3 G/DL (ref 30.7–35.5)
MCHC RBC AUTO-ENTMCNC: 30.5 GM/DL (ref 32.2–35.5)
MCV RBC AUTO: 83.8 FL (ref 80–100)
MCV RBC AUTO: 83.9 FL (ref 81–99)
MONOCYTES ABSOLUTE: 1 THOU/MM3 (ref 0.4–1.3)
MONOCYTES NFR BLD AUTO: 9.8 %
NEUTROPHILS ABSOLUTE: 8.5 THOU/MM3 (ref 1.8–7.7)
NEUTROPHILS NFR BLD AUTO: 80.4 %
NRBC BLD AUTO-RTO: 0 /100 WBC
NT-PROBNP SERPL IA-MCNC: 151.2 PG/ML (ref 0–124)
OSMOLALITY SERPL CALC.SUM OF ELEC: 265.9 MOSMOL/KG (ref 275–300)
PDW BLD-RTO: 15.2 %
PLATELET # BLD AUTO: 553 THOU/MM3 (ref 130–400)
PLATELET # BLD: 545 K/UL (ref 140–400)
PMV BLD AUTO: 9.2 FL (ref 9.4–12.4)
PMV BLD AUTO: 9.6 FL (ref 7.2–11.7)
POTASSIUM SERPL-SCNC: 4.6 MEQ/L (ref 3.5–5.2)
POTASSIUM SERPL-SCNC: 5.1 MEQ/L (ref 3.4–5.3)
RBC # BLD AUTO: 3.79 MILL/MM3 (ref 4.2–5.4)
RBC # BLD: 3.9 M/UL (ref 3.95–5.26)
SARS-COV-2 RNA RESP QL NAA+PROBE: NOT DETECTED
SODIUM BLD-SCNC: 130 MEQ/L (ref 135–148)
SODIUM SERPL-SCNC: 130 MEQ/L (ref 135–145)
TROPONIN, HIGH SENSITIVITY: 18 NG/L (ref 0–12)
WBC # BLD AUTO: 10.6 THOU/MM3 (ref 4.8–10.8)
WBC # BLD: 12.1 K/UL (ref 3.5–10.9)

## 2025-01-23 PROCEDURE — 93005 ELECTROCARDIOGRAM TRACING: CPT | Performed by: EMERGENCY MEDICINE

## 2025-01-23 PROCEDURE — 93010 ELECTROCARDIOGRAM REPORT: CPT | Performed by: INTERNAL MEDICINE

## 2025-01-23 PROCEDURE — 36415 COLL VENOUS BLD VENIPUNCTURE: CPT

## 2025-01-23 PROCEDURE — 87636 SARSCOV2 & INF A&B AMP PRB: CPT

## 2025-01-23 PROCEDURE — 85025 COMPLETE CBC W/AUTO DIFF WBC: CPT

## 2025-01-23 PROCEDURE — 80048 BASIC METABOLIC PNL TOTAL CA: CPT

## 2025-01-23 PROCEDURE — 84484 ASSAY OF TROPONIN QUANT: CPT

## 2025-01-23 PROCEDURE — 6370000000 HC RX 637 (ALT 250 FOR IP)

## 2025-01-23 PROCEDURE — 71045 X-RAY EXAM CHEST 1 VIEW: CPT

## 2025-01-23 PROCEDURE — 83880 ASSAY OF NATRIURETIC PEPTIDE: CPT

## 2025-01-23 PROCEDURE — 99285 EMERGENCY DEPT VISIT HI MDM: CPT

## 2025-01-23 RX ORDER — OXYCODONE HYDROCHLORIDE 5 MG/1
10 TABLET ORAL ONCE
Status: COMPLETED | OUTPATIENT
Start: 2025-01-23 | End: 2025-01-24

## 2025-01-23 RX ORDER — GUAIFENESIN/DEXTROMETHORPHAN 100-10MG/5
5 SYRUP ORAL ONCE
Status: DISCONTINUED | OUTPATIENT
Start: 2025-01-23 | End: 2025-01-23

## 2025-01-23 RX ORDER — GUAIFENESIN/DEXTROMETHORPHAN 100-10MG/5
5 SYRUP ORAL ONCE
Status: COMPLETED | OUTPATIENT
Start: 2025-01-23 | End: 2025-01-23

## 2025-01-23 RX ORDER — OXYCODONE HYDROCHLORIDE 5 MG/1
10 TABLET ORAL ONCE
Status: COMPLETED | OUTPATIENT
Start: 2025-01-23 | End: 2025-01-23

## 2025-01-23 RX ORDER — OXYCODONE HYDROCHLORIDE 5 MG/1
10 TABLET ORAL EVERY 4 HOURS PRN
Status: DISCONTINUED | OUTPATIENT
Start: 2025-01-23 | End: 2025-01-23

## 2025-01-23 RX ADMIN — OXYCODONE 10 MG: 5 TABLET ORAL at 18:45

## 2025-01-23 RX ADMIN — GUAIFENESIN AND DEXTROMETHORPHAN 5 ML: 100; 10 SYRUP ORAL at 18:45

## 2025-01-23 ASSESSMENT — PAIN - FUNCTIONAL ASSESSMENT
PAIN_FUNCTIONAL_ASSESSMENT: NONE - DENIES PAIN
PAIN_FUNCTIONAL_ASSESSMENT: WONG-BAKER FACES
PAIN_FUNCTIONAL_ASSESSMENT: NONE - DENIES PAIN
PAIN_FUNCTIONAL_ASSESSMENT: NONE - DENIES PAIN

## 2025-01-23 ASSESSMENT — PAIN SCALES - WONG BAKER: WONGBAKER_NUMERICALRESPONSE: HURTS LITTLE MORE

## 2025-01-23 NOTE — ED PROVIDER NOTES
[facility] EMERGENCY DEPARTMENT  EMERGENCY DEPARTMENT ENCOUNTER          Pt Name: Susan K Sturgess Meyers  MRN: [id number]  Birthdate 1965  Date of evaluation: 1/23/2025  Physician: Davonte Crandall MD  Supervising Attending Physician: Hortencia Christensen MD       CHIEF COMPLAINT       Chief Complaint   Patient presents with    Fatigue         HISTORY OF PRESENT ILLNESS    HPI  Susan K Sturgess Meyers is a 59 y.o. female who presents to the emergency department from home for evaluation of increased weakness in her legs.  Patient is known to have COPD, Esophageal adenocarcinoma complicated by fistula s/p stenting and in remission since 2023, T2DM, GERD, hx of PE.  To note that the was seen in December after she had generalized weakness and found to have a T8-T9 compression to which she was transferred to [facility] because she had an esophageal adenocarcinoma and she was a complicated patient.  [facility] investigated and gave the patient antibiotics and reported that the compression decreased but patient still had some generalized weakness especially in her legs.    To note that patient presented today for increased weakness in her legs and has been having persistent saddle anesthesia as well as urinary and stool incontinence since she got admitted back in December 2025 for spinal cord compression.  Patient reports that she is being treated for pneumonia right now and has been given cefdinir 300 mg as per the charts.  Patient reports she was also being treated for UTI.  Patient reports she has a cough and attributes it to the stent irritating her esophagus.    Patient is denying fever, chills, nausea, vomiting, chest pain, shortness of breath.    The patient has no other acute complaints at this time.      PAST MEDICAL AND SURGICAL HISTORY     Past Medical History:   Diagnosis Date    Asthma, moderate persistent     DM2 (diabetes mellitus, type 2) (HCC)     History of esophageal cancer     Esophagus

## 2025-01-23 NOTE — ED NOTES
Pt updated on plan of care at this time. Pt voices no new needs or concerns. Call light in reach. Telemetry in place.   Covid/flu swab obtained at this time.

## 2025-01-23 NOTE — ED TRIAGE NOTES
Pt comes to ED with c/o weakness. Pt states that the weakness has been progressive for several moths but has gotten worse to the point of not being able to stand for several days. Pt reports that she was sent to Ed by her doctor after a telehealth appointment earlier today. Pt states that she has been using oxygen via nasal cannula x1 month and has felt short of breath today. EKG completed.

## 2025-01-23 NOTE — ED NOTES
This RN in to round. Pt resting in ED cot with eyes closed. RR regular and unlabored. Call light in reach.

## 2025-01-23 NOTE — ED PROVIDER NOTES
ATTENDING NOTE:    I supervised and discussed the history, physical exam and the management of this patient with the resident. I reviewed the resident's note and agree with the documented findings and plan of care.  Please see my additional note.    Patient states that she is here \"for general labs because I have not progressed as well with my therapy as my surgeon would have expected\".  Patient relates that since arriving at the nursing home she unfortunately had a recurrence of pneumonia.  She reports that she also was treated for a UTI in addition to having ongoing need for wound care to her sacral area.  She states she had a telehealth visit with her surgeon at [facility] and was advised to be seen in the emergency department for labs to be performed.  Per old records, at the time of her initial treatment in December she seemed to improve after antibiotics but cultures and biopsy were negative at that time.When asked about her ability to function since arriving at the nursing home, patient states that she feels as though she is getting better.  She reports that she is able to sit up for longer periods of time, also that when she gets massage therapy and water therapy with her legs that she is moving them better than she was previously. She reports ongoing LE weakness, saddle anesthesia, and incontinence since December. Labs were obtained, we will also plan to speak with patient's telehealth physician for additional details as their note is not visible to us in the system.    Dr. Crandall spoke with Dr. Azul. Patient's distal LE strength is intact however she does not flex at the hips. Per [facility], this is a change from when she was discharged from their facility. She is not progressing with her therapy at the LTC facility as expected. She was also supposed to follow up with ID and have repeat imaging performed, she has not been following up as directed. Per Dr. Crandall, no additional labs or imaging or

## 2025-01-24 VITALS
BODY MASS INDEX: 25.52 KG/M2 | WEIGHT: 144 LBS | HEIGHT: 63 IN | SYSTOLIC BLOOD PRESSURE: 129 MMHG | DIASTOLIC BLOOD PRESSURE: 76 MMHG | HEART RATE: 112 BPM | TEMPERATURE: 98.1 F | OXYGEN SATURATION: 96 % | RESPIRATION RATE: 17 BRPM

## 2025-01-24 PROCEDURE — 96374 THER/PROPH/DIAG INJ IV PUSH: CPT

## 2025-01-24 PROCEDURE — 6360000002 HC RX W HCPCS

## 2025-01-24 PROCEDURE — 6370000000 HC RX 637 (ALT 250 FOR IP)

## 2025-01-24 RX ORDER — GUAIFENESIN/DEXTROMETHORPHAN 100-10MG/5
5 SYRUP ORAL EVERY 4 HOURS PRN
Status: DISCONTINUED | OUTPATIENT
Start: 2025-01-24 | End: 2025-01-24 | Stop reason: HOSPADM

## 2025-01-24 RX ORDER — ONDANSETRON 2 MG/ML
4 INJECTION INTRAMUSCULAR; INTRAVENOUS ONCE
Status: COMPLETED | OUTPATIENT
Start: 2025-01-24 | End: 2025-01-24

## 2025-01-24 RX ADMIN — GUAIFENESIN AND DEXTROMETHORPHAN 5 ML: 100; 10 SYRUP ORAL at 00:28

## 2025-01-24 RX ADMIN — ONDANSETRON 4 MG: 2 INJECTION INTRAMUSCULAR; INTRAVENOUS at 00:28

## 2025-01-24 RX ADMIN — OXYCODONE 10 MG: 5 TABLET ORAL at 00:29

## 2025-01-24 NOTE — ED NOTES
This RN in to round. RR regular and unlabored. Pt resting in ED cot with eyes closed. Call light in reach.

## 2025-01-24 NOTE — ED NOTES
Medicated per mar. Repositioned. Pt remains alert and oriented. RR regular and unlabored. Further needs denied.

## 2025-01-24 NOTE — ED NOTES
This RN in to round. Pt resting in ED cot with eyes closed. Respirations eupniec and unlabored.  400mls emptied from shepard catheter.

## 2025-05-17 ENCOUNTER — APPOINTMENT (OUTPATIENT)
Dept: GENERAL RADIOLOGY | Age: 60
End: 2025-05-17
Payer: COMMERCIAL

## 2025-05-17 ENCOUNTER — HOSPITAL ENCOUNTER (INPATIENT)
Age: 60
LOS: 19 days | Discharge: HOME HEALTH | End: 2025-06-05
Attending: EMERGENCY MEDICINE | Admitting: INTERNAL MEDICINE
Payer: COMMERCIAL

## 2025-05-17 DIAGNOSIS — R53.81 DEBILITY: Primary | ICD-10-CM

## 2025-05-17 DIAGNOSIS — Z90.49 H/O ESOPHAGECTOMY: ICD-10-CM

## 2025-05-17 DIAGNOSIS — Z85.01 HISTORY OF ESOPHAGEAL CANCER: ICD-10-CM

## 2025-05-17 DIAGNOSIS — Z98.890 H/O ESOPHAGECTOMY: ICD-10-CM

## 2025-05-17 DIAGNOSIS — L89.159 PRESSURE INJURY OF SKIN OF SACRAL REGION, UNSPECIFIED INJURY STAGE: ICD-10-CM

## 2025-05-17 DIAGNOSIS — Z51.5 PALLIATIVE CARE PATIENT: ICD-10-CM

## 2025-05-17 PROBLEM — Z86.79 HISTORY OF PAROXYSMAL SUPRAVENTRICULAR TACHYCARDIA: Status: ACTIVE | Noted: 2025-05-17

## 2025-05-17 PROBLEM — R53.1 GENERALIZED WEAKNESS: Status: ACTIVE | Noted: 2025-05-17

## 2025-05-17 PROBLEM — E44.0 MODERATE MALNUTRITION: Status: ACTIVE | Noted: 2024-12-05

## 2025-05-17 PROBLEM — J44.9 COPD (CHRONIC OBSTRUCTIVE PULMONARY DISEASE) (HCC): Status: ACTIVE | Noted: 2025-05-17

## 2025-05-17 LAB
ANION GAP SERPL CALC-SCNC: 12 MEQ/L (ref 8–16)
BACTERIA URNS QL MICRO: ABNORMAL /HPF
BASOPHILS ABSOLUTE: 0 THOU/MM3 (ref 0–0.1)
BASOPHILS NFR BLD AUTO: 0.4 %
BILIRUB UR QL STRIP.AUTO: NEGATIVE
BUN SERPL-MCNC: 25 MG/DL (ref 8–23)
CALCIUM SERPL-MCNC: 9.9 MG/DL (ref 8.8–10.2)
CASTS #/AREA URNS LPF: ABNORMAL /LPF
CASTS 2: ABNORMAL /LPF
CHARACTER UR: CLEAR
CHLORIDE SERPL-SCNC: 96 MEQ/L (ref 98–111)
CO2 SERPL-SCNC: 24 MEQ/L (ref 22–29)
COLOR, UA: YELLOW
CREAT SERPL-MCNC: 0.7 MG/DL (ref 0.5–0.9)
CRYSTALS URNS MICRO: ABNORMAL
DEPRECATED MEAN GLUCOSE BLD GHB EST-ACNC: 141 MG/DL (ref 70–126)
DEPRECATED RDW RBC AUTO: 49.3 FL (ref 35–45)
EOSINOPHIL NFR BLD AUTO: 1.1 %
EOSINOPHILS ABSOLUTE: 0.1 THOU/MM3 (ref 0–0.4)
EPITHELIAL CELLS, UA: ABNORMAL /HPF
ERYTHROCYTE [DISTWIDTH] IN BLOOD BY AUTOMATED COUNT: 16.6 % (ref 11.5–14.5)
GFR SERPL CREATININE-BSD FRML MDRD: > 90 ML/MIN/1.73M2
GLUCOSE BLD STRIP.AUTO-MCNC: 168 MG/DL (ref 70–108)
GLUCOSE BLD STRIP.AUTO-MCNC: 182 MG/DL (ref 70–108)
GLUCOSE BLD STRIP.AUTO-MCNC: 209 MG/DL (ref 70–108)
GLUCOSE SERPL-MCNC: 212 MG/DL (ref 74–109)
GLUCOSE UR QL STRIP.AUTO: NEGATIVE MG/DL
HBA1C MFR BLD HPLC: 6.7 % (ref 4–6)
HCT VFR BLD AUTO: 33.8 % (ref 37–47)
HGB BLD-MCNC: 10.6 GM/DL (ref 12–16)
HGB UR QL STRIP.AUTO: ABNORMAL
IMM GRANULOCYTES # BLD AUTO: 0.06 THOU/MM3 (ref 0–0.07)
IMM GRANULOCYTES NFR BLD AUTO: 0.6 %
KETONES UR QL STRIP.AUTO: NEGATIVE
LYMPHOCYTES ABSOLUTE: 0.7 THOU/MM3 (ref 1–4.8)
LYMPHOCYTES NFR BLD AUTO: 7.2 %
MCH RBC QN AUTO: 25.7 PG (ref 26–33)
MCHC RBC AUTO-ENTMCNC: 31.4 GM/DL (ref 32.2–35.5)
MCV RBC AUTO: 81.8 FL (ref 81–99)
MISCELLANEOUS 2: ABNORMAL
MONOCYTES ABSOLUTE: 0.9 THOU/MM3 (ref 0.4–1.3)
MONOCYTES NFR BLD AUTO: 9.7 %
NEUTROPHILS ABSOLUTE: 7.9 THOU/MM3 (ref 1.8–7.7)
NEUTROPHILS NFR BLD AUTO: 81 %
NITRITE UR QL STRIP: NEGATIVE
NRBC BLD AUTO-RTO: 0 /100 WBC
OSMOLALITY SERPL CALC.SUM OF ELEC: 275.2 MOSMOL/KG (ref 275–300)
PH UR STRIP.AUTO: 5.5 [PH] (ref 5–9)
PLATELET # BLD AUTO: 326 THOU/MM3 (ref 130–400)
PMV BLD AUTO: 9.5 FL (ref 9.4–12.4)
POTASSIUM SERPL-SCNC: 4.9 MEQ/L (ref 3.5–5.2)
PROCALCITONIN SERPL IA-MCNC: 0.19 NG/ML (ref 0.01–0.09)
PROT UR STRIP.AUTO-MCNC: 30 MG/DL
RBC # BLD AUTO: 4.13 MILL/MM3 (ref 4.2–5.4)
RBC URINE: ABNORMAL /HPF
REASON FOR REJECTION: NORMAL
REJECTED TEST: NORMAL
RENAL EPI CELLS #/AREA URNS HPF: ABNORMAL /[HPF]
SODIUM SERPL-SCNC: 132 MEQ/L (ref 135–145)
SP GR UR REFRACT.AUTO: 1.01 (ref 1–1.03)
UROBILINOGEN, URINE: 0.2 EU/DL (ref 0–1)
WBC # BLD AUTO: 9.7 THOU/MM3 (ref 4.8–10.8)
WBC #/AREA URNS HPF: ABNORMAL /HPF
WBC #/AREA URNS HPF: ABNORMAL /[HPF]
YEAST LIKE FUNGI URNS QL MICRO: ABNORMAL

## 2025-05-17 PROCEDURE — 87086 URINE CULTURE/COLONY COUNT: CPT

## 2025-05-17 PROCEDURE — 87899 AGENT NOS ASSAY W/OPTIC: CPT

## 2025-05-17 PROCEDURE — 84145 PROCALCITONIN (PCT): CPT

## 2025-05-17 PROCEDURE — 83036 HEMOGLOBIN GLYCOSYLATED A1C: CPT

## 2025-05-17 PROCEDURE — 6360000002 HC RX W HCPCS: Performed by: INTERNAL MEDICINE

## 2025-05-17 PROCEDURE — 87077 CULTURE AEROBIC IDENTIFY: CPT

## 2025-05-17 PROCEDURE — 99223 1ST HOSP IP/OBS HIGH 75: CPT | Performed by: INTERNAL MEDICINE

## 2025-05-17 PROCEDURE — 2500000003 HC RX 250 WO HCPCS: Performed by: INTERNAL MEDICINE

## 2025-05-17 PROCEDURE — 87186 SC STD MICRODIL/AGAR DIL: CPT

## 2025-05-17 PROCEDURE — 94640 AIRWAY INHALATION TREATMENT: CPT

## 2025-05-17 PROCEDURE — 81001 URINALYSIS AUTO W/SCOPE: CPT

## 2025-05-17 PROCEDURE — 6370000000 HC RX 637 (ALT 250 FOR IP): Performed by: INTERNAL MEDICINE

## 2025-05-17 PROCEDURE — 71046 X-RAY EXAM CHEST 2 VIEWS: CPT

## 2025-05-17 PROCEDURE — 85025 COMPLETE CBC W/AUTO DIFF WBC: CPT

## 2025-05-17 PROCEDURE — 87449 NOS EACH ORGANISM AG IA: CPT

## 2025-05-17 PROCEDURE — 2580000003 HC RX 258: Performed by: INTERNAL MEDICINE

## 2025-05-17 PROCEDURE — 93005 ELECTROCARDIOGRAM TRACING: CPT | Performed by: INTERNAL MEDICINE

## 2025-05-17 PROCEDURE — 80048 BASIC METABOLIC PNL TOTAL CA: CPT

## 2025-05-17 PROCEDURE — 99285 EMERGENCY DEPT VISIT HI MDM: CPT

## 2025-05-17 PROCEDURE — 82948 REAGENT STRIP/BLOOD GLUCOSE: CPT

## 2025-05-17 PROCEDURE — 1200000000 HC SEMI PRIVATE

## 2025-05-17 PROCEDURE — 36415 COLL VENOUS BLD VENIPUNCTURE: CPT

## 2025-05-17 RX ORDER — OXYCODONE HYDROCHLORIDE 5 MG/1
10 TABLET ORAL EVERY 4 HOURS PRN
Refills: 0 | Status: DISCONTINUED | OUTPATIENT
Start: 2025-05-17 | End: 2025-05-29

## 2025-05-17 RX ORDER — ALBUTEROL SULFATE 90 UG/1
2 INHALANT RESPIRATORY (INHALATION)
Status: DISCONTINUED | OUTPATIENT
Start: 2025-05-17 | End: 2025-05-19

## 2025-05-17 RX ORDER — ENOXAPARIN SODIUM 100 MG/ML
40 INJECTION SUBCUTANEOUS EVERY 24 HOURS
Status: DISCONTINUED | OUTPATIENT
Start: 2025-05-17 | End: 2025-06-05 | Stop reason: HOSPADM

## 2025-05-17 RX ORDER — GLUCAGON 1 MG/ML
1 KIT INJECTION PRN
Status: DISCONTINUED | OUTPATIENT
Start: 2025-05-17 | End: 2025-06-05 | Stop reason: HOSPADM

## 2025-05-17 RX ORDER — METOPROLOL TARTRATE 25 MG/1
12.5 TABLET, FILM COATED ORAL 2 TIMES DAILY
Status: DISCONTINUED | OUTPATIENT
Start: 2025-05-17 | End: 2025-06-05 | Stop reason: HOSPADM

## 2025-05-17 RX ORDER — FAMOTIDINE 20 MG/1
40 TABLET, FILM COATED ORAL DAILY
Status: DISCONTINUED | OUTPATIENT
Start: 2025-05-17 | End: 2025-06-05 | Stop reason: HOSPADM

## 2025-05-17 RX ORDER — BUDESONIDE AND FORMOTEROL FUMARATE DIHYDRATE 80; 4.5 UG/1; UG/1
2 AEROSOL RESPIRATORY (INHALATION)
Status: DISCONTINUED | OUTPATIENT
Start: 2025-05-17 | End: 2025-06-03

## 2025-05-17 RX ORDER — PROMETHAZINE HYDROCHLORIDE 25 MG/1
25 TABLET ORAL EVERY 6 HOURS PRN
Status: DISCONTINUED | OUTPATIENT
Start: 2025-05-17 | End: 2025-06-05 | Stop reason: HOSPADM

## 2025-05-17 RX ORDER — CETIRIZINE HYDROCHLORIDE 10 MG/1
10 TABLET ORAL DAILY
Status: DISCONTINUED | OUTPATIENT
Start: 2025-05-17 | End: 2025-06-05 | Stop reason: HOSPADM

## 2025-05-17 RX ORDER — DEXTROSE MONOHYDRATE 100 MG/ML
INJECTION, SOLUTION INTRAVENOUS CONTINUOUS PRN
Status: DISCONTINUED | OUTPATIENT
Start: 2025-05-17 | End: 2025-06-05 | Stop reason: HOSPADM

## 2025-05-17 RX ORDER — GUAIFENESIN 200 MG/10ML
200 LIQUID ORAL EVERY 4 HOURS PRN
Status: DISCONTINUED | OUTPATIENT
Start: 2025-05-17 | End: 2025-06-01

## 2025-05-17 RX ORDER — TRAZODONE HYDROCHLORIDE 50 MG/1
25 TABLET ORAL NIGHTLY
Status: DISCONTINUED | OUTPATIENT
Start: 2025-05-17 | End: 2025-06-05 | Stop reason: HOSPADM

## 2025-05-17 RX ORDER — METOPROLOL TARTRATE 25 MG/1
12.5 TABLET, FILM COATED ORAL EVERY 12 HOURS
COMMUNITY
Start: 2025-01-08

## 2025-05-17 RX ORDER — ALBUTEROL SULFATE 90 UG/1
2 INHALANT RESPIRATORY (INHALATION)
Status: DISCONTINUED | OUTPATIENT
Start: 2025-05-17 | End: 2025-05-17

## 2025-05-17 RX ORDER — LORATADINE 10 MG/1
10 TABLET ORAL DAILY
COMMUNITY
Start: 2025-03-10

## 2025-05-17 RX ORDER — DIAZEPAM 5 MG/1
5 TABLET ORAL EVERY 6 HOURS PRN
Status: ON HOLD | COMMUNITY
Start: 2025-03-11 | End: 2025-06-05

## 2025-05-17 RX ORDER — INSULIN LISPRO 100 [IU]/ML
0-8 INJECTION, SOLUTION INTRAVENOUS; SUBCUTANEOUS EVERY 4 HOURS
Status: DISCONTINUED | OUTPATIENT
Start: 2025-05-17 | End: 2025-06-05 | Stop reason: HOSPADM

## 2025-05-17 RX ORDER — LOPERAMIDE HYDROCHLORIDE 2 MG/1
2 CAPSULE ORAL PRN
COMMUNITY

## 2025-05-17 RX ORDER — ONDANSETRON 4 MG/1
4 TABLET, ORALLY DISINTEGRATING ORAL EVERY 8 HOURS PRN
Status: DISCONTINUED | OUTPATIENT
Start: 2025-05-17 | End: 2025-06-05 | Stop reason: HOSPADM

## 2025-05-17 RX ADMIN — ENOXAPARIN SODIUM 40 MG: 100 INJECTION SUBCUTANEOUS at 17:55

## 2025-05-17 RX ADMIN — CEFEPIME 2000 MG: 2 INJECTION, POWDER, FOR SOLUTION INTRAVENOUS at 22:47

## 2025-05-17 RX ADMIN — CETIRIZINE HYDROCHLORIDE 10 MG: 10 TABLET, FILM COATED ORAL at 17:33

## 2025-05-17 RX ADMIN — GUAIFENESIN 200 MG: 200 SOLUTION ORAL at 22:44

## 2025-05-17 RX ADMIN — IPRATROPIUM BROMIDE 0.5 MG: 0.5 SOLUTION RESPIRATORY (INHALATION) at 15:46

## 2025-05-17 RX ADMIN — WATER 2000 MG: 1 INJECTION INTRAMUSCULAR; INTRAVENOUS; SUBCUTANEOUS at 15:52

## 2025-05-17 RX ADMIN — ALBUTEROL SULFATE 2 PUFF: 90 AEROSOL, METERED RESPIRATORY (INHALATION) at 21:05

## 2025-05-17 RX ADMIN — BUDESONIDE AND FORMOTEROL FUMARATE DIHYDRATE 2 PUFF: 80; 4.5 AEROSOL RESPIRATORY (INHALATION) at 21:05

## 2025-05-17 RX ADMIN — IPRATROPIUM BROMIDE 0.5 MG: 0.5 SOLUTION RESPIRATORY (INHALATION) at 21:05

## 2025-05-17 RX ADMIN — ONDANSETRON 4 MG: 4 TABLET, ORALLY DISINTEGRATING ORAL at 22:44

## 2025-05-17 RX ADMIN — ALBUTEROL SULFATE 2 PUFF: 90 AEROSOL, METERED RESPIRATORY (INHALATION) at 15:44

## 2025-05-17 RX ADMIN — OXYCODONE 10 MG: 5 TABLET ORAL at 22:16

## 2025-05-17 RX ADMIN — FAMOTIDINE 40 MG: 20 TABLET, FILM COATED ORAL at 17:33

## 2025-05-17 RX ADMIN — OXYCODONE 10 MG: 5 TABLET ORAL at 17:58

## 2025-05-17 RX ADMIN — TRAZODONE HYDROCHLORIDE 25 MG: 50 TABLET ORAL at 22:11

## 2025-05-17 RX ADMIN — METOPROLOL TARTRATE 12.5 MG: 25 TABLET, FILM COATED ORAL at 22:11

## 2025-05-17 RX ADMIN — INSULIN LISPRO 2 UNITS: 100 INJECTION, SOLUTION INTRAVENOUS; SUBCUTANEOUS at 20:30

## 2025-05-17 ASSESSMENT — PAIN DESCRIPTION - LOCATION
LOCATION: BUTTOCKS
LOCATION: BACK

## 2025-05-17 ASSESSMENT — PAIN SCALES - GENERAL
PAINLEVEL_OUTOF10: 8

## 2025-05-17 ASSESSMENT — PAIN DESCRIPTION - ORIENTATION: ORIENTATION: LOWER;MID

## 2025-05-17 ASSESSMENT — PAIN - FUNCTIONAL ASSESSMENT
PAIN_FUNCTIONAL_ASSESSMENT: NONE - DENIES PAIN
PAIN_FUNCTIONAL_ASSESSMENT: PREVENTS OR INTERFERES SOME ACTIVE ACTIVITIES AND ADLS

## 2025-05-17 ASSESSMENT — PAIN DESCRIPTION - DESCRIPTORS
DESCRIPTORS: SHARP
DESCRIPTORS: ACHING

## 2025-05-17 NOTE — ED NOTES
ED to inpatient nurses report      Chief Complaint:  Chief Complaint   Patient presents with    Urinary Catheter     Present to ED from: home    MOA:     LOC: alert and orientated to name, place, date  Mobility: Fully dependent  Oxygen Baseline:  ra    Current needs required: RA     Code Status:   Full Code    What abnormal results were found and what did you give/do to treat them? NONE  Any procedures or intervention occur? NONE    Mental Status:  Level of Consciousness: Alert (0)    Psych Assessment:        Vitals:  Patient Vitals for the past 24 hrs:   BP Temp Temp src Pulse Resp SpO2 Weight   05/17/25 1159 122/74 -- -- 74 18 95 % --   05/17/25 1013 111/78 -- -- 87 19 95 % --   05/17/25 0911 99/79 98.4 °F (36.9 °C) Oral (!) 110 20 94 % 59.4 kg (131 lb)        LDAs:   Peripheral IV 05/17/25 Distal;Right Forearm (Active)   Site Assessment Clean, dry & intact 05/17/25 1026   Line Status Blood return noted;Flushed;Specimen collected 05/17/25 1026       Ambulatory Status:  No data recorded    Diagnosis:  DISPOSITION Admitted 05/17/2025 12:37:19 PM   Final diagnoses:   None        Consults:  None     Pain Score:  Pain Assessment  Pain Assessment: None - Denies Pain    C-SSRS:   Risk of Suicide: No Risk    Sepsis Screening:       Maggy Fall Risk:       Swallow Screening        Preferred Language:   English      ALLERGIES     Demerol hcl [meperidine], Codeine, Entex lq [phenylephrine-guaifenesin], Penicillins, and Sulfa antibiotics    SURGICAL HISTORY       Past Surgical History:   Procedure Laterality Date    COLONOSCOPY  02/17/2023    CT BIOPSY DEEP BONE PERCUTANEOUS  12/17/2024    CT BIOPSY DEEP BONE PERCUTANEOUS 12/17/2024    HYSTERECTOMY (CERVIX STATUS UNKNOWN)  2011    total    IR GUIDED INS DUOD OR JEJUN TUBE PERC  2/9/2024    IR INS DUOD OR JEJUN TUBE PERC 2/9/2024 [facility] SPECIAL PROCEDURES    KAN STEREO BREAST BX W LOC DEVICE 1ST LESION RIGHT Right 01/18/2019    benign    NECK SURGERY  1989    Ruptured disc

## 2025-05-17 NOTE — ED NOTES
Pt presents to the ED from home with concern for clogged catheter. Pt is nonambulatory and states  her  is her primary caregiver due to being released from the nursing home yesterday. Pt states she thinks her catheter is clogged and not draining appropriately. Catheter bag is nearly full of urine.

## 2025-05-17 NOTE — ED NOTES
Pt transported to [facility] on cart in stable condition. Floor contacted before transport and spoke with Rosa

## 2025-05-17 NOTE — ED PROVIDER NOTES
[facility] Renal Telemetry 6K      CHIEF COMPLAINT       Chief Complaint   Patient presents with    Urinary Catheter       Nurses Notes reviewed and I agree except as noted in the HPI.      HISTORY OF PRESENT ILLNESS    Susan K Sturgess Meyers is a 60 y.o. female who presents with complaint of clogged urinary catheter, said that she was discharged from a nursing home in [place] yesterday, but states that she cannot care for herself at home because she does not have any help and also she has no set up to help her get around, patient unable to ambulate due to history of back surgery secondary to osteomyelitis/cord compression.  She lives with her  but he also reports that he cannot care for her by himself at home.  Onset: Acute  Duration: Arrived home yesterday  Timing:   Location of Pain: No pain  Intesity/severity:   Modifying Factors: History of osteomyelitis, cord compression, decubitus, status post PEG, chronic Abreu catheter  Relieved by;  Previous Episodes;  Tx Before arrival: None    PAST MEDICAL HISTORY    has a past medical history of Asthma, moderate persistent, DM2 (diabetes mellitus, type 2) ([facility]), History of esophageal cancer, and Ovarian cancer in remission.    SURGICAL HISTORY      has a past surgical history that includes US BREAST BIOPSY W LOC DEVICE 1ST LESION RIGHT (Right, 11/25/2011); [facility] STEREO BREAST BX W LOC DEVICE 1ST LESION RIGHT (Right, 01/18/2019); Hysterectomy (2011); Ovary removal (Bilateral, 2011); Colonoscopy (02/17/2023); Upper gastrointestinal endoscopy (Left, 02/28/2023); Tonsillectomy; Neck surgery (1989); IR GUIDED INS DUOD OR JEJUN TUBE PERC (2/9/2024); and CT BIOPSY DEEP BONE PERCUTANEOUS (12/17/2024).    CURRENT MEDICATIONS       Current Discharge Medication List        CONTINUE these medications which have NOT CHANGED    Details   diazePAM (VALIUM) 5 MG tablet 1 tablet every 6 hours as needed for Anxiety.      loratadine (CLARITIN) 10 MG tablet 1 tablet by Per J Tube route  Notable for the following components:    POC Glucose 198 (*)     All other components within normal limits   CULTURE, REFLEXED, URINE    Narrative:     Source: urine       Site:           Current Antibiotics: not stated   LEGIONELLA ANTIGEN, URINE   STREP PNEUMONIAE ANTIGEN   ANION GAP   OSMOLALITY   GLOMERULAR FILTRATION RATE, ESTIMATED   SPECIMEN REJECTION       EMERGENCY DEPARTMENT COURSE:   Vitals:    Vitals:    05/17/25 2316 05/18/25 0359 05/18/25 0408 05/18/25 0508   BP:  131/62     Pulse:  100     Resp: 17 16 17 16   Temp:  97.8 °F (36.6 °C)     TempSrc:  Oral     SpO2:  96%     Weight:       Height:             CRITICAL CARE:       CONSULTS:  None    PROCEDURES:  none    FINAL IMPRESSION      1. Debility          DISPOSITION/PLAN   Admitted    PATIENT REFERRED TO:  No follow-up provider specified.    DISCHARGE MEDICATIONS:  Current Discharge Medication List          (Please note that portions of this note were completed with a voice recognition program.  Efforts were made to edit the dictations but occasionally words are mis-transcribed.)    DO Jt Giraldo Seth, DO  05/18/25 0704

## 2025-05-17 NOTE — PROGRESS NOTES
Comprehensive Nutrition Assessment    Type and Reason for Visit:  Initial, Consult, Positive nutrition screen (Home J tube feedings)    Nutrition Recommendations/Plan:   Initiate J tube feedings when appropriate tonight - Nocturnal feedings x16 hours (6 PM to 10 AM)  Called distribution to tube up correct tube feeding set - notified RN  Pour 2.5 cartons of [employer] 1.2 Glucose Support in tube feeding bag. This feeding system is \"open\" and can only hang for 8 hours. Feeding set must be changed ~1 AM - again pour 2.5 cartons of [employer] 1.2 Glucose Support in tube feeding bag. Plan to use a total of 5 cartons/day.   Tube feed rate to be set at 75 mL/hr. If unable to start at appropriate time, please continue to run for a total of 16 hours/night.   Additional free water flushes per provider - recommend 125 mL q 4 hours if no IVF  Family to bring in home tube feeding formula as [employer] 1.2 Glucose Support not on [facility] formulary.   Pending SNF placement - pt indicates feeling scared that she is not able to maintain her health at home  Pt thinks her tube feeding supplies are delivered by [employer]  Will monitor actual weights and increase tube feeding regimen as appropriate to better meet nutritional needs.     Malnutrition Assessment:  Malnutrition Status:  Moderate malnutrition (05/17/25 1508)    Context:  Acute Illness     Findings of the 6 clinical characteristics of malnutrition:  Energy Intake:   (pt states she was sometimes not getting entire cycle of TF at [facility])  Weight Loss:  Unable to assess (pt suspects losses - only stated weight this admit)     Body Fat Loss:  Mild body fat loss Orbital   Muscle Mass Loss:  Mild muscle mass loss Temples (temporalis), Clavicles (pectoralis & deltoids)  Fluid Accumulation:  No fluid accumulation     Strength:  Not Performed    Nutrition Assessment:     Pt. moderately malnourished AEB criteria as listed above.  At risk for further nutrition compromise r/t admit with  generalized weakness -  unable to care for pt at home - discharged from SNF x1 day,  and underlying medical condition (PMHx: COPD, T2DM, esophageal cancer - s/p esophagectomy, esophageal fistula, s/p J-Tube placement 2/9/24, ovarian cancer 2011, former smoker).        Nutrition Related Findings:  Pt. Report/Treatments/Miscellaneous: Pt seen, reports that she is still on her long term formula of [employer] 1.2 glucose support. She infuses it overnight x16 hours (6 PM-10 AM). Unable to fully confirm but thinks her TF supplies are from [employer]. Pt states she was not always getting full TF infusion at [facility] in [place] d/t staffing issues. Frequently on weekends - they would not resume it at the proper times. She feels she has lost some weight but unable to confirm. Otherwise, pt continues to tolerate TF regimen and has no concerns. Pt states son will bring in TF formula from home. Will resume home regimen and monitor need to increase to better meet nutritional needs at this time. Spoke with RN.   GI Status: BM - none yet this admit  Pertinent Labs: Na 132, BUN 25, creatinine 0.7, glucose 212, HgbA1C (5/17) 6.7%  Pertinent Meds: cefepime, pepcid, humalog, trazodone       Wound Type:  (chronic coccyx wound)       Current Nutrition Intake & Therapies:    Average Meal Intake: NPO     Diet NPO  ADULT TUBE FEEDING; Jejunostomy; Other Tube Feeding (specify); [employer] 1.2 Glucose Support; Supplied from home; Cyclic; 75; 6:00 PM; 10:00 AM; 30; Q 4 hours  Current Tube Feeding (TF) Orders:  Feeding Route: Jejunostomy (s/p Open j tube placement 2024)  Formula: Other Tube Feeding ([employer] 1.2 - Glucose Support)  Schedule: Cyclic  Feeding Regimen: [employer] 1.2 Glucose Support at 75 mL/hr x16 hours (0648-1014); Feeding set change ~1AM  Additives/Modulars: None  Water Flushes: per provider - recommend 125 mL q 4 hours if no IVF  Current TF Provides: [employer] 1.2 Glucose Support at 75 mL/hr to provide: 1440 kcals, 77

## 2025-05-17 NOTE — PROCEDURES
PROCEDURE NOTE  Date: 5/17/2025   Name: Susan K Sturgess Meyers  YOB: 1965    Procedures EKG completed, given to RN

## 2025-05-17 NOTE — PROGRESS NOTES
Pharmacy Note - Extended Infusion Beta-Lactam Dose Adjustment    Cefepime 2000 mg q12h intermittent infusion for treatment of pneumonia. Per [facility] Extended Infusion Beta-Lactam Policy, cefepime will be changed to 2000 mg loading dose followed by 2000 mg q8h extended infusion    Estimated Creatinine Clearance: Estimated Creatinine Clearance: 71 mL/min (based on SCr of 0.7 mg/dL).    Dialysis Status, JULI, CKD: Normal    BMI: Body mass index is 23.21 kg/m².    Rationale for Adjustment: Dose adjusted per [facility] Extended Infusion Policy based on renal function and indication. The above medication is renally eliminated and demonstrates time-dependent effects on bacterial eradication. Extended-infusion dosing strategy aims to enhance microbiologic and clinical efficacy.     Pharmacy will monitor renal function daily and adjust dose as necessary.    Please call with any questions.    Thank you,  Christin Steen, PharmD, BCPS   5/17/2025  2:52 PM

## 2025-05-18 LAB
ANION GAP SERPL CALC-SCNC: 10 MEQ/L (ref 8–16)
BASOPHILS ABSOLUTE: 0 THOU/MM3 (ref 0–0.1)
BASOPHILS NFR BLD AUTO: 0.5 %
BUN SERPL-MCNC: 28 MG/DL (ref 8–23)
CALCIUM SERPL-MCNC: 9.7 MG/DL (ref 8.8–10.2)
CHLORIDE SERPL-SCNC: 97 MEQ/L (ref 98–111)
CO2 SERPL-SCNC: 25 MEQ/L (ref 22–29)
CREAT SERPL-MCNC: 0.8 MG/DL (ref 0.5–0.9)
DEPRECATED RDW RBC AUTO: 49.9 FL (ref 35–45)
EKG ATRIAL RATE: 90 BPM
EKG P AXIS: 69 DEGREES
EKG P-R INTERVAL: 156 MS
EKG Q-T INTERVAL: 348 MS
EKG QRS DURATION: 64 MS
EKG QTC CALCULATION (BAZETT): 425 MS
EKG R AXIS: -47 DEGREES
EKG T AXIS: 75 DEGREES
EKG VENTRICULAR RATE: 90 BPM
EOSINOPHIL NFR BLD AUTO: 2.3 %
EOSINOPHILS ABSOLUTE: 0.2 THOU/MM3 (ref 0–0.4)
ERYTHROCYTE [DISTWIDTH] IN BLOOD BY AUTOMATED COUNT: 17 % (ref 11.5–14.5)
GFR SERPL CREATININE-BSD FRML MDRD: 84 ML/MIN/1.73M2
GLUCOSE BLD STRIP.AUTO-MCNC: 142 MG/DL (ref 70–108)
GLUCOSE BLD STRIP.AUTO-MCNC: 190 MG/DL (ref 70–108)
GLUCOSE BLD STRIP.AUTO-MCNC: 194 MG/DL (ref 70–108)
GLUCOSE BLD STRIP.AUTO-MCNC: 198 MG/DL (ref 70–108)
GLUCOSE BLD STRIP.AUTO-MCNC: 217 MG/DL (ref 70–108)
GLUCOSE BLD STRIP.AUTO-MCNC: 239 MG/DL (ref 70–108)
GLUCOSE SERPL-MCNC: 198 MG/DL (ref 74–109)
HCT VFR BLD AUTO: 31.6 % (ref 37–47)
HGB BLD-MCNC: 9.8 GM/DL (ref 12–16)
IMM GRANULOCYTES # BLD AUTO: 0.05 THOU/MM3 (ref 0–0.07)
IMM GRANULOCYTES NFR BLD AUTO: 0.7 %
L PNEUMO1 AG UR QL IA.RAPID: NEGATIVE
LYMPHOCYTES ABSOLUTE: 0.7 THOU/MM3 (ref 1–4.8)
LYMPHOCYTES NFR BLD AUTO: 9.4 %
MCH RBC QN AUTO: 25.5 PG (ref 26–33)
MCHC RBC AUTO-ENTMCNC: 31 GM/DL (ref 32.2–35.5)
MCV RBC AUTO: 82.3 FL (ref 81–99)
MONOCYTES ABSOLUTE: 0.8 THOU/MM3 (ref 0.4–1.3)
MONOCYTES NFR BLD AUTO: 11.2 %
NEUTROPHILS ABSOLUTE: 5.5 THOU/MM3 (ref 1.8–7.7)
NEUTROPHILS NFR BLD AUTO: 75.9 %
NRBC BLD AUTO-RTO: 0 /100 WBC
PLATELET # BLD AUTO: 365 THOU/MM3 (ref 130–400)
PMV BLD AUTO: 9.3 FL (ref 9.4–12.4)
POTASSIUM SERPL-SCNC: 4.4 MEQ/L (ref 3.5–5.2)
RBC # BLD AUTO: 3.84 MILL/MM3 (ref 4.2–5.4)
SODIUM SERPL-SCNC: 132 MEQ/L (ref 135–145)
STREP PNEUMO AG, UR: NEGATIVE
WBC # BLD AUTO: 7.3 THOU/MM3 (ref 4.8–10.8)

## 2025-05-18 PROCEDURE — 6370000000 HC RX 637 (ALT 250 FOR IP)

## 2025-05-18 PROCEDURE — 94640 AIRWAY INHALATION TREATMENT: CPT

## 2025-05-18 PROCEDURE — 6370000000 HC RX 637 (ALT 250 FOR IP): Performed by: INTERNAL MEDICINE

## 2025-05-18 PROCEDURE — 94761 N-INVAS EAR/PLS OXIMETRY MLT: CPT

## 2025-05-18 PROCEDURE — 80048 BASIC METABOLIC PNL TOTAL CA: CPT

## 2025-05-18 PROCEDURE — 2500000003 HC RX 250 WO HCPCS: Performed by: INTERNAL MEDICINE

## 2025-05-18 PROCEDURE — 99233 SBSQ HOSP IP/OBS HIGH 50: CPT | Performed by: STUDENT IN AN ORGANIZED HEALTH CARE EDUCATION/TRAINING PROGRAM

## 2025-05-18 PROCEDURE — 85025 COMPLETE CBC W/AUTO DIFF WBC: CPT

## 2025-05-18 PROCEDURE — 93010 ELECTROCARDIOGRAM REPORT: CPT | Performed by: INTERNAL MEDICINE

## 2025-05-18 PROCEDURE — 82948 REAGENT STRIP/BLOOD GLUCOSE: CPT

## 2025-05-18 PROCEDURE — 2580000003 HC RX 258: Performed by: INTERNAL MEDICINE

## 2025-05-18 PROCEDURE — 1200000000 HC SEMI PRIVATE

## 2025-05-18 PROCEDURE — 6360000002 HC RX W HCPCS: Performed by: INTERNAL MEDICINE

## 2025-05-18 PROCEDURE — 36415 COLL VENOUS BLD VENIPUNCTURE: CPT

## 2025-05-18 RX ADMIN — CEFEPIME 2000 MG: 2 INJECTION, POWDER, FOR SOLUTION INTRAVENOUS at 23:13

## 2025-05-18 RX ADMIN — INSULIN LISPRO 2 UNITS: 100 INJECTION, SOLUTION INTRAVENOUS; SUBCUTANEOUS at 09:00

## 2025-05-18 RX ADMIN — GUAIFENESIN 200 MG: 200 SOLUTION ORAL at 09:01

## 2025-05-18 RX ADMIN — BUDESONIDE AND FORMOTEROL FUMARATE DIHYDRATE 2 PUFF: 80; 4.5 AEROSOL RESPIRATORY (INHALATION) at 08:03

## 2025-05-18 RX ADMIN — GUAIFENESIN 200 MG: 200 SOLUTION ORAL at 04:08

## 2025-05-18 RX ADMIN — INSULIN LISPRO 2 UNITS: 100 INJECTION, SOLUTION INTRAVENOUS; SUBCUTANEOUS at 02:43

## 2025-05-18 RX ADMIN — ALBUTEROL SULFATE 2 PUFF: 90 AEROSOL, METERED RESPIRATORY (INHALATION) at 08:02

## 2025-05-18 RX ADMIN — GUAIFENESIN 200 MG: 200 SOLUTION ORAL at 17:51

## 2025-05-18 RX ADMIN — INSULIN LISPRO 2 UNITS: 100 INJECTION, SOLUTION INTRAVENOUS; SUBCUTANEOUS at 06:58

## 2025-05-18 RX ADMIN — ONDANSETRON 4 MG: 4 TABLET, ORALLY DISINTEGRATING ORAL at 06:29

## 2025-05-18 RX ADMIN — ALBUTEROL SULFATE 2 PUFF: 90 AEROSOL, METERED RESPIRATORY (INHALATION) at 12:41

## 2025-05-18 RX ADMIN — OXYCODONE 10 MG: 5 TABLET ORAL at 13:03

## 2025-05-18 RX ADMIN — OXYCODONE 10 MG: 5 TABLET ORAL at 09:00

## 2025-05-18 RX ADMIN — INSULIN LISPRO 2 UNITS: 100 INJECTION, SOLUTION INTRAVENOUS; SUBCUTANEOUS at 15:31

## 2025-05-18 RX ADMIN — IPRATROPIUM BROMIDE 0.5 MG: 0.5 SOLUTION RESPIRATORY (INHALATION) at 12:41

## 2025-05-18 RX ADMIN — OXYCODONE 10 MG: 5 TABLET ORAL at 17:42

## 2025-05-18 RX ADMIN — CEFEPIME 2000 MG: 2 INJECTION, POWDER, FOR SOLUTION INTRAVENOUS at 06:41

## 2025-05-18 RX ADMIN — OXYCODONE 10 MG: 5 TABLET ORAL at 04:08

## 2025-05-18 RX ADMIN — ENOXAPARIN SODIUM 40 MG: 100 INJECTION SUBCUTANEOUS at 17:42

## 2025-05-18 RX ADMIN — CETIRIZINE HYDROCHLORIDE 10 MG: 10 TABLET, FILM COATED ORAL at 09:00

## 2025-05-18 RX ADMIN — IPRATROPIUM BROMIDE 0.5 MG: 0.5 SOLUTION RESPIRATORY (INHALATION) at 08:03

## 2025-05-18 RX ADMIN — FAMOTIDINE 40 MG: 20 TABLET, FILM COATED ORAL at 09:00

## 2025-05-18 RX ADMIN — TRAZODONE HYDROCHLORIDE 25 MG: 50 TABLET ORAL at 19:38

## 2025-05-18 RX ADMIN — CEFEPIME 2000 MG: 2 INJECTION, POWDER, FOR SOLUTION INTRAVENOUS at 15:30

## 2025-05-18 RX ADMIN — PROMETHAZINE HYDROCHLORIDE 25 MG: 25 TABLET ORAL at 13:03

## 2025-05-18 ASSESSMENT — PAIN SCALES - GENERAL
PAINLEVEL_OUTOF10: 5
PAINLEVEL_OUTOF10: 9
PAINLEVEL_OUTOF10: 7
PAINLEVEL_OUTOF10: 8
PAINLEVEL_OUTOF10: 8
PAINLEVEL_OUTOF10: 0

## 2025-05-18 ASSESSMENT — PAIN SCALES - WONG BAKER
WONGBAKER_NUMERICALRESPONSE: NO HURT

## 2025-05-18 ASSESSMENT — PAIN DESCRIPTION - ORIENTATION
ORIENTATION: MID
ORIENTATION: MID
ORIENTATION: MID;LOWER

## 2025-05-18 ASSESSMENT — PAIN DESCRIPTION - LOCATION
LOCATION: BUTTOCKS
LOCATION: BACK
LOCATION: COCCYX

## 2025-05-18 ASSESSMENT — PAIN DESCRIPTION - DESCRIPTORS
DESCRIPTORS: SHARP
DESCRIPTORS: ACHING
DESCRIPTORS: ACHING

## 2025-05-18 ASSESSMENT — PAIN - FUNCTIONAL ASSESSMENT
PAIN_FUNCTIONAL_ASSESSMENT: PREVENTS OR INTERFERES SOME ACTIVE ACTIVITIES AND ADLS

## 2025-05-18 NOTE — RT PROTOCOL NOTE
RT Inhaler-Nebulizer Bronchodilator Protocol Note    There is a bronchodilator order in the chart from a provider indicating to follow the RT Bronchodilator Protocol and there is an “Initiate RT Inhaler-Nebulizer Bronchodilator Protocol” order as well (see protocol at bottom of note).    CXR Findings:  No results found.    The findings from the last RT Protocol Assessment were as follows:   History Pulmonary Disease: Chronic pulmonary disease  Respiratory Pattern: Regular pattern and RR 12-20 bpm  Breath Sounds: Slightly diminished and/or crackles  Cough: Strong, spontaneous, non-productive  Indication for Bronchodilator Therapy: Decreased or absent breath sounds  Bronchodilator Assessment Score: 4    Aerosolized bronchodilator medication orders have been revised according to the RT Inhaler-Nebulizer Bronchodilator Protocol below.    Respiratory Therapist to perform RT Therapy Protocol Assessment initially then follow the protocol.  Repeat RT Therapy Protocol Assessment PRN for score 0-3 or on second treatment, BID, and PRN for scores above 3.    No Indications - adjust the frequency to every 6 hours PRN wheezing or bronchospasm, if no treatments needed after 48 hours then discontinue using Per Protocol order mode.     If indication present, adjust the RT bronchodilator orders based on the Bronchodilator Assessment Score as indicated below.  Use Inhaler orders unless patient has one or more of the following: on home nebulizer, not able to hold breath for 10 seconds, is not alert and oriented, cannot activate and use MDI correctly, or respiratory rate 25 breaths per minute or more, then use the equivalent nebulizer order(s) with same Frequency and PRN reasons based on the score.  If a patient is on this medication at home then do not decrease Frequency below that used at home.    0-3 - enter or revise RT bronchodilator order(s) to equivalent RT Bronchodilator order with Frequency of every 4 hours PRN for wheezing or  increased work of breathing using Per Protocol order mode.        4-6 - enter or revise RT Bronchodilator order(s) to two equivalent RT bronchodilator orders with one order with BID Frequency and one order with Frequency of every 4 hours PRN wheezing or increased work of breathing using Per Protocol order mode.        7-10 - enter or revise RT Bronchodilator order(s) to two equivalent RT bronchodilator orders with one order with TID Frequency and one order with Frequency of every 4 hours PRN wheezing or increased work of breathing using Per Protocol order mode.       11-13 - enter or revise RT Bronchodilator order(s) to one equivalent RT bronchodilator order with QID Frequency and an Albuterol order with Frequency of every 4 hours PRN wheezing or increased work of breathing using Per Protocol order mode.      Greater than 13 - enter or revise RT Bronchodilator order(s) to one equivalent RT bronchodilator order with every 4 hours Frequency and an Albuterol order with Frequency of every 2 hours PRN wheezing or increased work of breathing using Per Protocol order mode.     RT to enter RT Home Evaluation for COPD & MDI Assessment order using Per Protocol order mode.    Electronically signed by Trish Landa RCP on 5/18/2025 at 8:10 AM

## 2025-05-18 NOTE — PLAN OF CARE
Problem: Chronic Conditions and Co-morbidities  Goal: Patient's chronic conditions and co-morbidity symptoms are monitored and maintained or improved  Outcome: Progressing  Flowsheets (Taken 5/17/2025 2308)  Care Plan - Patient's Chronic Conditions and Co-Morbidity Symptoms are Monitored and Maintained or Improved: Monitor and assess patient's chronic conditions and comorbid symptoms for stability, deterioration, or improvement     Problem: Discharge Planning  Goal: Discharge to home or other facility with appropriate resources  Outcome: Progressing  Flowsheets (Taken 5/17/2025 2308)  Discharge to home or other facility with appropriate resources:   Identify barriers to discharge with patient and caregiver   Identify discharge learning needs (meds, wound care, etc)     Problem: Safety - Adult  Goal: Free from fall injury  Outcome: Progressing  Flowsheets (Taken 5/17/2025 2308)  Free From Fall Injury: Instruct family/caregiver on patient safety     Problem: Skin/Tissue Integrity  Goal: Skin integrity remains intact  Description: 1.  Monitor for areas of redness and/or skin breakdown2.  Assess vascular access sites hourly3.  Every 4-6 hours minimum:  Change oxygen saturation probe site4.  Every 4-6 hours:  If on nasal continuous positive airway pressure, respiratory therapy assess nares and determine need for appliance change or resting period  Outcome: Progressing  Flowsheets (Taken 5/17/2025 2308)  Skin Integrity Remains Intact: Monitor for areas of redness and/or skin breakdown     Problem: Nutrition Deficit:  Goal: Optimize nutritional status  Outcome: Progressing  Flowsheets (Taken 5/17/2025 2308)  Nutrient intake appropriate for improving, restoring, or maintaining nutritional needs: Assess nutritional status and recommend course of action     Problem: Respiratory - Adult  Goal: Clear lung sounds  5/17/2025 2308 by Melany Calderón RN  Outcome: Progressing     Problem: Pain  Goal: Verbalizes/displays adequate  comfort level or baseline comfort level  Outcome: Progressing  Flowsheets (Taken 5/17/2025 8373)  Verbalizes/displays adequate comfort level or baseline comfort level:   Encourage patient to monitor pain and request assistance   Assess pain using appropriate pain scale

## 2025-05-18 NOTE — PLAN OF CARE
Problem: Respiratory - Adult  Goal: Clear lung sounds  5/18/2025 0809 by Trish Landa, RCCORTES  Outcome: Progressing

## 2025-05-18 NOTE — PROGRESS NOTES
PROGRESS NOTE      Patient:  Susan K Sturgess Meyers  Unit/Bed:6K-28/028-A  YOB: 1965  MRN: [id number]   Acct: [id number]    PCP: Torri George APRN - CNP    Date of Admission: 5/17/2025 LOS: 1    Date of Evaluation:  5/18/2025    Anticipated Discharge: Pending clinical course    Assessment/Plan:    Generalized weakness.  Chronic weakness 2/2 thoracic cord compression and esophageal adenocarcinoma.  Patient presented due to being unable to take care of herself at home after returning home from SNF in [place] on 5/16.  Noted to have a UTI in the setting of urinary retention with Abreu catheter-fully replaced on admission and antibiotics started, per below.  Also noted to likely have a pneumonia, being treated with cefepime  Dietitian consulted for J-tube feeding management.    Social work consulted for SNF placement.  Consider palliative care consult.  Patient was seeing palliative care in [place].    Complicated UTI. In the setting of urinary retention with Abreu catheter.  Abreu exchanged on admission. UA on admission with large leukocyte esterase.  Nitrites and bacteria were negative.  Patient was started on cefepime on admission.   Continue cefepime started on 5/17.  Preliminary urine culture with nonfermenting gram-negative bacilli, pending final results.  Legionella antigen and strep pneumo antigen ordered on 5/17, pending.    Mild atelectasis vs pneumonia in the inferior medial aspect of the right lung base.  Noted on CXR 5/17.  Procalcitonin of 0.19 indicates a low likelihood of bacterial infection.  Afebrile and WBC WNL.  Not requiring supplemental oxygen.  Cefepime started on 5/17, continue treatment.  Continue incentive spirometer and Acapella.  Continue COPD home meds per below.    COPD.  PFTs available per chart review.  Not currently requiring supplemental O2.  Continue Proventil, Atrovent, Symbicort.     History of SVT.  Per chart review noted to be due to heart strain from  sounds are normal.      Tenderness: There is no abdominal tenderness.      Comments: J-tube noted to be in place without surrounding erythema, 1 episode of emesis during physical exam yellow and liquidy   Musculoskeletal:      Right lower leg: No edema.      Left lower leg: No edema.   Skin:     General: Skin is warm and dry.      Capillary Refill: Capillary refill takes less than 2 seconds.      Coloration: Skin is pale.   Neurological:      Mental Status: She is oriented to person, place, and time.   Psychiatric:         Mood and Affect: Mood normal.         Behavior: Behavior normal.         Thought Content: Thought content normal.         Judgment: Judgment normal.      All labs reviewed and interpreted by me:  Labs:   Recent Labs     05/17/25  1015 05/18/25  0817   WBC 9.7 7.3   HGB 10.6* 9.8*   HCT 33.8* 31.6*    365     Recent Labs     05/17/25  1015 05/18/25  0817   * 132*   K 4.9 4.4   CL 96* 97*   CO2 24 25   BUN 25* 28*   CREATININE 0.7 0.8   CALCIUM 9.9 9.7     No results for input(s): \"AST\", \"ALT\", \"BILIDIR\", \"BILITOT\", \"ALKPHOS\" in the last 72 hours.  No results for input(s): \"INR\" in the last 72 hours.  No results for input(s): \"CKTOTAL\", \"TROPONINT\" in the last 72 hours.    Urinalysis:      Lab Results   Component Value Date/Time    NITRU NEGATIVE 05/17/2025 10:20 AM    WBCUA 25-50 05/17/2025 10:20 AM    BACTERIA NONE SEEN 05/17/2025 10:20 AM    RBCUA 15-25 05/17/2025 10:20 AM    BLOODU MODERATE 05/17/2025 10:20 AM    GLUCOSEU NEGATIVE 05/17/2025 10:20 AM       All radiology images and reports reviewed and interpreted by me:  Radiology:  XR CHEST (2 VW)   Final Result   1. Normal heart size. 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 Apparent prior thoracic surgery since prior study.   2. Mild atelectasis/pneumonia inferomedial aspect right lung base.            **This report has been created using voice recognition software.  It may contain   minor errors which

## 2025-05-18 NOTE — PLAN OF CARE
Problem: Chronic Conditions and Co-morbidities  Goal: Patient's chronic conditions and co-morbidity symptoms are monitored and maintained or improved  Outcome: Progressing  Flowsheets (Taken 5/17/2025 2308 by Melany Calderón RN)  Care Plan - Patient's Chronic Conditions and Co-Morbidity Symptoms are Monitored and Maintained or Improved: Monitor and assess patient's chronic conditions and comorbid symptoms for stability, deterioration, or improvement     Problem: Discharge Planning  Goal: Discharge to home or other facility with appropriate resources  Outcome: Progressing  Flowsheets (Taken 5/17/2025 2308 by Melany Calderón, RN)  Discharge to home or other facility with appropriate resources:   Identify barriers to discharge with patient and caregiver   Identify discharge learning needs (meds, wound care, etc)     Problem: Safety - Adult  Goal: Free from fall injury  Outcome: Progressing  Flowsheets (Taken 5/17/2025 2308 by Melany Calderón RN)  Free From Fall Injury: Instruct family/caregiver on patient safety     Problem: Skin/Tissue Integrity  Goal: Skin integrity remains intact  Description: 1.  Monitor for areas of redness and/or skin breakdown2.  Assess vascular access sites hourly3.  Every 4-6 hours minimum:  Change oxygen saturation probe site4.  Every 4-6 hours:  If on nasal continuous positive airway pressure, respiratory therapy assess nares and determine need for appliance change or resting period  Outcome: Progressing  Flowsheets (Taken 5/17/2025 2308 by Melany Calderón RN)  Skin Integrity Remains Intact: Monitor for areas of redness and/or skin breakdown     Problem: Nutrition Deficit:  Goal: Optimize nutritional status  Outcome: Progressing  Flowsheets (Taken 5/17/2025 2308 by Melany Calderón RN)  Nutrient intake appropriate for improving, restoring, or maintaining nutritional needs: Assess nutritional status and recommend course of action     Problem: Respiratory - Adult  Goal: Clear lung

## 2025-05-18 NOTE — PLAN OF CARE
Problem: Respiratory - Adult  Goal: Clear lung sounds  5/17/2025 2108 by Pancho Mejia RCP  Outcome: Progressing

## 2025-05-19 LAB
ANION GAP SERPL CALC-SCNC: 10 MEQ/L (ref 8–16)
BACTERIA UR CULT: ABNORMAL
BASOPHILS ABSOLUTE: 0 THOU/MM3 (ref 0–0.1)
BASOPHILS NFR BLD AUTO: 0.4 %
BUN SERPL-MCNC: 28 MG/DL (ref 8–23)
CALCIUM SERPL-MCNC: 9.8 MG/DL (ref 8.8–10.2)
CHLORIDE SERPL-SCNC: 100 MEQ/L (ref 98–111)
CO2 SERPL-SCNC: 25 MEQ/L (ref 22–29)
CREAT SERPL-MCNC: 0.9 MG/DL (ref 0.5–0.9)
DEPRECATED RDW RBC AUTO: 53.1 FL (ref 35–45)
EOSINOPHIL NFR BLD AUTO: 2.6 %
EOSINOPHILS ABSOLUTE: 0.2 THOU/MM3 (ref 0–0.4)
ERYTHROCYTE [DISTWIDTH] IN BLOOD BY AUTOMATED COUNT: 17.1 % (ref 11.5–14.5)
GFR SERPL CREATININE-BSD FRML MDRD: 73 ML/MIN/1.73M2
GLUCOSE BLD STRIP.AUTO-MCNC: 128 MG/DL (ref 70–108)
GLUCOSE BLD STRIP.AUTO-MCNC: 133 MG/DL (ref 70–108)
GLUCOSE BLD STRIP.AUTO-MCNC: 173 MG/DL (ref 70–108)
GLUCOSE BLD STRIP.AUTO-MCNC: 182 MG/DL (ref 70–108)
GLUCOSE BLD STRIP.AUTO-MCNC: 203 MG/DL (ref 70–108)
GLUCOSE BLD STRIP.AUTO-MCNC: 225 MG/DL (ref 70–108)
GLUCOSE BLD STRIP.AUTO-MCNC: 247 MG/DL (ref 70–108)
GLUCOSE SERPL-MCNC: 210 MG/DL (ref 74–109)
HCT VFR BLD AUTO: 35.1 % (ref 37–47)
HGB BLD-MCNC: 10.6 GM/DL (ref 12–16)
IMM GRANULOCYTES # BLD AUTO: 0.05 THOU/MM3 (ref 0–0.07)
IMM GRANULOCYTES NFR BLD AUTO: 0.5 %
LYMPHOCYTES ABSOLUTE: 0.6 THOU/MM3 (ref 1–4.8)
LYMPHOCYTES NFR BLD AUTO: 6.6 %
MCH RBC QN AUTO: 25.9 PG (ref 26–33)
MCHC RBC AUTO-ENTMCNC: 30.2 GM/DL (ref 32.2–35.5)
MCV RBC AUTO: 85.8 FL (ref 81–99)
MONOCYTES ABSOLUTE: 0.9 THOU/MM3 (ref 0.4–1.3)
MONOCYTES NFR BLD AUTO: 9.5 %
NEUTROPHILS ABSOLUTE: 7.5 THOU/MM3 (ref 1.8–7.7)
NEUTROPHILS NFR BLD AUTO: 80.4 %
NRBC BLD AUTO-RTO: 0 /100 WBC
ORGANISM: ABNORMAL
PLATELET # BLD AUTO: 358 THOU/MM3 (ref 130–400)
PMV BLD AUTO: 9.6 FL (ref 9.4–12.4)
POTASSIUM SERPL-SCNC: 4.7 MEQ/L (ref 3.5–5.2)
RBC # BLD AUTO: 4.09 MILL/MM3 (ref 4.2–5.4)
SODIUM SERPL-SCNC: 135 MEQ/L (ref 135–145)
WBC # BLD AUTO: 9.3 THOU/MM3 (ref 4.8–10.8)

## 2025-05-19 PROCEDURE — 82948 REAGENT STRIP/BLOOD GLUCOSE: CPT

## 2025-05-19 PROCEDURE — 85025 COMPLETE CBC W/AUTO DIFF WBC: CPT

## 2025-05-19 PROCEDURE — 2580000003 HC RX 258: Performed by: INTERNAL MEDICINE

## 2025-05-19 PROCEDURE — 2500000003 HC RX 250 WO HCPCS: Performed by: INTERNAL MEDICINE

## 2025-05-19 PROCEDURE — 97162 PT EVAL MOD COMPLEX 30 MIN: CPT

## 2025-05-19 PROCEDURE — 6370000000 HC RX 637 (ALT 250 FOR IP): Performed by: STUDENT IN AN ORGANIZED HEALTH CARE EDUCATION/TRAINING PROGRAM

## 2025-05-19 PROCEDURE — 94640 AIRWAY INHALATION TREATMENT: CPT

## 2025-05-19 PROCEDURE — 94761 N-INVAS EAR/PLS OXIMETRY MLT: CPT

## 2025-05-19 PROCEDURE — 99232 SBSQ HOSP IP/OBS MODERATE 35: CPT | Performed by: STUDENT IN AN ORGANIZED HEALTH CARE EDUCATION/TRAINING PROGRAM

## 2025-05-19 PROCEDURE — 6360000002 HC RX W HCPCS: Performed by: INTERNAL MEDICINE

## 2025-05-19 PROCEDURE — 97530 THERAPEUTIC ACTIVITIES: CPT

## 2025-05-19 PROCEDURE — 80048 BASIC METABOLIC PNL TOTAL CA: CPT

## 2025-05-19 PROCEDURE — 1200000003 HC TELEMETRY R&B

## 2025-05-19 PROCEDURE — 36415 COLL VENOUS BLD VENIPUNCTURE: CPT

## 2025-05-19 PROCEDURE — 6370000000 HC RX 637 (ALT 250 FOR IP)

## 2025-05-19 PROCEDURE — 6370000000 HC RX 637 (ALT 250 FOR IP): Performed by: INTERNAL MEDICINE

## 2025-05-19 RX ORDER — ALBUTEROL SULFATE 90 UG/1
2 INHALANT RESPIRATORY (INHALATION)
Status: DISCONTINUED | OUTPATIENT
Start: 2025-05-19 | End: 2025-05-21

## 2025-05-19 RX ADMIN — OXYCODONE 10 MG: 5 TABLET ORAL at 03:04

## 2025-05-19 RX ADMIN — METOPROLOL TARTRATE 12.5 MG: 25 TABLET, FILM COATED ORAL at 08:29

## 2025-05-19 RX ADMIN — GUAIFENESIN 200 MG: 200 SOLUTION ORAL at 23:29

## 2025-05-19 RX ADMIN — INSULIN LISPRO 2 UNITS: 100 INJECTION, SOLUTION INTRAVENOUS; SUBCUTANEOUS at 03:54

## 2025-05-19 RX ADMIN — GUAIFENESIN 200 MG: 200 SOLUTION ORAL at 17:32

## 2025-05-19 RX ADMIN — CEFEPIME 2000 MG: 2 INJECTION, POWDER, FOR SOLUTION INTRAVENOUS at 06:43

## 2025-05-19 RX ADMIN — PROMETHAZINE HYDROCHLORIDE 25 MG: 25 TABLET ORAL at 23:28

## 2025-05-19 RX ADMIN — INSULIN LISPRO 4 UNITS: 100 INJECTION, SOLUTION INTRAVENOUS; SUBCUTANEOUS at 10:15

## 2025-05-19 RX ADMIN — PROMETHAZINE HYDROCHLORIDE 25 MG: 25 TABLET ORAL at 17:32

## 2025-05-19 RX ADMIN — INSULIN LISPRO 4 UNITS: 100 INJECTION, SOLUTION INTRAVENOUS; SUBCUTANEOUS at 06:41

## 2025-05-19 RX ADMIN — TIOTROPIUM BROMIDE INHALATION SPRAY 2 PUFF: 3.12 SPRAY, METERED RESPIRATORY (INHALATION) at 09:06

## 2025-05-19 RX ADMIN — GUAIFENESIN 200 MG: 200 SOLUTION ORAL at 08:36

## 2025-05-19 RX ADMIN — OXYCODONE 10 MG: 5 TABLET ORAL at 18:41

## 2025-05-19 RX ADMIN — ALBUTEROL SULFATE 2 PUFF: 90 AEROSOL, METERED RESPIRATORY (INHALATION) at 09:06

## 2025-05-19 RX ADMIN — FAMOTIDINE 40 MG: 20 TABLET, FILM COATED ORAL at 08:29

## 2025-05-19 RX ADMIN — OXYCODONE 10 MG: 5 TABLET ORAL at 14:23

## 2025-05-19 RX ADMIN — CEFEPIME 2000 MG: 2 INJECTION, POWDER, FOR SOLUTION INTRAVENOUS at 17:31

## 2025-05-19 RX ADMIN — PROMETHAZINE HYDROCHLORIDE 25 MG: 25 TABLET ORAL at 10:08

## 2025-05-19 RX ADMIN — BUDESONIDE AND FORMOTEROL FUMARATE DIHYDRATE 2 PUFF: 80; 4.5 AEROSOL RESPIRATORY (INHALATION) at 20:40

## 2025-05-19 RX ADMIN — CETIRIZINE HYDROCHLORIDE 10 MG: 10 TABLET, FILM COATED ORAL at 08:29

## 2025-05-19 RX ADMIN — PROMETHAZINE HYDROCHLORIDE 25 MG: 25 TABLET ORAL at 03:05

## 2025-05-19 RX ADMIN — BUDESONIDE AND FORMOTEROL FUMARATE DIHYDRATE 2 PUFF: 80; 4.5 AEROSOL RESPIRATORY (INHALATION) at 09:06

## 2025-05-19 RX ADMIN — ALBUTEROL SULFATE 2 PUFF: 90 AEROSOL, METERED RESPIRATORY (INHALATION) at 20:40

## 2025-05-19 RX ADMIN — ENOXAPARIN SODIUM 40 MG: 100 INJECTION SUBCUTANEOUS at 17:32

## 2025-05-19 RX ADMIN — OXYCODONE 10 MG: 5 TABLET ORAL at 23:21

## 2025-05-19 RX ADMIN — TRAZODONE HYDROCHLORIDE 25 MG: 50 TABLET ORAL at 21:54

## 2025-05-19 RX ADMIN — OXYCODONE 10 MG: 5 TABLET ORAL at 10:07

## 2025-05-19 ASSESSMENT — PAIN DESCRIPTION - DESCRIPTORS
DESCRIPTORS: ACHING
DESCRIPTORS: DISCOMFORT

## 2025-05-19 ASSESSMENT — PAIN DESCRIPTION - LOCATION
LOCATION: BUTTOCKS
LOCATION: COCCYX
LOCATION: BUTTOCKS

## 2025-05-19 ASSESSMENT — PAIN SCALES - GENERAL
PAINLEVEL_OUTOF10: 9
PAINLEVEL_OUTOF10: 4
PAINLEVEL_OUTOF10: 8
PAINLEVEL_OUTOF10: 8
PAINLEVEL_OUTOF10: 6
PAINLEVEL_OUTOF10: 9

## 2025-05-19 ASSESSMENT — PAIN - FUNCTIONAL ASSESSMENT
PAIN_FUNCTIONAL_ASSESSMENT: ACTIVITIES ARE NOT PREVENTED
PAIN_FUNCTIONAL_ASSESSMENT: PREVENTS OR INTERFERES SOME ACTIVE ACTIVITIES AND ADLS

## 2025-05-19 ASSESSMENT — PAIN SCALES - WONG BAKER: WONGBAKER_NUMERICALRESPONSE: NO HURT

## 2025-05-19 NOTE — PROGRESS NOTES
Serosanguinous 05/19/25 1317   Odor None 05/19/25 1317   Olga-wound Assessment Intact;Fragile 05/19/25 1317   Margins Attached edges;Unattached edges 05/19/25 1317   Number of days: 414      Plan     Treatment Recommendations:   Coccyx- Clean wound with normal saline or wound cleanser and gauze. Pat dry with clean gauze. Lightly fill wound bed with normal saline moistened gauze, cover with sacral foam dressing. Change daily and as needed for saturation/ fecal incontinence.     Pressure Injury Prevention:   [x] Support Surface: [place]  [x] Turned with wedges   [] Turned with pillows  [] Offloading boots   [] No depends  [x] Chux pad  [] External urinary device  [x] Other: shepard    Discharge Plan:  Placement for patient upon discharge:   [] Home (self or family)  [] Home with home health  [] Inpatient Rehab  [x] SNF/ ECF  [] [facility]/ [facility]    Patient [facility] for [facility]: follow up with wound care provider at SNF

## 2025-05-19 NOTE — PLAN OF CARE
Problem: Chronic Conditions and Co-morbidities  Goal: Patient's chronic conditions and co-morbidity symptoms are monitored and maintained or improved  5/18/2025 2255 by Kaycee Tabor RN  Outcome: Progressing  Flowsheets (Taken 5/17/2025 2308 by Melany Calderón RN)  Care Plan - Patient's Chronic Conditions and Co-Morbidity Symptoms are Monitored and Maintained or Improved: Monitor and assess patient's chronic conditions and comorbid symptoms for stability, deterioration, or improvement  5/18/2025 1631 by Alyssa Fajardo RN  Outcome: Progressing  Flowsheets (Taken 5/17/2025 2308 by Melany Calderón RN)  Care Plan - Patient's Chronic Conditions and Co-Morbidity Symptoms are Monitored and Maintained or Improved: Monitor and assess patient's chronic conditions and comorbid symptoms for stability, deterioration, or improvement     Problem: Discharge Planning  Goal: Discharge to home or other facility with appropriate resources  5/18/2025 2255 by Kaycee Tabor RN  Outcome: Progressing  Flowsheets (Taken 5/17/2025 2308 by Melany Calderón RN)  Discharge to home or other facility with appropriate resources:   Identify barriers to discharge with patient and caregiver   Identify discharge learning needs (meds, wound care, etc)  5/18/2025 1631 by Alyssa Fajardo RN  Outcome: Progressing  Flowsheets (Taken 5/17/2025 2308 by Melany Calderón RN)  Discharge to home or other facility with appropriate resources:   Identify barriers to discharge with patient and caregiver   Identify discharge learning needs (meds, wound care, etc)     Problem: Safety - Adult  Goal: Free from fall injury  5/18/2025 2255 by Kaycee Tabor RN  Outcome: Progressing  Flowsheets (Taken 5/17/2025 2308 by Melany Calderón RN)  Free From Fall Injury: Instruct family/caregiver on patient safety  5/18/2025 1631 by Alyssa Fajardo RN  Outcome: Progressing  Flowsheets (Taken 5/17/2025 2308 by Melany Calderón RN)  Free From Fall Injury:

## 2025-05-19 NOTE — PROGRESS NOTES
05/19/25 0909   RT Protocol   History Pulmonary Disease 2   Respiratory pattern 0   Breath sounds 2   Cough 1   Indications for Bronchodilator Therapy On home bronchodilators   Bronchodilator Assessment Score 5

## 2025-05-19 NOTE — CARE COORDINATION
DISCHARGE PLANNING EVALUATION  5/19/25, 11:51 AM EDT    Reason for Referral: discharge planning to SNF  Decision Maker: pt, spouse assists  Current Services: [facility] for Tube Feed, ? Home Health, hospital bed, wheelchair, ramped entrance  New Services Requested: SNF  Family/ Social/ Home environment: pt had recent stay at SNF in [place] ([facility]).  Pt reports being there for 2-3 months.  Pt was DC home with spouse(doesn't work) on Friday 5/16/25.  According to pt she had appealed her insurance 3 times.  SW discussed with pt insurance process and coverage.  Insurance may feel that this is pts new \"baseline\".    Payment Source:daysoft   Transportation at Discharge: ambualnce  Post-acute (PAC) provider list was provided to patient. Patient was informed of their freedom to choose PAC provider. Discussed and offered to show the patient the relevant PAC Providers quality and resource use measures on Medicare Compare web site via computer based on patient's goals of care and treatment preferences. Questions regarding selection process were answered.      Teach Back Method used with pt regarding care plan   Patient verbalized understanding of the plan of care and contribute to goal setting.       Patient preferences and discharge plan: SW met with pt, discussed discharge POC.  Pt stated that she is not able to care for herself and her spouse \"has long covid with lung issues\".  SW questioned why pt left the SNF in [place].  Pt stated that her insurance had denied a longer stay and they had appealed insurance 3 times.  SW stated that we can try placement again, however insurance may still deny.  Pt to talk with spouse tonight and let SW know what SNF's to call.    Pt is current with [facility] for Tube Feeds.     Electronically signed by DEAN Alonso on 5/19/2025 at 11:51 AM

## 2025-05-19 NOTE — CARE COORDINATION
Case Management Assessment Initial Evaluation    Date/Time of Evaluation: 5/19/2025 3:15 PM  Assessment Completed by: Ezequiel Shipley RN    If patient is discharged prior to next notation, then this note serves as note for discharge by case management.    Patient Name: Susan K Sturgess Meyers                   YOB: 1965  Diagnosis: Generalized weakness [R53.1]                   Date / Time: 5/17/2025  9:09 AM  Location: [address] [place] [address]     Patient Admission Status: Inpatient   Readmission Risk Low 0-14, Mod 15-19), High > 20: Readmission Risk Score: 14.4    Current PCP: Torri George, ADDISON - CNP  Health Care Decision Makers:   Primary Decision Maker: gabelizzyMooney,Brad - Spouse - [phone]    Secondary Decision Maker: Sturgess Meyers,Robert - Child - [phone]    Additional Case Management Notes: IV maxipime, pain control. TF per J-tube. PT/OT.     Procedures: none    Imaging: n/a     Patient Goals/Plan/Treatment Preferences: Cammy is from home with her . She was recently discharged from an ECF in [place]. DME as below. She has a J-tube with feeds provided by [facility]. She is uncertain of  provider. She is requesting ECF, [facility].        05/19/25 1527   Service Assessment   Patient Orientation Alert and Oriented   Cognition Alert   History Provided By Patient   Primary Caregiver Spouse   Support Systems Spouse/Significant Other;Children   Patient's Healthcare Decision Maker is: Named in Scanned ACP Document   PCP Verified by CM Yes   Prior Functional Level Assistance with the following:;Feeding;Mobility;Housework;Toileting;Bathing;Dressing   Current Functional Level Assistance with the following:;Bathing;Dressing;Toileting;Feeding;Mobility   Can patient return to prior living arrangement Unknown at present   Ability to make needs known: Good   Family able to assist with home care needs: Yes   Would you like for me to discuss the discharge plan with any other family

## 2025-05-19 NOTE — RT PROTOCOL NOTE
RT Inhaler-Nebulizer Bronchodilator Protocol Note    There is a bronchodilator order in the chart from a provider indicating to follow the RT Bronchodilator Protocol and there is an “Initiate RT Inhaler-Nebulizer Bronchodilator Protocol” order as well (see protocol at bottom of note).    CXR Findings:  No results found.    The findings from the last RT Protocol Assessment were as follows:   History Pulmonary Disease: Chronic pulmonary disease  Respiratory Pattern: Regular pattern and RR 12-20 bpm  Breath Sounds: Slightly diminished and/or crackles  Cough: Strong, productive  Indication for Bronchodilator Therapy: On home bronchodilators  Bronchodilator Assessment Score: 5    Aerosolized bronchodilator medication orders have been revised according to the RT Inhaler-Nebulizer Bronchodilator Protocol below.    Respiratory Therapist to perform RT Therapy Protocol Assessment initially then follow the protocol.  Repeat RT Therapy Protocol Assessment PRN for score 0-3 or on second treatment, BID, and PRN for scores above 3.    No Indications - adjust the frequency to every 6 hours PRN wheezing or bronchospasm, if no treatments needed after 48 hours then discontinue using Per Protocol order mode.     If indication present, adjust the RT bronchodilator orders based on the Bronchodilator Assessment Score as indicated below.  Use Inhaler orders unless patient has one or more of the following: on home nebulizer, not able to hold breath for 10 seconds, is not alert and oriented, cannot activate and use MDI correctly, or respiratory rate 25 breaths per minute or more, then use the equivalent nebulizer order(s) with same Frequency and PRN reasons based on the score.  If a patient is on this medication at home then do not decrease Frequency below that used at home.    0-3 - enter or revise RT bronchodilator order(s) to equivalent RT Bronchodilator order with Frequency of every 4 hours PRN for wheezing or increased work of breathing  using Per Protocol order mode.        4-6 - enter or revise RT Bronchodilator order(s) to two equivalent RT bronchodilator orders with one order with BID Frequency and one order with Frequency of every 4 hours PRN wheezing or increased work of breathing using Per Protocol order mode.        7-10 - enter or revise RT Bronchodilator order(s) to two equivalent RT bronchodilator orders with one order with TID Frequency and one order with Frequency of every 4 hours PRN wheezing or increased work of breathing using Per Protocol order mode.       11-13 - enter or revise RT Bronchodilator order(s) to one equivalent RT bronchodilator order with QID Frequency and an Albuterol order with Frequency of every 4 hours PRN wheezing or increased work of breathing using Per Protocol order mode.      Greater than 13 - enter or revise RT Bronchodilator order(s) to one equivalent RT bronchodilator order with every 4 hours Frequency and an Albuterol order with Frequency of every 2 hours PRN wheezing or increased work of breathing using Per Protocol order mode.     RT to enter RT Home Evaluation for COPD & MDI Assessment order using Per Protocol order mode.    Electronically signed by Latoya Thorpe RCP on 5/19/2025 at 9:10 AM

## 2025-05-19 NOTE — PROGRESS NOTES
[facility]  INPATIENT PHYSICAL THERAPY  EVALUATION  STRZ RENAL TELEMETRY 6K - 6K-28/028-A    Discharge Recommendations: Long Term Care with PT  Equipment Recommendations: No  Defer to next facility            Time In: 728  Time Out: 826  Timed Code Treatment Minutes: 38 Minutes  Minutes: 58          Date: 2025  Patient Name: Susan K Sturgess Meyers,  Gender:  female        MRN: [id number]  : 1965  (60 y.o.)      Referring Practitioner: Juan Gautam MD  Diagnosis: Generalized weakness  Additional Pertinent Hx: Per EMR: \"60-year-old female with past medical history of COPD, atrial fibrillation, type 2 diabetes, hyponatremia, anemia, esophageal adenocarcinoma, thoracic cord compression, vertebral osteomyelitis, allergic rhinitis, sacral decubitus ulcer, ovarian cancer, and physical deconditioning who presented to [facility] 1 day after being discharged from SNF in [place] due to not being able to take care of herself at home and due to weakness.  Patient reports weakness in the lower extremities is worse than in the upper extremities and has been ongoing, did not improve with PT OT at SNF, per patient she required additional treatment however was declined.  In the ED CXR noted mild atelectasis versus pneumonia in the inferior medial aspect of the right lung base.  UA noted leukocyte esterases urine culture with preliminary nonfermenting gram-negative bacilli. Patient has a catheter.  She was started on cefepime.  Legionella and strep antigens ordered. Dietitian was consulted for management of J-tube feeds.  Social work was consulted for placement.\"     Restrictions/Precautions:  Restrictions/Precautions: NPO, Fall Risk, General Precautions       Other Position/Activity Restrictions: J-tube, sacral wound           Subjective:  Chart Reviewed: Yes  Patient assessed for rehabilitation services?: Yes  Family/Caregiver Present: No  Subjective: Clearance from RN to see pt this date. Pt was  PT interventions to address the above impairments and progress towards PLOF.  Therapy Prognosis: Fair    Requires PT Follow-Up: Yes    Patient Education:      .    Patient Education  Education Given To: Patient  Education Provided: Role of Therapy, Plan of Care, Home Exercise Program, Mobility Training  Education Method: Verbal  Barriers to Learning: None  Education Outcome: Verbalized understanding, Continued education needed, Demonstrated understanding       Plan:  Current Treatment Recommendations: Strengthening, Balance training, Functional mobility training, Endurance training, Safety education & training, Home exercise program, Pain management, Positioning, Patient/Caregiver education & training, Therapeutic activities, Co-Treatment, Equipment evaluation, education, & procurement  General Plan:  (5x GM)    Goals:  Patient Goals : would like to be able to walk a little on her own  Short Term Goals  Time Frame for Short Term Goals: Prior to hospital d/c  Short Term Goal 1: Pt will perform rolling L <> R with Pepito and use of bed railing for repositioning in bed  Short Term Goal 2: Pt will perform supine <> sitting EOB via log roll method with use of bed railing and Pepito to get in and out of bed  Short Term Goal 3: Pt will tolerate sitting EOB 20min or greater for improved posturing, and tolerance to upright positioning  Short Term Goal 4: Pt will demo ability to direct care during mobilization and repositioning, 90% of the time to direct care.  Long Term Goals  Time Frame for Long Term Goals : NA due to short ELOS    Following session, patient left semi-supine in bed with bed alarm activated. Call light and bedside table are within reach. Nursing staff is present. Communication with nursing staff on recommendation for moon boots or PRAFO for PF management. Communication with OT staff on performance during intervention.

## 2025-05-19 NOTE — PROGRESS NOTES
[facility]  OCCUPATIONAL THERAPY MISSED TREATMENT NOTE  STRZ RENAL TELEMETRY 6K  6K-28/028-A      Date: 2025  Patient Name: Susan K Sturgess Meyers        CSN: [id number]   : 1965  (60 y.o.)  Gender: female                REASON FOR MISSED TREATMENT: Patient Refused.  Pt reports she is tired this PM and requested to rest. OT will attempt back tomorrow with PT d/t decreased activity tolerance.

## 2025-05-19 NOTE — PROGRESS NOTES
PROGRESS NOTE      Patient:  Susan K Sturgess Meyers  Unit/Bed:6K-28/028-A  YOB: 1965  MRN: [id number]   Acct: [id number]    PCP: Torri George, APRN - CNP    Date of Admission: 5/17/2025 LOS: 2    Date of Evaluation:  5/19/2025    Anticipated Discharge: Pending clinical course    Assessment/Plan:    Generalized weakness.  Chronic weakness 2/2 thoracic cord compression and esophageal adenocarcinoma.  Patient presented due to being unable to take care of herself at home after returning home from SNF in [place] on 5/16.  Noted to have a UTI in the setting of urinary retention with Abreu catheter-fully replaced on admission and antibiotics started, per below.  Also noted to likely have a pneumonia, being treated with cefepime  Dietitian consulted for J-tube feeding management.    Social work consulted for SNF placement.  Consider palliative care consult.  Patient was seeing palliative care in [place].    Complicated UTI. In the setting of urinary retention with Abreu catheter.  Abreu exchanged on admission. UA on admission with large leukocyte esterase.  Nitrites and bacteria were negative.  Patient was started on cefepime on admission.   Continue cefepime started on 5/17.  On 5/19/2025 urine culture resulted in Pseudomonas aeruginosa susceptible to cefepime  Legionella antigen and strep pneumo antigen negative resulted on 5/19/2025    Mild atelectasis vs pneumonia in the inferior medial aspect of the right lung base.  Noted on CXR 5/17.  Procalcitonin of 0.19 indicates a low likelihood of bacterial infection.  Afebrile and WBC WNL.  Not requiring supplemental oxygen.  Cefepime started on 5/17, continue treatment.  Continue incentive spirometer and Acapella.  Continue COPD home meds per below.    COPD.  PFTs available per chart review.  Not currently requiring supplemental O2.  Continue Proventil, Spiriva Symbicort.     History of SVT.  Per chart review noted to be due to heart strain from PE.  Result   1. Normal heart size. 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 Apparent prior thoracic surgery since prior study.   2. Mild atelectasis/pneumonia inferomedial aspect right lung base.            **This report has been created using voice recognition software.  It may contain   minor errors which are inherent in voice recognition technology.**            Electronically signed by Dr. Leonard Oakley          Diet: Diet NPO  ADULT TUBE FEEDING; Jejunostomy; Other Tube Feeding (specify); Memento 1.2 Glucose Support; Supplied from home; Cyclic; 75; 6:00 PM; 10:00 AM; 30; Q 4 hours    Microbiology: yes -urine culture shows Pseudomonas sensitive to cefepime  Antibiotics: yes -cefepime    Steroids: no    Telemetry: []Yes / [x]No    LDA: []CVC / []PICC / []Midline / [x]Abreu / []Drains / []Mediport / [x]PIV / [x] J-tube    Labs (still needed?): [x]Yes / []No  IVF (still needed?): []Yes / [x]No    Level of care: [x]Step Down / []Med-Surg  Bed Status: [x]Inpatient / []Observation    DVT Prophylaxis: [x] Lovenox / [] Heparin / [] SCDs / [] Already on Systemic Anticoagulation / [] None     PT/OT: [x]Yes / []No    Disposition:    [] Home       [] TCU       [] Rehab       [] Psych       [x] SNF       [] Long Term Care Facility       [x] Other-pending insurance coverage    Code Status: Full Code      An electronic signature was used to authenticate this note  - Karen Bradley MD PGY-1 on 5/19/2025 at 10:02 AM

## 2025-05-19 NOTE — PROGRESS NOTES
Pharmacy Note - Extended Infusion Beta-Lactam Dose Adjustment    Cefepime 2000 mg q8h extended infusion for treatment of pneumonia in NH patient. Per [facility] Extended Infusion Beta-Lactam Policy, cefepime will be changed to cefepime 2000mg q12h extended infusion    Estimated Creatinine Clearance: Estimated Creatinine Clearance: 55 mL/min (based on SCr of 0.9 mg/dL).    Dialysis Status, JULI, CKD:  increasing SCr    BMI: Body mass index is 21.36 kg/m².    Rationale for Adjustment: Dose adjusted per [facility] Extended Infusion Policy based on renal function and indication. The above medication is renally eliminated and demonstrates time-dependent effects on bacterial eradication. Extended-infusion dosing strategy aims to enhance microbiologic and clinical efficacy.     Pharmacy will monitor renal function daily and adjust dose as necessary.    Please call with any questions.    Thank you,  Cammy Mathews, PharmD, BCPS 5/19/2025 12:08 PM

## 2025-05-20 PROBLEM — R53.81 DEBILITY: Status: ACTIVE | Noted: 2025-05-20

## 2025-05-20 PROBLEM — E43 SEVERE MALNUTRITION: Chronic | Status: ACTIVE | Noted: 2025-05-20

## 2025-05-20 PROBLEM — N39.0 COMPLICATED UTI (URINARY TRACT INFECTION): Status: ACTIVE | Noted: 2025-05-20

## 2025-05-20 LAB
ANION GAP SERPL CALC-SCNC: 10 MEQ/L (ref 8–16)
BASOPHILS ABSOLUTE: 0.1 THOU/MM3 (ref 0–0.1)
BASOPHILS NFR BLD AUTO: 0.5 %
BUN SERPL-MCNC: 25 MG/DL (ref 8–23)
CALCIUM SERPL-MCNC: 9.3 MG/DL (ref 8.8–10.2)
CHLORIDE SERPL-SCNC: 98 MEQ/L (ref 98–111)
CO2 SERPL-SCNC: 24 MEQ/L (ref 22–29)
CREAT SERPL-MCNC: 0.8 MG/DL (ref 0.5–0.9)
DEPRECATED RDW RBC AUTO: 53.1 FL (ref 35–45)
EOSINOPHIL NFR BLD AUTO: 1.9 %
EOSINOPHILS ABSOLUTE: 0.2 THOU/MM3 (ref 0–0.4)
ERYTHROCYTE [DISTWIDTH] IN BLOOD BY AUTOMATED COUNT: 17.2 % (ref 11.5–14.5)
GFR SERPL CREATININE-BSD FRML MDRD: 84 ML/MIN/1.73M2
GLUCOSE BLD STRIP.AUTO-MCNC: 109 MG/DL (ref 70–108)
GLUCOSE BLD STRIP.AUTO-MCNC: 133 MG/DL (ref 70–108)
GLUCOSE BLD STRIP.AUTO-MCNC: 158 MG/DL (ref 70–108)
GLUCOSE BLD STRIP.AUTO-MCNC: 160 MG/DL (ref 70–108)
GLUCOSE BLD STRIP.AUTO-MCNC: 175 MG/DL (ref 70–108)
GLUCOSE BLD STRIP.AUTO-MCNC: 215 MG/DL (ref 70–108)
GLUCOSE SERPL-MCNC: 149 MG/DL (ref 74–109)
HCT VFR BLD AUTO: 33.2 % (ref 37–47)
HGB BLD-MCNC: 10.3 GM/DL (ref 12–16)
IMM GRANULOCYTES # BLD AUTO: 0.05 THOU/MM3 (ref 0–0.07)
IMM GRANULOCYTES NFR BLD AUTO: 0.5 %
LYMPHOCYTES ABSOLUTE: 0.8 THOU/MM3 (ref 1–4.8)
LYMPHOCYTES NFR BLD AUTO: 7.9 %
MCH RBC QN AUTO: 26.3 PG (ref 26–33)
MCHC RBC AUTO-ENTMCNC: 31 GM/DL (ref 32.2–35.5)
MCV RBC AUTO: 84.9 FL (ref 81–99)
MONOCYTES ABSOLUTE: 0.9 THOU/MM3 (ref 0.4–1.3)
MONOCYTES NFR BLD AUTO: 9.4 %
NEUTROPHILS ABSOLUTE: 8 THOU/MM3 (ref 1.8–7.7)
NEUTROPHILS NFR BLD AUTO: 79.8 %
NRBC BLD AUTO-RTO: 0 /100 WBC
PLATELET # BLD AUTO: 298 THOU/MM3 (ref 130–400)
PLATELET BLD QL SMEAR: ABNORMAL
PMV BLD AUTO: 10.1 FL (ref 9.4–12.4)
POTASSIUM SERPL-SCNC: 4.7 MEQ/L (ref 3.5–5.2)
RBC # BLD AUTO: 3.91 MILL/MM3 (ref 4.2–5.4)
SCAN OF BLOOD SMEAR: NORMAL
SODIUM SERPL-SCNC: 132 MEQ/L (ref 135–145)
WBC # BLD AUTO: 10 THOU/MM3 (ref 4.8–10.8)

## 2025-05-20 PROCEDURE — 2500000003 HC RX 250 WO HCPCS: Performed by: INTERNAL MEDICINE

## 2025-05-20 PROCEDURE — 6370000000 HC RX 637 (ALT 250 FOR IP)

## 2025-05-20 PROCEDURE — 97530 THERAPEUTIC ACTIVITIES: CPT

## 2025-05-20 PROCEDURE — 6360000002 HC RX W HCPCS: Performed by: INTERNAL MEDICINE

## 2025-05-20 PROCEDURE — 6370000000 HC RX 637 (ALT 250 FOR IP): Performed by: INTERNAL MEDICINE

## 2025-05-20 PROCEDURE — 80048 BASIC METABOLIC PNL TOTAL CA: CPT

## 2025-05-20 PROCEDURE — 6370000000 HC RX 637 (ALT 250 FOR IP): Performed by: STUDENT IN AN ORGANIZED HEALTH CARE EDUCATION/TRAINING PROGRAM

## 2025-05-20 PROCEDURE — 36415 COLL VENOUS BLD VENIPUNCTURE: CPT

## 2025-05-20 PROCEDURE — 97535 SELF CARE MNGMENT TRAINING: CPT

## 2025-05-20 PROCEDURE — 97167 OT EVAL HIGH COMPLEX 60 MIN: CPT

## 2025-05-20 PROCEDURE — 82948 REAGENT STRIP/BLOOD GLUCOSE: CPT

## 2025-05-20 PROCEDURE — 2580000003 HC RX 258: Performed by: INTERNAL MEDICINE

## 2025-05-20 PROCEDURE — 1200000003 HC TELEMETRY R&B

## 2025-05-20 PROCEDURE — 6360000002 HC RX W HCPCS

## 2025-05-20 PROCEDURE — 1200000000 HC SEMI PRIVATE

## 2025-05-20 PROCEDURE — 94640 AIRWAY INHALATION TREATMENT: CPT

## 2025-05-20 PROCEDURE — 85025 COMPLETE CBC W/AUTO DIFF WBC: CPT

## 2025-05-20 PROCEDURE — 99232 SBSQ HOSP IP/OBS MODERATE 35: CPT | Performed by: STUDENT IN AN ORGANIZED HEALTH CARE EDUCATION/TRAINING PROGRAM

## 2025-05-20 RX ORDER — ONDANSETRON 2 MG/ML
4 INJECTION INTRAMUSCULAR; INTRAVENOUS EVERY 6 HOURS PRN
Status: DISCONTINUED | OUTPATIENT
Start: 2025-05-20 | End: 2025-06-05 | Stop reason: HOSPADM

## 2025-05-20 RX ADMIN — PROMETHAZINE HYDROCHLORIDE 25 MG: 25 TABLET ORAL at 08:47

## 2025-05-20 RX ADMIN — GUAIFENESIN 200 MG: 200 SOLUTION ORAL at 18:50

## 2025-05-20 RX ADMIN — FAMOTIDINE 40 MG: 20 TABLET, FILM COATED ORAL at 08:45

## 2025-05-20 RX ADMIN — GUAIFENESIN 200 MG: 200 SOLUTION ORAL at 14:49

## 2025-05-20 RX ADMIN — CETIRIZINE HYDROCHLORIDE 10 MG: 10 TABLET, FILM COATED ORAL at 08:45

## 2025-05-20 RX ADMIN — ALBUTEROL SULFATE 2 PUFF: 90 AEROSOL, METERED RESPIRATORY (INHALATION) at 20:58

## 2025-05-20 RX ADMIN — CEFEPIME 2000 MG: 2 INJECTION, POWDER, FOR SOLUTION INTRAVENOUS at 17:33

## 2025-05-20 RX ADMIN — METOPROLOL TARTRATE 12.5 MG: 25 TABLET, FILM COATED ORAL at 08:46

## 2025-05-20 RX ADMIN — OXYCODONE 10 MG: 5 TABLET ORAL at 03:39

## 2025-05-20 RX ADMIN — INSULIN LISPRO 2 UNITS: 100 INJECTION, SOLUTION INTRAVENOUS; SUBCUTANEOUS at 02:25

## 2025-05-20 RX ADMIN — OXYCODONE 10 MG: 5 TABLET ORAL at 14:49

## 2025-05-20 RX ADMIN — PROMETHAZINE HYDROCHLORIDE 25 MG: 25 TABLET ORAL at 14:49

## 2025-05-20 RX ADMIN — GUAIFENESIN 200 MG: 200 SOLUTION ORAL at 03:38

## 2025-05-20 RX ADMIN — OXYCODONE 10 MG: 5 TABLET ORAL at 23:35

## 2025-05-20 RX ADMIN — ENOXAPARIN SODIUM 40 MG: 100 INJECTION SUBCUTANEOUS at 17:15

## 2025-05-20 RX ADMIN — GUAIFENESIN 200 MG: 200 SOLUTION ORAL at 08:45

## 2025-05-20 RX ADMIN — OXYCODONE 10 MG: 5 TABLET ORAL at 08:47

## 2025-05-20 RX ADMIN — OXYCODONE 10 MG: 5 TABLET ORAL at 18:50

## 2025-05-20 RX ADMIN — BUDESONIDE AND FORMOTEROL FUMARATE DIHYDRATE 2 PUFF: 80; 4.5 AEROSOL RESPIRATORY (INHALATION) at 20:58

## 2025-05-20 RX ADMIN — ONDANSETRON 4 MG: 2 INJECTION, SOLUTION INTRAMUSCULAR; INTRAVENOUS at 02:56

## 2025-05-20 RX ADMIN — ONDANSETRON 4 MG: 2 INJECTION, SOLUTION INTRAMUSCULAR; INTRAVENOUS at 17:15

## 2025-05-20 RX ADMIN — TRAZODONE HYDROCHLORIDE 25 MG: 50 TABLET ORAL at 21:20

## 2025-05-20 RX ADMIN — CEFEPIME 2000 MG: 2 INJECTION, POWDER, FOR SOLUTION INTRAVENOUS at 06:31

## 2025-05-20 ASSESSMENT — PAIN DESCRIPTION - DESCRIPTORS
DESCRIPTORS: ACHING
DESCRIPTORS: DISCOMFORT
DESCRIPTORS: ACHING

## 2025-05-20 ASSESSMENT — PAIN - FUNCTIONAL ASSESSMENT
PAIN_FUNCTIONAL_ASSESSMENT: PREVENTS OR INTERFERES SOME ACTIVE ACTIVITIES AND ADLS
PAIN_FUNCTIONAL_ASSESSMENT: PREVENTS OR INTERFERES WITH MANY ACTIVE NOT PASSIVE ACTIVITIES
PAIN_FUNCTIONAL_ASSESSMENT: PREVENTS OR INTERFERES SOME ACTIVE ACTIVITIES AND ADLS
PAIN_FUNCTIONAL_ASSESSMENT: PREVENTS OR INTERFERES SOME ACTIVE ACTIVITIES AND ADLS

## 2025-05-20 ASSESSMENT — PAIN SCALES - GENERAL
PAINLEVEL_OUTOF10: 9
PAINLEVEL_OUTOF10: 5
PAINLEVEL_OUTOF10: 8
PAINLEVEL_OUTOF10: 0
PAINLEVEL_OUTOF10: 8
PAINLEVEL_OUTOF10: 0
PAINLEVEL_OUTOF10: 3
PAINLEVEL_OUTOF10: 8
PAINLEVEL_OUTOF10: 8

## 2025-05-20 ASSESSMENT — PAIN DESCRIPTION - ORIENTATION
ORIENTATION: MID

## 2025-05-20 ASSESSMENT — PAIN DESCRIPTION - LOCATION
LOCATION: BUTTOCKS
LOCATION: BUTTOCKS
LOCATION: COCCYX
LOCATION: BUTTOCKS
LOCATION: BUTTOCKS
LOCATION: BUTTOCKS;COCCYX

## 2025-05-20 ASSESSMENT — PAIN SCALES - WONG BAKER
WONGBAKER_NUMERICALRESPONSE: HURTS A LITTLE BIT
WONGBAKER_NUMERICALRESPONSE: HURTS A LITTLE BIT
WONGBAKER_NUMERICALRESPONSE: NO HURT

## 2025-05-20 NOTE — PLAN OF CARE
Problem: Respiratory - Adult  Goal: Clear lung sounds  Outcome: Progressing   Lung sounds are improved. Current treatment regimen will be discussed with provider to see if treatments may be changed.Patient mutually agreed on goals.

## 2025-05-20 NOTE — CARE COORDINATION
Spoke with Bridget at [facility], she states they received a referral for Cammy but she was admitted to hospital before they could start services. They will need new orders at discharge if she does not go to [facility].  Electronically signed by Ezequiel Shipley RN on 5/20/2025 at 2:00 PM

## 2025-05-20 NOTE — CARE COORDINATION
5/20/25, 3:09 PM EDT    DISCHARGE ON GOING EVALUATION    Cammy [facility]CO [facility] day: 3  Location: -28/028-A Reason for admit: Generalized weakness [R53.1]     Procedures: none    Imaging since last note: 5/17 CXR: 1. Normal heart size. An aortic endograft is present in the descending thoracic aorta. Apparent prior thoracic surgery since prior study.  2. Mild atelectasis/pneumonia inferomedial aspect right lung base.    Barriers to Discharge: Chronic Abreu, Hospitalist following. PT/OT following. Cefepime IV. ECF Placement.     PCP: Torri George, ADDISON Villalpando CNP  Readmission Risk Score: 15    Patient Goals/Plan/Treatment Preferences: From home with spouse, HH [facility] for TF. Plans for ECF,  pre-cert required.

## 2025-05-20 NOTE — PROGRESS NOTES
Patient tolerance of  treatment:Good.  Plan: Current Treatment Recommendations: Strengthening, Balance training, Functional mobility training, Endurance training, Safety education & training, Home exercise program, Pain management, Positioning, Patient/Caregiver education & training, Therapeutic activities, Co-Treatment, Equipment evaluation, education, & procurement  General Plan:  (5x GM)    Education:  Learners: Patient  Patient Education: Plan of Care, Bed Mobility, Reviewed Prior Education, Verbal Exercise Instruction    Goals:  Patient Goals : would like to be able to walk a little on her own  Short Term Goals  Time Frame for Short Term Goals: Prior to hospital d/c  Short Term Goal 1: Pt will perform rolling L <> R with Pepito and use of bed railing for repositioning in bed  Short Term Goal 2: Pt will perform supine <> sitting EOB via log roll method with use of bed railing and Pepito to get in and out of bed  Short Term Goal 3: Pt will tolerate sitting EOB 20min or greater for improved posturing, and tolerance to upright positioning  Short Term Goal 4: Pt will demo ability to direct care during mobilization and repositioning, 90% of the time to direct care.  Long Term Goals  Time Frame for Long Term Goals : NA due to short ELOS    Following session, patient left supine in bed with bed alarm activated. Call light and bedside table are within reach.

## 2025-05-20 NOTE — PROGRESS NOTES
Comprehensive Nutrition Assessment    Type and Reason for Visit:  Reassess (J Tube Enteral Feeding)    Nutrition Recommendations/Plan:   Switch to all day continuous feeding - with N/V 2/2 esophageal stent but nocturnal feeding paused d/t N/V last night. Should not be related to tube feeding but will modify to 24 hour feed to decrease rate.   [employer] 1.2 Glucose Support at 55 mL/hr x 24 hours per day.   Pour 440 mL of [employer] 1.2 Glucose Support in tube feeding bag. This feeding system is \"open\" and can only hang for 8 hours at a time. Feeding set must be changed q 8 hours (a total of 3x/day). Plan to use a total of ~5.25 cartons of formula/day.   Additional free H2O per provider - recommend additional 125 mL q 4 hours if no IVF  Family to bring in home tube feeding formula as [employer] 1.2 Glucose Support not on [facility] formulary.   Pending SNF placement - pt indicates feeling scared that she is not able to maintain her health at home  Tube feeding supplies are delivered to home from [facility]     Malnutrition Assessment:  Malnutrition Status:  Severe malnutrition (05/20/25 1248)    Context:  Chronic Illness     Findings of the 6 clinical characteristics of malnutrition:  Energy Intake:   (pt states she was sometimes not getting entire cycle of TF at SNF)  Weight Loss:  Greater than 10% over 6 months     Body Fat Loss:  Mild body fat loss Orbital, Buccal region, Triceps   Muscle Mass Loss:  Mild muscle mass loss Temples (temporalis), Clavicles (pectoralis & deltoids), Scapula (trapezius)  Fluid Accumulation:  No fluid accumulation     Strength:  Not Performed    Nutrition Assessment:     Pt. severely malnourished AEB criteria listed above.  At risk for further nutritional compromise r/t admit with generalized weakness -  unable to care for pt at home - discharged from SNF x1 day, N/V 2/2 esophageal stent, and underlying medical condition (PMHx: COPD, T2DM, esophageal cancer - s/p esophagectomy,  weight and unsure of how much; per EMR: 4/20/23: 184# 1 oz - standing scale, 3/31/24: 165# 10 oz, 12/10/24: 153# 4 oz)        Weight Adjustment For: No Adjustment                 BMI Categories: Normal Weight (BMI 18.5-24.9)    Estimated Daily Nutrient Needs:  Energy Requirements Based On: Kcal/kg  Weight Used for Energy Requirements: Current (59.4 kg)  Energy (kcal/day): 5443-0455 kcals (25-30 kcals/kg)  Weight Used for Protein Requirements: Current (59.4 kg)  Protein (g/day): 77-89 grams (1.3-1.5 grams/kg)  Method Used for Fluid Requirements: 1 ml/kcal  Fluid (ml/day): 8263-6797 mL (1 mL/kcal)    Nutrition Diagnosis:   Severe malnutrition, in context of chronic illness related to other (inadequate enteral nutrition intake; N/V) as evidenced by criteria as identified in malnutrition assessment    Nutrition Interventions:   Food and/or Nutrient Delivery: Continue Current Diet, Modify Tube Feeding  Nutrition Education/Counseling: Education/Counseling initiated  Coordination of Nutrition Care: Continue to monitor while inpatient       Goals:  Goals: by next RD assessment, Initiate nutrition support, Tolerate nutrition support at goal rate  Type of Goal: Continue current goal  Previous Goal Met: Progressing toward Goal(s)    Nutrition Monitoring and Evaluation:      Food/Nutrient Intake Outcomes: Progression of Nutrition, Enteral Nutrition Intake/Tolerance  Physical Signs/Symptoms Outcomes: Biochemical Data, GI Status, Fluid Status or Edema, Nutrition Focused Physical Findings, Skin, Weight    Discharge Planning:    Enteral Nutrition     Branden Mcdonough RD, LD  Contact: [phone]

## 2025-05-20 NOTE — PROGRESS NOTES
[facility]  INPATIENT OCCUPATIONAL THERAPY  STRZ RENAL TELEMETRY 6K  EVALUATION      Discharge Recommendations: Continue to assess pending progress, Subacute/Skilled Nursing Facility, 24 hour supervision or assist  Equipment Recommendations: Yes Faye lift, Hospital bed?      Time In: 1117  Time Out: 1159  Timed Code Treatment Minutes: 34 Minutes  Minutes: 42        Co-tx with PT d/t decreased activity tolerance and pt requiring +2 skilled assist.     Date: 2025  Patient Name: Susan K Sturgess Meyers,   Gender: female      MRN: [id number]  : 1965  (60 y.o.)  Referring Practitioner: Juan Gautam MD  Diagnosis: Generalized weakness  Additional Pertinent Hx: Per EMR: \"60-year-old female with past medical history of COPD, atrial fibrillation, type 2 diabetes, hyponatremia, anemia, esophageal adenocarcinoma, thoracic cord compression, vertebral osteomyelitis, allergic rhinitis, sacral decubitus ulcer, ovarian cancer, and physical deconditioning who presented to [facility] 1 day after being discharged from SNF in [place] due to not being able to take care of herself at home and due to weakness.  Patient reports weakness in the lower extremities is worse than in the upper extremities and has been ongoing, did not improve with PT OT at SNF, per patient she required additional treatment however was declined.  In the ED CXR noted mild atelectasis versus pneumonia in the inferior medial aspect of the right lung base.  UA noted leukocyte esterases urine culture with preliminary nonfermenting gram-negative bacilli. Patient has a catheter.  She was started on cefepime.  Legionella and strep antigens ordered. Dietitian was consulted for management of J-tube feeds.  Social work was consulted for placement.\"    Restrictions/Precautions:  Restrictions/Precautions: NPO, Fall Risk, General Precautions  Position Activity Restriction  Other Position/Activity Restrictions: J-tube, sacral wound    Subjective  Chart

## 2025-05-20 NOTE — PROGRESS NOTES
PROGRESS NOTE      Patient:  Susan K Sturgess Meyers  Unit/Bed:6K-28/028-A  YOB: 1965  MRN: [id number]   Acct: [id number]    PCP: Torri George, APRN - CNP    Date of Admission: 5/17/2025 LOS: 3    Date of Evaluation:  5/20/2025    Anticipated Discharge: Pending clinical course    Assessment/Plan:    Generalized weakness.  Chronic weakness 2/2 thoracic cord compression and esophageal adenocarcinoma.  Patient presented due to being unable to take care of herself at home after returning home from SNF in [place] on 5/16.  Noted to have a UTI in the setting of urinary retention with Abreu catheter-fully replaced on admission and antibiotics started, per below.  Also noted to likely have a pneumonia, being treated with cefepime.  Dietitian consulted for J-tube feeding management.    Social work consulted for SNF placement.  Consider palliative care consult.  Patient was seeing palliative care in [place].    Complicated UTI. In the setting of urinary retention with Abreu catheter.  Abreu exchanged on admission. UA on admission with large leukocyte esterase.  Nitrites and bacteria were negative.  Patient was started on cefepime on admission.   Continue cefepime started on 5/17 for a total of 5 days.  On 5/19/2025 urine culture resulted in Pseudomonas aeruginosa susceptible to cefepime  Legionella antigen and strep pneumo antigen negative resulted on 5/19/2025    Mild atelectasis vs pneumonia in the inferior medial aspect of the right lung base.  Noted on CXR 5/17.  Procalcitonin of 0.19 indicates a low likelihood of bacterial infection.  Afebrile and WBC WNL.  Not requiring supplemental oxygen.  Cefepime started on 5/17, continue treatment.  Continue incentive spirometer and Acapella.  Continue COPD home meds per below.    COPD.  PFTs available per chart review.  Not currently requiring supplemental O2.  Continue Proventil, Spiriva Symbicort.     History of SVT.  Per chart review noted to be due  study.   2. Mild atelectasis/pneumonia inferomedial aspect right lung base.            **This report has been created using voice recognition software.  It may contain   minor errors which are inherent in voice recognition technology.**            Electronically signed by Dr. Leonard Oakley          Diet: Diet NPO  ADULT TUBE FEEDING; Jejunostomy; Other Tube Feeding (specify); [employer] 1.2 Glucose Support; Supplied from home; Cyclic; 75; 6:00 PM; 10:00 AM; 30; Q 4 hours    Microbiology: yes -urine culture shows Pseudomonas sensitive to cefepime  Antibiotics: yes -cefepime    Steroids: no    Telemetry: []Yes / [x]No    LDA: []CVC / []PICC / []Midline / [x]Abreu / []Drains / []Mediport / [x]PIV / [x] J-tube    Labs (still needed?): [x]Yes / []No  IVF (still needed?): []Yes / [x]No    Level of care: [x]Step Down / []Med-Surg  Bed Status: [x]Inpatient / []Observation    DVT Prophylaxis: [x] Lovenox / [] Heparin / [] SCDs / [] Already on Systemic Anticoagulation / [] None     PT/OT: [x]Yes / []No    Disposition:    [] Home       [] TCU       [] Rehab       [] Psych       [x] SNF       [] Long Term Care Facility       [x] Other-pending insurance coverage    Code Status: Full Code      An electronic signature was used to authenticate this note  - Karen Bradley MD PGY-1 on 5/20/2025 at 10:41 AM

## 2025-05-20 NOTE — CARE COORDINATION
5/20/25, 11:51 AM EDT    DISCHARGE PLANNING EVALUATION    Follow up with pt this morning regarding ECF choices.  Pt stated that she and her spouse would like [facility] in [place], this was the only facility that they \"trust\".    [facility] contacted [facility], left message for admissions to call [facility] back.

## 2025-05-21 LAB
ANION GAP SERPL CALC-SCNC: 10 MEQ/L (ref 8–16)
BASOPHILS ABSOLUTE: 0.1 THOU/MM3 (ref 0–0.1)
BASOPHILS NFR BLD AUTO: 0.8 %
BUN SERPL-MCNC: 25 MG/DL (ref 8–23)
CALCIUM SERPL-MCNC: 9.5 MG/DL (ref 8.8–10.2)
CHLORIDE SERPL-SCNC: 99 MEQ/L (ref 98–111)
CO2 SERPL-SCNC: 23 MEQ/L (ref 22–29)
CREAT SERPL-MCNC: 0.8 MG/DL (ref 0.5–0.9)
DEPRECATED RDW RBC AUTO: 53.9 FL (ref 35–45)
EOSINOPHIL NFR BLD AUTO: 2.8 %
EOSINOPHILS ABSOLUTE: 0.2 THOU/MM3 (ref 0–0.4)
ERYTHROCYTE [DISTWIDTH] IN BLOOD BY AUTOMATED COUNT: 17.5 % (ref 11.5–14.5)
GFR SERPL CREATININE-BSD FRML MDRD: 84 ML/MIN/1.73M2
GLUCOSE BLD STRIP.AUTO-MCNC: 167 MG/DL (ref 70–108)
GLUCOSE BLD STRIP.AUTO-MCNC: 174 MG/DL (ref 70–108)
GLUCOSE BLD STRIP.AUTO-MCNC: 189 MG/DL (ref 70–108)
GLUCOSE BLD STRIP.AUTO-MCNC: 202 MG/DL (ref 70–108)
GLUCOSE BLD STRIP.AUTO-MCNC: 216 MG/DL (ref 70–108)
GLUCOSE BLD STRIP.AUTO-MCNC: 232 MG/DL (ref 70–108)
GLUCOSE SERPL-MCNC: 193 MG/DL (ref 74–109)
HCT VFR BLD AUTO: 34.6 % (ref 37–47)
HGB BLD-MCNC: 10.5 GM/DL (ref 12–16)
IMM GRANULOCYTES # BLD AUTO: 0.1 THOU/MM3 (ref 0–0.07)
IMM GRANULOCYTES NFR BLD AUTO: 1.3 %
LYMPHOCYTES ABSOLUTE: 0.6 THOU/MM3 (ref 1–4.8)
LYMPHOCYTES NFR BLD AUTO: 7.9 %
MCH RBC QN AUTO: 26.1 PG (ref 26–33)
MCHC RBC AUTO-ENTMCNC: 30.3 GM/DL (ref 32.2–35.5)
MCV RBC AUTO: 86.1 FL (ref 81–99)
MONOCYTES ABSOLUTE: 0.9 THOU/MM3 (ref 0.4–1.3)
MONOCYTES NFR BLD AUTO: 11.7 %
NEUTROPHILS ABSOLUTE: 5.9 THOU/MM3 (ref 1.8–7.7)
NEUTROPHILS NFR BLD AUTO: 75.5 %
NRBC BLD AUTO-RTO: 0 /100 WBC
PLATELET # BLD AUTO: 354 THOU/MM3 (ref 130–400)
PMV BLD AUTO: 9.4 FL (ref 9.4–12.4)
POTASSIUM SERPL-SCNC: 4.3 MEQ/L (ref 3.5–5.2)
RBC # BLD AUTO: 4.02 MILL/MM3 (ref 4.2–5.4)
SODIUM SERPL-SCNC: 132 MEQ/L (ref 135–145)
WBC # BLD AUTO: 7.8 THOU/MM3 (ref 4.8–10.8)

## 2025-05-21 PROCEDURE — 80048 BASIC METABOLIC PNL TOTAL CA: CPT

## 2025-05-21 PROCEDURE — 94761 N-INVAS EAR/PLS OXIMETRY MLT: CPT

## 2025-05-21 PROCEDURE — 1200000003 HC TELEMETRY R&B

## 2025-05-21 PROCEDURE — 85025 COMPLETE CBC W/AUTO DIFF WBC: CPT

## 2025-05-21 PROCEDURE — 2580000003 HC RX 258

## 2025-05-21 PROCEDURE — 82948 REAGENT STRIP/BLOOD GLUCOSE: CPT

## 2025-05-21 PROCEDURE — 6360000002 HC RX W HCPCS

## 2025-05-21 PROCEDURE — 6360000002 HC RX W HCPCS: Performed by: INTERNAL MEDICINE

## 2025-05-21 PROCEDURE — 94669 MECHANICAL CHEST WALL OSCILL: CPT

## 2025-05-21 PROCEDURE — 6370000000 HC RX 637 (ALT 250 FOR IP): Performed by: INTERNAL MEDICINE

## 2025-05-21 PROCEDURE — 2500000003 HC RX 250 WO HCPCS: Performed by: INTERNAL MEDICINE

## 2025-05-21 PROCEDURE — 94640 AIRWAY INHALATION TREATMENT: CPT

## 2025-05-21 PROCEDURE — 6370000000 HC RX 637 (ALT 250 FOR IP)

## 2025-05-21 PROCEDURE — 2580000003 HC RX 258: Performed by: INTERNAL MEDICINE

## 2025-05-21 PROCEDURE — 6370000000 HC RX 637 (ALT 250 FOR IP): Performed by: STUDENT IN AN ORGANIZED HEALTH CARE EDUCATION/TRAINING PROGRAM

## 2025-05-21 PROCEDURE — 36415 COLL VENOUS BLD VENIPUNCTURE: CPT

## 2025-05-21 PROCEDURE — 1200000000 HC SEMI PRIVATE

## 2025-05-21 PROCEDURE — 99233 SBSQ HOSP IP/OBS HIGH 50: CPT | Performed by: FAMILY MEDICINE

## 2025-05-21 RX ORDER — ALBUTEROL SULFATE 90 UG/1
2 INHALANT RESPIRATORY (INHALATION) EVERY 6 HOURS
Status: DISCONTINUED | OUTPATIENT
Start: 2025-05-21 | End: 2025-05-21

## 2025-05-21 RX ORDER — ALBUTEROL SULFATE 90 UG/1
2 INHALANT RESPIRATORY (INHALATION) 3 TIMES DAILY
Status: DISCONTINUED | OUTPATIENT
Start: 2025-05-21 | End: 2025-05-21

## 2025-05-21 RX ORDER — FLUTICASONE PROPIONATE 50 MCG
2 SPRAY, SUSPENSION (ML) NASAL 2 TIMES DAILY
Status: DISCONTINUED | OUTPATIENT
Start: 2025-05-21 | End: 2025-06-05 | Stop reason: HOSPADM

## 2025-05-21 RX ORDER — ALBUTEROL SULFATE 90 UG/1
2 INHALANT RESPIRATORY (INHALATION)
Status: DISCONTINUED | OUTPATIENT
Start: 2025-05-22 | End: 2025-05-30

## 2025-05-21 RX ORDER — ALBUTEROL SULFATE 0.83 MG/ML
2.5 SOLUTION RESPIRATORY (INHALATION) EVERY 4 HOURS PRN
Status: DISCONTINUED | OUTPATIENT
Start: 2025-05-21 | End: 2025-06-05 | Stop reason: HOSPADM

## 2025-05-21 RX ADMIN — CEFEPIME 2000 MG: 2 INJECTION, POWDER, FOR SOLUTION INTRAVENOUS at 18:11

## 2025-05-21 RX ADMIN — INSULIN LISPRO 2 UNITS: 100 INJECTION, SOLUTION INTRAVENOUS; SUBCUTANEOUS at 06:19

## 2025-05-21 RX ADMIN — ENOXAPARIN SODIUM 40 MG: 100 INJECTION SUBCUTANEOUS at 18:04

## 2025-05-21 RX ADMIN — ONDANSETRON 4 MG: 4 TABLET, ORALLY DISINTEGRATING ORAL at 16:38

## 2025-05-21 RX ADMIN — BUDESONIDE AND FORMOTEROL FUMARATE DIHYDRATE 2 PUFF: 80; 4.5 AEROSOL RESPIRATORY (INHALATION) at 20:05

## 2025-05-21 RX ADMIN — OXYCODONE 10 MG: 5 TABLET ORAL at 16:38

## 2025-05-21 RX ADMIN — CETIRIZINE HYDROCHLORIDE 10 MG: 10 TABLET, FILM COATED ORAL at 07:57

## 2025-05-21 RX ADMIN — ALBUTEROL SULFATE 2 PUFF: 90 AEROSOL, METERED RESPIRATORY (INHALATION) at 08:25

## 2025-05-21 RX ADMIN — INSULIN LISPRO 2 UNITS: 100 INJECTION, SOLUTION INTRAVENOUS; SUBCUTANEOUS at 22:52

## 2025-05-21 RX ADMIN — INSULIN LISPRO 2 UNITS: 100 INJECTION, SOLUTION INTRAVENOUS; SUBCUTANEOUS at 14:19

## 2025-05-21 RX ADMIN — ALBUTEROL SULFATE 2 PUFF: 90 AEROSOL, METERED RESPIRATORY (INHALATION) at 20:05

## 2025-05-21 RX ADMIN — ONDANSETRON 4 MG: 2 INJECTION, SOLUTION INTRAMUSCULAR; INTRAVENOUS at 10:21

## 2025-05-21 RX ADMIN — GUAIFENESIN 200 MG: 200 SOLUTION ORAL at 16:38

## 2025-05-21 RX ADMIN — ALBUTEROL SULFATE 2 PUFF: 90 AEROSOL, METERED RESPIRATORY (INHALATION) at 14:08

## 2025-05-21 RX ADMIN — GUAIFENESIN 200 MG: 200 SOLUTION ORAL at 10:35

## 2025-05-21 RX ADMIN — CEFEPIME 2000 MG: 2 INJECTION, POWDER, FOR SOLUTION INTRAVENOUS at 06:14

## 2025-05-21 RX ADMIN — INSULIN LISPRO 2 UNITS: 100 INJECTION, SOLUTION INTRAVENOUS; SUBCUTANEOUS at 10:35

## 2025-05-21 RX ADMIN — FAMOTIDINE 40 MG: 20 TABLET, FILM COATED ORAL at 07:57

## 2025-05-21 RX ADMIN — OXYCODONE 10 MG: 5 TABLET ORAL at 20:58

## 2025-05-21 RX ADMIN — OXYCODONE 10 MG: 5 TABLET ORAL at 07:57

## 2025-05-21 RX ADMIN — BUDESONIDE AND FORMOTEROL FUMARATE DIHYDRATE 2 PUFF: 80; 4.5 AEROSOL RESPIRATORY (INHALATION) at 08:25

## 2025-05-21 RX ADMIN — TRAZODONE HYDROCHLORIDE 25 MG: 50 TABLET ORAL at 20:59

## 2025-05-21 RX ADMIN — TIOTROPIUM BROMIDE INHALATION SPRAY 2 PUFF: 3.12 SPRAY, METERED RESPIRATORY (INHALATION) at 08:25

## 2025-05-21 ASSESSMENT — PAIN SCALES - GENERAL
PAINLEVEL_OUTOF10: 0
PAINLEVEL_OUTOF10: 0
PAINLEVEL_OUTOF10: 8
PAINLEVEL_OUTOF10: 0

## 2025-05-21 ASSESSMENT — PAIN DESCRIPTION - LOCATION: LOCATION: BUTTOCKS;COCCYX

## 2025-05-21 ASSESSMENT — PAIN SCALES - WONG BAKER
WONGBAKER_NUMERICALRESPONSE: NO HURT
WONGBAKER_NUMERICALRESPONSE: NO HURT

## 2025-05-21 ASSESSMENT — PAIN DESCRIPTION - DESCRIPTORS: DESCRIPTORS: ACHING;SHARP

## 2025-05-21 NOTE — PROGRESS NOTES
Pt was in bed with her eyes closed but was awoke. She was dealing with generalized weakness. She was encouraged and blessed.    05/21/25 1237   Encounter Summary   Encounter Overview/Reason Initial Encounter   Service Provided For Patient   Referral/Consult From [facility]   Support System Family members   Last Encounter  05/21/25   Complexity of Encounter Low   Begin Time 0820   End Time  0826   Total Time Calculated 6 min   Spiritual/Emotional needs   Type Spiritual Support   Assessment/Intervention/Outcome   Assessment Anxious   Intervention Empowerment   Outcome Encouraged

## 2025-05-21 NOTE — PROGRESS NOTES
PROGRESS NOTE      Patient:  Susan K Sturgess Meyers  Unit/Bed:6K-28/028-A  YOB: 1965  MRN: [id number]   Acct: [id number]    PCP: Torri George, APRN - CNP    Date of Admission: 5/17/2025 LOS: 4    Date of Evaluation:  5/21/2025    Anticipated Discharge: Pending disposition    Assessment/Plan:    Generalized weakness, chronic BLE weakness -- chronic weakness due to medical conditions as mentioned below + pseudomonal UTI, she also has a history of esophageal adenocarcinoma plus thoracic cord compression/discitis.  Patient initially presented to [facility] <24 hrs as she was unable to take care of herself after returning home from SNF in [place] on 5/16.  Antibiotics for her pseudomonal UTI as mentioned below.  Continue J-tube feeds per recommendations of dietitian.  Palliative eval was completed for goals of care discussion.  Patient would like to remain a full code and continue PT/OT and return to SNF.  Pseudomonal UTI, POA due to chronic indwelling Abreu catheter: Chronic Abreu that was kinked on arrival and was exchanged on 5/17/2025.  Urine culture grew Pseudomonas aeruginosa that was susceptible to cefepime.  Continue cefepime for total of 7 days started on 5/17.  Legionella + strep pneumo antigen were negative.  Continue Abreu care -- f/u outpt urology for further mgmt  Mild atelectasis versus PNA in the inferior medial aspect of the right lung, chronic cough: Seen on chest x-ray completed on 5/17/2025, which showed normal heart size with aortic endograft that is present in the descending thoracic aorta.  Also shows mild atelectasis/pneumonia inferior medial aspect of right lung base.  Has been receiving cefepime as mentioned above.  Did not requiring oxygen during hospitalization => continue pulmonary hygiene and RT therapy with BD treatments per protocol  COPD, not in exacerbation: No PFT to review, has wheezing on physical examination on 5/21/2025 will adjust albuterol to every 6 hours.

## 2025-05-21 NOTE — PALLIATIVE CARE
Initial Evaluation        Patient:   Susan K Sturgess Meyers  YOB: 1965  Age:  60 y.o.  Room:  [facility]28/028-  MRN:  [id number]   Acct: [id number]    Date of Admission:  5/17/2025  9:09 AM  Date of Service:  5/21/2025  Completed By:  Keara Desir RN        Reason for Palliative Care Evaluation:-   Goals of Care     Current Concerns   pain, fatigue, nausea, and vomiting     Palliative Performance Scale   40%  Mainly in bed; Extensive disease; Mainly assist; intake normal or reduced; LOC full/confusion       Goals of Care Discussions and Plan         Advance Care Planning   Goals of Care/Advance Care Planning (ACP) Conversation    Date of Conversation: 05/21/25    Individuals present for the conversation: Patient with decision making capacity     ACP documents on file prior to discussion:  -Power of  for Healthcare    Healthcare Power of /Healthcare Surrogate Decision Makers:  Advance Care Planning   Healthcare Decision Maker:    Primary Decision Maker: SturgemohanKarenJef ortiz - Spouse - [phone]    Secondary Decision Maker: Sturgess Meyers,Robert - Child - [phone]    Conversation Summary: Patient resting in bed, emesis bag on her lap. Discussed current admission. Patient states she came in to the hospital due to a clogged catheter. Her catheter was exchanged. Discussed plan of care. Patient not seeming overly interested in participating in conversation. Patient shares that she feels her symptoms of nausea and pain are well controlled with current medications. Patient hopes to be discharged to [facility]. Discussed current code status. Patient shares she has already discussed this, requests to remain a FULL code. Patient is frustrated with how long her recovery has been taking. Discussed importance of continuing to work with therapy. Patient shares she was able to sit up on the edge of the bed today. Patient started vomiting, denied further questions.     Treatment  Limitations: They would want to attempt to sustain life by all medically effective means. This includes providing appropriate medical and surgical treatments as indicated to attempt to prolong life, including intensive care, mechanical ventilation and CPR. This is consistent with a code status of full code.    Resuscitation Status: Full Code No additional code details    Documentation Completed:  -No new documents completed.    Plan/Follow-Up:  Patient requested to remain FULL code. Would like to speak with LSW to discuss placement. Will follow PRN, please call if further needs arise.     I spent 20 minutes with the patient and/or surrogate decision maker discussing the patient's wishes and goals.      Keara Desir, HARRY              Electronically signed by Keara Desir RN on 5/21/2025 at 12:54 PM           Palliative Care Office: [phone]

## 2025-05-21 NOTE — PLAN OF CARE
Problem: Chronic Conditions and Co-morbidities  Goal: Patient's chronic conditions and co-morbidity symptoms are monitored and maintained or improved  Outcome: Progressing  Flowsheets (Taken 5/21/2025 0314)  Care Plan - Patient's Chronic Conditions and Co-Morbidity Symptoms are Monitored and Maintained or Improved:   Monitor and assess patient's chronic conditions and comorbid symptoms for stability, deterioration, or improvement   Collaborate with multidisciplinary team to address chronic and comorbid conditions and prevent exacerbation or deterioration   Update acute care plan with appropriate goals if chronic or comorbid symptoms are exacerbated and prevent overall improvement and discharge     Problem: Discharge Planning  Goal: Discharge to home or other facility with appropriate resources  Outcome: Progressing  Flowsheets (Taken 5/21/2025 0314)  Discharge to home or other facility with appropriate resources:   Identify barriers to discharge with patient and caregiver   Identify discharge learning needs (meds, wound care, etc)   Refer to discharge planning if patient needs post-hospital services based on physician order or complex needs related to functional status, cognitive ability or social support system     Problem: Safety - Adult  Goal: Free from fall injury  Outcome: Progressing  Flowsheets (Taken 5/21/2025 0314)  Free From Fall Injury:   Instruct family/caregiver on patient safety   Based on caregiver fall risk screen, instruct family/caregiver to ask for assistance with transferring infant if caregiver noted to have fall risk factors     Problem: Skin/Tissue Integrity  Goal: Skin integrity remains intact  Description: 1.  Monitor for areas of redness and/or skin breakdown2.  Assess vascular access sites hourly3.  Every 4-6 hours minimum:  Change oxygen saturation probe site4.  Every 4-6 hours:  If on nasal continuous positive airway pressure, respiratory therapy assess nares and determine need for  appliance change or resting period  Outcome: Progressing  Flowsheets (Taken 5/21/2025 0314)  Skin Integrity Remains Intact:   Monitor for areas of redness and/or skin breakdown   Every 4-6 hours:  If on nasal continuous positive airway pressure, assess nares and determine need for appliance change or resting period   Positioning devices     Problem: Nutrition Deficit:  Goal: Optimize nutritional status  Outcome: Progressing  Flowsheets (Taken 5/21/2025 0314)  Nutrient intake appropriate for improving, restoring, or maintaining nutritional needs:   Assess nutritional status and recommend course of action   Order, calculate, and assess calorie counts as needed   Provide specific nutrition education to patient or family as appropriate     Problem: Respiratory - Adult  Goal: Clear lung sounds  Outcome: Progressing     Problem: Pain  Goal: Verbalizes/displays adequate comfort level or baseline comfort level  Outcome: Progressing  Flowsheets (Taken 5/21/2025 0314)  Verbalizes/displays adequate comfort level or baseline comfort level:   Encourage patient to monitor pain and request assistance   Administer analgesics based on type and severity of pain and evaluate response   Consider cultural and social influences on pain and pain management

## 2025-05-21 NOTE — CARE COORDINATION
5/21/25, 9:36 AM EDT    DISCHARGE ON GOING EVALUATION    [facility] day: [facility]  Location: -28/028-A Reason for admit: Generalized weakness [R53.1]     Procedures: none    Imaging since last note: n/a     Barriers to Discharge: IV maxipime (day 5 of 7). Palliative care consult pending. SW having difficulty getting response from patient's ECF of choice.     PCP: Torri George, ADDISON - CNP  Readmission Risk Score: 13.7    Patient Goals/Plan/Treatment Preferences: From home with . Has [facility] and [facility] (will need new orders). Patient is requesting ECF placement at [facility].

## 2025-05-21 NOTE — PLAN OF CARE
Problem: Chronic Conditions and Co-morbidities  Goal: Patient's chronic conditions and co-morbidity symptoms are monitored and maintained or improved  5/21/2025 1600 by Monik Coned RN  Outcome: Progressing  Flowsheets (Taken 5/21/2025 1600)  Care Plan - Patient's Chronic Conditions and Co-Morbidity Symptoms are Monitored and Maintained or Improved:   Monitor and assess patient's chronic conditions and comorbid symptoms for stability, deterioration, or improvement   Collaborate with multidisciplinary team to address chronic and comorbid conditions and prevent exacerbation or deterioration     Problem: Discharge Planning  Goal: Discharge to home or other facility with appropriate resources  5/21/2025 1600 by Monik Conde RN  Outcome: Progressing  Flowsheets (Taken 5/21/2025 1600)  Discharge to home or other facility with appropriate resources:   Identify barriers to discharge with patient and caregiver   Identify discharge learning needs (meds, wound care, etc)     Problem: Safety - Adult  Goal: Free from fall injury  5/21/2025 1600 by Monik Conde RN  Outcome: Progressing  Flowsheets (Taken 5/21/2025 1600)  Free From Fall Injury:   Instruct family/caregiver on patient safety   Based on caregiver fall risk screen, instruct family/caregiver to ask for assistance with transferring infant if caregiver noted to have fall risk factors  Note: No falls noted this shift. Continue falling star program. Bed alarm on, bed in low position. Call light and personal belongings in reach.  Patient uses call light appropriately.      Problem: Skin/Tissue Integrity  Goal: Skin integrity remains intact  Description: 1.  Monitor for areas of redness and/or skin breakdown2.  Assess vascular access sites hourly3.  Every 4-6 hours minimum:  Change oxygen saturation probe site4.  Every 4-6 hours:  If on nasal continuous positive airway pressure, respiratory therapy assess nares and determine need for appliance change or resting

## 2025-05-21 NOTE — CARE COORDINATION
5/21/25, 10:11 AM EDT    DISCHARGE PLANNING EVALUATION    SW spoke with Christin, Admissions with [facility].  They are in network and do have beds.  Referral completed thru [facility].

## 2025-05-22 PROBLEM — L89.154 DECUBITUS ULCER OF COCCYX, STAGE IV (HCC): Status: ACTIVE | Noted: 2025-05-22

## 2025-05-22 PROBLEM — I50.32 CHRONIC DIASTOLIC CHF (CONGESTIVE HEART FAILURE) (HCC): Status: ACTIVE | Noted: 2025-05-22

## 2025-05-22 PROBLEM — Z98.890 H/O ESOPHAGECTOMY: Status: ACTIVE | Noted: 2025-05-22

## 2025-05-22 PROBLEM — R05.3 CHRONIC COUGH: Status: ACTIVE | Noted: 2025-05-22

## 2025-05-22 PROBLEM — N39.0 PSEUDOMONAS URINARY TRACT INFECTION: Status: ACTIVE | Noted: 2025-05-22

## 2025-05-22 PROBLEM — S24.2XXA: Status: ACTIVE | Noted: 2025-05-22

## 2025-05-22 PROBLEM — Z87.39 HISTORY OF DISCITIS: Status: ACTIVE | Noted: 2025-05-22

## 2025-05-22 PROBLEM — Z90.49 H/O ESOPHAGECTOMY: Status: ACTIVE | Noted: 2025-05-22

## 2025-05-22 PROBLEM — Z78.9 INABILITY TO PERFORM ACTIVITIES OF DAILY LIVING: Status: ACTIVE | Noted: 2025-05-22

## 2025-05-22 PROBLEM — Z86.711 HISTORY OF PULMONARY EMBOLISM: Status: ACTIVE | Noted: 2025-05-22

## 2025-05-22 PROBLEM — K21.9 GASTROESOPHAGEAL REFLUX DISEASE: Status: ACTIVE | Noted: 2025-05-22

## 2025-05-22 PROBLEM — B96.5 PSEUDOMONAS URINARY TRACT INFECTION: Status: ACTIVE | Noted: 2025-05-22

## 2025-05-22 PROBLEM — J98.11 ATELECTASIS OF RIGHT LUNG: Status: ACTIVE | Noted: 2025-05-22

## 2025-05-22 PROBLEM — R29.898 BILATERAL LEG WEAKNESS: Status: ACTIVE | Noted: 2025-05-22

## 2025-05-22 PROBLEM — T83.511A URINARY TRACT INFECTION ASSOCIATED WITH INDWELLING URETHRAL CATHETER: Status: ACTIVE | Noted: 2025-05-20

## 2025-05-22 LAB
ANION GAP SERPL CALC-SCNC: 9 MEQ/L (ref 8–16)
BASOPHILS ABSOLUTE: 0 THOU/MM3 (ref 0–0.1)
BASOPHILS NFR BLD AUTO: 0.5 %
BUN SERPL-MCNC: 23 MG/DL (ref 8–23)
CALCIUM SERPL-MCNC: 9.6 MG/DL (ref 8.8–10.2)
CHLORIDE SERPL-SCNC: 101 MEQ/L (ref 98–111)
CO2 SERPL-SCNC: 23 MEQ/L (ref 22–29)
CREAT SERPL-MCNC: 0.7 MG/DL (ref 0.5–0.9)
DEPRECATED RDW RBC AUTO: 53.4 FL (ref 35–45)
EOSINOPHIL NFR BLD AUTO: 2.4 %
EOSINOPHILS ABSOLUTE: 0.2 THOU/MM3 (ref 0–0.4)
ERYTHROCYTE [DISTWIDTH] IN BLOOD BY AUTOMATED COUNT: 17.5 % (ref 11.5–14.5)
GFR SERPL CREATININE-BSD FRML MDRD: > 90 ML/MIN/1.73M2
GLUCOSE BLD STRIP.AUTO-MCNC: 127 MG/DL (ref 70–108)
GLUCOSE BLD STRIP.AUTO-MCNC: 139 MG/DL (ref 70–108)
GLUCOSE BLD STRIP.AUTO-MCNC: 142 MG/DL (ref 70–108)
GLUCOSE BLD STRIP.AUTO-MCNC: 172 MG/DL (ref 70–108)
GLUCOSE BLD STRIP.AUTO-MCNC: 189 MG/DL (ref 70–108)
GLUCOSE BLD STRIP.AUTO-MCNC: 227 MG/DL (ref 70–108)
GLUCOSE SERPL-MCNC: 178 MG/DL (ref 74–109)
HCT VFR BLD AUTO: 31.1 % (ref 37–47)
HGB BLD-MCNC: 10 GM/DL (ref 12–16)
IMM GRANULOCYTES # BLD AUTO: 0.06 THOU/MM3 (ref 0–0.07)
IMM GRANULOCYTES NFR BLD AUTO: 0.7 %
LYMPHOCYTES ABSOLUTE: 0.6 THOU/MM3 (ref 1–4.8)
LYMPHOCYTES NFR BLD AUTO: 6.8 %
MCH RBC QN AUTO: 27.3 PG (ref 26–33)
MCHC RBC AUTO-ENTMCNC: 32.2 GM/DL (ref 32.2–35.5)
MCV RBC AUTO: 85 FL (ref 81–99)
MONOCYTES ABSOLUTE: 0.9 THOU/MM3 (ref 0.4–1.3)
MONOCYTES NFR BLD AUTO: 10.9 %
NEUTROPHILS ABSOLUTE: 6.8 THOU/MM3 (ref 1.8–7.7)
NEUTROPHILS NFR BLD AUTO: 78.7 %
NRBC BLD AUTO-RTO: 0 /100 WBC
PLATELET # BLD AUTO: 331 THOU/MM3 (ref 130–400)
PMV BLD AUTO: 9.3 FL (ref 9.4–12.4)
POTASSIUM SERPL-SCNC: 4.3 MEQ/L (ref 3.5–5.2)
RBC # BLD AUTO: 3.66 MILL/MM3 (ref 4.2–5.4)
SODIUM SERPL-SCNC: 133 MEQ/L (ref 135–145)
WBC # BLD AUTO: 8.6 THOU/MM3 (ref 4.8–10.8)

## 2025-05-22 PROCEDURE — 6370000000 HC RX 637 (ALT 250 FOR IP)

## 2025-05-22 PROCEDURE — 80048 BASIC METABOLIC PNL TOTAL CA: CPT

## 2025-05-22 PROCEDURE — 1200000000 HC SEMI PRIVATE

## 2025-05-22 PROCEDURE — 6360000002 HC RX W HCPCS

## 2025-05-22 PROCEDURE — 97530 THERAPEUTIC ACTIVITIES: CPT

## 2025-05-22 PROCEDURE — 6370000000 HC RX 637 (ALT 250 FOR IP): Performed by: STUDENT IN AN ORGANIZED HEALTH CARE EDUCATION/TRAINING PROGRAM

## 2025-05-22 PROCEDURE — 94669 MECHANICAL CHEST WALL OSCILL: CPT

## 2025-05-22 PROCEDURE — 85025 COMPLETE CBC W/AUTO DIFF WBC: CPT

## 2025-05-22 PROCEDURE — 94640 AIRWAY INHALATION TREATMENT: CPT

## 2025-05-22 PROCEDURE — 36415 COLL VENOUS BLD VENIPUNCTURE: CPT

## 2025-05-22 PROCEDURE — 97535 SELF CARE MNGMENT TRAINING: CPT

## 2025-05-22 PROCEDURE — 2580000003 HC RX 258

## 2025-05-22 PROCEDURE — 99232 SBSQ HOSP IP/OBS MODERATE 35: CPT | Performed by: FAMILY MEDICINE

## 2025-05-22 PROCEDURE — 82948 REAGENT STRIP/BLOOD GLUCOSE: CPT

## 2025-05-22 PROCEDURE — 6370000000 HC RX 637 (ALT 250 FOR IP): Performed by: INTERNAL MEDICINE

## 2025-05-22 PROCEDURE — 97110 THERAPEUTIC EXERCISES: CPT

## 2025-05-22 PROCEDURE — 2500000003 HC RX 250 WO HCPCS: Performed by: INTERNAL MEDICINE

## 2025-05-22 PROCEDURE — 6360000002 HC RX W HCPCS: Performed by: INTERNAL MEDICINE

## 2025-05-22 RX ADMIN — TIOTROPIUM BROMIDE INHALATION SPRAY 2 PUFF: 3.12 SPRAY, METERED RESPIRATORY (INHALATION) at 08:04

## 2025-05-22 RX ADMIN — GUAIFENESIN 200 MG: 200 SOLUTION ORAL at 09:01

## 2025-05-22 RX ADMIN — OXYCODONE 10 MG: 5 TABLET ORAL at 20:57

## 2025-05-22 RX ADMIN — PROMETHAZINE HYDROCHLORIDE 25 MG: 25 TABLET ORAL at 16:46

## 2025-05-22 RX ADMIN — INSULIN LISPRO 2 UNITS: 100 INJECTION, SOLUTION INTRAVENOUS; SUBCUTANEOUS at 09:01

## 2025-05-22 RX ADMIN — GUAIFENESIN 200 MG: 200 SOLUTION ORAL at 16:46

## 2025-05-22 RX ADMIN — GUAIFENESIN 200 MG: 200 SOLUTION ORAL at 20:58

## 2025-05-22 RX ADMIN — ENOXAPARIN SODIUM 40 MG: 100 INJECTION SUBCUTANEOUS at 17:42

## 2025-05-22 RX ADMIN — METOPROLOL TARTRATE 12.5 MG: 25 TABLET, FILM COATED ORAL at 20:58

## 2025-05-22 RX ADMIN — METOPROLOL TARTRATE 12.5 MG: 25 TABLET, FILM COATED ORAL at 09:02

## 2025-05-22 RX ADMIN — CEFEPIME 2000 MG: 2 INJECTION, POWDER, FOR SOLUTION INTRAVENOUS at 17:46

## 2025-05-22 RX ADMIN — CEFEPIME 2000 MG: 2 INJECTION, POWDER, FOR SOLUTION INTRAVENOUS at 06:22

## 2025-05-22 RX ADMIN — FLUTICASONE PROPIONATE 2 SPRAY: 50 SPRAY, METERED NASAL at 09:02

## 2025-05-22 RX ADMIN — OXYCODONE 10 MG: 5 TABLET ORAL at 16:46

## 2025-05-22 RX ADMIN — INSULIN LISPRO 2 UNITS: 100 INJECTION, SOLUTION INTRAVENOUS; SUBCUTANEOUS at 06:26

## 2025-05-22 RX ADMIN — OXYCODONE 10 MG: 5 TABLET ORAL at 00:52

## 2025-05-22 RX ADMIN — CETIRIZINE HYDROCHLORIDE 10 MG: 10 TABLET, FILM COATED ORAL at 09:01

## 2025-05-22 RX ADMIN — ALBUTEROL SULFATE 2 PUFF: 90 AEROSOL, METERED RESPIRATORY (INHALATION) at 20:52

## 2025-05-22 RX ADMIN — OXYCODONE 10 MG: 5 TABLET ORAL at 09:01

## 2025-05-22 RX ADMIN — GUAIFENESIN 200 MG: 200 SOLUTION ORAL at 00:52

## 2025-05-22 RX ADMIN — FLUTICASONE PROPIONATE 2 SPRAY: 50 SPRAY, METERED NASAL at 01:53

## 2025-05-22 RX ADMIN — ALBUTEROL SULFATE 2 PUFF: 90 AEROSOL, METERED RESPIRATORY (INHALATION) at 08:04

## 2025-05-22 RX ADMIN — ONDANSETRON 4 MG: 2 INJECTION, SOLUTION INTRAMUSCULAR; INTRAVENOUS at 00:52

## 2025-05-22 RX ADMIN — FAMOTIDINE 40 MG: 20 TABLET, FILM COATED ORAL at 09:01

## 2025-05-22 RX ADMIN — BUDESONIDE AND FORMOTEROL FUMARATE DIHYDRATE 2 PUFF: 80; 4.5 AEROSOL RESPIRATORY (INHALATION) at 20:52

## 2025-05-22 RX ADMIN — ONDANSETRON 4 MG: 4 TABLET, ORALLY DISINTEGRATING ORAL at 09:01

## 2025-05-22 ASSESSMENT — PAIN DESCRIPTION - DESCRIPTORS
DESCRIPTORS: ACHING
DESCRIPTORS: SHARP

## 2025-05-22 ASSESSMENT — PAIN DESCRIPTION - LOCATION
LOCATION: BACK;SACRUM
LOCATION: BUTTOCKS;COCCYX

## 2025-05-22 ASSESSMENT — PAIN DESCRIPTION - ORIENTATION: ORIENTATION: MID

## 2025-05-22 ASSESSMENT — PAIN SCALES - WONG BAKER: WONGBAKER_NUMERICALRESPONSE: NO HURT

## 2025-05-22 ASSESSMENT — PAIN SCALES - GENERAL
PAINLEVEL_OUTOF10: 9
PAINLEVEL_OUTOF10: 0
PAINLEVEL_OUTOF10: 0
PAINLEVEL_OUTOF10: 8

## 2025-05-22 ASSESSMENT — PAIN - FUNCTIONAL ASSESSMENT: PAIN_FUNCTIONAL_ASSESSMENT: ACTIVITIES ARE NOT PREVENTED

## 2025-05-22 NOTE — PROGRESS NOTES
PROGRESS NOTE      Patient:  Susan K Sturgess Meyers  Unit/Bed:6K-28/028-A  YOB: 1965  MRN: [id number]   Acct: [id number]    PCP: Torri George, APRN - CNP    Date of Admission: 5/17/2025 LOS: 5    Date of Evaluation:  5/22/2025    Anticipated Discharge: Pending disposition    Assessment/Plan:    Generalized weakness, chronic BLE weakness -- chronic weakness due to medical conditions as mentioned below + pseudomonal UTI, she also has a history of esophageal adenocarcinoma plus thoracic cord compression/discitis.  Patient initially presented to [facility] <24 hrs as she was unable to take care of herself after returning home from SNF in [place] on 5/16.  Antibiotics for her pseudomonal UTI as mentioned below.  Continue J-tube feeds per recommendations of dietitian.  Palliative eval was completed for goals of care discussion.  Patient would like to remain a full code and continue PT/OT and return to SNF.  Pseudomonal UTI, POA due to chronic indwelling Abreu catheter: Chronic Abreu that was kinked on arrival and was exchanged on 5/17/2025.  Urine culture grew Pseudomonas aeruginosa that was susceptible to cefepime.  Continue cefepime for total of 7 days started on 5/17.  Legionella + strep pneumo antigen were negative.  Continue Abreu care -- f/u outpt urology for further mgmt  Mild atelectasis versus PNA in the inferior medial aspect of the right lung, chronic cough: Seen on chest x-ray completed on 5/17/2025, which showed normal heart size with aortic endograft that is present in the descending thoracic aorta.  Also shows mild atelectasis/pneumonia inferior medial aspect of right lung base.  Has been receiving cefepime as mentioned above.  Did not requiring oxygen during hospitalization => continue pulmonary hygiene and RT therapy with BD treatments per protocol  COPD, not in exacerbation: No PFT to review, has wheezing on physical examination on 5/21/2025 will adjust albuterol to every 6 hours.   Continue Symbicort + Spiriva + Robitussin.  Patient should follow-up as outpatient to get formal PFTs.  Hx of SVT: Per chart review had 2 episodes noted at OSH with heart rates in the 200s, per chart review subsided with Valsalva + carotid massage. Continue home BB. Pt declined Tele  Hx of PE in 2024: Home medications do not include anticoagulation.  Continue Lovenox 40 mg every 24 hours for DVT/PE prophylaxis as high risk given immobility and hx cancer  Chronic diastolic heart failure: Last TTE on 4/1/2024 noted grade 1 diastolic dysfunction with normal LAP and normal EF.  Not on GDMT, not clinically fluid overloaded.  Did have a previous echo in December 2024 which showed EF 55 to 60% with normal diastolic dysfunction.  IDDM 2: Most recent A1c 6.7 on 5/17/2025 => continue medium dose sliding scale, hypoglycemic protocol in place  Hyponatremia, chronic: Likely secondary to nutritional status + G-tube feeds.  Sodium has been stable since admission, sodium of 132 on 5/21/2025.  Does have history of hyponatremia with sodium noted to be around 120.  Does have some hyperglycemia.  Continue to monitor with BMP  Normocytic anemia, chronic.  Had an iron panel in 2024 which was consistent with iron deficiency anemia, she also has history of esophageal adenocarcinoma and is on J-tube feeds.  H&H has been stable in 9-10's since admission on 5/17/2025.  Baseline hemoglobin around 9-10.  Monitor for signs and symptoms of blood loss.  Esophageal adenocarcinoma s/p esophagectomy at [facility] 12/5/23 complicated by esophageal fistula requiring multiple stent placements and esophageal endovac: S/p esophagectomy at [facility] 12/5/23, s/p neoadjuvant chemo and XRT.  Had esophageal stent + J tube placement.  EndoMaxx esophageal stent placed on 12/31/2024 and awaiting , dietitian following for tube feeds.  Follows outpatient at [facility].  Continue home medication oxycodone for pain relief.  Hx Ovarian cancer: per care everywhere Under surveillance

## 2025-05-22 NOTE — PLAN OF CARE
Problem: Respiratory - Adult  Goal: Clear lung sounds  5/21/2025 2010 by Marielos Kenney, RCP  Outcome: Progressing   Receiving MDI's to improve aeration and maintain Asthma. Will continue with therapies as ordered.    Patient mutually agreed on goals.

## 2025-05-22 NOTE — PLAN OF CARE
Problem: Chronic Conditions and Co-morbidities  Goal: Patient's chronic conditions and co-morbidity symptoms are monitored and maintained or improved  5/22/2025 0338 by Lauren Julien RN  Outcome: Progressing  5/21/2025 1600 by Monik Conde RN  Outcome: Progressing  Flowsheets (Taken 5/21/2025 1600)  Care Plan - Patient's Chronic Conditions and Co-Morbidity Symptoms are Monitored and Maintained or Improved:   Monitor and assess patient's chronic conditions and comorbid symptoms for stability, deterioration, or improvement   Collaborate with multidisciplinary team to address chronic and comorbid conditions and prevent exacerbation or deterioration     Problem: Discharge Planning  Goal: Discharge to home or other facility with appropriate resources  5/22/2025 0338 by Lauren Julien RN  Outcome: Progressing  5/21/2025 1600 by Monik Conde RN  Outcome: Progressing  Flowsheets (Taken 5/21/2025 1600)  Discharge to home or other facility with appropriate resources:   Identify barriers to discharge with patient and caregiver   Identify discharge learning needs (meds, wound care, etc)     Problem: Safety - Adult  Goal: Free from fall injury  5/22/2025 0338 by Lauren Julien RN  Outcome: Progressing  5/21/2025 1600 by Monik Conde RN  Outcome: Progressing  Flowsheets (Taken 5/21/2025 1600)  Free From Fall Injury:   Instruct family/caregiver on patient safety   Based on caregiver fall risk screen, instruct family/caregiver to ask for assistance with transferring infant if caregiver noted to have fall risk factors  Note: No falls noted this shift. Continue falling star program. Bed alarm on, bed in low position. Call light and personal belongings in reach.  Patient uses call light appropriately.      Problem: Skin/Tissue Integrity  Goal: Skin integrity remains intact  Description: 1.  Monitor for areas of redness and/or skin breakdown2.  Assess vascular access sites hourly3.  Every 4-6 hours minimum:  Change oxygen  saturation probe site4.  Every 4-6 hours:  If on nasal continuous positive airway pressure, respiratory therapy assess nares and determine need for appliance change or resting period  5/22/2025 0338 by Lauren Julien RN  Outcome: Progressing  5/21/2025 1600 by Monik Conde RN  Outcome: Progressing  Flowsheets (Taken 5/21/2025 1600)  Skin Integrity Remains Intact:   Monitor for areas of redness and/or skin breakdown   Assess vascular access sites hourly  Note: No new signs or symptoms of skin breakdown noted this shift, encouraging patient to turn and reposition self in bed q2h      Problem: Nutrition Deficit:  Goal: Optimize nutritional status  5/22/2025 0338 by Lauren Julien RN  Outcome: Progressing  5/21/2025 1600 by Monik Conde RN  Outcome: Progressing  Flowsheets (Taken 5/21/2025 1600)  Nutrient intake appropriate for improving, restoring, or maintaining nutritional needs: Assess nutritional status and recommend course of action     Problem: Respiratory - Adult  Goal: Clear lung sounds  5/22/2025 0338 by Lauren Julien RN  Outcome: Progressing  5/21/2025 2010 by Marielos Kenney, P  Outcome: Progressing  5/21/2025 1600 by Monik Conde RN  Outcome: Progressing     Problem: Pain  Goal: Verbalizes/displays adequate comfort level or baseline comfort level  5/22/2025 0338 by Lauren Julien RN  Outcome: Progressing  5/21/2025 1600 by Monik Conde RN  Outcome: Progressing  Flowsheets (Taken 5/21/2025 1600)  Verbalizes/displays adequate comfort level or baseline comfort level:   Encourage patient to monitor pain and request assistance   Administer analgesics based on type and severity of pain and evaluate response  Note: No complaint of pain voiced at this time.  Continue to monitor. PRN medications available if needed.

## 2025-05-22 NOTE — PROGRESS NOTES
[facility]  INPATIENT PHYSICAL THERAPY  DAILY NOTE  STRZ RENAL TELEMETRY 6K - 6K-28/028-A    Co-tx with OT due to medical complexity, pt requires mult staff assist (PT, OT) for mobility. Pt would not tolerate separate sessions.      Discharge Recommendations: Subacute/Skilled Nursing Facility  Equipment Recommendations: No  Defer to next facility            Time In: 1100  Time Out: 1153  Timed Code Treatment Minutes: 53 Minutes  Minutes: 53          Date: 2025  Patient Name: Susan K Sturgess Meyers,  Gender:  female        MRN: [id number]  : 1965  (60 y.o.)     Referring Practitioner: Juan Gautam MD  Diagnosis: Generalized weakness  Additional Pertinent Hx: Per EMR: \"60-year-old female with past medical history of COPD, atrial fibrillation, type 2 diabetes, hyponatremia, anemia, esophageal adenocarcinoma, thoracic cord compression, vertebral osteomyelitis, allergic rhinitis, sacral decubitus ulcer, ovarian cancer, and physical deconditioning who presented to [facility] 1 day after being discharged from SNF in [place] due to not being able to take care of herself at home and due to weakness.  Patient reports weakness in the lower extremities is worse than in the upper extremities and has been ongoing, did not improve with PT OT at SNF, per patient she required additional treatment however was declined.  In the ED CXR noted mild atelectasis versus pneumonia in the inferior medial aspect of the right lung base.  UA noted leukocyte esterases urine culture with preliminary nonfermenting gram-negative bacilli. Patient has a catheter.  She was started on cefepime.  Legionella and strep antigens ordered. Dietitian was consulted for management of J-tube feeds.  Social work was consulted for placement.\"     Prior Level of Function:  Lives With: Spouse, Son  Type of Home: House  Home Layout: Two level, Able to Live on Main level with bedroom/bathroom  Home Access: Stairs to enter without rails,

## 2025-05-22 NOTE — CARE COORDINATION
5/22/25, 1:48 PM EDT    DISCHARGE PLANNING EVALUATION    SW left message this morning and this afternoon for Christin, sofiya with [facility] requesting call back regarding referral.     2:09 PM update:  CRYSTAL received call from Lolly with [facility], they have denied pt.  Concerns for financial as they do NOT feel pt will be skilled under insurance.  Discussed attempting pre-cert to see if pt gets approved for short stay.  Advised that spouse is willing to complete medicaid application.  Also noted that spouse does plan to take pt home once insurance runs out.  Lolly still denied pt.      Pt and spouse requested referral to [facility] and  [facility].  Referrals completed, both facilities are out of network.    SW updated pt.  Advised her to discuss with spouse.   Discussed that the only plan may be for pt to return home with spouse and current [facility] with [facility] for Tube Feed.

## 2025-05-22 NOTE — PROGRESS NOTES
Physician Progress Note      PATIENT:               STURGESS MEYERS, SUSAN  [facility] #:                  [id number]  :                       1965  ADMIT DATE:       2025 9:09 AM  DISCH DATE:  RESPONDING  PROVIDER #:        Azalia Olivas MD          QUERY TEXT:    Please clarify the patient?s nutritional status:    The clinical indicators include:  Per dietitian's note on :  Malnutrition Status:  Severe malnutrition (25 1248)  Context:  Chronic Illness  Findings of the 6 clinical characteristics of malnutrition:  Energy Intake:   (pt states she was sometimes not getting entire cycle of TF   at SNF)  Weight Loss:  Greater than 10% over 6 months  Body Fat Loss:  Mild body fat loss Orbital, Buccal region, Triceps  Muscle Mass Loss:  Mild muscle mass loss Temples (temporalis), Clavicles   (pectoralis & deltoids), Scapula (trapezius)  Fluid Accumulation:  No fluid accumulation    Risk Factors: unable to care for self at home, weakness, vomiting, BMI 21.4  Treatment: continuous feeding via tube feed, dietitian consult  Options provided:  -- Protein calorie malnutrition severe  -- Other - I will add my own diagnosis  -- Disagree - Not applicable / Not valid  -- Disagree - Clinically unable to determine / Unknown  -- Refer to Clinical Documentation Reviewer    PROVIDER RESPONSE TEXT:    This patient has severe protein calorie malnutrition.    Query created by: Bridget Meeks on 2025 2:24 PM      Electronically signed by:  Azalia Olivas MD 2025 2:50 PM

## 2025-05-22 NOTE — PROGRESS NOTES
Maximum Assistance, X 2 Repositioning while EOB and supine- pt guarded with movements d/t increased pain at sacral wound.    TRANSFERS:  Sit to Stand:  Maximum Assistance, X 2, with Geri Stedy.   Stand to Sit: Maximum Assistance, X 2, with Geri Stedy.     FUNCTIONAL MOBILITY:  Assistive Device: Mobility Not Tested at this time secondary to: Decreased standing balance/tolerance during functional transfer training.    ADDITIONAL ACTIVITIES:  None this session.    Functional Outcome Measures:   AM-PAC Inpatient Daily Activity Raw Score: 11     Modified Rey:  Current Functional Status:  Not Applicable    Education:  Learners: Patient  ADL's, Energy Conservation, Importance of Increasing Activity, Fall Prevention, Pursed Lip Breathing, and Safety with transfers and mobility    ASSESSMENT:     Activity Tolerance:  Patient tolerance of  treatment: Fair treatment tolerance, Limited by fatigue, Reduced activity pace, and Need for increased rest breaks      Plan: Times Per Week: 5x  Times Per Day: Once a day  Current Treatment Recommendations: Strengthening, Balance training, Functional mobility training, Endurance training, Wheelchair mobility training, Patient/Caregiver education & training, Equipment evaluation, education, & procurement, Self-Care / ADL, Safety education & training, Pain management    Goals  Short Term Goals  Time Frame for Short Term Goals: Until discharge  Short Term Goal 1: Pt will complete BUE light resistive exercises with min vcs for technique to increase indep and endurance with all self cares and transfers.  Short Term Goal 2: Pt will complete dynamic sitting EOB x 25 minutes with SBA to increase indep and endurance with all self cares.  Short Term Goal 3: Pt will complete functional transfers to/from various surfaces with Mod A +2 to increase indep with toileting.  Short Term Goal 4: Pt will complete UB dressing with Set up to increase indep within home environment.    Following session,  patient left in safe position in bed, with alarm, and call light within reach

## 2025-05-22 NOTE — PLAN OF CARE
Problem: Respiratory - Adult  Goal: Clear lung sounds  5/22/2025 0813 by Jo Carvajal, KATHERINE  Outcome: Progressing      97

## 2025-05-23 LAB
ALBUMIN SERPL BCG-MCNC: 2.9 G/DL (ref 3.4–4.9)
ALP SERPL-CCNC: 170 U/L (ref 38–126)
ALT SERPL W/O P-5'-P-CCNC: 20 U/L (ref 10–35)
ANION GAP SERPL CALC-SCNC: 8 MEQ/L (ref 8–16)
AST SERPL-CCNC: 20 U/L (ref 10–35)
BASOPHILS ABSOLUTE: 0 THOU/MM3 (ref 0–0.1)
BASOPHILS NFR BLD AUTO: 0.7 %
BILIRUB CONJ SERPL-MCNC: < 0.1 MG/DL (ref 0–0.2)
BILIRUB SERPL-MCNC: < 0.2 MG/DL (ref 0.3–1.2)
BUN SERPL-MCNC: 26 MG/DL (ref 8–23)
CALCIUM SERPL-MCNC: 9.5 MG/DL (ref 8.8–10.2)
CHLORIDE SERPL-SCNC: 102 MEQ/L (ref 98–111)
CO2 SERPL-SCNC: 24 MEQ/L (ref 22–29)
CREAT SERPL-MCNC: 0.7 MG/DL (ref 0.5–0.9)
DEPRECATED RDW RBC AUTO: 55.9 FL (ref 35–45)
EOSINOPHIL NFR BLD AUTO: 3.6 %
EOSINOPHILS ABSOLUTE: 0.2 THOU/MM3 (ref 0–0.4)
ERYTHROCYTE [DISTWIDTH] IN BLOOD BY AUTOMATED COUNT: 17.8 % (ref 11.5–14.5)
GFR SERPL CREATININE-BSD FRML MDRD: > 90 ML/MIN/1.73M2
GLUCOSE BLD STRIP.AUTO-MCNC: 149 MG/DL (ref 70–108)
GLUCOSE BLD STRIP.AUTO-MCNC: 153 MG/DL (ref 70–108)
GLUCOSE BLD STRIP.AUTO-MCNC: 156 MG/DL (ref 70–108)
GLUCOSE BLD STRIP.AUTO-MCNC: 157 MG/DL (ref 70–108)
GLUCOSE BLD STRIP.AUTO-MCNC: 160 MG/DL (ref 70–108)
GLUCOSE BLD STRIP.AUTO-MCNC: 200 MG/DL (ref 70–108)
GLUCOSE SERPL-MCNC: 142 MG/DL (ref 74–109)
HCT VFR BLD AUTO: 31.6 % (ref 37–47)
HGB BLD-MCNC: 9.7 GM/DL (ref 12–16)
IMM GRANULOCYTES # BLD AUTO: 0.07 THOU/MM3 (ref 0–0.07)
IMM GRANULOCYTES NFR BLD AUTO: 1.2 %
LYMPHOCYTES ABSOLUTE: 0.7 THOU/MM3 (ref 1–4.8)
LYMPHOCYTES NFR BLD AUTO: 11.4 %
MCH RBC QN AUTO: 26.6 PG (ref 26–33)
MCHC RBC AUTO-ENTMCNC: 30.7 GM/DL (ref 32.2–35.5)
MCV RBC AUTO: 86.6 FL (ref 81–99)
MONOCYTES ABSOLUTE: 0.8 THOU/MM3 (ref 0.4–1.3)
MONOCYTES NFR BLD AUTO: 13.8 %
NEUTROPHILS ABSOLUTE: 4 THOU/MM3 (ref 1.8–7.7)
NEUTROPHILS NFR BLD AUTO: 69.3 %
NRBC BLD AUTO-RTO: 0 /100 WBC
PLATELET # BLD AUTO: 320 THOU/MM3 (ref 130–400)
PMV BLD AUTO: 9.3 FL (ref 9.4–12.4)
POTASSIUM SERPL-SCNC: 4.4 MEQ/L (ref 3.5–5.2)
PROT SERPL-MCNC: 6.1 G/DL (ref 6.4–8.3)
RBC # BLD AUTO: 3.65 MILL/MM3 (ref 4.2–5.4)
SODIUM SERPL-SCNC: 134 MEQ/L (ref 135–145)
WBC # BLD AUTO: 5.8 THOU/MM3 (ref 4.8–10.8)

## 2025-05-23 PROCEDURE — 6370000000 HC RX 637 (ALT 250 FOR IP): Performed by: STUDENT IN AN ORGANIZED HEALTH CARE EDUCATION/TRAINING PROGRAM

## 2025-05-23 PROCEDURE — 6370000000 HC RX 637 (ALT 250 FOR IP): Performed by: INTERNAL MEDICINE

## 2025-05-23 PROCEDURE — 1200000000 HC SEMI PRIVATE

## 2025-05-23 PROCEDURE — 82248 BILIRUBIN DIRECT: CPT

## 2025-05-23 PROCEDURE — 6360000002 HC RX W HCPCS

## 2025-05-23 PROCEDURE — 2500000003 HC RX 250 WO HCPCS: Performed by: INTERNAL MEDICINE

## 2025-05-23 PROCEDURE — 94640 AIRWAY INHALATION TREATMENT: CPT

## 2025-05-23 PROCEDURE — 6360000002 HC RX W HCPCS: Performed by: INTERNAL MEDICINE

## 2025-05-23 PROCEDURE — 2580000003 HC RX 258

## 2025-05-23 PROCEDURE — 6370000000 HC RX 637 (ALT 250 FOR IP)

## 2025-05-23 PROCEDURE — 97535 SELF CARE MNGMENT TRAINING: CPT

## 2025-05-23 PROCEDURE — 97530 THERAPEUTIC ACTIVITIES: CPT

## 2025-05-23 PROCEDURE — 36415 COLL VENOUS BLD VENIPUNCTURE: CPT

## 2025-05-23 PROCEDURE — 85025 COMPLETE CBC W/AUTO DIFF WBC: CPT

## 2025-05-23 PROCEDURE — 94669 MECHANICAL CHEST WALL OSCILL: CPT

## 2025-05-23 PROCEDURE — 82948 REAGENT STRIP/BLOOD GLUCOSE: CPT

## 2025-05-23 PROCEDURE — 97110 THERAPEUTIC EXERCISES: CPT

## 2025-05-23 PROCEDURE — 99232 SBSQ HOSP IP/OBS MODERATE 35: CPT | Performed by: FAMILY MEDICINE

## 2025-05-23 PROCEDURE — 80053 COMPREHEN METABOLIC PANEL: CPT

## 2025-05-23 RX ADMIN — METOPROLOL TARTRATE 12.5 MG: 25 TABLET, FILM COATED ORAL at 08:43

## 2025-05-23 RX ADMIN — ALBUTEROL SULFATE 2 PUFF: 90 AEROSOL, METERED RESPIRATORY (INHALATION) at 08:12

## 2025-05-23 RX ADMIN — OXYCODONE 10 MG: 5 TABLET ORAL at 17:06

## 2025-05-23 RX ADMIN — BUDESONIDE AND FORMOTEROL FUMARATE DIHYDRATE 2 PUFF: 80; 4.5 AEROSOL RESPIRATORY (INHALATION) at 18:10

## 2025-05-23 RX ADMIN — TIOTROPIUM BROMIDE INHALATION SPRAY 2 PUFF: 3.12 SPRAY, METERED RESPIRATORY (INHALATION) at 08:14

## 2025-05-23 RX ADMIN — GUAIFENESIN 200 MG: 200 SOLUTION ORAL at 17:06

## 2025-05-23 RX ADMIN — OXYCODONE 10 MG: 5 TABLET ORAL at 08:44

## 2025-05-23 RX ADMIN — TRAZODONE HYDROCHLORIDE 25 MG: 50 TABLET ORAL at 20:57

## 2025-05-23 RX ADMIN — OXYCODONE 10 MG: 5 TABLET ORAL at 03:59

## 2025-05-23 RX ADMIN — FLUTICASONE PROPIONATE 2 SPRAY: 50 SPRAY, METERED NASAL at 08:43

## 2025-05-23 RX ADMIN — BUDESONIDE AND FORMOTEROL FUMARATE DIHYDRATE 2 PUFF: 80; 4.5 AEROSOL RESPIRATORY (INHALATION) at 08:16

## 2025-05-23 RX ADMIN — ENOXAPARIN SODIUM 40 MG: 100 INJECTION SUBCUTANEOUS at 17:06

## 2025-05-23 RX ADMIN — PROMETHAZINE HYDROCHLORIDE 25 MG: 25 TABLET ORAL at 19:56

## 2025-05-23 RX ADMIN — CEFEPIME 2000 MG: 2 INJECTION, POWDER, FOR SOLUTION INTRAVENOUS at 05:33

## 2025-05-23 RX ADMIN — CETIRIZINE HYDROCHLORIDE 10 MG: 10 TABLET, FILM COATED ORAL at 08:43

## 2025-05-23 RX ADMIN — PROMETHAZINE HYDROCHLORIDE 25 MG: 25 TABLET ORAL at 08:44

## 2025-05-23 RX ADMIN — ALBUTEROL SULFATE 2 PUFF: 90 AEROSOL, METERED RESPIRATORY (INHALATION) at 18:10

## 2025-05-23 RX ADMIN — ONDANSETRON 4 MG: 4 TABLET, ORALLY DISINTEGRATING ORAL at 17:06

## 2025-05-23 RX ADMIN — FAMOTIDINE 40 MG: 20 TABLET, FILM COATED ORAL at 08:43

## 2025-05-23 RX ADMIN — FLUTICASONE PROPIONATE 2 SPRAY: 50 SPRAY, METERED NASAL at 21:05

## 2025-05-23 RX ADMIN — GUAIFENESIN 200 MG: 200 SOLUTION ORAL at 04:05

## 2025-05-23 RX ADMIN — ALBUTEROL SULFATE 2 PUFF: 90 AEROSOL, METERED RESPIRATORY (INHALATION) at 13:13

## 2025-05-23 RX ADMIN — GUAIFENESIN 200 MG: 200 SOLUTION ORAL at 08:44

## 2025-05-23 RX ADMIN — INSULIN LISPRO 2 UNITS: 100 INJECTION, SOLUTION INTRAVENOUS; SUBCUTANEOUS at 10:15

## 2025-05-23 RX ADMIN — CEFEPIME 2000 MG: 2 INJECTION, POWDER, FOR SOLUTION INTRAVENOUS at 17:52

## 2025-05-23 RX ADMIN — ONDANSETRON 4 MG: 2 INJECTION, SOLUTION INTRAMUSCULAR; INTRAVENOUS at 05:27

## 2025-05-23 RX ADMIN — OXYCODONE 10 MG: 5 TABLET ORAL at 20:58

## 2025-05-23 ASSESSMENT — PAIN SCALES - GENERAL
PAINLEVEL_OUTOF10: 7
PAINLEVEL_OUTOF10: 6
PAINLEVEL_OUTOF10: 8
PAINLEVEL_OUTOF10: 9

## 2025-05-23 ASSESSMENT — PAIN - FUNCTIONAL ASSESSMENT
PAIN_FUNCTIONAL_ASSESSMENT: ACTIVITIES ARE NOT PREVENTED
PAIN_FUNCTIONAL_ASSESSMENT: ACTIVITIES ARE NOT PREVENTED

## 2025-05-23 NOTE — PROGRESS NOTES
Physician Progress Note      PATIENT:               STURGESS MEYERS, SUSAN  [facility] #:                  [id number]  :                       1965  ADMIT DATE:       2025 9:09 AM  DISCH DATE:  RESPONDING  PROVIDER #:        Azalia Olivas MD          QUERY TEXT:    The attending physician is required to clarify conflicting documentation in   the medical record.  Noted documentation of \" \"Low suspicion for pneumonia\"   per  IM note and \"Mild atelectasis vs pneumonia in the inferior medial   aspect of the right lung base\" per  IM note.    The clinical indicators include:  *Per  IM note, \"Low suspicion for pneumonia\"...    Per  IM note, \"Mild atelectasis vs pneumonia in the inferior medial aspect   of the right lung base.  Noted on CXR .  Procalcitonin of 0.19 indicates   a low likelihood of bacterial infection.  Afebrile and WBC WNL.  Not requiring   supplemental oxygen. Cefepime started on , continue treatment.\"    CXR, :  Bilateral airspace disease.  This is most concerning for infection, pulmonary  edema can have a similar appearance.    Procal 0.19 per lab results    Risk Factors: nursing home resident, DM2, immunocompromised  Treatment: labs, IV Cefepime, IS, Acapella, Mucinex, Robitussin  Options provided:  -- Pneumonia confirmed.  -- Pneumonia ruled out, atelectasis confirmed.  -- Both pneumonia and atelectasis ruled out.  -- Other - I will add my own diagnosis  -- Disagree - Not applicable / Not valid  -- Disagree - Clinically unable to determine / Unknown  -- Refer to Clinical Documentation Reviewer    PROVIDER RESPONSE TEXT:    After study, pneumonia ruled out, atelectasis confirmed.    Query created by: Bridget Meeks on 2025 2:00 PM      QUERY TEXT:    Based on your medical judgment, please clarify these findings and document if   any of the following are being evaluated and/or treated:    The clinical indicators include:  Per ED Provider note, \"tates that she cannot  care for herself at home because   she does not have any help and also she has no set up to help her get around,   patient unable to ambulate due to history of back surgery secondary to   osteomyelitis/cord compression.\"  ---  Per H&P, \"principal problem: \"generalized weakness\"  --  PT note on 5/19 & 5/20: \"Pt reports she was ambulting prior to SNF stay though   strength has steadily decreased\"...\" Social work was consulted for   placement\"...\"Sit to Stand: Maximum Assistance, X 2, with Geri Stedy,   decreased force production  Stand to Sit:Maximum Assistance, X 2, with Geri Stedy, poor eccentric control\"  --  Per 5/21 IM note, \"Other conditions per Dr. Bradley -- due to chronic   conditions, debility will c/s palliative care for goals of care discussion and   possible need for long term f/u for sx mgmt\"    Risk factors: inability to ambulate, COPD, DM2, UTI, thoracic cord   compression, osteomyelitis, severe malnutrition  Treatment: labs, imaging, PT/OT, social work for NH placement  Options provided:  -- Age Related Physical Debility  -- Frailty  -- Other - I will add my own diagnosis  -- Disagree - Not applicable / Not valid  -- Disagree - Clinically unable to determine / Unknown  -- Refer to Clinical Documentation Reviewer    PROVIDER RESPONSE TEXT:    Debility due to chronic medical conditions    Query created by: Bridget Meeks on 5/23/2025 9:36 AM      Electronically signed by:  Azalia Olivas MD 5/23/2025 10:22 AM

## 2025-05-23 NOTE — PROGRESS NOTES
Pt has a good technique on acapella and states she has been doing on her own. Acapella has been turned over to the pt.

## 2025-05-23 NOTE — PROGRESS NOTES
Comprehensive Nutrition Assessment    Type and Reason for Visit:  Reassess (tubefeeding)    Nutrition Recommendations/Plan:   Continue current tubefeeding regimen: [employer] 1.2 Glucose Support at 55 ml/hr x 24 hours/day.  Tubefeeding formula brought in by family per patient preference-supplies through [facility] (formula not on formulary at [facility]).  Pour 440 mL of [employer] 1.2 Glucose Support in tube feeding bag. This feeding system is \"open\" and can only hang for 8 hours at a time. Feeding set must be changed q 8 hours (a total of 3x/day). Plan to use a total of ~5.25 cartons of formula/day.   Recommend 125 mls free water flush every 4 hours if no IVF's or per Provider.  NPO.       Malnutrition Assessment:  Malnutrition Status:  Severe malnutrition (05/20/25 1248)    Context:  Chronic Illness     Findings of the 6 clinical characteristics of malnutrition:  Energy Intake:   (pt states she was sometimes not getting entire cycle of TF at [facility])  Weight Loss:  Greater than 10% over 6 months     Body Fat Loss:  Mild body fat loss Orbital, Buccal region, Triceps   Muscle Mass Loss:  Mild muscle mass loss Temples (temporalis), Clavicles (pectoralis & deltoids), Scapula (trapezius)  Fluid Accumulation:  No fluid accumulation     Strength:  Not Performed    Nutrition Assessment:      Pt. severely malnourished AEB criteria listed above.  At risk for further nutritional compromise r/t admit with generalized weakness -  unable to care for pt at home - discharged from SNF x1 day, N/V 2/2 esophageal stent, and underlying medical condition (PMHx: COPD, T2DM, esophageal cancer - s/p esophagectomy, esophageal fistula, s/p J-Tube placement 2/9/24, ovarian cancer 2011, former smoker).      Nutrition Related Findings:    Pt. Report/Treatments/Miscellaneous: Patient seen, RN present as well. Emesis bag with patient, full of tissues, patient reports primarily phlegm she spits out.  Has been tolerating current continuous

## 2025-05-23 NOTE — PLAN OF CARE
Problem: Chronic Conditions and Co-morbidities  Goal: Patient's chronic conditions and co-morbidity symptoms are monitored and maintained or improved  Outcome: Progressing  Flowsheets (Taken 5/22/2025 2015)  Care Plan - Patient's Chronic Conditions and Co-Morbidity Symptoms are Monitored and Maintained or Improved: Monitor and assess patient's chronic conditions and comorbid symptoms for stability, deterioration, or improvement     Problem: Discharge Planning  Goal: Discharge to home or other facility with appropriate resources  Outcome: Progressing  Flowsheets (Taken 5/22/2025 2015)  Discharge to home or other facility with appropriate resources: Identify barriers to discharge with patient and caregiver     Problem: Safety - Adult  Goal: Free from fall injury  Outcome: Progressing     Problem: Skin/Tissue Integrity  Goal: Skin integrity remains intact  Description: 1.  Monitor for areas of redness and/or skin breakdown2.  Assess vascular access sites hourly3.  Every 4-6 hours minimum:  Change oxygen saturation probe site4.  Every 4-6 hours:  If on nasal continuous positive airway pressure, respiratory therapy assess nares and determine need for appliance change or resting period  Outcome: Progressing     Problem: Nutrition Deficit:  Goal: Optimize nutritional status  Outcome: Progressing     Problem: Respiratory - Adult  Goal: Clear lung sounds  Outcome: Progressing     Problem: Pain  Goal: Verbalizes/displays adequate comfort level or baseline comfort level  Outcome: Progressing

## 2025-05-23 NOTE — CARE COORDINATION
5/23/25, 12:27 PM EDT    DISCHARGE PLANNING EVALUATION    SW attempted to visit with pt to discuss dc plan, therapy and staff working with pt.

## 2025-05-23 NOTE — PLAN OF CARE
Problem: Chronic Conditions and Co-morbidities  Goal: Patient's chronic conditions and co-morbidity symptoms are monitored and maintained or improved  5/23/2025 1004 by Monik Conde RN  Outcome: Progressing  Flowsheets (Taken 5/23/2025 1004)  Care Plan - Patient's Chronic Conditions and Co-Morbidity Symptoms are Monitored and Maintained or Improved:   Monitor and assess patient's chronic conditions and comorbid symptoms for stability, deterioration, or improvement   Collaborate with multidisciplinary team to address chronic and comorbid conditions and prevent exacerbation or deterioration     Problem: Discharge Planning  Goal: Discharge to home or other facility with appropriate resources  5/23/2025 1004 by Monik Conde RN  Outcome: Progressing  Flowsheets (Taken 5/23/2025 1004)  Discharge to home or other facility with appropriate resources:   Identify barriers to discharge with patient and caregiver   Identify discharge learning needs (meds, wound care, etc)     Problem: Safety - Adult  Goal: Free from fall injury  5/23/2025 1004 by Monik Conde RN  Outcome: Progressing  Flowsheets (Taken 5/23/2025 1004)  Free From Fall Injury:   Instruct family/caregiver on patient safety   Based on caregiver fall risk screen, instruct family/caregiver to ask for assistance with transferring infant if caregiver noted to have fall risk factors  Note: No falls noted this shift. Continue falling star program. Bed alarm on, bed in low position. Call light and personal belongings in reach.  Patient uses call light appropriately.      Problem: Skin/Tissue Integrity  Goal: Skin integrity remains intact  Description: 1.  Monitor for areas of redness and/or skin breakdown2.  Assess vascular access sites hourly3.  Every 4-6 hours minimum:  Change oxygen saturation probe site4.  Every 4-6 hours:  If on nasal continuous positive airway pressure, respiratory therapy assess nares and determine need for appliance change or resting  period  5/23/2025 1004 by Monik Conde RN  Outcome: Progressing  Flowsheets (Taken 5/23/2025 1004)  Skin Integrity Remains Intact:   Monitor for areas of redness and/or skin breakdown   Assess vascular access sites hourly  Note: No new signs or symptoms of skin breakdown noted this shift, encouraging patient to turn and reposition self in bed q2h      Problem: Nutrition Deficit:  Goal: Optimize nutritional status  5/23/2025 1004 by Monik Conde RN  Outcome: Progressing  Flowsheets  Taken 5/23/2025 1004 by Monik Conde RN  Nutrient intake appropriate for improving, restoring, or maintaining nutritional needs:   Assess nutritional status and recommend course of action   Monitor oral intake, labs, and treatment plans  Taken 5/23/2025 0942 by Schwab, Allison C, RD, LD  Nutrient intake appropriate for improving, restoring, or maintaining nutritional needs:   Assess nutritional status and recommend course of action   Monitor oral intake, labs, and treatment plans   Recommend appropriate diets, oral nutritional supplements, and vitamin/mineral supplements   Recommend, monitor, and adjust tube feedings and TPN/PPN based on assessed needs     Problem: Pain  Goal: Verbalizes/displays adequate comfort level or baseline comfort level  5/23/2025 1004 by Monik Conde RN  Outcome: Progressing  Flowsheets (Taken 5/23/2025 1004)  Verbalizes/displays adequate comfort level or baseline comfort level:   Encourage patient to monitor pain and request assistance   Administer analgesics based on type and severity of pain and evaluate response  Note: No complaint of pain voiced at this time.  Continue to monitor. PRN medications available if needed.    Care plan reviewed with patient. Patient verbalizes understanding of plan of care and contributes to goal setting.

## 2025-05-23 NOTE — PROGRESS NOTES
[facility]  Inpatient Physical Therapy   Daily Note  STRZ RENAL TELEMETRY 6K - 6K-28/028-A      Discharge Recommendations: Subacute/Skilled Nursing Facility  Equipment Recommendations: No  Defer to next facility  however if returning home will require a magdalene          Time In: 1110  Time Out: 1206  Timed Code Treatment Minutes: 56 Minutes  Minutes: 56        CoTx completed due to increased medical complexity, pt requiring 2+ assist and inability to tolerate separate sessions.     Date: 2025  Patient Name: Susan K Sturgess Meyers,  Gender:  female        MRN: [id number]  : 1965  (60 y.o.)     Referring Practitioner: Juan Gautam MD  Diagnosis: Generalized weakness  Additional Pertinent Hx: Per EMR: \"60-year-old female with past medical history of COPD, atrial fibrillation, type 2 diabetes, hyponatremia, anemia, esophageal adenocarcinoma, thoracic cord compression, vertebral osteomyelitis, allergic rhinitis, sacral decubitus ulcer, ovarian cancer, and physical deconditioning who presented to [facility] 1 day after being discharged from SNF in [place] due to not being able to take care of herself at home and due to weakness.  Patient reports weakness in the lower extremities is worse than in the upper extremities and has been ongoing, did not improve with PT OT at SNF, per patient she required additional treatment however was declined.  In the ED CXR noted mild atelectasis versus pneumonia in the inferior medial aspect of the right lung base.  UA noted leukocyte esterases urine culture with preliminary nonfermenting gram-negative bacilli. Patient has a catheter.  She was started on cefepime.  Legionella and strep antigens ordered. Dietitian was consulted for management of J-tube feeds.  Social work was consulted for placement.\"     Prior Level of Function:  Lives With: Spouse, Son  Type of Home: House  Home Layout: Two level, Able to Live on Main level with bedroom/bathroom  Home Access:  Stairs to enter without rails, Ramped entrance (Spouse bought ramp for the home)  Entrance Stairs - Number of Steps: 3 ALLYSSA  Home Equipment: Hospital bed, Wheelchair - Manual   Bathroom Shower/Tub: Tub/Shower unit  Bathroom Toilet: Standard  Bathroom Equipment: Shower chair    Prior Level of Assist for ADLs: Needs assistance  Prior Level of Assist for Transfers: Needs assistance  Active : No  Additional Comments: Pt reports use of magdalene and WC at SNF prior to d/c home. Reprots d/c home with hospital bed and WC but no lift at this time. Stated that she occasionally performed sit pivot transfers.    Restrictions/Precautions:  Restrictions/Precautions: NPO, Fall Risk, General Precautions  Position Activity Restriction  Other Position/Activity Restrictions: J-tube, sacral wound     Lines/Tubes:   Patient Lines/Drains/Airways Status       Active LDAs       Name Placement date Placement time Site Days    Peripheral IV 05/17/25 Distal;Right Forearm 05/17/25  1026  Forearm  6    Gastrostomy/Enterostomy/Jejunostomy Tube LLQ 03/31/24  1239  LLQ  418    Urinary Catheter 05/17/25 2 Way 05/17/25  0932  2 Way  6    Wound 03/31/24 Coccyx 03/31/24  1239  Coccyx  418                SUBJECTIVE: RN approved session. Pt agrees to co-tx. Pt reporting today has been a rough day with feeling sick and vomiting, states she is willing to try    Pain: buttocks wound, pt very guarded throughout session with any sheering    Vitals: Vitals not assessed per clinical judgement, see nursing flowsheet    OBJECTIVE:  Bed Mobility:  Rolling to Left: Moderate Assistance   Rolling to Right: Moderate Assistance   Supine to Sit: Moderate Assistance, X 2, with head of bed raised, with rail, with verbal cues , with increased time for completion  Sit to Supine: Minimal Assistance, Maximum Assistance, X 2, with head of bed flat, with rail   Scooting: Dependent, using hercules function, did scoot laterally in supine max x2  Extra time spent on pericare

## 2025-05-23 NOTE — RT PROTOCOL NOTE
RT Inhaler-Nebulizer Bronchodilator Protocol Note    There is a bronchodilator order in the chart from a provider indicating to follow the RT Bronchodilator Protocol and there is an “Initiate RT Inhaler-Nebulizer Bronchodilator Protocol” order as well (see protocol at bottom of note).    CXR Findings:  No results found.    The findings from the last RT Protocol Assessment were as follows:   History Pulmonary Disease: Chronic pulmonary disease  Respiratory Pattern: Regular pattern and RR 12-20 bpm  Breath Sounds: Inspiratory and expiratory or bilateral wheezing and/or rhonchi  Cough: Strong, productive  Indication for Bronchodilator Therapy: Wheezing associated with pulm disorder  Bronchodilator Assessment Score: 9    Aerosolized bronchodilator medication orders have been revised according to the RT Inhaler-Nebulizer Bronchodilator Protocol below.    Respiratory Therapist to perform RT Therapy Protocol Assessment initially then follow the protocol.  Repeat RT Therapy Protocol Assessment PRN for score 0-3 or on second treatment, BID, and PRN for scores above 3.    No Indications - adjust the frequency to every 6 hours PRN wheezing or bronchospasm, if no treatments needed after 48 hours then discontinue using Per Protocol order mode.     If indication present, adjust the RT bronchodilator orders based on the Bronchodilator Assessment Score as indicated below.  Use Inhaler orders unless patient has one or more of the following: on home nebulizer, not able to hold breath for 10 seconds, is not alert and oriented, cannot activate and use MDI correctly, or respiratory rate 25 breaths per minute or more, then use the equivalent nebulizer order(s) with same Frequency and PRN reasons based on the score.  If a patient is on this medication at home then do not decrease Frequency below that used at home.    0-3 - enter or revise RT bronchodilator order(s) to equivalent RT Bronchodilator order with Frequency of every 4 hours PRN  for wheezing or increased work of breathing using Per Protocol order mode.        4-6 - enter or revise RT Bronchodilator order(s) to two equivalent RT bronchodilator orders with one order with BID Frequency and one order with Frequency of every 4 hours PRN wheezing or increased work of breathing using Per Protocol order mode.        7-10 - enter or revise RT Bronchodilator order(s) to two equivalent RT bronchodilator orders with one order with TID Frequency and one order with Frequency of every 4 hours PRN wheezing or increased work of breathing using Per Protocol order mode.       11-13 - enter or revise RT Bronchodilator order(s) to one equivalent RT bronchodilator order with QID Frequency and an Albuterol order with Frequency of every 4 hours PRN wheezing or increased work of breathing using Per Protocol order mode.      Greater than 13 - enter or revise RT Bronchodilator order(s) to one equivalent RT bronchodilator order with every 4 hours Frequency and an Albuterol order with Frequency of every 2 hours PRN wheezing or increased work of breathing using Per Protocol order mode.     RT to enter RT Home Evaluation for COPD & MDI Assessment order using Per Protocol order mode.    Electronically signed by Yodit Conde RCP on 5/23/2025 at 8:16 AM

## 2025-05-23 NOTE — CARE COORDINATION
Discussed with Dr. GARVIN, patient is medically ready for discharge. SW has attempted to place in ECF but has been unable (see notes). Dr. GARVIN feels comfortable discharging back to home if patient's magdalene lift has been delivered and  trained (ordered prior to patient's admission by provider at different facility). Placed call to  Jef, voicemail message left with request for callback. Electronically signed by Ezequiel Shipley RN on 5/23/2025 at 1:48 PM

## 2025-05-23 NOTE — PROGRESS NOTES
[facility]  STRZ RENAL TELEMETRY 6K  Occupational Therapy  Daily Note    Discharge Recommendations: Home with Home Health OT  Equipment Recommendations: Yes Faye lift, Hospital bed?       Time In: 1125  Time Out: 1206  Timed Code Treatment Minutes: 41 Minutes  Minutes: 41  CoTx with PT secondary to pt having low tolerance of activity and having a lot of nausea and vomiting this morning.        Date: 2025  Patient Name: Susan K Sturgess Meyers,   Gender: female      [facility]: [facility]  MRN: [id number]  : 1965  (60 y.o.)  Referring Practitioner: Juan Gautam MD  Diagnosis: Generalized weakness  Additional Pertinent Hx: Per EMR: \"60-year-old female with past medical history of COPD, atrial fibrillation, type 2 diabetes, hyponatremia, anemia, esophageal adenocarcinoma, thoracic cord compression, vertebral osteomyelitis, allergic rhinitis, sacral decubitus ulcer, ovarian cancer, and physical deconditioning who presented to [facility] 1 day after being discharged from SNF in [place] due to not being able to take care of herself at home and due to weakness.  Patient reports weakness in the lower extremities is worse than in the upper extremities and has been ongoing, did not improve with PT OT at SNF, per patient she required additional treatment however was declined.  In the ED CXR noted mild atelectasis versus pneumonia in the inferior medial aspect of the right lung base.  UA noted leukocyte esterases urine culture with preliminary nonfermenting gram-negative bacilli. Patient has a catheter.  She was started on cefepime.  Legionella and strep antigens ordered. Dietitian was consulted for management of J-tube feeds.  Social work was consulted for placement.\"    Restrictions/Precautions:  Restrictions/Precautions: NPO, Fall Risk, General Precautions  Position Activity Restriction  Other Position/Activity Restrictions: J-tube, sacral wound      Social/Functional History:  Lives With: Spouse,  Son  Type of Home: House  Home Layout: Two level, Able to Live on Main level with bedroom/bathroom  Home Access: Stairs to enter without rails, Ramped entrance (Spouse bought ramp for the home)  Entrance Stairs - Number of Steps: 3 ALLYSSA  Home Equipment: Hospital bed, Wheelchair - Manual   Bathroom Shower/Tub: Tub/Shower unit  Bathroom Toilet: Standard  Bathroom Equipment: Shower chair       Prior Level of Assist for ADLs: Needs assistance  Prior Level of Assist for Transfers: Needs assistance  Prior Level of Assist for Ambulation: Non-ambulatory - confined to hopsital bed  Has the patient had two or more falls in the past year or any fall with injury in the past year?: Unknown    Active : No  Patient's  Info: spouse  Leisure & Hobbies: gardening  Additional Comments: Pt reports use of magdalene and WC at SNF prior to d/c home. Reprots d/c home with hospital bed and WC but no lift at this time. Stated that she occasionally performed sit pivot transfers.    SUBJECTIVE:  Pleasant and cooperative  RN approved session.  Pt agreed to try sitting up after having help with perineum care in sidelying.       PAIN: No number given.  Pt reported tenderness in her buttocks.  ECP cream was applied.      Vitals: Vitals not assessed per clinical judgement, see nursing flowsheet    COGNITION: WFL    ADL:   Grooming: with set-up.  Pt wiped her face with a tissue or washcloth while sitting after she had an emesis.  Toileting: Maximum Assistance and X 2.  Pt had BM while rolling and had to be cleaned and the bed pad changed.  Pt had another BM after having an emesis.      IADL:   Not Tested    BED MOBILITY:  Rolling to Left: Moderate Assistance, X 1, with verbal cues  help needed for initiating placement of her legs  Rolling to Right: Moderate Assistance with verbal cues; help needed to place her leg so she could help push off  Supine to Sit: Moderate Assistance, X 2 sidelying to sit from her R side with cues to help push

## 2025-05-23 NOTE — PROGRESS NOTES
[facility]  PHYSICAL THERAPY MISSED TREATMENT NOTE  STRZ RENAL TELEMETRY 6K    Date: 2025  Patient Name: Susan K Sturgess Meyers        MRN: [id number]   : 1965  (60 y.o.)  Gender: female   Referring Practitioner: Juan Gautam MD            REASON FOR MISSED TREATMENT:  Pt requesting to have PT come back later as she has just gotten her meds and is not feeling well this morning .      Will try back later if able

## 2025-05-24 ENCOUNTER — APPOINTMENT (OUTPATIENT)
Dept: GENERAL RADIOLOGY | Age: 60
End: 2025-05-24
Payer: COMMERCIAL

## 2025-05-24 LAB
ANION GAP SERPL CALC-SCNC: 9 MEQ/L (ref 8–16)
BUN SERPL-MCNC: 25 MG/DL (ref 8–23)
CALCIUM SERPL-MCNC: 9.4 MG/DL (ref 8.8–10.2)
CHLORIDE SERPL-SCNC: 100 MEQ/L (ref 98–111)
CO2 SERPL-SCNC: 23 MEQ/L (ref 22–29)
CREAT SERPL-MCNC: 0.7 MG/DL (ref 0.5–0.9)
GFR SERPL CREATININE-BSD FRML MDRD: > 90 ML/MIN/1.73M2
GLUCOSE BLD STRIP.AUTO-MCNC: 125 MG/DL (ref 70–108)
GLUCOSE BLD STRIP.AUTO-MCNC: 137 MG/DL (ref 70–108)
GLUCOSE BLD STRIP.AUTO-MCNC: 147 MG/DL (ref 70–108)
GLUCOSE BLD STRIP.AUTO-MCNC: 170 MG/DL (ref 70–108)
GLUCOSE BLD STRIP.AUTO-MCNC: 180 MG/DL (ref 70–108)
GLUCOSE SERPL-MCNC: 144 MG/DL (ref 74–109)
POTASSIUM SERPL-SCNC: 4.4 MEQ/L (ref 3.5–5.2)
SODIUM SERPL-SCNC: 132 MEQ/L (ref 135–145)

## 2025-05-24 PROCEDURE — 6360000002 HC RX W HCPCS: Performed by: INTERNAL MEDICINE

## 2025-05-24 PROCEDURE — 6370000000 HC RX 637 (ALT 250 FOR IP): Performed by: INTERNAL MEDICINE

## 2025-05-24 PROCEDURE — 94640 AIRWAY INHALATION TREATMENT: CPT

## 2025-05-24 PROCEDURE — 2500000003 HC RX 250 WO HCPCS: Performed by: INTERNAL MEDICINE

## 2025-05-24 PROCEDURE — 71045 X-RAY EXAM CHEST 1 VIEW: CPT

## 2025-05-24 PROCEDURE — 99232 SBSQ HOSP IP/OBS MODERATE 35: CPT | Performed by: FAMILY MEDICINE

## 2025-05-24 PROCEDURE — 82948 REAGENT STRIP/BLOOD GLUCOSE: CPT

## 2025-05-24 PROCEDURE — 6370000000 HC RX 637 (ALT 250 FOR IP): Performed by: FAMILY MEDICINE

## 2025-05-24 PROCEDURE — 94761 N-INVAS EAR/PLS OXIMETRY MLT: CPT

## 2025-05-24 PROCEDURE — 6370000000 HC RX 637 (ALT 250 FOR IP): Performed by: STUDENT IN AN ORGANIZED HEALTH CARE EDUCATION/TRAINING PROGRAM

## 2025-05-24 PROCEDURE — 36415 COLL VENOUS BLD VENIPUNCTURE: CPT

## 2025-05-24 PROCEDURE — 1200000000 HC SEMI PRIVATE

## 2025-05-24 PROCEDURE — 6370000000 HC RX 637 (ALT 250 FOR IP)

## 2025-05-24 PROCEDURE — 80048 BASIC METABOLIC PNL TOTAL CA: CPT

## 2025-05-24 RX ORDER — GLYCOPYRROLATE 1 MG/1
1 TABLET ORAL 3 TIMES DAILY
Status: DISCONTINUED | OUTPATIENT
Start: 2025-05-24 | End: 2025-05-28

## 2025-05-24 RX ADMIN — BUDESONIDE AND FORMOTEROL FUMARATE DIHYDRATE 2 PUFF: 80; 4.5 AEROSOL RESPIRATORY (INHALATION) at 09:31

## 2025-05-24 RX ADMIN — OXYCODONE 10 MG: 5 TABLET ORAL at 12:27

## 2025-05-24 RX ADMIN — GUAIFENESIN 200 MG: 200 SOLUTION ORAL at 16:28

## 2025-05-24 RX ADMIN — OXYCODONE 10 MG: 5 TABLET ORAL at 21:03

## 2025-05-24 RX ADMIN — OXYCODONE 10 MG: 5 TABLET ORAL at 16:29

## 2025-05-24 RX ADMIN — ONDANSETRON 4 MG: 4 TABLET, ORALLY DISINTEGRATING ORAL at 21:03

## 2025-05-24 RX ADMIN — ALBUTEROL SULFATE 2 PUFF: 90 AEROSOL, METERED RESPIRATORY (INHALATION) at 09:31

## 2025-05-24 RX ADMIN — GUAIFENESIN 200 MG: 200 SOLUTION ORAL at 12:27

## 2025-05-24 RX ADMIN — GUAIFENESIN 200 MG: 200 SOLUTION ORAL at 08:11

## 2025-05-24 RX ADMIN — ENOXAPARIN SODIUM 40 MG: 100 INJECTION SUBCUTANEOUS at 18:11

## 2025-05-24 RX ADMIN — PROMETHAZINE HYDROCHLORIDE 25 MG: 25 TABLET ORAL at 16:29

## 2025-05-24 RX ADMIN — FAMOTIDINE 40 MG: 20 TABLET, FILM COATED ORAL at 08:12

## 2025-05-24 RX ADMIN — ALBUTEROL SULFATE 2 PUFF: 90 AEROSOL, METERED RESPIRATORY (INHALATION) at 13:13

## 2025-05-24 RX ADMIN — ALBUTEROL SULFATE 2 PUFF: 90 AEROSOL, METERED RESPIRATORY (INHALATION) at 20:40

## 2025-05-24 RX ADMIN — OXYCODONE 10 MG: 5 TABLET ORAL at 04:06

## 2025-05-24 RX ADMIN — OXYCODONE 10 MG: 5 TABLET ORAL at 08:11

## 2025-05-24 RX ADMIN — PROMETHAZINE HYDROCHLORIDE 25 MG: 25 TABLET ORAL at 08:12

## 2025-05-24 RX ADMIN — GUAIFENESIN 200 MG: 200 SOLUTION ORAL at 21:02

## 2025-05-24 RX ADMIN — GUAIFENESIN 200 MG: 200 SOLUTION ORAL at 04:06

## 2025-05-24 RX ADMIN — FLUTICASONE PROPIONATE 2 SPRAY: 50 SPRAY, METERED NASAL at 21:03

## 2025-05-24 RX ADMIN — TIOTROPIUM BROMIDE INHALATION SPRAY 2 PUFF: 3.12 SPRAY, METERED RESPIRATORY (INHALATION) at 09:32

## 2025-05-24 RX ADMIN — INSULIN LISPRO 2 UNITS: 100 INJECTION, SOLUTION INTRAVENOUS; SUBCUTANEOUS at 10:45

## 2025-05-24 RX ADMIN — BUDESONIDE AND FORMOTEROL FUMARATE DIHYDRATE 2 PUFF: 80; 4.5 AEROSOL RESPIRATORY (INHALATION) at 20:40

## 2025-05-24 RX ADMIN — TRAZODONE HYDROCHLORIDE 25 MG: 50 TABLET ORAL at 21:03

## 2025-05-24 RX ADMIN — FLUTICASONE PROPIONATE 2 SPRAY: 50 SPRAY, METERED NASAL at 10:34

## 2025-05-24 RX ADMIN — GLYCOPYRROLATE 1 MG: 1 TABLET ORAL at 15:33

## 2025-05-24 RX ADMIN — CETIRIZINE HYDROCHLORIDE 10 MG: 10 TABLET, FILM COATED ORAL at 10:45

## 2025-05-24 ASSESSMENT — PAIN DESCRIPTION - DESCRIPTORS
DESCRIPTORS: ACHING
DESCRIPTORS: ACHING

## 2025-05-24 ASSESSMENT — PAIN DESCRIPTION - PAIN TYPE: TYPE: ACUTE PAIN;CHRONIC PAIN

## 2025-05-24 ASSESSMENT — PAIN DESCRIPTION - FREQUENCY: FREQUENCY: CONTINUOUS

## 2025-05-24 ASSESSMENT — PAIN DESCRIPTION - LOCATION
LOCATION: BACK
LOCATION: BACK
LOCATION: BUTTOCKS
LOCATION: BACK
LOCATION: BACK

## 2025-05-24 ASSESSMENT — PAIN SCALES - GENERAL
PAINLEVEL_OUTOF10: 7
PAINLEVEL_OUTOF10: 7
PAINLEVEL_OUTOF10: 8
PAINLEVEL_OUTOF10: 9

## 2025-05-24 ASSESSMENT — PAIN - FUNCTIONAL ASSESSMENT: PAIN_FUNCTIONAL_ASSESSMENT: PREVENTS OR INTERFERES SOME ACTIVE ACTIVITIES AND ADLS

## 2025-05-24 ASSESSMENT — PAIN SCALES - WONG BAKER
WONGBAKER_NUMERICALRESPONSE: NO HURT
WONGBAKER_NUMERICALRESPONSE: NO HURT

## 2025-05-24 ASSESSMENT — PAIN DESCRIPTION - ONSET: ONSET: GRADUAL

## 2025-05-24 NOTE — PROGRESS NOTES
Hospitalist Progress Note      Patient:  Susan K Sturgess Mooney      Unit/Bed:6K-28/028-A    YOB: 1965    MRN: [id number]       Acct: [id number]     PCP: Torri George, ADDISON - CNP    Date of Admission: 5/17/2025    Date/Time of Evaluation:  5/24/2025 at 9:51 AM    Assessment/Plan:    Generalized weakness, chronic BLE weakness -- chronic weakness due to medical conditions as mentioned below + pseudomonal UTI, she also has a history of esophageal adenocarcinoma plus thoracic cord compression/discitis.  Patient initially presented to [facility] <24 hrs as she was unable to take care of herself after returning home from SNF in [place] on 5/16.    -- s/p cefepime 5/17 - 5/23 for her pseudomonal UTI  -- Continue J-tube feeds per recommendations of dietitian.   -- Palliative eval was completed for goals of care discussion and continues to remain a full code   -- continue PT/OT -- rec SNF but preferred SNF denied and ?if looking at additional SNF's per SS note 5/22 and no one was not able to talk with  5/23 about possible DC home with magdalene lift and HH already in place.  Pseudomonal UTI due to chronic indwelling Abreu catheter -- POA: Chronic Abreu that was kinked on arrival and was exchanged on 5/17/2025.  Urine culture grew Pseudomonas aeruginosa that was susceptible to cefepime.  S/p tx with cefepime 5/17 - 5/23.  Legionella + strep pneumo antigen were negative.  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  Also shows mild atelectasis/pneumonia inferior medial aspect of right lung base -- repeat CXR 5/24  -- continues with increased \"phlegm\" - ?GI secretions thus trial robinul 1 mg per J tube 3 x day and monitor if helps  -- stable on  CHEST (2 VW)   Final Result   1. Normal heart size. 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 Apparent prior thoracic surgery since prior study.   2. Mild atelectasis/pneumonia inferomedial aspect right lung base.            **This report has been created using voice recognition software.  It may contain   minor errors which are inherent in voice recognition technology.**            Electronically signed by Dr. Leonard Oakley            LDA: []CVC / []PICC / []Midline / [x]Abreu / []Drains / []Mediport / [x]J-tube  Antibiotics: completed cefepime 5/23  Steroids: no   Labs (still needed?): [x]Yes / []No  IVF (still needed?): []Yes / [x]No    Level of care: []Step Down / [x]Med-Surg  Bed Status: [x]Inpatient / []Observation  Telemetry: []Yes / [x]No -- Review last 24 hrs = off  PT/OT: [x]Yes / []No     Abreu: yes - chronic and changed on admission 5/17    Microbiology:  urine cx 5/17 = Pseudomonas aeruginosa    DVT prophylaxis: [x] Lovenox                                 [] SCDs                                 [] SQ Heparin                                 [] Encourage ambulation           [] Already on Anticoagulation     Disposition:    [x] Home with HH vs SNF       [] TCU       [] Rehab       [] Psych       [] SNF       [] Long Term Care Facility       [] Other-    Code Status: Full Code      Electronically signed by MIGUEL HURTADO MD on 5/24/2025 at 9:51 AM

## 2025-05-25 LAB
GLUCOSE BLD STRIP.AUTO-MCNC: 137 MG/DL (ref 70–108)
GLUCOSE BLD STRIP.AUTO-MCNC: 150 MG/DL (ref 70–108)
GLUCOSE BLD STRIP.AUTO-MCNC: 153 MG/DL (ref 70–108)
GLUCOSE BLD STRIP.AUTO-MCNC: 155 MG/DL (ref 70–108)
GLUCOSE BLD STRIP.AUTO-MCNC: 158 MG/DL (ref 70–108)
GLUCOSE BLD STRIP.AUTO-MCNC: 172 MG/DL (ref 70–108)
HCT VFR BLD AUTO: 31.1 % (ref 37–47)
HGB BLD-MCNC: 9.7 GM/DL (ref 12–16)

## 2025-05-25 PROCEDURE — 2500000003 HC RX 250 WO HCPCS: Performed by: INTERNAL MEDICINE

## 2025-05-25 PROCEDURE — 6370000000 HC RX 637 (ALT 250 FOR IP): Performed by: INTERNAL MEDICINE

## 2025-05-25 PROCEDURE — 85018 HEMOGLOBIN: CPT

## 2025-05-25 PROCEDURE — 94761 N-INVAS EAR/PLS OXIMETRY MLT: CPT

## 2025-05-25 PROCEDURE — 99232 SBSQ HOSP IP/OBS MODERATE 35: CPT | Performed by: FAMILY MEDICINE

## 2025-05-25 PROCEDURE — 6370000000 HC RX 637 (ALT 250 FOR IP): Performed by: STUDENT IN AN ORGANIZED HEALTH CARE EDUCATION/TRAINING PROGRAM

## 2025-05-25 PROCEDURE — 6370000000 HC RX 637 (ALT 250 FOR IP)

## 2025-05-25 PROCEDURE — 85014 HEMATOCRIT: CPT

## 2025-05-25 PROCEDURE — 6360000002 HC RX W HCPCS: Performed by: INTERNAL MEDICINE

## 2025-05-25 PROCEDURE — 6370000000 HC RX 637 (ALT 250 FOR IP): Performed by: FAMILY MEDICINE

## 2025-05-25 PROCEDURE — 82948 REAGENT STRIP/BLOOD GLUCOSE: CPT

## 2025-05-25 PROCEDURE — 1200000000 HC SEMI PRIVATE

## 2025-05-25 PROCEDURE — 94640 AIRWAY INHALATION TREATMENT: CPT

## 2025-05-25 PROCEDURE — 36415 COLL VENOUS BLD VENIPUNCTURE: CPT

## 2025-05-25 RX ADMIN — ENOXAPARIN SODIUM 40 MG: 100 INJECTION SUBCUTANEOUS at 16:29

## 2025-05-25 RX ADMIN — GUAIFENESIN 200 MG: 200 SOLUTION ORAL at 21:05

## 2025-05-25 RX ADMIN — FLUTICASONE PROPIONATE 2 SPRAY: 50 SPRAY, METERED NASAL at 09:57

## 2025-05-25 RX ADMIN — ALBUTEROL SULFATE 2 PUFF: 90 AEROSOL, METERED RESPIRATORY (INHALATION) at 08:24

## 2025-05-25 RX ADMIN — FLUTICASONE PROPIONATE 2 SPRAY: 50 SPRAY, METERED NASAL at 21:16

## 2025-05-25 RX ADMIN — GLYCOPYRROLATE 1 MG: 1 TABLET ORAL at 09:57

## 2025-05-25 RX ADMIN — BUDESONIDE AND FORMOTEROL FUMARATE DIHYDRATE 2 PUFF: 80; 4.5 AEROSOL RESPIRATORY (INHALATION) at 22:25

## 2025-05-25 RX ADMIN — GUAIFENESIN 200 MG: 200 SOLUTION ORAL at 16:29

## 2025-05-25 RX ADMIN — TIOTROPIUM BROMIDE INHALATION SPRAY 2 PUFF: 3.12 SPRAY, METERED RESPIRATORY (INHALATION) at 08:24

## 2025-05-25 RX ADMIN — PROMETHAZINE HYDROCHLORIDE 25 MG: 25 TABLET ORAL at 09:57

## 2025-05-25 RX ADMIN — PROMETHAZINE HYDROCHLORIDE 25 MG: 25 TABLET ORAL at 01:23

## 2025-05-25 RX ADMIN — OXYCODONE 10 MG: 5 TABLET ORAL at 16:28

## 2025-05-25 RX ADMIN — ALBUTEROL SULFATE 2 PUFF: 90 AEROSOL, METERED RESPIRATORY (INHALATION) at 13:01

## 2025-05-25 RX ADMIN — OXYCODONE 10 MG: 5 TABLET ORAL at 21:05

## 2025-05-25 RX ADMIN — FAMOTIDINE 40 MG: 20 TABLET, FILM COATED ORAL at 09:57

## 2025-05-25 RX ADMIN — GUAIFENESIN 200 MG: 200 SOLUTION ORAL at 09:57

## 2025-05-25 RX ADMIN — PROMETHAZINE HYDROCHLORIDE 25 MG: 25 TABLET ORAL at 16:28

## 2025-05-25 RX ADMIN — GUAIFENESIN 200 MG: 200 SOLUTION ORAL at 01:23

## 2025-05-25 RX ADMIN — ALBUTEROL SULFATE 2 PUFF: 90 AEROSOL, METERED RESPIRATORY (INHALATION) at 22:25

## 2025-05-25 RX ADMIN — CETIRIZINE HYDROCHLORIDE 10 MG: 10 TABLET, FILM COATED ORAL at 09:57

## 2025-05-25 RX ADMIN — GLYCOPYRROLATE 1 MG: 1 TABLET ORAL at 16:30

## 2025-05-25 RX ADMIN — TRAZODONE HYDROCHLORIDE 25 MG: 50 TABLET ORAL at 21:04

## 2025-05-25 RX ADMIN — PROMETHAZINE HYDROCHLORIDE 25 MG: 25 TABLET ORAL at 21:05

## 2025-05-25 RX ADMIN — BUDESONIDE AND FORMOTEROL FUMARATE DIHYDRATE 2 PUFF: 80; 4.5 AEROSOL RESPIRATORY (INHALATION) at 08:24

## 2025-05-25 RX ADMIN — OXYCODONE 10 MG: 5 TABLET ORAL at 01:23

## 2025-05-25 ASSESSMENT — PAIN SCALES - GENERAL
PAINLEVEL_OUTOF10: 9
PAINLEVEL_OUTOF10: 2
PAINLEVEL_OUTOF10: 8
PAINLEVEL_OUTOF10: 10
PAINLEVEL_OUTOF10: 9
PAINLEVEL_OUTOF10: 8
PAINLEVEL_OUTOF10: 9

## 2025-05-25 ASSESSMENT — PAIN DESCRIPTION - FREQUENCY
FREQUENCY: CONTINUOUS
FREQUENCY: CONTINUOUS

## 2025-05-25 ASSESSMENT — PAIN DESCRIPTION - ORIENTATION: ORIENTATION: MID

## 2025-05-25 ASSESSMENT — PAIN DESCRIPTION - DESCRIPTORS
DESCRIPTORS: ACHING
DESCRIPTORS: ACHING;BURNING

## 2025-05-25 ASSESSMENT — PAIN DESCRIPTION - PAIN TYPE
TYPE: ACUTE PAIN;CHRONIC PAIN
TYPE: ACUTE PAIN

## 2025-05-25 ASSESSMENT — PAIN DESCRIPTION - LOCATION
LOCATION: COCCYX
LOCATION: BUTTOCKS
LOCATION: GENERALIZED
LOCATION: BUTTOCKS

## 2025-05-25 ASSESSMENT — PAIN DESCRIPTION - ONSET
ONSET: GRADUAL
ONSET: GRADUAL

## 2025-05-25 ASSESSMENT — PAIN - FUNCTIONAL ASSESSMENT: PAIN_FUNCTIONAL_ASSESSMENT: PREVENTS OR INTERFERES SOME ACTIVE ACTIVITIES AND ADLS

## 2025-05-25 NOTE — PLAN OF CARE
Problem: Chronic Conditions and Co-morbidities  Goal: Patient's chronic conditions and co-morbidity symptoms are monitored and maintained or improved  Outcome: Progressing     Problem: Discharge Planning  Goal: Discharge to home or other facility with appropriate resources  Outcome: Progressing     Problem: Safety - Adult  Goal: Free from fall injury  Outcome: Progressing     Problem: Skin/Tissue Integrity  Goal: Skin integrity remains intact  Description: 1.  Monitor for areas of redness and/or skin breakdown2.  Assess vascular access sites hourly3.  Every 4-6 hours minimum:  Change oxygen saturation probe site4.  Every 4-6 hours:  If on nasal continuous positive airway pressure, respiratory therapy assess nares and determine need for appliance change or resting period  Outcome: Progressing     Problem: Nutrition Deficit:  Goal: Optimize nutritional status  Outcome: Progressing     Problem: Respiratory - Adult  Goal: Clear lung sounds  5/24/2025 2316 by Hammad Levine RN  Outcome: Progressing  5/24/2025 0944 by Anna Jaime RCP  Outcome: Progressing     Problem: Pain  Goal: Verbalizes/displays adequate comfort level or baseline comfort level  Outcome: Progressing

## 2025-05-25 NOTE — PLAN OF CARE
Problem: Chronic Conditions and Co-morbidities  Goal: Patient's chronic conditions and co-morbidity symptoms are monitored and maintained or improved  5/25/2025 1704 by Keara Saleem RN  Outcome: Progressing  5/25/2025 1704 by Keara Saleem RN  Outcome: Progressing     Problem: Discharge Planning  Goal: Discharge to home or other facility with appropriate resources  5/25/2025 1704 by Keara Saleem RN  Outcome: Progressing  5/25/2025 1704 by Keara Saleem RN  Outcome: Progressing     Problem: Safety - Adult  Goal: Free from fall injury  5/25/2025 1704 by Keara Saleem RN  Outcome: Progressing  5/25/2025 1704 by Keara Saleem RN  Outcome: Progressing     Problem: Skin/Tissue Integrity  Goal: Skin integrity remains intact  Description: 1.  Monitor for areas of redness and/or skin breakdown2.  Assess vascular access sites hourly3.  Every 4-6 hours minimum:  Change oxygen saturation probe site4.  Every 4-6 hours:  If on nasal continuous positive airway pressure, respiratory therapy assess nares and determine need for appliance change or resting period  5/25/2025 1704 by Keara Saleem RN  Outcome: Progressing  5/25/2025 1704 by Keara Saleem RN  Outcome: Progressing     Problem: Nutrition Deficit:  Goal: Optimize nutritional status  5/25/2025 1704 by Keara Saleem RN  Outcome: Progressing  5/25/2025 1704 by Keara Saleem RN  Outcome: Progressing     Problem: Respiratory - Adult  Goal: Clear lung sounds  5/25/2025 1704 by Keara Saleem RN  Outcome: Progressing  5/25/2025 1704 by Keara Saleem RN  Outcome: Progressing    Problem: Pain  Goal: Verbalizes/displays adequate comfort level or baseline comfort level  5/25/2025 1704 by Keara Saleem RN  Outcome: Progressing  5/25/2025 1704 by Keara Saleem RN  Outcome: Progressing      Care plan reviewed with patient. Verbalizes  understanding.

## 2025-05-25 NOTE — RT PROTOCOL NOTE
RT Inhaler-Nebulizer Bronchodilator Protocol Note    There is a bronchodilator order in the chart from a provider indicating to follow the RT Bronchodilator Protocol and there is an “Initiate RT Inhaler-Nebulizer Bronchodilator Protocol” order as well (see protocol at bottom of note).    CXR Findings:  XR CHEST PORTABLE  Result Date: 5/24/2025  No acute intrathoracic process. **This report has been created using voice recognition software. It may contain minor errors which are inherent in voice recognition technology.** Electronically signed by Dr. Mushtaq Mccarthy      The findings from the last RT Protocol Assessment were as follows:   History Pulmonary Disease: Chronic pulmonary disease  Respiratory Pattern: Regular pattern and RR 12-20 bpm  Breath Sounds: Inspiratory and expiratory or bilateral wheezing and/or rhonchi  Cough: Strong, productive  Indication for Bronchodilator Therapy: Decreased or absent breath sounds  Bronchodilator Assessment Score: 9    Aerosolized bronchodilator medication orders have been revised according to the RT Inhaler-Nebulizer Bronchodilator Protocol below.    Respiratory Therapist to perform RT Therapy Protocol Assessment initially then follow the protocol.  Repeat RT Therapy Protocol Assessment PRN for score 0-3 or on second treatment, BID, and PRN for scores above 3.    No Indications - adjust the frequency to every 6 hours PRN wheezing or bronchospasm, if no treatments needed after 48 hours then discontinue using Per Protocol order mode.     If indication present, adjust the RT bronchodilator orders based on the Bronchodilator Assessment Score as indicated below.  Use Inhaler orders unless patient has one or more of the following: on home nebulizer, not able to hold breath for 10 seconds, is not alert and oriented, cannot activate and use MDI correctly, or respiratory rate 25 breaths per minute or more, then use the equivalent nebulizer order(s) with same Frequency and PRN  reasons based on the score.  If a patient is on this medication at home then do not decrease Frequency below that used at home.    0-3 - enter or revise RT bronchodilator order(s) to equivalent RT Bronchodilator order with Frequency of every 4 hours PRN for wheezing or increased work of breathing using Per Protocol order mode.        4-6 - enter or revise RT Bronchodilator order(s) to two equivalent RT bronchodilator orders with one order with BID Frequency and one order with Frequency of every 4 hours PRN wheezing or increased work of breathing using Per Protocol order mode.        7-10 - enter or revise RT Bronchodilator order(s) to two equivalent RT bronchodilator orders with one order with TID Frequency and one order with Frequency of every 4 hours PRN wheezing or increased work of breathing using Per Protocol order mode.       11-13 - enter or revise RT Bronchodilator order(s) to one equivalent RT bronchodilator order with QID Frequency and an Albuterol order with Frequency of every 4 hours PRN wheezing or increased work of breathing using Per Protocol order mode.      Greater than 13 - enter or revise RT Bronchodilator order(s) to one equivalent RT bronchodilator order with every 4 hours Frequency and an Albuterol order with Frequency of every 2 hours PRN wheezing or increased work of breathing using Per Protocol order mode.     RT to enter RT Home Evaluation for COPD & MDI Assessment order using Per Protocol order mode.    Electronically signed by Pancho Loving RCP on 5/24/2025 at 8:47 PM

## 2025-05-25 NOTE — PROGRESS NOTES
Nightly    metoprolol tartrate  12.5 mg Per J Tube BID    cetirizine  10 mg Per J Tube Daily    enoxaparin  40 mg SubCUTAneous Q24H    insulin lispro  0-8 Units SubCUTAneous Q4H     PRN Meds: albuterol, ondansetron, oxyCODONE, ondansetron, promethazine, guaiFENesin, glucose, dextrose bolus **OR** dextrose bolus, glucagon (rDNA), dextrose      Intake/Output Summary (Last 24 hours) at 5/25/2025 0944  Last data filed at 5/25/2025 0406  Gross per 24 hour   Intake 2833 ml   Output 450 ml   Net 2383 ml       Diet:  Diet NPO  ADULT TUBE FEEDING; Jejunostomy; Other Tube Feeding (specify); [employer] 1.2 Glucose Support; Supplied from home; Continuous; 35; Yes; 10; Q 4 hours; 55; 30; Q 4 hours    Exam:  /75   Pulse 79   Temp 98.4 °F (36.9 °C) (Oral)   Resp 20   Ht 1.6 m (5' 3\")   Wt 61.2 kg (134 lb 14.7 oz)   SpO2 93%   BMI 23.90 kg/m²     General appearance: No apparent distress, appears stated age and cooperative.  Oriented x 3.  HEENT: Pupils equal, round, and reactive to light. Conjunctivae/corneas clear.  MMM.  Neck: Supple, with full range of motion.     Respiratory:  Normal respiratory effort. Few upper airway rhonchi bilaterally and occasional wheeze anteriorly.  No respiratory distress or accessory muscle use.  Cardiovascular: Regular rate and rhythm with normal S1/S2 without murmurs, rubs or gallops.  No JVD.  Abdomen: Soft, minimal diffuse TTP, J tube upper abd, non-distended with normal bowel sounds.  No rebound or guarding  Musculoskeletal: No clubbing, cyanosis or edema bilaterally.  No calf tenderness palpation, only moves feet little on BLE  Skin: Skin color, texture, turgor normal.  No rashes or lesions.  Neurologic:  Cranial nerves: II-XII intact  Psychiatric: Alert and oriented, thought content appropriate, normal insight      All labs reviewed and interpreted by me:  Labs:   Recent Labs     05/23/25  0531 05/25/25  0606   WBC 5.8  --    HGB 9.7* 9.7*   HCT 31.6* 31.1*     --   5/17    Microbiology:  urine cx 5/17 = Pseudomonas aeruginosa    DVT prophylaxis: [x] Lovenox                                 [] SCDs                                 [] SQ Heparin                                 [] Encourage ambulation           [] Already on Anticoagulation     Disposition:    [x] Home with HH vs SNF       [] TCU       [] Rehab       [] Psych       [] SNF       [] Long Term Care Facility       [] Other-    Code Status: Full Code      Electronically signed by MIGUEL HURTADO MD on 5/25/2025 at 9:44 AM

## 2025-05-25 NOTE — PLAN OF CARE
Problem: Respiratory - Adult  Goal: Clear lung sounds  5/25/2025 0831 by Diane Shankar, RCP  Note: Patient receiving inhalers to maintain and manage respiratory status. Will be continued as ordered to improve aeration.

## 2025-05-26 LAB
ANION GAP SERPL CALC-SCNC: 10 MEQ/L (ref 8–16)
BUN SERPL-MCNC: 23 MG/DL (ref 8–23)
CALCIUM SERPL-MCNC: 9.3 MG/DL (ref 8.8–10.2)
CHLORIDE SERPL-SCNC: 98 MEQ/L (ref 98–111)
CO2 SERPL-SCNC: 25 MEQ/L (ref 22–29)
CREAT SERPL-MCNC: 0.6 MG/DL (ref 0.5–0.9)
GFR SERPL CREATININE-BSD FRML MDRD: > 90 ML/MIN/1.73M2
GLUCOSE BLD STRIP.AUTO-MCNC: 123 MG/DL (ref 70–108)
GLUCOSE BLD STRIP.AUTO-MCNC: 137 MG/DL (ref 70–108)
GLUCOSE BLD STRIP.AUTO-MCNC: 147 MG/DL (ref 70–108)
GLUCOSE BLD STRIP.AUTO-MCNC: 149 MG/DL (ref 70–108)
GLUCOSE BLD STRIP.AUTO-MCNC: 161 MG/DL (ref 70–108)
GLUCOSE BLD STRIP.AUTO-MCNC: 176 MG/DL (ref 70–108)
GLUCOSE SERPL-MCNC: 144 MG/DL (ref 74–109)
POTASSIUM SERPL-SCNC: 4.5 MEQ/L (ref 3.5–5.2)
SODIUM SERPL-SCNC: 133 MEQ/L (ref 135–145)

## 2025-05-26 PROCEDURE — 6370000000 HC RX 637 (ALT 250 FOR IP): Performed by: STUDENT IN AN ORGANIZED HEALTH CARE EDUCATION/TRAINING PROGRAM

## 2025-05-26 PROCEDURE — 2500000003 HC RX 250 WO HCPCS: Performed by: INTERNAL MEDICINE

## 2025-05-26 PROCEDURE — 6360000002 HC RX W HCPCS

## 2025-05-26 PROCEDURE — 6370000000 HC RX 637 (ALT 250 FOR IP)

## 2025-05-26 PROCEDURE — 80048 BASIC METABOLIC PNL TOTAL CA: CPT

## 2025-05-26 PROCEDURE — 6370000000 HC RX 637 (ALT 250 FOR IP): Performed by: FAMILY MEDICINE

## 2025-05-26 PROCEDURE — 1200000000 HC SEMI PRIVATE

## 2025-05-26 PROCEDURE — 6370000000 HC RX 637 (ALT 250 FOR IP): Performed by: INTERNAL MEDICINE

## 2025-05-26 PROCEDURE — 82948 REAGENT STRIP/BLOOD GLUCOSE: CPT

## 2025-05-26 PROCEDURE — 36415 COLL VENOUS BLD VENIPUNCTURE: CPT

## 2025-05-26 PROCEDURE — 99232 SBSQ HOSP IP/OBS MODERATE 35: CPT | Performed by: FAMILY MEDICINE

## 2025-05-26 PROCEDURE — 94640 AIRWAY INHALATION TREATMENT: CPT

## 2025-05-26 PROCEDURE — 6360000002 HC RX W HCPCS: Performed by: INTERNAL MEDICINE

## 2025-05-26 RX ORDER — 0.9 % SODIUM CHLORIDE 0.9 %
500 INTRAVENOUS SOLUTION INTRAVENOUS ONCE
Status: COMPLETED | OUTPATIENT
Start: 2025-05-27 | End: 2025-05-27

## 2025-05-26 RX ADMIN — ENOXAPARIN SODIUM 40 MG: 100 INJECTION SUBCUTANEOUS at 17:35

## 2025-05-26 RX ADMIN — GLYCOPYRROLATE 1 MG: 1 TABLET ORAL at 20:49

## 2025-05-26 RX ADMIN — ONDANSETRON 4 MG: 2 INJECTION, SOLUTION INTRAMUSCULAR; INTRAVENOUS at 11:25

## 2025-05-26 RX ADMIN — CETIRIZINE HYDROCHLORIDE 10 MG: 10 TABLET, FILM COATED ORAL at 07:47

## 2025-05-26 RX ADMIN — ONDANSETRON 4 MG: 4 TABLET, ORALLY DISINTEGRATING ORAL at 20:47

## 2025-05-26 RX ADMIN — OXYCODONE 10 MG: 5 TABLET ORAL at 15:27

## 2025-05-26 RX ADMIN — OXYCODONE 10 MG: 5 TABLET ORAL at 11:25

## 2025-05-26 RX ADMIN — GUAIFENESIN 200 MG: 200 SOLUTION ORAL at 20:47

## 2025-05-26 RX ADMIN — TRAZODONE HYDROCHLORIDE 25 MG: 50 TABLET ORAL at 20:47

## 2025-05-26 RX ADMIN — GUAIFENESIN 200 MG: 200 SOLUTION ORAL at 02:09

## 2025-05-26 RX ADMIN — OXYCODONE 10 MG: 5 TABLET ORAL at 02:09

## 2025-05-26 RX ADMIN — GUAIFENESIN 200 MG: 200 SOLUTION ORAL at 11:25

## 2025-05-26 RX ADMIN — PROMETHAZINE HYDROCHLORIDE 25 MG: 25 TABLET ORAL at 06:49

## 2025-05-26 RX ADMIN — FLUTICASONE PROPIONATE 2 SPRAY: 50 SPRAY, METERED NASAL at 20:49

## 2025-05-26 RX ADMIN — ALBUTEROL SULFATE 2 PUFF: 90 AEROSOL, METERED RESPIRATORY (INHALATION) at 19:26

## 2025-05-26 RX ADMIN — ALBUTEROL SULFATE 2 PUFF: 90 AEROSOL, METERED RESPIRATORY (INHALATION) at 09:29

## 2025-05-26 RX ADMIN — GLYCOPYRROLATE 1 MG: 1 TABLET ORAL at 07:47

## 2025-05-26 RX ADMIN — FLUTICASONE PROPIONATE 2 SPRAY: 50 SPRAY, METERED NASAL at 07:47

## 2025-05-26 RX ADMIN — BUDESONIDE AND FORMOTEROL FUMARATE DIHYDRATE 2 PUFF: 80; 4.5 AEROSOL RESPIRATORY (INHALATION) at 09:29

## 2025-05-26 RX ADMIN — BUDESONIDE AND FORMOTEROL FUMARATE DIHYDRATE 2 PUFF: 80; 4.5 AEROSOL RESPIRATORY (INHALATION) at 19:26

## 2025-05-26 RX ADMIN — OXYCODONE 10 MG: 5 TABLET ORAL at 20:47

## 2025-05-26 RX ADMIN — ONDANSETRON 4 MG: 2 INJECTION, SOLUTION INTRAMUSCULAR; INTRAVENOUS at 02:09

## 2025-05-26 RX ADMIN — OXYCODONE 10 MG: 5 TABLET ORAL at 06:49

## 2025-05-26 RX ADMIN — GUAIFENESIN 200 MG: 200 SOLUTION ORAL at 06:49

## 2025-05-26 RX ADMIN — TIOTROPIUM BROMIDE INHALATION SPRAY 2 PUFF: 3.12 SPRAY, METERED RESPIRATORY (INHALATION) at 09:37

## 2025-05-26 RX ADMIN — FAMOTIDINE 40 MG: 20 TABLET, FILM COATED ORAL at 07:47

## 2025-05-26 RX ADMIN — PROMETHAZINE HYDROCHLORIDE 25 MG: 25 TABLET ORAL at 15:27

## 2025-05-26 RX ADMIN — GUAIFENESIN 200 MG: 200 SOLUTION ORAL at 15:27

## 2025-05-26 ASSESSMENT — PAIN DESCRIPTION - ORIENTATION
ORIENTATION: MID

## 2025-05-26 ASSESSMENT — PAIN SCALES - GENERAL
PAINLEVEL_OUTOF10: 9
PAINLEVEL_OUTOF10: 8
PAINLEVEL_OUTOF10: 8
PAINLEVEL_OUTOF10: 9
PAINLEVEL_OUTOF10: 8
PAINLEVEL_OUTOF10: 7
PAINLEVEL_OUTOF10: 10

## 2025-05-26 ASSESSMENT — PAIN DESCRIPTION - FREQUENCY
FREQUENCY: CONTINUOUS
FREQUENCY: CONTINUOUS

## 2025-05-26 ASSESSMENT — PAIN DESCRIPTION - DESCRIPTORS
DESCRIPTORS: DISCOMFORT
DESCRIPTORS: SORE
DESCRIPTORS: DISCOMFORT
DESCRIPTORS: BURNING;PRESSURE
DESCRIPTORS: SORE

## 2025-05-26 ASSESSMENT — PAIN DESCRIPTION - LOCATION
LOCATION: COCCYX
LOCATION: COCCYX;SACRUM

## 2025-05-26 ASSESSMENT — PAIN DESCRIPTION - ONSET
ONSET: GRADUAL
ONSET: GRADUAL

## 2025-05-26 ASSESSMENT — PAIN SCALES - WONG BAKER
WONGBAKER_NUMERICALRESPONSE: NO HURT
WONGBAKER_NUMERICALRESPONSE: NO HURT

## 2025-05-26 ASSESSMENT — PAIN DESCRIPTION - PAIN TYPE
TYPE: CHRONIC PAIN
TYPE: ACUTE PAIN

## 2025-05-26 NOTE — PLAN OF CARE
Problem: Respiratory - Adult  Goal: Clear lung sounds  5/26/2025 1932 by Pancho Mejia RCP  Outcome: Progressing

## 2025-05-26 NOTE — PLAN OF CARE
Problem: Respiratory - Adult  Goal: Clear lung sounds  5/25/2025 1704 by Keara Saleem RN  Outcome: Progressing     Patient mutually agreed on goals.

## 2025-05-26 NOTE — PROGRESS NOTES
PROGRESS NOTE      Patient:  Susan K Sturgess Meyers  Unit/Bed:6K-28/028-A  YOB: 1965  MRN: [id number]   Acct: [id number]    PCP: Torri George, APRN - CNP    Date of Admission: 5/17/2025 LOS: 9    Date of Evaluation:  5/26/2025    Anticipated Discharge: Pending placement     Assessment/Plan:       Generalized weakness, chronic BLE weakness -- chronic weakness due to medical conditions as mentioned below + pseudomonal UTI, she also has a history of esophageal adenocarcinoma plus thoracic cord compression/discitis.  Patient initially presented [facility] <24 hrs as she was unable to take care of herself after returning home from SNF in [place] on 5/16.    -- s/p cefepime 5/17 - 5/23 for her pseudomonal UTI  -- Continue J-tube feeds per recommendations of dietitian.   -- Palliative eval was completed for goals of care discussion and continues to remain a full code   -- continue PT/OT -- rec SNF but preferred SNF denied by facility (not by insurance) -- ?if looking at additional SNF's per SS note 5/22 and no one was not able to talk with  5/23 about possible DC home with magdalene lift and HH already in place.  -- Will follow up with CM 5/27 regarding finding placement in network  Pseudomonal UTI due to chronic indwelling Abreu catheter -- POA: Chronic Abreu that was kinked on arrival and was exchanged on 5/17/2025.  Urine culture grew Pseudomonas aeruginosa that was susceptible to cefepime.  S/p tx with cefepime 5/17 - 5/23.  Legionella + strep pneumo antigen were negative.  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  Also shows mild atelectasis/pneumonia inferior medial aspect of right lung base -- repeat CXR 5/24 with no acute  aspect of the right lung base. UA noted leukocyte esterases urine culture with preliminary nonfermenting gram-negative bacilli. Patient has a catheter. She was started on cefepime. Legionella and strep antigens ordered. Dietitian was consulted for management of J-tube feeds. Social work was consulted for placement.\"     Subjective/HPI:   Susan K Sturgess Meyers feels similar to previous overall. No acute changes. Reports some improvement? of her cough this morning but continues with secretions and chronic SOB. Stable on RA. Afebrile.  Last BM 5/25.       PMH, SURGICAL HX, FH, SOCIAL HX reviewed and updated as needed.    Medications:  Reviewed    Infusion Medications    dextrose       Scheduled Medications    glycopyrrolate  1 mg Per G Tube TID    fluticasone  2 spray Each Nostril BID    albuterol sulfate HFA  2 puff Inhalation TID RT    tiotropium  2 puff Inhalation Daily RT    famotidine  40 mg Per J Tube Daily    budesonide-formoterol  2 puff Inhalation BID RT    traZODone  25 mg Per J Tube Nightly    metoprolol tartrate  12.5 mg Per J Tube BID    cetirizine  10 mg Per J Tube Daily    enoxaparin  40 mg SubCUTAneous Q24H    insulin lispro  0-8 Units SubCUTAneous Q4H     PRN Meds: albuterol, ondansetron, oxyCODONE, ondansetron, promethazine, guaiFENesin, glucose, dextrose bolus **OR** dextrose bolus, glucagon (rDNA), dextrose      Intake/Output Summary (Last 24 hours) at 5/26/2025 1237  Last data filed at 5/26/2025 0405  Gross per 24 hour   Intake 1576 ml   Output 1050 ml   Net 526 ml       Exam:  /83   Pulse 87   Temp 97.7 °F (36.5 °C) (Oral)   Resp 15   Ht 1.6 m (5' 3\")   Wt 61.2 kg (134 lb 14.7 oz)   SpO2 96%   BMI 23.90 kg/m²     Physical Exam  Constitutional:       General: She is not in acute distress.     Appearance: Normal appearance. She is normal weight. She is ill-appearing (Chronically ill).   HENT:      Head: Normocephalic and atraumatic.   Eyes:      General: No scleral icterus.

## 2025-05-26 NOTE — RT PROTOCOL NOTE
July 13, 2023    To Whom It May Concern:         This is confirmation that Pati Lang attended her scheduled appointment with Mari Saba P.A.-C. on 7/13/23.  Please excuse work absences today for medical reasons.          If you have any questions please do not hesitate to call me at the phone number listed below.    Sincerely,    Mari Saba P.A.-C.  [phone]                   RT Nebulizer Bronchodilator Protocol Note    There is a bronchodilator order in the chart from a provider indicating to follow the RT Bronchodilator Protocol and there is an “Initiate RT Bronchodilator Protocol” order as well (see protocol at bottom of note).    CXR Findings:  No results found.    The findings from the last RT Protocol Assessment were as follows:  Smoking: Chronic pulmonary disease  Respiratory Pattern: Regular pattern and RR 12-20 bpm  Breath Sounds: Inspiratory and expiratory or bilateral wheezing and/or rhonchi  Cough: Strong, productive  Indication for Bronchodilator Therapy: Wheezing associated with pulm disorder  Bronchodilator Assessment Score: 9    Aerosolized bronchodilator medication orders have been revised according to the RT Nebulizer Bronchodilator Protocol below.    Respiratory Therapist to perform RT Therapy Protocol Assessment initially then follow the protocol.  Repeat RT Therapy Protocol Assessment PRN for score 0-3 or on second treatment, BID, and PRN for scores above 3.    No Indications - adjust the frequency to every 6 hours PRN wheezing or bronchospasm, if no treatments needed after 48 hours then discontinue using Per Protocol order mode.     If indication present, adjust the RT bronchodilator orders based on the Bronchodilator Assessment Score as indicated below.  If a patient is on this medication at home then do not decrease Frequency below that used at home.    0-3 - enter or revise RT bronchodilator order(s) to equivalent RT Bronchodilator order with Frequency of every 4 hours PRN for wheezing or increased work of breathing using Per Protocol order mode.       4-6 - enter or revise RT Bronchodilator order(s) to two equivalent RT bronchodilator orders with one order with BID Frequency and one order with Frequency of every 4 hours PRN wheezing or increased work of breathing using Per Protocol order mode.         7-10 - enter or revise RT Bronchodilator order(s) to two

## 2025-05-26 NOTE — PLAN OF CARE
Problem: Chronic Conditions and Co-morbidities  Goal: Patient's chronic conditions and co-morbidity symptoms are monitored and maintained or improved  5/26/2025 0450 by Marylu Sales RN  Outcome: Progressing  Flowsheets (Taken 5/26/2025 0450)  Care Plan - Patient's Chronic Conditions and Co-Morbidity Symptoms are Monitored and Maintained or Improved:   Monitor and assess patient's chronic conditions and comorbid symptoms for stability, deterioration, or improvement   Collaborate with multidisciplinary team to address chronic and comorbid conditions and prevent exacerbation or deterioration   Update acute care plan with appropriate goals if chronic or comorbid symptoms are exacerbated and prevent overall improvement and discharge       Problem: Discharge Planning  Goal: Discharge to home or other facility with appropriate resources  5/26/2025 0450 by Marylu Sales RN  Outcome: Progressing  Flowsheets (Taken 5/26/2025 0450)  Discharge to home or other facility with appropriate resources:   Identify barriers to discharge with patient and caregiver   Arrange for needed discharge resources and transportation as appropriate   Identify discharge learning needs (meds, wound care, etc)       Problem: Safety - Adult  Goal: Free from fall injury  5/26/2025 0450 by Marylu Sales RN  Outcome: Progressing  Note: Patient remains free from fall this shift. Bed alarm activated . Bed locked and lowest position.  2/4 side rails raised for safety. Patient has non-skid socks on. Pathway clear and possessions in reach. Call light within reach. Patient rounded on hourly.        Problem: Skin/Tissue Integrity  Goal: Skin integrity remains intact  5/26/2025 0450 by Marylu Sales RN  Outcome: Progressing  Note:   Patient encouraged and offered to reposition every two hours, but refuses as charted in flowsheet. Educated on importance. Heels elevated . Skin kept clean and dry. Skin assessed with every head to toe. Osvaldo scale complete.          Problem: Nutrition Deficit:  Goal: Optimize nutritional status  5/26/2025 0452 by Marylu Sales RN  Outcome: Progressing  Flowsheets (Taken 5/26/2025 0452)  Nutrient intake appropriate for improving, restoring, or maintaining nutritional needs:   Assess nutritional status and recommend course of action   Monitor oral intake, labs, and treatment plans   Recommend, monitor, and adjust tube feedings and TPN/PPN based on assessed needs       Problem: Pain  Goal: Verbalizes/displays adequate comfort level or baseline comfort level  5/26/2025 0450 by Marylu Sales RN  Outcome: Progressing  Flowsheets (Taken 5/26/2025 0450)  Verbalizes/displays adequate comfort level or baseline comfort level:   Encourage patient to monitor pain and request assistance   Assess pain using appropriate pain scale   Administer analgesics based on type and severity of pain and evaluate response   Implement non-pharmacological measures as appropriate and evaluate response       Problem: Genitourinary - Adult  Goal: Urinary catheter remains patent  Outcome: Progressing  Flowsheets (Taken 5/26/2025 0454)  Urinary catheter remains patent: Assess patency of urinary catheter  Note: Abreu care provided this shift. Catheter bag is hanging below the bladder and off the floor. Catheter tubing is free from any kinks and is secured.       Problem: Metabolic/Fluid and Electrolytes - Adult  Goal: Glucose maintained within prescribed range  Outcome: Progressing  Flowsheets (Taken 5/26/2025 0452)  Glucose maintained within prescribed range:   Monitor blood glucose as ordered   Assess for signs and symptoms of hyperglycemia and hypoglycemia   Administer ordered medications to maintain glucose within target range       Patient confirms understanding and agrees to the proposed treatment plan listed above.  Electronically signed by Marylu Sales RN on 5/26/2025 at 4:54 AM

## 2025-05-27 LAB
ANION GAP SERPL CALC-SCNC: 13 MEQ/L (ref 8–16)
BUN SERPL-MCNC: 23 MG/DL (ref 8–23)
CALCIUM SERPL-MCNC: 9.3 MG/DL (ref 8.8–10.2)
CHLORIDE SERPL-SCNC: 100 MEQ/L (ref 98–111)
CO2 SERPL-SCNC: 20 MEQ/L (ref 22–29)
CREAT SERPL-MCNC: 0.6 MG/DL (ref 0.5–0.9)
DEPRECATED RDW RBC AUTO: 55.2 FL (ref 35–45)
ERYTHROCYTE [DISTWIDTH] IN BLOOD BY AUTOMATED COUNT: 17.3 % (ref 11.5–14.5)
GFR SERPL CREATININE-BSD FRML MDRD: > 90 ML/MIN/1.73M2
GLUCOSE BLD STRIP.AUTO-MCNC: 138 MG/DL (ref 70–108)
GLUCOSE BLD STRIP.AUTO-MCNC: 139 MG/DL (ref 70–108)
GLUCOSE BLD STRIP.AUTO-MCNC: 158 MG/DL (ref 70–108)
GLUCOSE BLD STRIP.AUTO-MCNC: 159 MG/DL (ref 70–108)
GLUCOSE BLD STRIP.AUTO-MCNC: 177 MG/DL (ref 70–108)
GLUCOSE BLD STRIP.AUTO-MCNC: 205 MG/DL (ref 70–108)
GLUCOSE SERPL-MCNC: 158 MG/DL (ref 74–109)
HCT VFR BLD AUTO: 31 % (ref 37–47)
HGB BLD-MCNC: 9.3 GM/DL (ref 12–16)
MCH RBC QN AUTO: 26 PG (ref 26–33)
MCHC RBC AUTO-ENTMCNC: 30 GM/DL (ref 32.2–35.5)
MCV RBC AUTO: 86.6 FL (ref 81–99)
PLATELET # BLD AUTO: 288 THOU/MM3 (ref 130–400)
PMV BLD AUTO: 9.2 FL (ref 9.4–12.4)
POTASSIUM SERPL-SCNC: 4.4 MEQ/L (ref 3.5–5.2)
RBC # BLD AUTO: 3.58 MILL/MM3 (ref 4.2–5.4)
SODIUM SERPL-SCNC: 133 MEQ/L (ref 135–145)
WBC # BLD AUTO: 5.9 THOU/MM3 (ref 4.8–10.8)

## 2025-05-27 PROCEDURE — 6370000000 HC RX 637 (ALT 250 FOR IP): Performed by: INTERNAL MEDICINE

## 2025-05-27 PROCEDURE — 6370000000 HC RX 637 (ALT 250 FOR IP): Performed by: STUDENT IN AN ORGANIZED HEALTH CARE EDUCATION/TRAINING PROGRAM

## 2025-05-27 PROCEDURE — 82948 REAGENT STRIP/BLOOD GLUCOSE: CPT

## 2025-05-27 PROCEDURE — 94761 N-INVAS EAR/PLS OXIMETRY MLT: CPT

## 2025-05-27 PROCEDURE — 97110 THERAPEUTIC EXERCISES: CPT

## 2025-05-27 PROCEDURE — 97530 THERAPEUTIC ACTIVITIES: CPT

## 2025-05-27 PROCEDURE — 99232 SBSQ HOSP IP/OBS MODERATE 35: CPT | Performed by: FAMILY MEDICINE

## 2025-05-27 PROCEDURE — 85027 COMPLETE CBC AUTOMATED: CPT

## 2025-05-27 PROCEDURE — 6360000002 HC RX W HCPCS: Performed by: INTERNAL MEDICINE

## 2025-05-27 PROCEDURE — 97535 SELF CARE MNGMENT TRAINING: CPT

## 2025-05-27 PROCEDURE — 80048 BASIC METABOLIC PNL TOTAL CA: CPT

## 2025-05-27 PROCEDURE — 6360000002 HC RX W HCPCS

## 2025-05-27 PROCEDURE — 94640 AIRWAY INHALATION TREATMENT: CPT

## 2025-05-27 PROCEDURE — 2580000003 HC RX 258

## 2025-05-27 PROCEDURE — 6370000000 HC RX 637 (ALT 250 FOR IP)

## 2025-05-27 PROCEDURE — 2500000003 HC RX 250 WO HCPCS: Performed by: INTERNAL MEDICINE

## 2025-05-27 PROCEDURE — 1200000000 HC SEMI PRIVATE

## 2025-05-27 PROCEDURE — 36415 COLL VENOUS BLD VENIPUNCTURE: CPT

## 2025-05-27 PROCEDURE — 6370000000 HC RX 637 (ALT 250 FOR IP): Performed by: FAMILY MEDICINE

## 2025-05-27 RX ADMIN — TRAZODONE HYDROCHLORIDE 25 MG: 50 TABLET ORAL at 20:01

## 2025-05-27 RX ADMIN — FLUTICASONE PROPIONATE 2 SPRAY: 50 SPRAY, METERED NASAL at 09:15

## 2025-05-27 RX ADMIN — ALBUTEROL SULFATE 2 PUFF: 90 AEROSOL, METERED RESPIRATORY (INHALATION) at 07:25

## 2025-05-27 RX ADMIN — GLYCOPYRROLATE 1 MG: 1 TABLET ORAL at 16:16

## 2025-05-27 RX ADMIN — OXYCODONE 10 MG: 5 TABLET ORAL at 20:00

## 2025-05-27 RX ADMIN — FLUTICASONE PROPIONATE 2 SPRAY: 50 SPRAY, METERED NASAL at 20:01

## 2025-05-27 RX ADMIN — FAMOTIDINE 40 MG: 20 TABLET, FILM COATED ORAL at 09:15

## 2025-05-27 RX ADMIN — GLYCOPYRROLATE 1 MG: 1 TABLET ORAL at 09:15

## 2025-05-27 RX ADMIN — GUAIFENESIN 200 MG: 200 SOLUTION ORAL at 16:15

## 2025-05-27 RX ADMIN — OXYCODONE 10 MG: 5 TABLET ORAL at 16:15

## 2025-05-27 RX ADMIN — GUAIFENESIN 200 MG: 200 SOLUTION ORAL at 20:00

## 2025-05-27 RX ADMIN — ONDANSETRON 4 MG: 2 INJECTION, SOLUTION INTRAMUSCULAR; INTRAVENOUS at 09:15

## 2025-05-27 RX ADMIN — PROMETHAZINE HYDROCHLORIDE 25 MG: 25 TABLET ORAL at 06:39

## 2025-05-27 RX ADMIN — OXYCODONE 10 MG: 5 TABLET ORAL at 06:39

## 2025-05-27 RX ADMIN — GUAIFENESIN 200 MG: 200 SOLUTION ORAL at 09:18

## 2025-05-27 RX ADMIN — ENOXAPARIN SODIUM 40 MG: 100 INJECTION SUBCUTANEOUS at 18:32

## 2025-05-27 RX ADMIN — CETIRIZINE HYDROCHLORIDE 10 MG: 10 TABLET, FILM COATED ORAL at 09:14

## 2025-05-27 RX ADMIN — INSULIN LISPRO 2 UNITS: 100 INJECTION, SOLUTION INTRAVENOUS; SUBCUTANEOUS at 11:32

## 2025-05-27 RX ADMIN — ALBUTEROL SULFATE 2 PUFF: 90 AEROSOL, METERED RESPIRATORY (INHALATION) at 12:28

## 2025-05-27 RX ADMIN — ALBUTEROL SULFATE 2 PUFF: 90 AEROSOL, METERED RESPIRATORY (INHALATION) at 19:43

## 2025-05-27 RX ADMIN — ONDANSETRON 4 MG: 2 INJECTION, SOLUTION INTRAMUSCULAR; INTRAVENOUS at 16:15

## 2025-05-27 RX ADMIN — SODIUM CHLORIDE 500 ML: 0.9 INJECTION, SOLUTION INTRAVENOUS at 00:40

## 2025-05-27 RX ADMIN — PROMETHAZINE HYDROCHLORIDE 25 MG: 25 TABLET ORAL at 20:00

## 2025-05-27 RX ADMIN — BUDESONIDE AND FORMOTEROL FUMARATE DIHYDRATE 2 PUFF: 80; 4.5 AEROSOL RESPIRATORY (INHALATION) at 19:43

## 2025-05-27 RX ADMIN — TIOTROPIUM BROMIDE INHALATION SPRAY 2 PUFF: 3.12 SPRAY, METERED RESPIRATORY (INHALATION) at 07:25

## 2025-05-27 RX ADMIN — BUDESONIDE AND FORMOTEROL FUMARATE DIHYDRATE 2 PUFF: 80; 4.5 AEROSOL RESPIRATORY (INHALATION) at 07:25

## 2025-05-27 RX ADMIN — OXYCODONE 10 MG: 5 TABLET ORAL at 11:32

## 2025-05-27 ASSESSMENT — PAIN SCALES - GENERAL
PAINLEVEL_OUTOF10: 7
PAINLEVEL_OUTOF10: 7
PAINLEVEL_OUTOF10: 9
PAINLEVEL_OUTOF10: 9
PAINLEVEL_OUTOF10: 10
PAINLEVEL_OUTOF10: 9

## 2025-05-27 ASSESSMENT — PAIN DESCRIPTION - LOCATION
LOCATION: SACRUM
LOCATION: BUTTOCKS
LOCATION: COCCYX

## 2025-05-27 ASSESSMENT — PAIN DESCRIPTION - DESCRIPTORS
DESCRIPTORS: SORE
DESCRIPTORS: SORE

## 2025-05-27 ASSESSMENT — PAIN DESCRIPTION - ORIENTATION
ORIENTATION: MID
ORIENTATION: MID

## 2025-05-27 ASSESSMENT — PAIN SCALES - WONG BAKER
WONGBAKER_NUMERICALRESPONSE: NO HURT
WONGBAKER_NUMERICALRESPONSE: NO HURT

## 2025-05-27 NOTE — CARE COORDINATION
5/27/25, 11:13 AM EDT    DISCHARGE PLANNING EVALUATION    SW left detailed message with pts spouse Jef ([phone])regarding discharge planning.  Pt is medically stable for discharge.  Unable to find accepting SNF facility.  SW requested call back.

## 2025-05-27 NOTE — PLAN OF CARE
Problem: Chronic Conditions and Co-morbidities  Goal: Patient's chronic conditions and co-morbidity symptoms are monitored and maintained or improved  Outcome: Progressing  Flowsheets (Taken 5/26/2025 2045)  Care Plan - Patient's Chronic Conditions and Co-Morbidity Symptoms are Monitored and Maintained or Improved: Monitor and assess patient's chronic conditions and comorbid symptoms for stability, deterioration, or improvement     Problem: Discharge Planning  Goal: Discharge to home or other facility with appropriate resources  Outcome: Progressing  Flowsheets (Taken 5/26/2025 2045)  Discharge to home or other facility with appropriate resources: Identify barriers to discharge with patient and caregiver     Problem: Safety - Adult  Goal: Free from fall injury  Outcome: Progressing  Flowsheets (Taken 5/23/2025 1004 by Monik Conde, RN)  Free From Fall Injury:   Instruct family/caregiver on patient safety   Based on caregiver fall risk screen, instruct family/caregiver to ask for assistance with transferring infant if caregiver noted to have fall risk factors     Problem: Skin/Tissue Integrity  Goal: Skin integrity remains intact  Description: 1.  Monitor for areas of redness and/or skin breakdown2.  Assess vascular access sites hourly3.  Every 4-6 hours minimum:  Change oxygen saturation probe site4.  Every 4-6 hours:  If on nasal continuous positive airway pressure, respiratory therapy assess nares and determine need for appliance change or resting period  Outcome: Progressing  Flowsheets (Taken 5/26/2025 2045)  Skin Integrity Remains Intact: Monitor for areas of redness and/or skin breakdown     Problem: Nutrition Deficit:  Goal: Optimize nutritional status  Outcome: Progressing  Flowsheets (Taken 5/26/2025 0452 by Marylu Sales, RN)  Nutrient intake appropriate for improving, restoring, or maintaining nutritional needs:   Assess nutritional status and recommend course of action   Monitor oral intake, labs, and

## 2025-05-27 NOTE — PLAN OF CARE
Problem: Respiratory - Adult  Goal: Clear lung sounds  5/27/2025 0745 by Azalia Reyes, RCCORTES  Outcome: Progressing   Continue with tx's to improve breath sounds, increase aeration and decrease WOB.

## 2025-05-27 NOTE — PROGRESS NOTES
[facility]  [facility] RENAL TELEMETRY 6K  Occupational Therapy  Daily Note    Discharge Recommendations: Subacute/skilled nursing facility  Equipment Recommendations: Yes Faye lift, Hospital bed?      Time In: 0848  Time Out: 0945  Timed Code Treatment Minutes: 57 Minutes  Minutes: 57          Date: 2025  Patient Name: Susan K Sturgess Meyers,   Gender: female      Room: [address] [place] [address]  MRN: [id number]  : 1965  (60 y.o.)  Referring Practitioner: Juan Gautam MD  Diagnosis: Generalized weakness  Additional Pertinent Hx: Per EMR: \"60-year-old female with past medical history of COPD, atrial fibrillation, type 2 diabetes, hyponatremia, anemia, esophageal adenocarcinoma, thoracic cord compression, vertebral osteomyelitis, allergic rhinitis, sacral decubitus ulcer, ovarian cancer, and physical deconditioning who presented to [facility] 1 day after being discharged from SNF in [place] due to not being able to take care of herself at home and due to weakness.  Patient reports weakness in the lower extremities is worse than in the upper extremities and has been ongoing, did not improve with PT OT at SNF, per patient she required additional treatment however was declined.  In the ED CXR noted mild atelectasis versus pneumonia in the inferior medial aspect of the right lung base.  UA noted leukocyte esterases urine culture with preliminary nonfermenting gram-negative bacilli. Patient has a catheter.  She was started on cefepime.  Legionella and strep antigens ordered. Dietitian was consulted for management of J-tube feeds.  Social work was consulted for placement.\"    Restrictions/Precautions:  Restrictions/Precautions: NPO, Fall Risk, General Precautions  Position Activity Restriction  Other Position/Activity Restrictions: J-tube, sacral wound     Social/Functional History:  Lives With: Spouse, Son  Type of Home: House  Home Layout: Two level, Able to Live on Main level with bedroom/bathroom  Home Access:

## 2025-05-27 NOTE — PROGRESS NOTES
Comprehensive Nutrition Assessment    Type and Reason for Visit:  Reassess (tubefeeding)    Nutrition Recommendations/Plan:   Continue current tubefeeding regimen: [employer] 1.2 Glucose Support at 55 ml/hr x 24 hours/day.  Tubefeeding formula brought in by family per patient preference-supplies through [facility] (formula not on formulary at [facility]).  Pour 440 mL of [employer] 1.2 Glucose Support in tube feeding bag. This feeding system is \"open\" and can only hang for 8 hours at a time. Feeding set must be changed q 8 hours (a total of 3x/day). Plan to use a total of ~5.25 cartons of formula/day.   Recommend 125 mls free water flush every 4 hours if no IVF's or per Provider.  NPO.       Malnutrition Assessment:  Malnutrition Status:  Severe malnutrition (05/20/25 1248)    Context:  Chronic Illness     Findings of the 6 clinical characteristics of malnutrition:  Energy Intake:   (pt states she was sometimes not getting entire cycle of TF at [facility])  Weight Loss:  Greater than 10% over 6 months     Body Fat Loss:  Mild body fat loss Orbital, Buccal region, Triceps   Muscle Mass Loss:  Mild muscle mass loss Temples (temporalis), Clavicles (pectoralis & deltoids), Scapula (trapezius)  Fluid Accumulation:  No fluid accumulation     Strength:  Not Performed    Nutrition Assessment:     Pt. severely malnourished AEB criteria listed above. At risk for further nutritional compromise r/t admit with generalized weakness -  unable to care for pt at home - discharged from SNF x1 day, N/V 2/2 esophageal stent, and underlying medical condition (PMHx: COPD, T2DM, esophageal cancer - s/p esophagectomy, esophageal fistula, s/p J-Tube placement 2/9/24, ovarian cancer 2011, former smoker).     Nutrition Related Findings:    Pt. Report/Treatments/Miscellaneous: Patient seen with family, emesis bag with patient full of tissues, reports primarily phlegm she spits out which she reports is not new and reports is not of concern,

## 2025-05-27 NOTE — RT PROTOCOL NOTE
RT Inhaler-Nebulizer Bronchodilator Protocol Note    There is a bronchodilator order in the chart from a provider indicating to follow the RT Bronchodilator Protocol and there is an “Initiate RT Inhaler-Nebulizer Bronchodilator Protocol” order as well (see protocol at bottom of note).    CXR Findings:  No results found.    The findings from the last RT Protocol Assessment were as follows:   History Pulmonary Disease: Chronic pulmonary disease  Respiratory Pattern: Regular pattern and RR 12-20 bpm  Breath Sounds: Inspiratory and expiratory or bilateral wheezing and/or rhonchi  Cough: Strong, productive  Indication for Bronchodilator Therapy: Wheezing associated with pulm disorder  Bronchodilator Assessment Score: 9    Aerosolized bronchodilator medication orders have been revised according to the RT Inhaler-Nebulizer Bronchodilator Protocol below.    Respiratory Therapist to perform RT Therapy Protocol Assessment initially then follow the protocol.  Repeat RT Therapy Protocol Assessment PRN for score 0-3 or on second treatment, BID, and PRN for scores above 3.    No Indications - adjust the frequency to every 6 hours PRN wheezing or bronchospasm, if no treatments needed after 48 hours then discontinue using Per Protocol order mode.     If indication present, adjust the RT bronchodilator orders based on the Bronchodilator Assessment Score as indicated below.  Use Inhaler orders unless patient has one or more of the following: on home nebulizer, not able to hold breath for 10 seconds, is not alert and oriented, cannot activate and use MDI correctly, or respiratory rate 25 breaths per minute or more, then use the equivalent nebulizer order(s) with same Frequency and PRN reasons based on the score.  If a patient is on this medication at home then do not decrease Frequency below that used at home.    0-3 - enter or revise RT bronchodilator order(s) to equivalent RT Bronchodilator order with Frequency of every 4 hours PRN

## 2025-05-27 NOTE — PLAN OF CARE
Problem: Respiratory - Adult  Goal: Clear lung sounds  5/27/2025 1947 by Marielos Kenney, RCP  Outcome: Progressing   Receiving MDI's to improve aeration and maintain COPD. Ill continue with therapies as ordered.    Patient mutually agreed on goals.

## 2025-05-27 NOTE — CARE COORDINATION
Cammy's  did not return this CM call from Friday. Placed call to [facility] to check on status of magdalene life, rep states they have been reaching out to patient and  but haven't received callback. Rep states once copay of $8.67 is paid, they can arrange training and delivery of magdalene. Emerita serrano. Electronically signed by Ezequiel Shipley RN on 5/27/2025 at 2:07 PM

## 2025-05-27 NOTE — PROGRESS NOTES
[facility]  INPATIENT PHYSICAL THERAPY  DAILY NOTE  STRZ RENAL TELEMETRY 6K - 6K-28/028-A      Discharge Recommendations: Subacute/Skilled Nursing Facility and Patient would benefit from continued PT at discharge  Equipment Recommendations: No  Defer to next facility            Time In: 1042  Time Out: 1120  Timed Code Treatment Minutes: 38 Minutes  Minutes: 38          Date: 2025  Patient Name: Susan K Sturgess Meyers,  Gender:  female        MRN: [id number]  : 1965  (60 y.o.)     Referring Practitioner: Juan Gautam MD  Diagnosis: Generalized weakness  Additional Pertinent Hx: Per EMR: \"60-year-old female with past medical history of COPD, atrial fibrillation, type 2 diabetes, hyponatremia, anemia, esophageal adenocarcinoma, thoracic cord compression, vertebral osteomyelitis, allergic rhinitis, sacral decubitus ulcer, ovarian cancer, and physical deconditioning who [facility] being discharged from SNF in [place] due to not being able to take care of herself at home and due to weakness.  Patient reports weakness in the lower extremities is worse than in the upper extremities and has been ongoing, did not improve with PT OT at SNF, per patient she required additional treatment however was declined.  In the ED CXR noted mild atelectasis versus pneumonia in the inferior medial aspect of the right lung base.  UA noted leukocyte esterases urine culture with preliminary nonfermenting gram-negative bacilli. Patient has a catheter.  She was started on cefepime.  Legionella and strep antigens ordered. Dietitian was consulted for management of J-tube feeds.  Social work was consulted for placement.\"     Prior Level of Function:  Lives With: Spouse, Son  Type of Home: House  Home Layout: Two level, Able to Live on Main level with bedroom/bathroom  Home Access: Stairs to enter without rails, Ramped entrance (Spouse bought ramp for the home)  Entrance Stairs - Number of Steps:

## 2025-05-28 LAB
GLUCOSE BLD STRIP.AUTO-MCNC: 134 MG/DL (ref 70–108)
GLUCOSE BLD STRIP.AUTO-MCNC: 135 MG/DL (ref 70–108)
GLUCOSE BLD STRIP.AUTO-MCNC: 141 MG/DL (ref 70–108)
GLUCOSE BLD STRIP.AUTO-MCNC: 145 MG/DL (ref 70–108)
GLUCOSE BLD STRIP.AUTO-MCNC: 152 MG/DL (ref 70–108)
GLUCOSE BLD STRIP.AUTO-MCNC: 179 MG/DL (ref 70–108)

## 2025-05-28 PROCEDURE — 6360000002 HC RX W HCPCS: Performed by: INTERNAL MEDICINE

## 2025-05-28 PROCEDURE — 99233 SBSQ HOSP IP/OBS HIGH 50: CPT | Performed by: STUDENT IN AN ORGANIZED HEALTH CARE EDUCATION/TRAINING PROGRAM

## 2025-05-28 PROCEDURE — 6370000000 HC RX 637 (ALT 250 FOR IP): Performed by: STUDENT IN AN ORGANIZED HEALTH CARE EDUCATION/TRAINING PROGRAM

## 2025-05-28 PROCEDURE — 6370000000 HC RX 637 (ALT 250 FOR IP): Performed by: FAMILY MEDICINE

## 2025-05-28 PROCEDURE — 6360000002 HC RX W HCPCS

## 2025-05-28 PROCEDURE — 6370000000 HC RX 637 (ALT 250 FOR IP): Performed by: INTERNAL MEDICINE

## 2025-05-28 PROCEDURE — 1200000000 HC SEMI PRIVATE

## 2025-05-28 PROCEDURE — 82948 REAGENT STRIP/BLOOD GLUCOSE: CPT

## 2025-05-28 PROCEDURE — 94640 AIRWAY INHALATION TREATMENT: CPT

## 2025-05-28 PROCEDURE — 94761 N-INVAS EAR/PLS OXIMETRY MLT: CPT

## 2025-05-28 PROCEDURE — 2500000003 HC RX 250 WO HCPCS: Performed by: INTERNAL MEDICINE

## 2025-05-28 PROCEDURE — 6370000000 HC RX 637 (ALT 250 FOR IP)

## 2025-05-28 RX ORDER — GLYCOPYRROLATE 1 MG/1
2 TABLET ORAL 3 TIMES DAILY
Status: DISCONTINUED | OUTPATIENT
Start: 2025-05-28 | End: 2025-06-01

## 2025-05-28 RX ADMIN — OXYCODONE 10 MG: 5 TABLET ORAL at 23:24

## 2025-05-28 RX ADMIN — FAMOTIDINE 40 MG: 20 TABLET, FILM COATED ORAL at 08:45

## 2025-05-28 RX ADMIN — CETIRIZINE HYDROCHLORIDE 10 MG: 10 TABLET, FILM COATED ORAL at 08:45

## 2025-05-28 RX ADMIN — BUDESONIDE AND FORMOTEROL FUMARATE DIHYDRATE 2 PUFF: 80; 4.5 AEROSOL RESPIRATORY (INHALATION) at 08:51

## 2025-05-28 RX ADMIN — ENOXAPARIN SODIUM 40 MG: 100 INJECTION SUBCUTANEOUS at 18:19

## 2025-05-28 RX ADMIN — TRAZODONE HYDROCHLORIDE 25 MG: 50 TABLET ORAL at 20:24

## 2025-05-28 RX ADMIN — TIOTROPIUM BROMIDE INHALATION SPRAY 2 PUFF: 3.12 SPRAY, METERED RESPIRATORY (INHALATION) at 08:49

## 2025-05-28 RX ADMIN — PROMETHAZINE HYDROCHLORIDE 25 MG: 25 TABLET ORAL at 08:45

## 2025-05-28 RX ADMIN — PROMETHAZINE HYDROCHLORIDE 25 MG: 25 TABLET ORAL at 18:19

## 2025-05-28 RX ADMIN — OXYCODONE 10 MG: 5 TABLET ORAL at 10:16

## 2025-05-28 RX ADMIN — ONDANSETRON 4 MG: 2 INJECTION, SOLUTION INTRAMUSCULAR; INTRAVENOUS at 14:17

## 2025-05-28 RX ADMIN — GUAIFENESIN 200 MG: 200 SOLUTION ORAL at 06:46

## 2025-05-28 RX ADMIN — GUAIFENESIN 200 MG: 200 SOLUTION ORAL at 01:45

## 2025-05-28 RX ADMIN — OXYCODONE 10 MG: 5 TABLET ORAL at 05:43

## 2025-05-28 RX ADMIN — GUAIFENESIN 200 MG: 200 SOLUTION ORAL at 23:24

## 2025-05-28 RX ADMIN — PROMETHAZINE HYDROCHLORIDE 25 MG: 25 TABLET ORAL at 23:24

## 2025-05-28 RX ADMIN — PROMETHAZINE HYDROCHLORIDE 25 MG: 25 TABLET ORAL at 01:45

## 2025-05-28 RX ADMIN — GUAIFENESIN 200 MG: 200 SOLUTION ORAL at 14:17

## 2025-05-28 RX ADMIN — GUAIFENESIN 200 MG: 200 SOLUTION ORAL at 18:19

## 2025-05-28 RX ADMIN — BUDESONIDE AND FORMOTEROL FUMARATE DIHYDRATE 2 PUFF: 80; 4.5 AEROSOL RESPIRATORY (INHALATION) at 19:57

## 2025-05-28 RX ADMIN — GLYCOPYRROLATE 1 MG: 1 TABLET ORAL at 14:17

## 2025-05-28 RX ADMIN — GLYCOPYRROLATE 1 MG: 1 TABLET ORAL at 08:45

## 2025-05-28 RX ADMIN — FLUTICASONE PROPIONATE 2 SPRAY: 50 SPRAY, METERED NASAL at 08:45

## 2025-05-28 RX ADMIN — ALBUTEROL SULFATE 2 PUFF: 90 AEROSOL, METERED RESPIRATORY (INHALATION) at 13:33

## 2025-05-28 RX ADMIN — OXYCODONE 10 MG: 5 TABLET ORAL at 18:19

## 2025-05-28 RX ADMIN — OXYCODONE 10 MG: 5 TABLET ORAL at 01:45

## 2025-05-28 RX ADMIN — OXYCODONE 10 MG: 5 TABLET ORAL at 14:17

## 2025-05-28 RX ADMIN — FLUTICASONE PROPIONATE 2 SPRAY: 50 SPRAY, METERED NASAL at 20:24

## 2025-05-28 RX ADMIN — ALBUTEROL SULFATE 2 PUFF: 90 AEROSOL, METERED RESPIRATORY (INHALATION) at 19:57

## 2025-05-28 RX ADMIN — ALBUTEROL SULFATE 2 PUFF: 90 AEROSOL, METERED RESPIRATORY (INHALATION) at 08:47

## 2025-05-28 RX ADMIN — ONDANSETRON 4 MG: 2 INJECTION, SOLUTION INTRAMUSCULAR; INTRAVENOUS at 05:00

## 2025-05-28 ASSESSMENT — PAIN SCALES - GENERAL
PAINLEVEL_OUTOF10: 9
PAINLEVEL_OUTOF10: 8
PAINLEVEL_OUTOF10: 0
PAINLEVEL_OUTOF10: 7
PAINLEVEL_OUTOF10: 8
PAINLEVEL_OUTOF10: 9
PAINLEVEL_OUTOF10: 10
PAINLEVEL_OUTOF10: 9

## 2025-05-28 ASSESSMENT — PAIN SCALES - WONG BAKER: WONGBAKER_NUMERICALRESPONSE: NO HURT

## 2025-05-28 ASSESSMENT — PAIN DESCRIPTION - DESCRIPTORS
DESCRIPTORS: PRESSURE
DESCRIPTORS: PRESSURE

## 2025-05-28 ASSESSMENT — PAIN DESCRIPTION - LOCATION
LOCATION: SACRUM;COCCYX
LOCATION: SACRUM
LOCATION: COCCYX

## 2025-05-28 NOTE — CARE COORDINATION
5/28/25, 4:15 PM EDT    DISCHARGE ON GOING EVALUATION    [facility] day: 11  Location: -28/028-A Reason for admit: Generalized weakness [R53.1]     Procedures: none    Imaging since last note: none    Barriers to Discharge: Awaiting referral from SNF, possible discharge home with [facility].     PCP: Torri George APRN - CNP  Readmission Risk Score: 14.6    Patient Goals/Plan/Treatment Preferences: SNF vs  Home with spouse, current with [facility] with [facility] for Tube Feeding. SW following.      Hospitalist called in the afternoon requesting for a letter to be sent to [facility] for Tube feeding order for home. Patient still has pending SNF referral. Left Ezequiel HENDRIX voicemail in regards to plan of care.

## 2025-05-28 NOTE — PROGRESS NOTES
[facility]  OCCUPATIONAL THERAPY MISSED TREATMENT NOTE  STRZ RENAL TELEMETRY 6K  6K-/028-A      Date: 2025  Patient Name: Susan K Sturgess Meyers        CSN: [id number]   : 1965  (60 y.o.)  Gender: female   Referring Practitioner: Juan Gautam MD            REASON FOR MISSED TREATMENT: Patient Refused.  ; RN approved session, patient supine in bed upon OT arrival and declined OT asking for pain meds first. Informed RN that patient stated her pain was 9/10 on tail bone. RN states patient refuses to turn on side t/o day for pressure relief. OT to check back as able and time allows.

## 2025-05-28 NOTE — PROGRESS NOTES
PROGRESS NOTE      Patient:  Susan K Sturgess Meyers  Unit/Bed:6K-28/028-A  YOB: 1965  MRN: [id number]   Acct: [id number]    PCP: Torri George, APRN - CNP    Date of Admission: 5/17/2025 LOS: 11    Date of Evaluation:  5/28/2025    Anticipated Discharge: Pending placement     Assessment/Plan:       Generalized weakness, chronic BLE weakness -- chronic weakness due to medical conditions as mentioned below + pseudomonal UTI, she also has a history of esophageal adenocarcinoma plus thoracic cord compression/discitis.  Patient initially presented to [facility] <24 hrs as she was unable to take care of herself after returning home from SNF in [place] on 5/16.    -- s/p cefepime 5/17 - 5/23 for her pseudomonal UTI  -- Continue J-tube feeds per recommendations of dietitian.   -- Palliative eval was completed for goals of care discussion and continues to remain a full code   -- continue PT/OT -- rec SNF but preferred SNF not in insurance network-- ?retry placement with SNF in network --?if looking at additional SNF's per SS note 5/22 and no one was not able to talk with  5/23 about possible DC home with magdalene lift and HH already in place.  -- 5/27 CM is attempting to contact  about discharge planning   Pseudomonal UTI due to chronic indwelling Abreu catheter -- POA: Chronic Abreu that was kinked on arrival and was exchanged on 5/17/2025.  Urine culture grew Pseudomonas aeruginosa that was susceptible to cefepime.  S/p tx with cefepime 5/17 - 5/23.  Legionella + strep pneumo antigen were negative.  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  Also shows mild atelectasis/pneumonia inferior medial aspect of right lung base -- repeat  needed?): []Yes / [x]No    Level of care: []Step Down / [x]Med-Surg  Bed Status: [x]Inpatient / []Observation    DVT Prophylaxis: [x] Lovenox / [] Heparin / [] SCDs / [] Already on Systemic Anticoagulation / [] None     PT/OT: [x]Yes / []No    Disposition:    [] Home       [] TCU       [] Rehab       [] Psych       [x] SNF       [] Long Term Care Facility       [x] Other-Pending     Code Status: Full Code      An electronic signature was used to authenticate this note  - Sagrario Isidro DO PGY-1 on 5/28/2025 at 10:21 AM

## 2025-05-28 NOTE — PALLIATIVE CARE
Follow Up / Progress Note        Patient:   Susan K Sturgess Meyers  YOB: 1965  Age:  60 y.o.  Room:  [facility]28/028-  MRN:  [id number]           Chart reviewed. Palliative care remains available, please call if further needs arise.         Electronically signed by Keara Desir RN on 5/28/2025 at 8:10 AM             Palliative Care Office: [phone]

## 2025-05-28 NOTE — PLAN OF CARE
Problem: Chronic Conditions and Co-morbidities  Goal: Patient's chronic conditions and co-morbidity symptoms are monitored and maintained or improved  Outcome: Progressing  Flowsheets (Taken 5/27/2025 1955)  Care Plan - Patient's Chronic Conditions and Co-Morbidity Symptoms are Monitored and Maintained or Improved: Monitor and assess patient's chronic conditions and comorbid symptoms for stability, deterioration, or improvement     Problem: Discharge Planning  Goal: Discharge to home or other facility with appropriate resources  Outcome: Progressing  Flowsheets (Taken 5/27/2025 1955)  Discharge to home or other facility with appropriate resources: Identify barriers to discharge with patient and caregiver     Problem: Safety - Adult  Goal: Free from fall injury  Outcome: Progressing  Flowsheets (Taken 5/23/2025 1004 by Monik Conde, RN)  Free From Fall Injury:   Instruct family/caregiver on patient safety   Based on caregiver fall risk screen, instruct family/caregiver to ask for assistance with transferring infant if caregiver noted to have fall risk factors     Problem: Skin/Tissue Integrity  Goal: Skin integrity remains intact  Description: 1.  Monitor for areas of redness and/or skin breakdown2.  Assess vascular access sites hourly3.  Every 4-6 hours minimum:  Change oxygen saturation probe site4.  Every 4-6 hours:  If on nasal continuous positive airway pressure, respiratory therapy assess nares and determine need for appliance change or resting period  Outcome: Progressing  Flowsheets (Taken 5/27/2025 1955)  Skin Integrity Remains Intact:   Monitor for areas of redness and/or skin breakdown   Assess vascular access sites hourly     Problem: Nutrition Deficit:  Goal: Optimize nutritional status  Outcome: Progressing  Flowsheets (Taken 5/26/2025 0452 by Marylu Sales, RN)  Nutrient intake appropriate for improving, restoring, or maintaining nutritional needs:   Assess nutritional status and recommend course of

## 2025-05-28 NOTE — PROGRESS NOTES
[facility] Contin[facility] Nurse  Progress Note       Susan K Sturgess Meyers  AGE: 60 y.o.   GENDER: female  : 1965  UNIT: 6K-28/028-A  TODAY'S DATE:  2025  ADMISSION DATE: 2025  9:09 AM    Subjective   Reason for [facility] Evaluation and Assessment: coccyx wound re-evaluation      Susan K Sturgess Meyers is a 60 y.o. female referred by:   [] Physician/ Resident/ PA/ ADDISON-CNP  [x] Nursing  [] Other:     Wound Identification:  Wound Type:pressure  Wound Location: Coccyx     Objective     Osvaldo Risk Score: Osvaldo Scale Score: 12    Assessment     Encounter:   Present to pt room. Pt in bed.  Wound photo, assessment and treatment recommendations below.   Patient incontinent of large amount of liquid stool. Cleansed with wipes.   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 Cleansed wound with normal saline and gauze. Pat dry with clean gauze. Wound bed lightly filled with normal saline moistened gauze, covered with sacral foam dressing.   Primary RN in room with tech to complete linen change.   Pt in bed. Will continue to follow and assess wound.   Call with concerns and for wound evolution.       25  Wound Care Documentation:  Wound 24 Coccyx (Active)   Wound Image   25   Wound Etiology Pressure Stage 4 25   Dressing Status Old drainage noted;New dressing applied 25   Wound Cleansed Cleansed with saline 25   Dressing/Treatment Foam;Moist to dry 25   Wound Length (cm) 4 cm 25   Wound Width (cm) 1 cm 25 131   Wound Depth (cm) 0.9 cm 25   Wound Surface Area (cm^2) 4 cm^2 25 131   Change in Wound Size % (l*w) 44.44 25   Wound Volume (cm^3) 3.6 cm^3 25   Wound Healing % 58 25 131   Undermining Starts ___ O'Clock 4 25 1319   Undermining Ends___ O'Clock 12 25 1319   Undermining Maxium Distance (cm) 1.5 25 131   Wound Assessment Pink/red;Slough 25

## 2025-05-28 NOTE — CARE COORDINATION
5/28/25, 9:23 AM EDT    DISCHARGE PLANNING EVALUATION    SW provided pt and spouse with ECF list.  Pt requested referrals to [facility] and [facility].    Referral provided to [facility], [facility] and [facility].  Await response.     1:45 PM update:  CRYSTAL spoke with [facility], they are not in network.    CRYSTAL spoke with Torri with [facility], concerned with pts payor, unable to accept pt.    CRYSTAL spoke with Jed with [facility], waiting on decision. Still reviewing.

## 2025-05-28 NOTE — PLAN OF CARE
Problem: Respiratory - Adult  Goal: Clear lung sounds  5/28/2025 0854 by Yodit Conde, RCP  Outcome: Progressing  Note: Txs to help improve lung aeration. Patient mutually agreed on goals.

## 2025-05-29 ENCOUNTER — APPOINTMENT (OUTPATIENT)
Dept: GENERAL RADIOLOGY | Age: 60
End: 2025-05-29
Payer: COMMERCIAL

## 2025-05-29 PROBLEM — R65.10 SIRS (SYSTEMIC INFLAMMATORY RESPONSE SYNDROME) (HCC): Status: ACTIVE | Noted: 2025-05-29

## 2025-05-29 PROBLEM — R52 INTRACTABLE PAIN: Status: ACTIVE | Noted: 2025-05-29

## 2025-05-29 PROBLEM — Z93.4 JEJUNOSTOMY TUBE PRESENT (HCC): Status: ACTIVE | Noted: 2025-05-29

## 2025-05-29 PROBLEM — Z97.8 INDWELLING FOLEY CATHETER PRESENT: Status: ACTIVE | Noted: 2025-05-29

## 2025-05-29 LAB
ACINETOBACTER CALCOACETICUS-BAUMANNII BY PCR: NOT DETECTED
AMORPH SED URNS QL MICRO: ABNORMAL
ANION GAP SERPL CALC-SCNC: 13 MEQ/L (ref 8–16)
BACTERIA URNS QL MICRO: ABNORMAL /HPF
BILIRUB UR QL STRIP.AUTO: NEGATIVE
BLACTX-M ISLT/SPM QL: ABNORMAL
BLAIMP ISLT/SPM QL: ABNORMAL
BLAKPC ISLT/SPM QL: ABNORMAL
BLAOXA-48-LIKE ISLT/SPM QL: ABNORMAL
BLAVIM ISLT/SPM QL: ABNORMAL
BUN SERPL-MCNC: 25 MG/DL (ref 8–23)
C PNEUM DNA LOWER RESP QL NAA+NON-PROBE: NOT DETECTED
CALCIUM SERPL-MCNC: 9.5 MG/DL (ref 8.8–10.2)
CASTS #/AREA URNS LPF: ABNORMAL /LPF
CASTS 2: ABNORMAL /LPF
CHARACTER UR: ABNORMAL
CHLORIDE SERPL-SCNC: 97 MEQ/L (ref 98–111)
CO2 SERPL-SCNC: 22 MEQ/L (ref 22–29)
COLOR, UA: ABNORMAL
CREAT SERPL-MCNC: 0.8 MG/DL (ref 0.5–0.9)
CRYSTALS URNS MICRO: ABNORMAL
CRYSTALS URNS MICRO: ABNORMAL
DEPRECATED RDW RBC AUTO: 53.8 FL (ref 35–45)
EKG ATRIAL RATE: 119 BPM
EKG P AXIS: 54 DEGREES
EKG P-R INTERVAL: 144 MS
EKG Q-T INTERVAL: 306 MS
EKG QRS DURATION: 64 MS
EKG QTC CALCULATION (BAZETT): 430 MS
EKG R AXIS: -3 DEGREES
EKG T AXIS: 63 DEGREES
EKG VENTRICULAR RATE: 119 BPM
ENTEROBACTER CLOACAE COMPLEX BY PCR: NOT DETECTED
EPITHELIAL CELLS, UA: ABNORMAL /HPF
ERYTHROCYTE [DISTWIDTH] IN BLOOD BY AUTOMATED COUNT: 17.3 % (ref 11.5–14.5)
ESCHERICHIA COLI BY PCR: NOT DETECTED
FLUAV RNA LOWER RESP QL NAA+NON-PROBE: NOT DETECTED
FLUBV RNA LOWER RESP QL NAA+NON-PROBE: NOT DETECTED
GFR SERPL CREATININE-BSD FRML MDRD: 84 ML/MIN/1.73M2
GLUCOSE BLD STRIP.AUTO-MCNC: 131 MG/DL (ref 70–108)
GLUCOSE BLD STRIP.AUTO-MCNC: 131 MG/DL (ref 70–108)
GLUCOSE BLD STRIP.AUTO-MCNC: 132 MG/DL (ref 70–108)
GLUCOSE BLD STRIP.AUTO-MCNC: 145 MG/DL (ref 70–108)
GLUCOSE BLD STRIP.AUTO-MCNC: 186 MG/DL (ref 70–108)
GLUCOSE BLD STRIP.AUTO-MCNC: 189 MG/DL (ref 70–108)
GLUCOSE SERPL-MCNC: 176 MG/DL (ref 74–109)
GLUCOSE UR QL STRIP.AUTO: NEGATIVE MG/DL
HADV DNA LOWER RESP QL NAA+NON-PROBE: NOT DETECTED
HAEMOPHILUS INFLUENZAE BY PCR: NOT DETECTED
HCOV RNA LOWER RESP QL NAA+NON-PROBE: NOT DETECTED
HCT VFR BLD AUTO: 31.6 % (ref 37–47)
HGB BLD-MCNC: 9.7 GM/DL (ref 12–16)
HGB UR QL STRIP.AUTO: ABNORMAL
HMPV RNA LOWER RESP QL NAA+NON-PROBE: NOT DETECTED
HPIV RNA LOWER RESP QL NAA+NON-PROBE: DETECTED
KETONES UR QL STRIP.AUTO: ABNORMAL
KLEBSIELLA AEROGENES BY PCR: NOT DETECTED
KLEBSIELLA OXYTOCA BY PCR: NOT DETECTED
KLEBSIELLA PNEUMONIAE GROUP BY PCR: NOT DETECTED
L PNEUMO DNA LOWER RESP QL NAA+NON-PROBE: NOT DETECTED
LACTATE SERPL-SCNC: 0.8 MMOL/L (ref 0.5–2.2)
M PNEUMO DNA LOWER RESP QL NAA+NON-PROBE: NOT DETECTED
MCH RBC QN AUTO: 26.1 PG (ref 26–33)
MCHC RBC AUTO-ENTMCNC: 30.7 GM/DL (ref 32.2–35.5)
MCV RBC AUTO: 84.9 FL (ref 81–99)
MISCELLANEOUS 2: ABNORMAL
MISCELLANEOUS LAB TEST RESULT: ABNORMAL
MORAXELLA CATARRHALIS BY PCR: NOT DETECTED
MUCOUS THREADS URNS QL MICRO: ABNORMAL
NITRITE UR QL STRIP: POSITIVE
PH UR STRIP.AUTO: 5.5 [PH] (ref 5–9)
PLATELET # BLD AUTO: 337 THOU/MM3 (ref 130–400)
PMV BLD AUTO: 9.3 FL (ref 9.4–12.4)
POTASSIUM SERPL-SCNC: 4.4 MEQ/L (ref 3.5–5.2)
PROCALCITONIN SERPL IA-MCNC: 0.25 NG/ML (ref 0.01–0.09)
PROT UR STRIP.AUTO-MCNC: 100 MG/DL
PROTEUS SPECIES BY PCR: NOT DETECTED
PSEUDOMONAS AERUGINOSA BY PCR: NOT DETECTED
RBC # BLD AUTO: 3.72 MILL/MM3 (ref 4.2–5.4)
RBC URINE: ABNORMAL /HPF
RENAL EPI CELLS #/AREA URNS HPF: ABNORMAL /[HPF]
RESISTANT GENE MECA/C & MREJ BY PCR: NOT DETECTED
RESISTANT GENE NDM BY PCR: ABNORMAL
RSV RNA LOWER RESP QL NAA+NON-PROBE: NOT DETECTED
RV+EV RNA LOWER RESP QL NAA+NON-PROBE: NOT DETECTED
SERRATIA MARCESCENS BY PCR: NOT DETECTED
SODIUM SERPL-SCNC: 132 MEQ/L (ref 135–145)
SOURCE: ABNORMAL
SP GR UR REFRACT.AUTO: 1.03 (ref 1–1.03)
SPECIMEN ACCEPTABILITY: ABNORMAL
STAPH AUREUS BY PCR: DETECTED
STREP AGALACTIAE BY PCR: NOT DETECTED
STREP PNEUMONIAE BY PCR: NOT DETECTED
STREP PYOGENES BY PCR: NOT DETECTED
UROBILINOGEN, URINE: 0.2 EU/DL (ref 0–1)
WBC # BLD AUTO: 11.7 THOU/MM3 (ref 4.8–10.8)
WBC #/AREA URNS HPF: > 200 /HPF
WBC #/AREA URNS HPF: ABNORMAL /[HPF]
YEAST LIKE FUNGI URNS QL MICRO: ABNORMAL

## 2025-05-29 PROCEDURE — 71046 X-RAY EXAM CHEST 2 VIEWS: CPT

## 2025-05-29 PROCEDURE — 94669 MECHANICAL CHEST WALL OSCILL: CPT

## 2025-05-29 PROCEDURE — 51702 INSERT TEMP BLADDER CATH: CPT

## 2025-05-29 PROCEDURE — 81001 URINALYSIS AUTO W/SCOPE: CPT

## 2025-05-29 PROCEDURE — 99233 SBSQ HOSP IP/OBS HIGH 50: CPT | Performed by: STUDENT IN AN ORGANIZED HEALTH CARE EDUCATION/TRAINING PROGRAM

## 2025-05-29 PROCEDURE — 87205 SMEAR GRAM STAIN: CPT

## 2025-05-29 PROCEDURE — 6370000000 HC RX 637 (ALT 250 FOR IP): Performed by: INTERNAL MEDICINE

## 2025-05-29 PROCEDURE — 2700000000 HC OXYGEN THERAPY PER DAY

## 2025-05-29 PROCEDURE — 36415 COLL VENOUS BLD VENIPUNCTURE: CPT

## 2025-05-29 PROCEDURE — 87086 URINE CULTURE/COLONY COUNT: CPT

## 2025-05-29 PROCEDURE — 94761 N-INVAS EAR/PLS OXIMETRY MLT: CPT

## 2025-05-29 PROCEDURE — 6370000000 HC RX 637 (ALT 250 FOR IP): Performed by: STUDENT IN AN ORGANIZED HEALTH CARE EDUCATION/TRAINING PROGRAM

## 2025-05-29 PROCEDURE — 83605 ASSAY OF LACTIC ACID: CPT

## 2025-05-29 PROCEDURE — 1200000000 HC SEMI PRIVATE

## 2025-05-29 PROCEDURE — 87081 CULTURE SCREEN ONLY: CPT

## 2025-05-29 PROCEDURE — 84145 PROCALCITONIN (PCT): CPT

## 2025-05-29 PROCEDURE — 87486 CHLMYD PNEUM DNA AMP PROBE: CPT

## 2025-05-29 PROCEDURE — 6370000000 HC RX 637 (ALT 250 FOR IP)

## 2025-05-29 PROCEDURE — 87040 BLOOD CULTURE FOR BACTERIA: CPT

## 2025-05-29 PROCEDURE — 80048 BASIC METABOLIC PNL TOTAL CA: CPT

## 2025-05-29 PROCEDURE — 87798 DETECT AGENT NOS DNA AMP: CPT

## 2025-05-29 PROCEDURE — 93005 ELECTROCARDIOGRAM TRACING: CPT | Performed by: STUDENT IN AN ORGANIZED HEALTH CARE EDUCATION/TRAINING PROGRAM

## 2025-05-29 PROCEDURE — 2580000003 HC RX 258: Performed by: STUDENT IN AN ORGANIZED HEALTH CARE EDUCATION/TRAINING PROGRAM

## 2025-05-29 PROCEDURE — 87541 LEGION PNEUMO DNA AMP PROB: CPT

## 2025-05-29 PROCEDURE — 97110 THERAPEUTIC EXERCISES: CPT

## 2025-05-29 PROCEDURE — 87150 DNA/RNA AMPLIFIED PROBE: CPT

## 2025-05-29 PROCEDURE — 6360000002 HC RX W HCPCS: Performed by: INTERNAL MEDICINE

## 2025-05-29 PROCEDURE — 2500000003 HC RX 250 WO HCPCS: Performed by: INTERNAL MEDICINE

## 2025-05-29 PROCEDURE — 87077 CULTURE AEROBIC IDENTIFY: CPT

## 2025-05-29 PROCEDURE — 87581 M.PNEUMON DNA AMP PROBE: CPT

## 2025-05-29 PROCEDURE — 87070 CULTURE OTHR SPECIMN AEROBIC: CPT

## 2025-05-29 PROCEDURE — 87186 SC STD MICRODIL/AGAR DIL: CPT

## 2025-05-29 PROCEDURE — 82948 REAGENT STRIP/BLOOD GLUCOSE: CPT

## 2025-05-29 PROCEDURE — 97530 THERAPEUTIC ACTIVITIES: CPT

## 2025-05-29 PROCEDURE — 6360000002 HC RX W HCPCS: Performed by: STUDENT IN AN ORGANIZED HEALTH CARE EDUCATION/TRAINING PROGRAM

## 2025-05-29 PROCEDURE — 87631 RESP VIRUS 3-5 TARGETS: CPT

## 2025-05-29 PROCEDURE — 85027 COMPLETE CBC AUTOMATED: CPT

## 2025-05-29 PROCEDURE — 94640 AIRWAY INHALATION TREATMENT: CPT

## 2025-05-29 RX ORDER — ACETAMINOPHEN 325 MG/1
650 TABLET ORAL EVERY 4 HOURS PRN
Status: DISCONTINUED | OUTPATIENT
Start: 2025-05-29 | End: 2025-06-05 | Stop reason: HOSPADM

## 2025-05-29 RX ORDER — SODIUM CHLORIDE, SODIUM LACTATE, POTASSIUM CHLORIDE, CALCIUM CHLORIDE 600; 310; 30; 20 MG/100ML; MG/100ML; MG/100ML; MG/100ML
INJECTION, SOLUTION INTRAVENOUS CONTINUOUS
Status: ACTIVE | OUTPATIENT
Start: 2025-05-29 | End: 2025-05-29

## 2025-05-29 RX ORDER — OXYCODONE HYDROCHLORIDE 15 MG/1
15 TABLET ORAL
Refills: 0 | Status: DISCONTINUED | OUTPATIENT
Start: 2025-05-29 | End: 2025-06-05

## 2025-05-29 RX ADMIN — BUDESONIDE AND FORMOTEROL FUMARATE DIHYDRATE 2 PUFF: 80; 4.5 AEROSOL RESPIRATORY (INHALATION) at 20:33

## 2025-05-29 RX ADMIN — OXYCODONE 10 MG: 5 TABLET ORAL at 07:38

## 2025-05-29 RX ADMIN — OXYCODONE 10 MG: 5 TABLET ORAL at 12:12

## 2025-05-29 RX ADMIN — ALBUTEROL SULFATE 2 PUFF: 90 AEROSOL, METERED RESPIRATORY (INHALATION) at 20:32

## 2025-05-29 RX ADMIN — OXYCODONE HYDROCHLORIDE 15 MG: 15 TABLET ORAL at 16:35

## 2025-05-29 RX ADMIN — FLUTICASONE PROPIONATE 2 SPRAY: 50 SPRAY, METERED NASAL at 19:54

## 2025-05-29 RX ADMIN — SODIUM CHLORIDE, SODIUM LACTATE, POTASSIUM CHLORIDE, AND CALCIUM CHLORIDE: .6; .31; .03; .02 INJECTION, SOLUTION INTRAVENOUS at 13:45

## 2025-05-29 RX ADMIN — ALBUTEROL SULFATE 2 PUFF: 90 AEROSOL, METERED RESPIRATORY (INHALATION) at 09:06

## 2025-05-29 RX ADMIN — ENOXAPARIN SODIUM 40 MG: 100 INJECTION SUBCUTANEOUS at 16:38

## 2025-05-29 RX ADMIN — GUAIFENESIN 200 MG: 200 SOLUTION ORAL at 12:14

## 2025-05-29 RX ADMIN — OXYCODONE HYDROCHLORIDE 15 MG: 15 TABLET ORAL at 19:53

## 2025-05-29 RX ADMIN — ACETAMINOPHEN 650 MG: 325 TABLET ORAL at 03:35

## 2025-05-29 RX ADMIN — INSULIN LISPRO 2 UNITS: 100 INJECTION, SOLUTION INTRAVENOUS; SUBCUTANEOUS at 07:01

## 2025-05-29 RX ADMIN — CEFEPIME 2000 MG: 2 INJECTION, POWDER, FOR SOLUTION INTRAVENOUS at 18:50

## 2025-05-29 RX ADMIN — CETIRIZINE HYDROCHLORIDE 10 MG: 10 TABLET, FILM COATED ORAL at 09:01

## 2025-05-29 RX ADMIN — TIOTROPIUM BROMIDE INHALATION SPRAY 2 PUFF: 3.12 SPRAY, METERED RESPIRATORY (INHALATION) at 09:15

## 2025-05-29 RX ADMIN — ALBUTEROL SULFATE 2 PUFF: 90 AEROSOL, METERED RESPIRATORY (INHALATION) at 14:03

## 2025-05-29 RX ADMIN — FLUTICASONE PROPIONATE 2 SPRAY: 50 SPRAY, METERED NASAL at 09:01

## 2025-05-29 RX ADMIN — BUDESONIDE AND FORMOTEROL FUMARATE DIHYDRATE 2 PUFF: 80; 4.5 AEROSOL RESPIRATORY (INHALATION) at 09:06

## 2025-05-29 RX ADMIN — GUAIFENESIN 200 MG: 200 SOLUTION ORAL at 07:39

## 2025-05-29 RX ADMIN — TRAZODONE HYDROCHLORIDE 25 MG: 50 TABLET ORAL at 19:53

## 2025-05-29 RX ADMIN — PROMETHAZINE HYDROCHLORIDE 25 MG: 25 TABLET ORAL at 07:38

## 2025-05-29 RX ADMIN — FAMOTIDINE 40 MG: 20 TABLET, FILM COATED ORAL at 09:01

## 2025-05-29 RX ADMIN — PROMETHAZINE HYDROCHLORIDE 25 MG: 25 TABLET ORAL at 15:39

## 2025-05-29 RX ADMIN — GUAIFENESIN 200 MG: 200 SOLUTION ORAL at 16:38

## 2025-05-29 RX ADMIN — GUAIFENESIN 200 MG: 200 SOLUTION ORAL at 19:53

## 2025-05-29 RX ADMIN — OXYCODONE 10 MG: 5 TABLET ORAL at 03:35

## 2025-05-29 RX ADMIN — CEFEPIME 2000 MG: 2 INJECTION, POWDER, FOR SOLUTION INTRAVENOUS at 13:46

## 2025-05-29 RX ADMIN — ONDANSETRON 4 MG: 4 TABLET, ORALLY DISINTEGRATING ORAL at 03:35

## 2025-05-29 ASSESSMENT — PAIN DESCRIPTION - LOCATION
LOCATION: COCCYX
LOCATION: COCCYX;BUTTOCKS
LOCATION: COCCYX
LOCATION: SACRUM;COCCYX

## 2025-05-29 ASSESSMENT — PAIN SCALES - GENERAL
PAINLEVEL_OUTOF10: 10
PAINLEVEL_OUTOF10: 8
PAINLEVEL_OUTOF10: 9
PAINLEVEL_OUTOF10: 10
PAINLEVEL_OUTOF10: 9

## 2025-05-29 ASSESSMENT — PAIN DESCRIPTION - FREQUENCY: FREQUENCY: CONTINUOUS

## 2025-05-29 ASSESSMENT — PAIN DESCRIPTION - ORIENTATION
ORIENTATION: MID

## 2025-05-29 ASSESSMENT — PAIN DESCRIPTION - DESCRIPTORS
DESCRIPTORS: PRESSURE
DESCRIPTORS: ACHING;DISCOMFORT
DESCRIPTORS: ACHING
DESCRIPTORS: ACHING
DESCRIPTORS: ACHING;DISCOMFORT

## 2025-05-29 ASSESSMENT — PAIN - FUNCTIONAL ASSESSMENT
PAIN_FUNCTIONAL_ASSESSMENT: PREVENTS OR INTERFERES WITH MANY ACTIVE NOT PASSIVE ACTIVITIES
PAIN_FUNCTIONAL_ASSESSMENT: PREVENTS OR INTERFERES WITH MANY ACTIVE NOT PASSIVE ACTIVITIES

## 2025-05-29 ASSESSMENT — PAIN SCALES - WONG BAKER: WONGBAKER_NUMERICALRESPONSE: NO HURT

## 2025-05-29 ASSESSMENT — PAIN DESCRIPTION - PAIN TYPE: TYPE: CHRONIC PAIN

## 2025-05-29 ASSESSMENT — PAIN DESCRIPTION - ONSET: ONSET: GRADUAL

## 2025-05-29 NOTE — PROGRESS NOTES
[facility]  PHYSICAL THERAPY MISSED TREATMENT NOTE  STRZ RENAL TELEMETRY 6K    Date: 2025  Patient Name: Susan K Sturgess Meyers        MRN: [id number]   : 1965  (60 y.o.)  Gender: female   Referring Practitioner: Juan aGutam MD            REASON FOR MISSED TREATMENT:  RN approve patient for session. Patient defers session d/t pain from new catheter placement and overall feeling of fatigue. Patient participated in OT earlier today .      Sarah Moran, MARIA ISABEL  3:09 PM  2025

## 2025-05-29 NOTE — PROGRESS NOTES
(52 kg)    Admission Body Weight: 59.4 kg (131 lb) (5/17: stated weight)  Current Body Weight: 64.6 kg (142 lb 6.7 oz) (5/29/25 trace LE edema),     Current BMI (kg/m2): 25.2  Usual Body Weight:  (per pt she has lost weight and unsure of how much; per EMR: 4/20/23: 184# 1 oz - standing scale, 3/31/24: 165# 10 oz, 12/10/24: 153# 4 oz)        Weight Adjustment For: No Adjustment                 BMI Categories: Overweight (BMI 25.0-29.9)    Estimated Daily Nutrient Needs:  Energy Requirements Based On: Kcal/kg  Weight Used for Energy Requirements: Current (59.4 kg)  Energy (kcal/day): 2610-5980 kcals (25-30 kcals/kg)  Weight Used for Protein Requirements: Current (59.4 kg)  Protein (g/day): 77-89 grams (1.3-1.5 grams/kg)  Method Used for Fluid Requirements: 1 ml/kcal  Fluid (ml/day): 2280-8916 mL (1 mL/kcal)    Nutrition Diagnosis:   Severe malnutrition, in context of chronic illness related to other (inadequate enteral nutrition intake; N/V) as evidenced by criteria as identified in malnutrition assessment    Nutrition Interventions:   Food and/or Nutrient Delivery: Continue NPO, Continue Current Tube Feeding (Modified free water flush.)  Nutrition Education/Counseling: Education/Counseling initiated  Coordination of Nutrition Care: Continue to monitor while inpatient       Goals:  Goals: by next RD assessment, Meet at least 75% of estimated needs, Tolerate nutrition support at goal rate  Type of Goal: Continue current goal  Previous Goal Met: Progressing toward Goal(s)    Nutrition Monitoring and Evaluation:      Food/Nutrient Intake Outcomes: Progression of Nutrition, Enteral Nutrition Intake/Tolerance  Physical Signs/Symptoms Outcomes: Biochemical Data, Chewing or Swallowing, GI Status, Fluid Status or Edema, Nutrition Focused Physical Findings, Weight, Skin    Discharge Planning:    Enteral Nutrition     Allison C Schwab, RD, LD  Contact: [phone]

## 2025-05-29 NOTE — PROGRESS NOTES
[facility]  STRZ RENAL TELEMETRY 6K  Occupational Therapy  Daily Note    Discharge Recommendations: Subacute/skilled nursing facility, ECF with OT, 24 hour assistance or supervision, and Patient would benefit from continued OT at discharge  Equipment Recommendations: Yes Faye lift, Hospital bed?      Time In: 1050  Time Out: 1114  Timed Code Treatment Minutes: 24 Minutes  Minutes: 24          Date: 2025  Patient Name: Susan K Sturgess Meyers,   Gender: female      [facility]  MRN: [id number]  : 1965  (60 y.o.)  Referring Practitioner: Juan Gautam MD  Diagnosis: Generalized weakness  Additional Pertinent Hx: Per EMR: \"60-year-old female with past medical history of COPD, atrial fibrillation, type 2 diabetes, hyponatremia, anemia, esophageal adenocarcinoma, thoracic cord compression, vertebral osteomyelitis, allergic rhinitis, sacral decubitus ulcer, ovarian cancer, and physical deconditioning who presented to [facility] 1 day after being discharged from SNF in [place] due to not being able to take care of herself at home and due to weakness.  Patient reports weakness in the lower extremities is worse than in the upper extremities and has been ongoing, did not improve with PT OT at SNF, per patient she required additional treatment however was declined.  In the ED CXR noted mild atelectasis versus pneumonia in the inferior medial aspect of the right lung base.  UA noted leukocyte esterases urine culture with preliminary nonfermenting gram-negative bacilli. Patient has a catheter.  She was started on cefepime.  Legionella and strep antigens ordered. Dietitian was consulted for management of J-tube feeds.  Social work was consulted for placement.\"    Restrictions/Precautions:  Restrictions/Precautions: NPO, Fall Risk, General Precautions  Position Activity Restriction  Other Position/Activity Restrictions: J-tube, sacral wound     Social/Functional History:  Lives With: Spouse,  ACTIVITIES:  Pt did sit EOB and was able to let go with x1 hand and do AROM of shoulder flexion x10 reps x1 set did complete both arms unilateral release only.     Pt takes extended time to complete due to nausea and fatigue.          Modified Garvin:  Current Functional Status:  Not Applicable    Education:  Learners: Patient  Importance of Increasing Activity    ASSESSMENT:     Activity Tolerance:  Patient tolerance of  treatment: Fair treatment tolerance      Plan: Times Per Week: 5x  Times Per Day: Once a day  Current Treatment Recommendations: Strengthening, Balance training, Functional mobility training, Endurance training, Wheelchair mobility training, Patient/Caregiver education & training, Equipment evaluation, education, & procurement, Self-Care / ADL, Safety education & training, Pain management    Goals  Short Term Goals  Time Frame for Short Term Goals: Until discharge  Short Term Goal 1: Pt will complete BUE light resistive exercises with min vcs for technique to increase indep and endurance with all self cares and transfers.  Short Term Goal 2: Pt will complete dynamic sitting EOB x 25 minutes with SBA to increase indep and endurance with all self cares.  Short Term Goal 3: Pt will complete functional transfers to/from various surfaces with Mod A +2 to increase indep with toileting.  Short Term Goal 4: Pt will complete UB dressing with Set up to increase indep within home environment.    Following session, patient left in safe position in bed, with alarm, and call light within reach

## 2025-05-29 NOTE — CARE COORDINATION
5/29/25, 2:15 PM EDT    DISCHARGE ON GOING EVALUATION    [facility] day: 12  Location: -28/028-A Reason for admit: Generalized weakness [R53.1]     Procedures: none    Imaging since last note: 5/29 chest xray- neg    Barriers to Discharge: Febrile overnight, 100.6 max. BC pending. IV maxipime started. Percussion vest per respiratory.     PCP: Torri George, ADDISON - CNP  Readmission Risk Score: 14.6    Patient Goals/Plan/Treatment Preferences: SW is unable to find an accepting ECF. Plan to send back home with , [facility](needs new orders), and [facility] for tube feeds.     *Cammy states [facility] is requesting a letter from  stating that her home TF is okay to use in the hospital setting. She has discussed with Dr. Carmona who requests CM provide letter to [facility]. VM message left at [facility] to clarify.

## 2025-05-29 NOTE — PLAN OF CARE
Problem: Chronic Conditions and Co-morbidities  Goal: Patient's chronic conditions and co-morbidity symptoms are monitored and maintained or improved  Outcome: Progressing  Note: Patient has a history of esophageal and ovarian cancer, esophagectomy, and stage IV decubitus ulcer.      Problem: Discharge Planning  Goal: Discharge to home or other facility with appropriate resources  Outcome: Progressing  Note: Patient plans to discharge to SNF when medically stable.      Problem: Safety - Adult  Goal: Free from fall injury  Outcome: Progressing  Note: No falls this shift.  Call light within reach.  Bed alarm in use.     Problem: Skin/Tissue Integrity  Goal: Skin integrity remains intact  Description: 1.  Monitor for areas of redness and/or skin breakdown2.  Assess vascular access sites hourly3.  Every 4-6 hours minimum:  Change oxygen saturation probe site4.  Every 4-6 hours:  If on nasal continuous positive airway pressure, respiratory therapy assess nares and determine need for appliance change or resting period  Outcome: Progressing  Note: No new breakdown this shift.   Pillow support.  Turning Q2 hours and PRN.      Problem: Nutrition Deficit:  Goal: Optimize nutritional status  Outcome: Progressing  Flowsheets (Taken 5/29/2025 1121 by Schwab, Allison C, RD, LD)  Nutrient intake appropriate for improving, restoring, or maintaining nutritional needs:   Assess nutritional status and recommend course of action   Monitor oral intake, labs, and treatment plans   Recommend appropriate diets, oral nutritional supplements, and vitamin/mineral supplements   Recommend, monitor, and adjust tube feedings and TPN/PPN based on assessed needs  Note: Patient on J tube feedings. Patient seen and educated today by dietician.      Problem: Respiratory - Adult  Goal: Clear lung sounds  Outcome: Progressing  Note: Patient has productive cough. Percussion vest started this shift. Lungs have wheezes and rhonchi.      Problem:  Pain  Goal: Verbalizes/displays adequate comfort level or baseline comfort level  Outcome: Progressing  Note: Patient complains of pain this shift, medications given per MAR. Pain medications increased this shift from 10mg Q4 to 15mg Q3.      Problem: Gastrointestinal - Adult  Goal: Maintains adequate nutritional intake  Outcome: Progressing  Note: Patient on J tube feedings. Bowel sounds active in all 4 quadrants. Incontinent of diarrhea.      Problem: Metabolic/Fluid and Electrolytes - Adult  Goal: Glucose maintained within prescribed range  Outcome: Progressing  Note: Q4 Glucose checks. No SSI needed this shift.      Problem: Genitourinary - Adult  Goal: Urinary catheter remains patent  Outcome: Progressing  Note: Urinary catheter exchanged and irrigated this shift. Large amounts of sediment noted. Bladder spasms with leakage around catheter.      Care plan reviewed with patient, patient verbalizes understanding.

## 2025-05-29 NOTE — PLAN OF CARE
Problem: Respiratory - Adult  Goal: Clear lung sounds  5/28/2025 2001 by Marielos Kenney, RCP  Outcome: Progressing   Receiving MDI's to improve aeration and COPD maintenance. Will continue with therapies as ordered.    Patient mutually agreed on goals.

## 2025-05-29 NOTE — PROGRESS NOTES
Pharmacy Note - Extended Infusion Beta-Lactam Dose Adjustment    Cefepime 2000 mg q8h intermittent infusion for treatment of Pneumonia. Per [facility] Extended Infusion Beta-Lactam Policy, cefepime will be changed to 2000 mg loading dose followed by 2000 mg q8h extended infusion    Estimated Creatinine Clearance: Estimated Creatinine Clearance: 68 mL/min (based on SCr of 0.8 mg/dL).    BMI: Body mass index is 25.23 kg/m².    Rationale for Adjustment: Dose adjusted per [facility] Extended Infusion Policy based on renal function and indication. The above medication is renally eliminated and demonstrates time-dependent effects on bacterial eradication. Extended-infusion dosing strategy aims to enhance microbiologic and clinical efficacy.     Pharmacy will monitor renal function daily and adjust dose as necessary.    Please call with any questions.    Thank you,    Kayden Curtis, PharmD, BCPS  5/29/2025  9:01 AM

## 2025-05-29 NOTE — PROGRESS NOTES
Again, encourage pulmonary hygiene with patient including IS, Acapella, will have RT use vibratory vest  Will start cefepime x7 days pending collection of BCx and UCx  Pseudomonal UTI due to chronic indwelling Abreu catheter -- POA: Chronic Abreu that was kinked on arrival and was exchanged on 5/17/2025.  Urine culture grew Pseudomonas aeruginosa that was susceptible to cefepime.    S/p tx with cefepime 5/17 - 5/23  Legionella + strep pneumo antigen were negative.    Continue Abreu care -- f/u outpt urology for further mgmt  Mild atelectasis in the inferior medial aspect of the right lung, chronic cough: Seen on chest x-ray completed on 5/17/2025, which showed normal heart size with aortic endograft that is present in the descending thoracic aorta.  Also shows mild atelectasis/pneumonia inferior medial aspect of right lung base.  Repeat CXR 5/24 with no acute process without infiltrates/fluid  Continues with increased \"phlegm\" - ?GI secretions with stent/fistula thus trial robinul 1 mg per J tube 3 x day started 5/24 and monitor if helps -- reports some improvement, but ?increases cough at night  Stable on RA since admission   Continue pulmonary hygiene and RT therapy with BD treatments per protocol  5/29 - again r/o pneumonia for sepsis as above in #2, pending repeat CXR   COPD, not in exacerbation: No PFT to review, BD tx per RT protocol.  Continue Symbicort + Spiriva + Robitussin.  Patient should follow-up as outpatient to get formal PFTs.  Hx of SVT: Per chart review had 2 episodes noted at OSH with heart rates in the 200s, per chart review subsided with Valsalva + carotid massage.   Initially continued home lopressor 12.5 mg bid with parameters and rarely getting the lopressor - hold since 5/27   Pt declined Tele  Hx of PE in 2024: Home medications do not include anticoagulation.  Continue Lovenox 40 mg every 24 hours for DVT/PE prophylaxis as high risk given immobility and hx cancer  Chronic diastolic heart  []No    Disposition:    [] Home       [] TCU       [] Rehab       [] Psych       [x] SNF       [] Long Term Care Facility       [x] Other-Pending     Code Status: Full Code      An electronic signature was used to authenticate this note  - Sagrario Isidro DO PGY-1 on 5/29/2025 at 10:34 AM

## 2025-05-29 NOTE — PROCEDURES
PROCEDURE NOTE  Date: 5/29/2025   Name: Susan K Sturgess Meyers  YOB: 1965    Procedures    EKG Completed. Handed to RN.

## 2025-05-29 NOTE — CARE COORDINATION
5/29/25, 8:21 AM EDT    DISCHARGE PLANNING EVALUATION    [facility]AL [facility].  They are not in network, also concerned pts insurance will not skill pt.  Noted that pt refused OT yesterday.  Pt has been inconsistent with therapy.     CRYSTAL left detailed message with pts spouse Jef that pt is medically stable for discharge.  CRYSTAL spoke with [facility] ([facility] yesterday.  They can accept pt and will need new HH orders.  Also noted that magdalene lift will need delivered prior to discharge.  Pt will require ambulance transport home.  CRYSTAL requested Jef call CRYSTAL back as soon as possible to coordinate discharge home.

## 2025-05-29 NOTE — PLAN OF CARE
Problem: Chronic Conditions and Co-morbidities  Goal: Patient's chronic conditions and co-morbidity symptoms are monitored and maintained or improved  Outcome: Progressing  Flowsheets (Taken 5/27/2025 1955)  Care Plan - Patient's Chronic Conditions and Co-Morbidity Symptoms are Monitored and Maintained or Improved: Monitor and assess patient's chronic conditions and comorbid symptoms for stability, deterioration, or improvement     Problem: Discharge Planning  Goal: Discharge to home or other facility with appropriate resources  Outcome: Progressing  Flowsheets (Taken 5/27/2025 1955)  Discharge to home or other facility with appropriate resources: Identify barriers to discharge with patient and caregiver     Problem: Safety - Adult  Goal: Free from fall injury  Outcome: Progressing  Flowsheets (Taken 5/23/2025 1004 by Monik Conde, RN)  Free From Fall Injury:   Instruct family/caregiver on patient safety   Based on caregiver fall risk screen, instruct family/caregiver to ask for assistance with transferring infant if caregiver noted to have fall risk factors     Problem: Skin/Tissue Integrity  Goal: Skin integrity remains intact  Description: 1.  Monitor for areas of redness and/or skin breakdown2.  Assess vascular access sites hourly3.  Every 4-6 hours minimum:  Change oxygen saturation probe site4.  Every 4-6 hours:  If on nasal continuous positive airway pressure, respiratory therapy assess nares and determine need for appliance change or resting period  Outcome: Progressing  Flowsheets (Taken 5/27/2025 1955)  Skin Integrity Remains Intact:   Monitor for areas of redness and/or skin breakdown   Assess vascular access sites hourly     Problem: Nutrition Deficit:  Goal: Optimize nutritional status  Outcome: Progressing  Flowsheets (Taken 5/26/2025 0452 by Marylu Sales, RN)  Nutrient intake appropriate for improving, restoring, or maintaining nutritional needs:   Assess nutritional status and recommend course of  action   Monitor oral intake, labs, and treatment plans   Recommend, monitor, and adjust tube feedings and TPN/PPN based on assessed needs     Problem: Respiratory - Adult  Goal: Clear lung sounds  5/28/2025 2201 by Sameera Silverman RN  Outcome: Progressing  5/28/2025 2001 by Marielos Kenney, RCP  Outcome: Progressing  5/28/2025 0854 by Yodit Conde, RCP  Outcome: Progressing  Note: Txs to help improve lung aeration. Patient mutually agreed on goals.       Problem: Pain  Goal: Verbalizes/displays adequate comfort level or baseline comfort level  Outcome: Progressing  Flowsheets (Taken 5/27/2025 1955)  Verbalizes/displays adequate comfort level or baseline comfort level:   Encourage patient to monitor pain and request assistance   Assess pain using appropriate pain scale   Administer analgesics based on type and severity of pain and evaluate response     Problem: Gastrointestinal - Adult  Goal: Maintains adequate nutritional intake  Outcome: Progressing  Flowsheets (Taken 5/27/2025 1955)  Maintains adequate nutritional intake: Monitor percentage of each meal consumed     Problem: Metabolic/Fluid and Electrolytes - Adult  Goal: Glucose maintained within prescribed range  Outcome: Progressing  Flowsheets (Taken 5/27/2025 1955)  Glucose maintained within prescribed range:   Monitor blood glucose as ordered   Assess for signs and symptoms of hyperglycemia and hypoglycemia     Problem: Genitourinary - Adult  Goal: Urinary catheter remains patent  Outcome: Progressing  Flowsheets (Taken 5/26/2025 2045)  Urinary catheter remains patent: Assess patency of urinary catheter

## 2025-05-30 ENCOUNTER — APPOINTMENT (OUTPATIENT)
Dept: GENERAL RADIOLOGY | Age: 60
End: 2025-05-30
Payer: COMMERCIAL

## 2025-05-30 ENCOUNTER — APPOINTMENT (OUTPATIENT)
Dept: INTERVENTIONAL RADIOLOGY/VASCULAR | Age: 60
End: 2025-05-30
Payer: COMMERCIAL

## 2025-05-30 PROBLEM — K94.13 MALFUNCTION OF JEJUNOSTOMY TUBE (HCC): Status: ACTIVE | Noted: 2025-05-30

## 2025-05-30 LAB
ANION GAP SERPL CALC-SCNC: 11 MEQ/L (ref 8–16)
BASOPHILS ABSOLUTE: 0 THOU/MM3 (ref 0–0.1)
BASOPHILS NFR BLD AUTO: 0.3 %
BUN SERPL-MCNC: 22 MG/DL (ref 8–23)
CALCIUM SERPL-MCNC: 9.3 MG/DL (ref 8.8–10.2)
CHLORIDE SERPL-SCNC: 101 MEQ/L (ref 98–111)
CO2 SERPL-SCNC: 23 MEQ/L (ref 22–29)
CREAT SERPL-MCNC: 0.6 MG/DL (ref 0.5–0.9)
DEPRECATED RDW RBC AUTO: 55.9 FL (ref 35–45)
EOSINOPHIL NFR BLD AUTO: 3.5 %
EOSINOPHILS ABSOLUTE: 0.2 THOU/MM3 (ref 0–0.4)
ERYTHROCYTE [DISTWIDTH] IN BLOOD BY AUTOMATED COUNT: 17.5 % (ref 11.5–14.5)
GFR SERPL CREATININE-BSD FRML MDRD: > 90 ML/MIN/1.73M2
GLUCOSE BLD STRIP.AUTO-MCNC: 111 MG/DL (ref 70–108)
GLUCOSE BLD STRIP.AUTO-MCNC: 116 MG/DL (ref 70–108)
GLUCOSE BLD STRIP.AUTO-MCNC: 125 MG/DL (ref 70–108)
GLUCOSE BLD STRIP.AUTO-MCNC: 135 MG/DL (ref 70–108)
GLUCOSE BLD STRIP.AUTO-MCNC: 84 MG/DL (ref 70–108)
GLUCOSE BLD STRIP.AUTO-MCNC: 85 MG/DL (ref 70–108)
GLUCOSE SERPL-MCNC: 110 MG/DL (ref 74–109)
HCT VFR BLD AUTO: 29.3 % (ref 37–47)
HGB BLD-MCNC: 9 GM/DL (ref 12–16)
IMM GRANULOCYTES # BLD AUTO: 0.04 THOU/MM3 (ref 0–0.07)
IMM GRANULOCYTES NFR BLD AUTO: 0.6 %
LYMPHOCYTES ABSOLUTE: 0.6 THOU/MM3 (ref 1–4.8)
LYMPHOCYTES NFR BLD AUTO: 8.9 %
MCH RBC QN AUTO: 26.8 PG (ref 26–33)
MCHC RBC AUTO-ENTMCNC: 30.7 GM/DL (ref 32.2–35.5)
MCV RBC AUTO: 87.2 FL (ref 81–99)
MONOCYTES ABSOLUTE: 0.6 THOU/MM3 (ref 0.4–1.3)
MONOCYTES NFR BLD AUTO: 8.9 %
NEUTROPHILS ABSOLUTE: 5.3 THOU/MM3 (ref 1.8–7.7)
NEUTROPHILS NFR BLD AUTO: 77.8 %
NRBC BLD AUTO-RTO: 0 /100 WBC
PLATELET # BLD AUTO: 344 THOU/MM3 (ref 130–400)
PMV BLD AUTO: 9.4 FL (ref 9.4–12.4)
POTASSIUM SERPL-SCNC: 4.2 MEQ/L (ref 3.5–5.2)
RBC # BLD AUTO: 3.36 MILL/MM3 (ref 4.2–5.4)
SODIUM SERPL-SCNC: 135 MEQ/L (ref 135–145)
WBC # BLD AUTO: 6.8 THOU/MM3 (ref 4.8–10.8)

## 2025-05-30 PROCEDURE — 2580000003 HC RX 258: Performed by: STUDENT IN AN ORGANIZED HEALTH CARE EDUCATION/TRAINING PROGRAM

## 2025-05-30 PROCEDURE — 49450 REPLACE G/C TUBE PERC: CPT

## 2025-05-30 PROCEDURE — 6360000002 HC RX W HCPCS: Performed by: PHYSICIAN ASSISTANT

## 2025-05-30 PROCEDURE — 6370000000 HC RX 637 (ALT 250 FOR IP): Performed by: INTERNAL MEDICINE

## 2025-05-30 PROCEDURE — 2709999900 IR GUIDED PERC REPLACE GJ TUBE

## 2025-05-30 PROCEDURE — 99233 SBSQ HOSP IP/OBS HIGH 50: CPT | Performed by: STUDENT IN AN ORGANIZED HEALTH CARE EDUCATION/TRAINING PROGRAM

## 2025-05-30 PROCEDURE — 0DH63UZ INSERTION OF FEEDING DEVICE INTO STOMACH, PERCUTANEOUS APPROACH: ICD-10-PCS | Performed by: RADIOLOGY

## 2025-05-30 PROCEDURE — 6370000000 HC RX 637 (ALT 250 FOR IP): Performed by: STUDENT IN AN ORGANIZED HEALTH CARE EDUCATION/TRAINING PROGRAM

## 2025-05-30 PROCEDURE — 6360000002 HC RX W HCPCS: Performed by: STUDENT IN AN ORGANIZED HEALTH CARE EDUCATION/TRAINING PROGRAM

## 2025-05-30 PROCEDURE — 0DPDXUZ REMOVAL OF FEEDING DEVICE FROM LOWER INTESTINAL TRACT, EXTERNAL APPROACH: ICD-10-PCS | Performed by: RADIOLOGY

## 2025-05-30 PROCEDURE — 97530 THERAPEUTIC ACTIVITIES: CPT

## 2025-05-30 PROCEDURE — 94640 AIRWAY INHALATION TREATMENT: CPT

## 2025-05-30 PROCEDURE — 94761 N-INVAS EAR/PLS OXIMETRY MLT: CPT

## 2025-05-30 PROCEDURE — 2500000003 HC RX 250 WO HCPCS: Performed by: INTERNAL MEDICINE

## 2025-05-30 PROCEDURE — 80048 BASIC METABOLIC PNL TOTAL CA: CPT

## 2025-05-30 PROCEDURE — 85025 COMPLETE CBC W/AUTO DIFF WBC: CPT

## 2025-05-30 PROCEDURE — 6360000004 HC RX CONTRAST MEDICATION: Performed by: RADIOLOGY

## 2025-05-30 PROCEDURE — 82948 REAGENT STRIP/BLOOD GLUCOSE: CPT

## 2025-05-30 PROCEDURE — 49465 FLUORO EXAM OF G/COLON TUBE: CPT

## 2025-05-30 PROCEDURE — 6360000004 HC RX CONTRAST MEDICATION: Performed by: PHYSICIAN ASSISTANT

## 2025-05-30 PROCEDURE — 0DHA3UZ INSERTION OF FEEDING DEVICE INTO JEJUNUM, PERCUTANEOUS APPROACH: ICD-10-PCS | Performed by: RADIOLOGY

## 2025-05-30 PROCEDURE — 1200000000 HC SEMI PRIVATE

## 2025-05-30 PROCEDURE — 36415 COLL VENOUS BLD VENIPUNCTURE: CPT

## 2025-05-30 PROCEDURE — 94669 MECHANICAL CHEST WALL OSCILL: CPT

## 2025-05-30 PROCEDURE — 3E0H76Z INTRODUCTION OF NUTRITIONAL SUBSTANCE INTO LOWER GI, VIA NATURAL OR ARTIFICIAL OPENING: ICD-10-PCS | Performed by: RADIOLOGY

## 2025-05-30 PROCEDURE — 6360000002 HC RX W HCPCS: Performed by: INTERNAL MEDICINE

## 2025-05-30 RX ORDER — DIATRIZOATE MEGLUMINE AND DIATRIZOATE SODIUM 660; 100 MG/ML; MG/ML
SOLUTION ORAL; RECTAL PRN
Status: COMPLETED | OUTPATIENT
Start: 2025-05-30 | End: 2025-05-30

## 2025-05-30 RX ORDER — ALBUTEROL SULFATE 90 UG/1
2 INHALANT RESPIRATORY (INHALATION)
Status: DISCONTINUED | OUTPATIENT
Start: 2025-05-30 | End: 2025-05-31

## 2025-05-30 RX ORDER — DIATRIZOATE MEGLUMINE AND DIATRIZOATE SODIUM 660; 100 MG/ML; MG/ML
30 SOLUTION ORAL; RECTAL
Status: DISCONTINUED | OUTPATIENT
Start: 2025-05-30 | End: 2025-06-05 | Stop reason: HOSPADM

## 2025-05-30 RX ADMIN — OXYCODONE HYDROCHLORIDE 15 MG: 15 TABLET ORAL at 02:47

## 2025-05-30 RX ADMIN — GUAIFENESIN 200 MG: 200 SOLUTION ORAL at 15:43

## 2025-05-30 RX ADMIN — HYDROMORPHONE HYDROCHLORIDE 0.5 MG: 1 INJECTION, SOLUTION INTRAMUSCULAR; INTRAVENOUS; SUBCUTANEOUS at 12:05

## 2025-05-30 RX ADMIN — PROMETHAZINE HYDROCHLORIDE 25 MG: 25 TABLET ORAL at 15:42

## 2025-05-30 RX ADMIN — FLUTICASONE PROPIONATE 2 SPRAY: 50 SPRAY, METERED NASAL at 20:25

## 2025-05-30 RX ADMIN — CEFEPIME 2000 MG: 2 INJECTION, POWDER, FOR SOLUTION INTRAVENOUS at 02:40

## 2025-05-30 RX ADMIN — OXYCODONE HYDROCHLORIDE 15 MG: 15 TABLET ORAL at 15:42

## 2025-05-30 RX ADMIN — GUAIFENESIN 200 MG: 200 SOLUTION ORAL at 20:26

## 2025-05-30 RX ADMIN — BUDESONIDE AND FORMOTEROL FUMARATE DIHYDRATE 2 PUFF: 80; 4.5 AEROSOL RESPIRATORY (INHALATION) at 20:34

## 2025-05-30 RX ADMIN — GUAIFENESIN 200 MG: 200 SOLUTION ORAL at 02:47

## 2025-05-30 RX ADMIN — FLUTICASONE PROPIONATE 2 SPRAY: 50 SPRAY, METERED NASAL at 08:29

## 2025-05-30 RX ADMIN — DIATRIZOATE MEGLUMINE AND DIATRIZOATE SODIUM 30 ML: 600; 100 SOLUTION ORAL; RECTAL at 05:50

## 2025-05-30 RX ADMIN — OXYCODONE HYDROCHLORIDE 15 MG: 15 TABLET ORAL at 18:59

## 2025-05-30 RX ADMIN — FAMOTIDINE 40 MG: 20 TABLET, FILM COATED ORAL at 15:42

## 2025-05-30 RX ADMIN — ENOXAPARIN SODIUM 40 MG: 100 INJECTION SUBCUTANEOUS at 17:24

## 2025-05-30 RX ADMIN — DIATRIZOATE MEGLUMINE AND DIATRIZOATE SODIUM 12 ML: 600; 100 SOLUTION ORAL; RECTAL at 15:01

## 2025-05-30 RX ADMIN — ALBUTEROL SULFATE 2 PUFF: 90 AEROSOL, METERED RESPIRATORY (INHALATION) at 20:32

## 2025-05-30 RX ADMIN — OXYCODONE HYDROCHLORIDE 15 MG: 15 TABLET ORAL at 22:18

## 2025-05-30 RX ADMIN — CEFEPIME 2000 MG: 2 INJECTION, POWDER, FOR SOLUTION INTRAVENOUS at 17:25

## 2025-05-30 RX ADMIN — TRAZODONE HYDROCHLORIDE 25 MG: 50 TABLET ORAL at 20:26

## 2025-05-30 RX ADMIN — HYDROMORPHONE HYDROCHLORIDE 0.5 MG: 1 INJECTION, SOLUTION INTRAMUSCULAR; INTRAVENOUS; SUBCUTANEOUS at 08:29

## 2025-05-30 RX ADMIN — HYDROMORPHONE HYDROCHLORIDE 0.5 MG: 1 INJECTION, SOLUTION INTRAMUSCULAR; INTRAVENOUS; SUBCUTANEOUS at 05:08

## 2025-05-30 RX ADMIN — CEFEPIME 2000 MG: 2 INJECTION, POWDER, FOR SOLUTION INTRAVENOUS at 10:48

## 2025-05-30 RX ADMIN — CETIRIZINE HYDROCHLORIDE 10 MG: 10 TABLET, FILM COATED ORAL at 15:42

## 2025-05-30 ASSESSMENT — PAIN SCALES - WONG BAKER
WONGBAKER_NUMERICALRESPONSE: NO HURT
WONGBAKER_NUMERICALRESPONSE: NO HURT
WONGBAKER_NUMERICALRESPONSE: HURTS A LITTLE BIT
WONGBAKER_NUMERICALRESPONSE: NO HURT
WONGBAKER_NUMERICALRESPONSE: HURTS A LITTLE BIT
WONGBAKER_NUMERICALRESPONSE: NO HURT

## 2025-05-30 ASSESSMENT — PAIN - FUNCTIONAL ASSESSMENT
PAIN_FUNCTIONAL_ASSESSMENT: ACTIVITIES ARE NOT PREVENTED
PAIN_FUNCTIONAL_ASSESSMENT: PREVENTS OR INTERFERES WITH MANY ACTIVE NOT PASSIVE ACTIVITIES
PAIN_FUNCTIONAL_ASSESSMENT: PREVENTS OR INTERFERES WITH MANY ACTIVE NOT PASSIVE ACTIVITIES
PAIN_FUNCTIONAL_ASSESSMENT: ACTIVITIES ARE NOT PREVENTED

## 2025-05-30 ASSESSMENT — PAIN DESCRIPTION - ORIENTATION
ORIENTATION: MID
ORIENTATION: MID;POSTERIOR
ORIENTATION: MID
ORIENTATION: RIGHT;LEFT
ORIENTATION: MID

## 2025-05-30 ASSESSMENT — PAIN SCALES - GENERAL
PAINLEVEL_OUTOF10: 3
PAINLEVEL_OUTOF10: 6
PAINLEVEL_OUTOF10: 5
PAINLEVEL_OUTOF10: 9
PAINLEVEL_OUTOF10: 7
PAINLEVEL_OUTOF10: 0
PAINLEVEL_OUTOF10: 7
PAINLEVEL_OUTOF10: 5
PAINLEVEL_OUTOF10: 3
PAINLEVEL_OUTOF10: 0
PAINLEVEL_OUTOF10: 0
PAINLEVEL_OUTOF10: 9
PAINLEVEL_OUTOF10: 0
PAINLEVEL_OUTOF10: 8
PAINLEVEL_OUTOF10: 7
PAINLEVEL_OUTOF10: 4
PAINLEVEL_OUTOF10: 8

## 2025-05-30 ASSESSMENT — PAIN DESCRIPTION - DESCRIPTORS
DESCRIPTORS: ACHING;DISCOMFORT;SORE;PRESSURE;SHARP
DESCRIPTORS: ACHING;DISCOMFORT
DESCRIPTORS: ACHING;DISCOMFORT
DESCRIPTORS: ACHING;SHARP
DESCRIPTORS: ACHING;DISCOMFORT;PRESSURE
DESCRIPTORS: ACHING;DISCOMFORT
DESCRIPTORS: ACHING;SHARP

## 2025-05-30 ASSESSMENT — PAIN DESCRIPTION - LOCATION
LOCATION: COCCYX
LOCATION: COCCYX;BUTTOCKS
LOCATION: COCCYX
LOCATION: COCCYX;SACRUM
LOCATION: COCCYX;BUTTOCKS

## 2025-05-30 NOTE — CARE COORDINATION
5/30/25, 10:10 AM EDT    DISCHARGE PLANNING EVALUATION    SW left message with Christin Bishop requesting call back.  Pt will need IV ATB q8 until 6/5/25.

## 2025-05-30 NOTE — PROGRESS NOTES
Comprehensive Nutrition Assessment    Type and Reason for Visit:  Reassess (NPO/tubefeeding, J-tube \"fell-out\")    Nutrition Recommendations/Plan:   J-tube \"fell-out\" 5/29/25 pm, to be replaced by IR today, plan to resume tubefeedings as previous: [employer] 1.2 Glucose Support at 55 ml/hr x 24 hours/day.  Tubefeeding formula brought in by family per patient preference (other formulas cause diarrhea per patient)-supplies through [facility] (formula not on formulary at [facility]).  Pour 440 mL of [employer] 1.2 Glucose Support in tube feeding bag. This feeding system is \"open\" and can only hang for 8 hours at a time. Feeding set must be changed q 8 hours (a total of 3x/day). Plan to use a total of ~5.25 cartons of formula/day.   Continue free water flush of 125 mls every 6 hours if no IVF's or per Provider.    NPO.   Note anticipating likely home at discharge. Tubefeeding supplies through [facility] PTA. Reinforced home tubefeeding regimen and free water flush recommendations with patient, provided handout as well.       Malnutrition Assessment:  Malnutrition Status:  Severe malnutrition (05/20/25 1248)    Context:  Chronic Illness     Findings of the 6 clinical characteristics of malnutrition:  Energy Intake:   (pt states she was sometimes not getting entire cycle of TF at [facility])  Weight Loss:  Greater than 10% over 6 months     Body Fat Loss:  Mild body fat loss Orbital, Buccal region, Triceps   Muscle Mass Loss:  Mild muscle mass loss Temples (temporalis), Clavicles (pectoralis & deltoids), Scapula (trapezius)  Fluid Accumulation:  No fluid accumulation     Strength:  Not Performed    Nutrition Assessment:        Pt. severely malnourished AEB criteria listed above.  At risk for further nutritional compromise r/t admit with generalized weakness -  unable to care for pt at home - discharged from SNF x1 day, N/V 2/2 esophageal stent, NPO d/t dysphagia-reliant on enteral feedings PTA for nutrition support, UTI,  chronic cough, chronic hyponatremia, J-tube fell out 5/29/25, and underlying medical condition (PMHx: COPD, T2DM, esophageal cancer - s/p esophagectomy, esophageal fistula, s/p J-Tube placement 2/9/24, ovarian cancer 2011, former smoker, CHF, chronic hyponatremia).      Nutrition Related Findings:    Pt. Report/Treatments/Miscellaneous: Nursing reports patient's J-tube and shepard \"fell-out\" last night.  Shepard placed in J-tube track, IR plans to replace J-tube today and then will resume tubefeedings. Patient started on IV antibiotics.  Talked with Care Manager and LSW, likely patient will be discharged to home.  Patient seen, reports continued phlegm causes gaggy and then either dry heaves or has to spit phlegm out, not induced by tubefeedings.  Reinforced current tubefeeding regimen-patient voiced understanding.  Nursing reports improved urine output today with adjustments with water flushes.   GI Status: BM 5/28/25  Pertinent Labs: 5/30/25: Sodium 135, Glucose 110. Chemstick 125, 111.   Pertinent Meds: maxipime, pepcid, robinol, humalog, trazadone, prn robitussin, prn zofran, prn oxycodone, prn phenergan     Wound Type: Stage IV (chronic pressure injury)       Current Nutrition Intake & Therapies:    Average Meal Intake: NPO     Diet NPO  ADULT TUBE FEEDING; Jejunostomy; Other Tube Feeding (specify); [employer] 1.2 Glucose Support; Supplied from home; Continuous; 35; Yes; 10; Q 4 hours; 55; 125; Q 6 hours  Current Tube Feeding (TF) Orders:  Feeding Route: Jejunostomy (s/p Open j tube placement 2024)  Formula: Other Tube Feeding ([employer] 1.2 - Glucose Support)  Schedule: Cyclic  Feeding Regimen: [employer] 1.2 Glucose Support at 55 mL/hr x24 hours/day; Feeding set changed q 8 hours (total of 3x/day)  Additives/Modulars: None  Water Flushes: 5/29/25: Recommend 125 mls every 6 hours if no IVF's or per Provider, (Note chronic hyponatremia, trace LE edema)  Current TF Provides: [employer] 1.2 Glucose Support at 55

## 2025-05-30 NOTE — PROGRESS NOTES
1424 Patient received in IR for J tube replacement.   1428 This procedure has been fully reviewed with the patient and written informed consent has been obtained.  1454 Procedure started with Dr. Roy.   1457 Procedure completed; patient tolerated well. Dressing to abdomen; no bleeding noted.  1504 Patient on bed; comfort ensured.  1508 Patient taken to 6K via transport. Pt alert and oriented x3; follows commands. Skin pink, warm, and dry. Respirations easy, regular, and nonlabored. Report called to Holli MCDONALD on 6K.

## 2025-05-30 NOTE — PROGRESS NOTES
[facility]  INPATIENT PHYSICAL THERAPY  DAILY NOTE  STRZ RENAL TELEMETRY 6K - 6K-28/028-A      Discharge Recommendations: Subacute/Skilled Nursing Facility and Patient would benefit from continued PT at discharge  Equipment Recommendations: No  Defer to next facility            Time In: 1026  Time Out: 1104  Timed Code Treatment Minutes: 38 Minutes  Minutes: 38          Date: 2025  Patient Name: Susan K Sturgess Meyers,  Gender:  female        MRN: [id number]  : 1965  (60 y.o.)     Referring Practitioner: Juan Gautam MD  Diagnosis: Sepsis (HCC)  Additional Pertinent Hx: Per EMR: \"60-year-old female with past medical history of COPD, atrial fibrillation, type 2 diabetes, hyponatremia, anemia, esophageal adenocarcinoma, thoracic cord compression, vertebral osteomyelitis, allergic rhinitis, sacral decubitus ulcer, ovarian cancer, and physical deconditioning who presented to [facility] 1 day after being discharged from SNF in [place] due to not being able to take care of herself at home and due to weakness.  Patient reports weakness in the lower extremities is worse than in the upper extremities and has been ongoing, did not improve with PT OT at SNF, per patient she required additional treatment however was declined.  In the ED CXR noted mild atelectasis versus pneumonia in the inferior medial aspect of the right lung base.  UA noted leukocyte esterases urine culture with preliminary nonfermenting gram-negative bacilli. Patient has a catheter.  She was started on cefepime.  Legionella and strep antigens ordered. Dietitian was consulted for management of J-tube feeds.  Social work was consulted for placement.\"     Prior Level of Function:  Lives With: Spouse, Son  Type of Home: House  Home Layout: Two level, Able to Live on Main level with bedroom/bathroom  Home Access: Stairs to enter without rails, Ramped entrance (Spouse bought ramp for the home)  Entrance Stairs - Number of Steps: 3

## 2025-05-30 NOTE — CARE COORDINATION
5/30/25, 9:40 AM EDT    DISCHARGE ON GOING EVALUATION    [facility] day: [facility]  Location: -28/028-A Reason for admit: Generalized weakness [R53.1]     Procedures: none    Imaging since last note: n/a    Barriers to Discharge: Abreu and j-tube accidentally pulled out last night. J-tube to be replaced today in IR. Remains on IV maxipime, awaiting cx return for length of need.     PCP: Torri George, ADDISON - CNP  Readmission Risk Score: 14.3    Patient Goals/Plan/Treatment Preferences: SW plans to see if [facility] will consider accepting as IV atb are currently ordered until 6/6. Otherwise plan to return home with her , [facility], and [facility].

## 2025-05-30 NOTE — RT PROTOCOL NOTE
RT Inhaler-Nebulizer Bronchodilator Protocol Note    There is a bronchodilator order in the chart from a provider indicating to follow the RT Bronchodilator Protocol and there is an “Initiate RT Inhaler-Nebulizer Bronchodilator Protocol” order as well (see protocol at bottom of note).    CXR Findings:  No results found.    The findings from the last RT Protocol Assessment were as follows:   History Pulmonary Disease: Chronic pulmonary disease  Respiratory Pattern: Regular pattern and RR 12-20 bpm  Breath Sounds: Slightly diminished and/or crackles  Cough: Strong, productive  Indication for Bronchodilator Therapy: Decreased or absent breath sounds  Bronchodilator Assessment Score: 5    Aerosolized bronchodilator medication orders have been revised according to the RT Inhaler-Nebulizer Bronchodilator Protocol below.    Respiratory Therapist to perform RT Therapy Protocol Assessment initially then follow the protocol.  Repeat RT Therapy Protocol Assessment PRN for score 0-3 or on second treatment, BID, and PRN for scores above 3.    No Indications - adjust the frequency to every 6 hours PRN wheezing or bronchospasm, if no treatments needed after 48 hours then discontinue using Per Protocol order mode.     If indication present, adjust the RT bronchodilator orders based on the Bronchodilator Assessment Score as indicated below.  Use Inhaler orders unless patient has one or more of the following: on home nebulizer, not able to hold breath for 10 seconds, is not alert and oriented, cannot activate and use MDI correctly, or respiratory rate 25 breaths per minute or more, then use the equivalent nebulizer order(s) with same Frequency and PRN reasons based on the score.  If a patient is on this medication at home then do not decrease Frequency below that used at home.    0-3 - enter or revise RT bronchodilator order(s) to equivalent RT Bronchodilator order with Frequency of every 4 hours PRN for wheezing or increased work of  breathing using Per Protocol order mode.        4-6 - enter or revise RT Bronchodilator order(s) to two equivalent RT bronchodilator orders with one order with BID Frequency and one order with Frequency of every 4 hours PRN wheezing or increased work of breathing using Per Protocol order mode.        7-10 - enter or revise RT Bronchodilator order(s) to two equivalent RT bronchodilator orders with one order with TID Frequency and one order with Frequency of every 4 hours PRN wheezing or increased work of breathing using Per Protocol order mode.       11-13 - enter or revise RT Bronchodilator order(s) to one equivalent RT bronchodilator order with QID Frequency and an Albuterol order with Frequency of every 4 hours PRN wheezing or increased work of breathing using Per Protocol order mode.      Greater than 13 - enter or revise RT Bronchodilator order(s) to one equivalent RT bronchodilator order with every 4 hours Frequency and an Albuterol order with Frequency of every 2 hours PRN wheezing or increased work of breathing using Per Protocol order mode.     RT to enter RT Home Evaluation for COPD & MDI Assessment order using Per Protocol order mode.    Electronically signed by Mejia Price RCP on 5/29/2025 at 8:38 PM

## 2025-05-30 NOTE — PROGRESS NOTES
At this particular time, this RN and Chetna GONG, were in patient's room to clean patient up and change linen d/t incontinence of bowel.  The patient was quiet, calm, and cooperative.  When patient was uncovered, it was noted that both the patient's PEG Tube and Abreu Catheter Tube were dislodged and tucked down between the patient's B/L Legs.  The Abreu catheter tubing tip was intact and the balloon was NOT inflated at that time.  The PEG Tube which had been dislodged, originally had 1 suture wrapped around it to help secure it in the patient's abdomen.  The site after the tube was out, showed little to NO trauma or active bleeding.  Her Skin Where the suture had been was Pink to Red in color without active bleeding. There was a scant/small amount of Serous drainage from the PEG Tube insertion site. No Tube Feed drainage was detected.  The patient's only recollection or summation was that MAYBE she coughed it out. *(She did in fact have violent episodes of Course Raspy Coughing). KADIE Perkins was contacted and orders received to place a temporary Abreu Catheter, 16 [place] in to the Insertion hole and into patient's stomach.  This was completed and secured with split gauze dressing and adhesive tape.  The 2nd Abreu Catheter 16 [place] was placed back in the patient's bladder without difficulty or trauma.  500 ml's of Cloudy Yellow Urine Returned to collection bag immediately upon the insertion.  Sterile procedure and protocol were used on both insertions *(PEG Tube Site & Pt's Bladder).   No c/o or sign of pain, chest pain, SOB, or distress was shown by this patient.  Hospitalist CNP came to the room and remove the collection bag from the Abreu Catheter Tip Piece and inflated with 6 ml's of Normal Saline into the 10 ml Catheter Balloon.  The inflation was used to secure it inside.  PCXR completed to verify placement with Contrast.  This was done by Radiology Techs x2.  No issues.  The placement was verified and

## 2025-05-30 NOTE — PROGRESS NOTES
per J-tube 3 times daily starting 5/24 and monitor for improvement in secretions  apprec dietician assist   5/30 J-tube displaced overnight during apparent coughing fit. Overnight provider contacted on 16 [place]an catheter used to secure site. IR contacted in AM for J tube replacement. 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  She did undergo bone biopsy on November 2024 which was negative for malignancy but with +infection and did have thoracic fusion please see below.  Follows [facility] as outpatient.  Was recently discharged from SNF => still unable to take care of herself and having lower extremity weakness.  Pending disposition per insurance coverage and SS plan.  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  Patient was following infectious disease and Spine surgery at [facility], per chart review patient was finished with course of outpatient antibiotics.  Patient was scheduled for MRI of thoracic spine however missed her appointment and is currently rescheduled for October 2025.  Patient states she needs sedation/intubation for lying flat to prevent aspiration for the MRI.  Allergic rhinitis: Chronic history continue Zyrtec daily per J-tube and flonase added 5/21  GERD: Continue Pepcid 40 mg per J-tube  Insomnia: Continue trazodone 25 mg per J-tube  Stage IV Decubitus ulcer coccyx -- POA -  Wound care following, cont tx with saline/gauze covered by sacral  surgery.   2. No effusion seen. No infiltrates are seen..            **This report has been created using voice recognition software.  It may contain   minor errors which are inherent in voice recognition technology.**            Electronically signed by Dr. Leonard Oakley      XR CHEST PORTABLE   Final Result      No acute intrathoracic process.               **This report has been created using voice recognition software. It may contain   minor errors which are inherent in voice recognition technology.**            Electronically signed by Dr. Msuhtaq Mccarthy      XR CHEST (2 VW)   Final Result   1. Normal heart size. 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 Apparent prior thoracic surgery since prior study.   2. Mild atelectasis/pneumonia inferomedial aspect right lung base.            **This report has been created using voice recognition software.  It may contain   minor errors which are inherent in voice recognition technology.**            Electronically signed by Dr. Leonard Oakley      IR GUIDED PERC REPLACE GJ TUBE    (Results Pending)       Diet: Diet NPO  ADULT TUBE FEEDING; Jejunostomy; Other Tube Feeding (specify); [employer] 1.2 Glucose Support; Supplied from home; Continuous; 35; Yes; 10; Q 4 hours; 55; 125; Q 6 hours    Microbiology: yes -urine culture 5/19 grew Pseudomonas aeruginosa sensitive to cefepime  Antibiotics: yes -  cefepime for total duration of 7 days from 5/17-5/24  Start cefepime 5/29 for 7 days    Steroids: no    Telemetry: []Yes / [x]No  Telemetry Review of past 24 hours: Not Applicable    LDA: []CVC / []PICC / []Midline / [x]Abreu / []Drains / []Mediport / [x]PIV / [x]J-tube    Labs (still needed?): [x]Yes / []No  IVF (still needed?): [x]Yes / []No    Level of care: []Step Down / [x]Med-Surg  Bed Status: [x]Inpatient / []Observation    DVT Prophylaxis: [x] Lovenox / [] Heparin / [] SCDs / [] Already on Systemic Anticoagulation / [] None     PT/OT: [x]Yes /

## 2025-05-31 PROBLEM — B34.8 PARAINFLUENZA INFECTION: Status: ACTIVE | Noted: 2025-05-31

## 2025-05-31 LAB
ANION GAP SERPL CALC-SCNC: 11 MEQ/L (ref 8–16)
BACTERIA UR CULT: ABNORMAL
BUN SERPL-MCNC: 19 MG/DL (ref 8–23)
CALCIUM SERPL-MCNC: 9.2 MG/DL (ref 8.8–10.2)
CHLORIDE SERPL-SCNC: 102 MEQ/L (ref 98–111)
CO2 SERPL-SCNC: 23 MEQ/L (ref 22–29)
CREAT SERPL-MCNC: 0.6 MG/DL (ref 0.5–0.9)
DEPRECATED RDW RBC AUTO: 54.1 FL (ref 35–45)
ERYTHROCYTE [DISTWIDTH] IN BLOOD BY AUTOMATED COUNT: 17.3 % (ref 11.5–14.5)
GFR SERPL CREATININE-BSD FRML MDRD: > 90 ML/MIN/1.73M2
GLUCOSE BLD STRIP.AUTO-MCNC: 116 MG/DL (ref 70–108)
GLUCOSE BLD STRIP.AUTO-MCNC: 127 MG/DL (ref 70–108)
GLUCOSE BLD STRIP.AUTO-MCNC: 133 MG/DL (ref 70–108)
GLUCOSE BLD STRIP.AUTO-MCNC: 144 MG/DL (ref 70–108)
GLUCOSE BLD STRIP.AUTO-MCNC: 152 MG/DL (ref 70–108)
GLUCOSE BLD STRIP.AUTO-MCNC: 153 MG/DL (ref 70–108)
GLUCOSE SERPL-MCNC: 114 MG/DL (ref 74–109)
HCT VFR BLD AUTO: 30.4 % (ref 37–47)
HGB BLD-MCNC: 9.3 GM/DL (ref 12–16)
MCH RBC QN AUTO: 26.3 PG (ref 26–33)
MCHC RBC AUTO-ENTMCNC: 30.6 GM/DL (ref 32.2–35.5)
MCV RBC AUTO: 86.1 FL (ref 81–99)
MRSA SPEC QL CULT: NORMAL
ORGANISM: ABNORMAL
PLATELET # BLD AUTO: 359 THOU/MM3 (ref 130–400)
PMV BLD AUTO: 8.8 FL (ref 9.4–12.4)
POTASSIUM SERPL-SCNC: 4.1 MEQ/L (ref 3.5–5.2)
RBC # BLD AUTO: 3.53 MILL/MM3 (ref 4.2–5.4)
SODIUM SERPL-SCNC: 136 MEQ/L (ref 135–145)
WBC # BLD AUTO: 5.3 THOU/MM3 (ref 4.8–10.8)

## 2025-05-31 PROCEDURE — 36415 COLL VENOUS BLD VENIPUNCTURE: CPT

## 2025-05-31 PROCEDURE — 6360000002 HC RX W HCPCS: Performed by: INTERNAL MEDICINE

## 2025-05-31 PROCEDURE — 2700000000 HC OXYGEN THERAPY PER DAY

## 2025-05-31 PROCEDURE — 6370000000 HC RX 637 (ALT 250 FOR IP): Performed by: STUDENT IN AN ORGANIZED HEALTH CARE EDUCATION/TRAINING PROGRAM

## 2025-05-31 PROCEDURE — 6370000000 HC RX 637 (ALT 250 FOR IP): Performed by: INTERNAL MEDICINE

## 2025-05-31 PROCEDURE — 1200000000 HC SEMI PRIVATE

## 2025-05-31 PROCEDURE — 6370000000 HC RX 637 (ALT 250 FOR IP)

## 2025-05-31 PROCEDURE — 94640 AIRWAY INHALATION TREATMENT: CPT

## 2025-05-31 PROCEDURE — 94761 N-INVAS EAR/PLS OXIMETRY MLT: CPT

## 2025-05-31 PROCEDURE — 6360000002 HC RX W HCPCS

## 2025-05-31 PROCEDURE — 2500000003 HC RX 250 WO HCPCS: Performed by: INTERNAL MEDICINE

## 2025-05-31 PROCEDURE — 94669 MECHANICAL CHEST WALL OSCILL: CPT

## 2025-05-31 PROCEDURE — 80048 BASIC METABOLIC PNL TOTAL CA: CPT

## 2025-05-31 PROCEDURE — 99233 SBSQ HOSP IP/OBS HIGH 50: CPT | Performed by: STUDENT IN AN ORGANIZED HEALTH CARE EDUCATION/TRAINING PROGRAM

## 2025-05-31 PROCEDURE — 2580000003 HC RX 258: Performed by: STUDENT IN AN ORGANIZED HEALTH CARE EDUCATION/TRAINING PROGRAM

## 2025-05-31 PROCEDURE — 85027 COMPLETE CBC AUTOMATED: CPT

## 2025-05-31 PROCEDURE — 6360000002 HC RX W HCPCS: Performed by: STUDENT IN AN ORGANIZED HEALTH CARE EDUCATION/TRAINING PROGRAM

## 2025-05-31 PROCEDURE — 82948 REAGENT STRIP/BLOOD GLUCOSE: CPT

## 2025-05-31 RX ORDER — ALBUTEROL SULFATE 90 UG/1
2 INHALANT RESPIRATORY (INHALATION)
Status: DISCONTINUED | OUTPATIENT
Start: 2025-05-31 | End: 2025-06-05

## 2025-05-31 RX ADMIN — CEFEPIME 2000 MG: 2 INJECTION, POWDER, FOR SOLUTION INTRAVENOUS at 10:50

## 2025-05-31 RX ADMIN — GUAIFENESIN 200 MG: 200 SOLUTION ORAL at 08:17

## 2025-05-31 RX ADMIN — TIOTROPIUM BROMIDE INHALATION SPRAY 2 PUFF: 3.12 SPRAY, METERED RESPIRATORY (INHALATION) at 07:59

## 2025-05-31 RX ADMIN — TRAZODONE HYDROCHLORIDE 25 MG: 50 TABLET ORAL at 21:37

## 2025-05-31 RX ADMIN — CEFEPIME 2000 MG: 2 INJECTION, POWDER, FOR SOLUTION INTRAVENOUS at 17:50

## 2025-05-31 RX ADMIN — OXYCODONE HYDROCHLORIDE 15 MG: 15 TABLET ORAL at 06:43

## 2025-05-31 RX ADMIN — OXYCODONE HYDROCHLORIDE 15 MG: 15 TABLET ORAL at 21:37

## 2025-05-31 RX ADMIN — ACETAMINOPHEN 650 MG: 325 TABLET ORAL at 20:16

## 2025-05-31 RX ADMIN — PROMETHAZINE HYDROCHLORIDE 25 MG: 25 TABLET ORAL at 08:17

## 2025-05-31 RX ADMIN — OXYCODONE HYDROCHLORIDE 15 MG: 15 TABLET ORAL at 17:48

## 2025-05-31 RX ADMIN — ENOXAPARIN SODIUM 40 MG: 100 INJECTION SUBCUTANEOUS at 17:49

## 2025-05-31 RX ADMIN — GUAIFENESIN 200 MG: 200 SOLUTION ORAL at 14:11

## 2025-05-31 RX ADMIN — GLYCOPYRROLATE 2 MG: 1 TABLET ORAL at 22:08

## 2025-05-31 RX ADMIN — ONDANSETRON 4 MG: 2 INJECTION, SOLUTION INTRAMUSCULAR; INTRAVENOUS at 06:44

## 2025-05-31 RX ADMIN — OXYCODONE HYDROCHLORIDE 15 MG: 15 TABLET ORAL at 14:10

## 2025-05-31 RX ADMIN — FLUTICASONE PROPIONATE 2 SPRAY: 50 SPRAY, METERED NASAL at 08:17

## 2025-05-31 RX ADMIN — FLUTICASONE PROPIONATE 2 SPRAY: 50 SPRAY, METERED NASAL at 22:08

## 2025-05-31 RX ADMIN — FAMOTIDINE 40 MG: 20 TABLET, FILM COATED ORAL at 08:16

## 2025-05-31 RX ADMIN — CETIRIZINE HYDROCHLORIDE 10 MG: 10 TABLET, FILM COATED ORAL at 08:16

## 2025-05-31 RX ADMIN — OXYCODONE HYDROCHLORIDE 15 MG: 15 TABLET ORAL at 10:50

## 2025-05-31 RX ADMIN — CEFEPIME 2000 MG: 2 INJECTION, POWDER, FOR SOLUTION INTRAVENOUS at 02:14

## 2025-05-31 RX ADMIN — Medication 2 PUFF: at 19:47

## 2025-05-31 RX ADMIN — ALBUTEROL SULFATE 2.5 MG: 2.5 SOLUTION RESPIRATORY (INHALATION) at 14:13

## 2025-05-31 RX ADMIN — BUDESONIDE AND FORMOTEROL FUMARATE DIHYDRATE 2 PUFF: 80; 4.5 AEROSOL RESPIRATORY (INHALATION) at 19:47

## 2025-05-31 RX ADMIN — GUAIFENESIN 200 MG: 200 SOLUTION ORAL at 20:16

## 2025-05-31 ASSESSMENT — PAIN DESCRIPTION - LOCATION
LOCATION: COCCYX
LOCATION: COCCYX;BUTTOCKS

## 2025-05-31 ASSESSMENT — PAIN SCALES - WONG BAKER
WONGBAKER_NUMERICALRESPONSE: HURTS A LITTLE BIT
WONGBAKER_NUMERICALRESPONSE: NO HURT
WONGBAKER_NUMERICALRESPONSE: NO HURT

## 2025-05-31 ASSESSMENT — PAIN DESCRIPTION - DESCRIPTORS
DESCRIPTORS: ACHING;DISCOMFORT
DESCRIPTORS: ACHING
DESCRIPTORS: ACHING;DISCOMFORT
DESCRIPTORS: ACHING

## 2025-05-31 ASSESSMENT — PAIN - FUNCTIONAL ASSESSMENT
PAIN_FUNCTIONAL_ASSESSMENT: ACTIVITIES ARE NOT PREVENTED

## 2025-05-31 ASSESSMENT — PAIN SCALES - GENERAL
PAINLEVEL_OUTOF10: 3
PAINLEVEL_OUTOF10: 8
PAINLEVEL_OUTOF10: 0
PAINLEVEL_OUTOF10: 5
PAINLEVEL_OUTOF10: 8
PAINLEVEL_OUTOF10: 9
PAINLEVEL_OUTOF10: 8

## 2025-05-31 ASSESSMENT — PAIN DESCRIPTION - ORIENTATION
ORIENTATION: MID

## 2025-05-31 ASSESSMENT — PAIN DESCRIPTION - FREQUENCY: FREQUENCY: CONTINUOUS

## 2025-05-31 ASSESSMENT — PAIN DESCRIPTION - PAIN TYPE: TYPE: CHRONIC PAIN

## 2025-05-31 ASSESSMENT — PAIN DESCRIPTION - ONSET: ONSET: GRADUAL

## 2025-05-31 NOTE — PLAN OF CARE
Problem: Chronic Conditions and Co-morbidities  Goal: Patient's chronic conditions and co-morbidity symptoms are monitored and maintained or improved  Outcome: Progressing  Flowsheets (Taken 5/31/2025 0242)  Care Plan - Patient's Chronic Conditions and Co-Morbidity Symptoms are Monitored and Maintained or Improved:   Monitor and assess patient's chronic conditions and comorbid symptoms for stability, deterioration, or improvement   Collaborate with multidisciplinary team to address chronic and comorbid conditions and prevent exacerbation or deterioration   Update acute care plan with appropriate goals if chronic or comorbid symptoms are exacerbated and prevent overall improvement and discharge     Problem: Discharge Planning  Goal: Discharge to home or other facility with appropriate resources  Outcome: Progressing  Flowsheets (Taken 5/31/2025 0242)  Discharge to home or other facility with appropriate resources:   Identify barriers to discharge with patient and caregiver   Arrange for needed discharge resources and transportation as appropriate   Identify discharge learning needs (meds, wound care, etc)     Problem: Safety - Adult  Goal: Free from fall injury  Outcome: Progressing  Flowsheets (Taken 5/31/2025 0242)  Free From Fall Injury:   Instruct family/caregiver on patient safety   Based on caregiver fall risk screen, instruct family/caregiver to ask for assistance with transferring infant if caregiver noted to have fall risk factors     Problem: Skin/Tissue Integrity  Goal: Skin integrity remains intact  Description: 1.  Monitor for areas of redness and/or skin breakdown2.  Assess vascular access sites hourly3.  Every 4-6 hours minimum:  Change oxygen saturation probe site4.  Every 4-6 hours:  If on nasal continuous positive airway pressure, respiratory therapy assess nares and determine need for appliance change or resting period  Outcome: Progressing  Flowsheets (Taken 5/31/2025 0242)  Skin Integrity

## 2025-05-31 NOTE — PROGRESS NOTES
motion.   Skin:     General: Skin is warm.      Coloration: Skin is not jaundiced or pale.      Comments: Decubital ulcer POA   Neurological:      Mental Status: She is alert and oriented to person, place, and time.      Motor: Weakness (Chronic lower extremity weakness) present.   Psychiatric:         Mood and Affect: Mood normal.         Behavior: Behavior normal.         Thought Content: Thought content normal.        All labs reviewed and interpreted by me:  Labs:   Recent Labs     05/29/25  0617 05/30/25  0550 05/31/25  0721   WBC 11.7* 6.8 5.3   HGB 9.7* 9.0* 9.3*   HCT 31.6* 29.3* 30.4*    344 359     Recent Labs     05/29/25  0617 05/30/25  0550 05/31/25  0721   * 135 136   K 4.4 4.2 4.1   CL 97* 101 102   CO2 22 23 23   BUN 25* 22 19   CREATININE 0.8 0.6 0.6   CALCIUM 9.5 9.3 9.2     No results for input(s): \"AST\", \"ALT\", \"BILIDIR\", \"BILITOT\", \"ALKPHOS\" in the last 72 hours.  No results for input(s): \"INR\" in the last 72 hours.  No results for input(s): \"CKTOTAL\", \"TROPONINT\" in the last 72 hours.    Urinalysis:      Lab Results   Component Value Date/Time    NITRU POSITIVE 05/29/2025 11:17 AM    WBCUA > 200 05/29/2025 11:17 AM    BACTERIA FEW 05/29/2025 11:17 AM    RBCUA 3-5 05/29/2025 11:17 AM    BLOODU MODERATE 05/29/2025 11:17 AM    GLUCOSEU NEGATIVE 05/29/2025 11:17 AM       All radiology images and reports reviewed and interpreted by me:  Radiology:  IR GUIDED PERC REPLACE GJ TUBE   Final Result   Status post successful jejunostomy tube exchange.            **This report has been created using voice recognition software.  It may contain   minor errors which are inherent in voice recognition technology.**      Electronically signed by Dr Renaldo Roy      XR INJ CONTRAST GASTRIC TUBE PERC   Final Result   1. Percutaneous feeding tube overlies the lower central abdomen. Contrast    transversus of the percutaneous feeding tube and outlines a long segment    of small bowel. No evidence

## 2025-05-31 NOTE — PLAN OF CARE
Problem: Respiratory - Adult  Goal: Clear lung sounds  5/31/2025 1004 by Torri Cooper RCP  Outcome: Progressing

## 2025-05-31 NOTE — RT PROTOCOL NOTE
RT Inhaler-Nebulizer Bronchodilator Protocol Note    There is a bronchodilator order in the chart from a provider indicating to follow the RT Bronchodilator Protocol and there is an “Initiate RT Inhaler-Nebulizer Bronchodilator Protocol” order as well (see protocol at bottom of note).    CXR Findings:  No results found.    The findings from the last RT Protocol Assessment were as follows:   History Pulmonary Disease: Chronic pulmonary disease  Respiratory Pattern: Regular pattern and RR 12-20 bpm  Breath Sounds: Inspiratory and expiratory or bilateral wheezing and/or rhonchi  Cough: Strong, spontaneous, non-productive  Indication for Bronchodilator Therapy: Decreased or absent breath sounds  Bronchodilator Assessment Score: 8    Aerosolized bronchodilator medication orders have been revised according to the RT Inhaler-Nebulizer Bronchodilator Protocol below.    Respiratory Therapist to perform RT Therapy Protocol Assessment initially then follow the protocol.  Repeat RT Therapy Protocol Assessment PRN for score 0-3 or on second treatment, BID, and PRN for scores above 3.    No Indications - adjust the frequency to every 6 hours PRN wheezing or bronchospasm, if no treatments needed after 48 hours then discontinue using Per Protocol order mode.     If indication present, adjust the RT bronchodilator orders based on the Bronchodilator Assessment Score as indicated below.  Use Inhaler orders unless patient has one or more of the following: on home nebulizer, not able to hold breath for 10 seconds, is not alert and oriented, cannot activate and use MDI correctly, or respiratory rate 25 breaths per minute or more, then use the equivalent nebulizer order(s) with same Frequency and PRN reasons based on the score.  If a patient is on this medication at home then do not decrease Frequency below that used at home.    0-3 - enter or revise RT bronchodilator order(s) to equivalent RT Bronchodilator order with Frequency of every  4 hours PRN for wheezing or increased work of breathing using Per Protocol order mode.        4-6 - enter or revise RT Bronchodilator order(s) to two equivalent RT bronchodilator orders with one order with BID Frequency and one order with Frequency of every 4 hours PRN wheezing or increased work of breathing using Per Protocol order mode.        7-10 - enter or revise RT Bronchodilator order(s) to two equivalent RT bronchodilator orders with one order with TID Frequency and one order with Frequency of every 4 hours PRN wheezing or increased work of breathing using Per Protocol order mode.       11-13 - enter or revise RT Bronchodilator order(s) to one equivalent RT bronchodilator order with QID Frequency and an Albuterol order with Frequency of every 4 hours PRN wheezing or increased work of breathing using Per Protocol order mode.      Greater than 13 - enter or revise RT Bronchodilator order(s) to one equivalent RT bronchodilator order with every 4 hours Frequency and an Albuterol order with Frequency of every 2 hours PRN wheezing or increased work of breathing using Per Protocol order mode.     RT to enter RT Home Evaluation for COPD & MDI Assessment order using Per Protocol order mode.    Electronically signed by Torri Cooper RCP on 5/31/2025 at 8:06 AM

## 2025-05-31 NOTE — PLAN OF CARE
Problem: Respiratory - Adult  Goal: Clear lung sounds  5/31/2025 1951 by Hortencia Landis RCP  Outcome: Progressing     Problem: Respiratory - Adult  Goal: Achieves optimal ventilation and oxygenation  5/31/2025 1951 by Hortencia Landis RCP  Outcome: Progressing  Patient mutually agreed on goals.

## 2025-06-01 LAB
BACTERIA SPEC RESP CULT: ABNORMAL
BACTERIA SPEC RESP CULT: ABNORMAL
GLUCOSE BLD STRIP.AUTO-MCNC: 125 MG/DL (ref 70–108)
GLUCOSE BLD STRIP.AUTO-MCNC: 136 MG/DL (ref 70–108)
GLUCOSE BLD STRIP.AUTO-MCNC: 139 MG/DL (ref 70–108)
GLUCOSE BLD STRIP.AUTO-MCNC: 142 MG/DL (ref 70–108)
GLUCOSE BLD STRIP.AUTO-MCNC: 161 MG/DL (ref 70–108)
GRAM STN SPEC: ABNORMAL
ORGANISM: ABNORMAL

## 2025-06-01 PROCEDURE — 2580000003 HC RX 258: Performed by: STUDENT IN AN ORGANIZED HEALTH CARE EDUCATION/TRAINING PROGRAM

## 2025-06-01 PROCEDURE — 6370000000 HC RX 637 (ALT 250 FOR IP): Performed by: INTERNAL MEDICINE

## 2025-06-01 PROCEDURE — 6360000002 HC RX W HCPCS: Performed by: STUDENT IN AN ORGANIZED HEALTH CARE EDUCATION/TRAINING PROGRAM

## 2025-06-01 PROCEDURE — 6370000000 HC RX 637 (ALT 250 FOR IP): Performed by: STUDENT IN AN ORGANIZED HEALTH CARE EDUCATION/TRAINING PROGRAM

## 2025-06-01 PROCEDURE — 82948 REAGENT STRIP/BLOOD GLUCOSE: CPT

## 2025-06-01 PROCEDURE — 1200000000 HC SEMI PRIVATE

## 2025-06-01 PROCEDURE — 6360000002 HC RX W HCPCS

## 2025-06-01 PROCEDURE — 6360000002 HC RX W HCPCS: Performed by: INTERNAL MEDICINE

## 2025-06-01 PROCEDURE — 2700000000 HC OXYGEN THERAPY PER DAY

## 2025-06-01 PROCEDURE — 2500000003 HC RX 250 WO HCPCS: Performed by: INTERNAL MEDICINE

## 2025-06-01 PROCEDURE — 2500000003 HC RX 250 WO HCPCS: Performed by: STUDENT IN AN ORGANIZED HEALTH CARE EDUCATION/TRAINING PROGRAM

## 2025-06-01 PROCEDURE — 99233 SBSQ HOSP IP/OBS HIGH 50: CPT | Performed by: STUDENT IN AN ORGANIZED HEALTH CARE EDUCATION/TRAINING PROGRAM

## 2025-06-01 PROCEDURE — 94669 MECHANICAL CHEST WALL OSCILL: CPT

## 2025-06-01 PROCEDURE — 94640 AIRWAY INHALATION TREATMENT: CPT

## 2025-06-01 PROCEDURE — 94761 N-INVAS EAR/PLS OXIMETRY MLT: CPT

## 2025-06-01 RX ORDER — GUAIFENESIN 200 MG/10ML
200 LIQUID ORAL EVERY 4 HOURS
Status: DISCONTINUED | OUTPATIENT
Start: 2025-06-01 | End: 2025-06-02

## 2025-06-01 RX ADMIN — Medication 2 PUFF: at 21:18

## 2025-06-01 RX ADMIN — OXYCODONE HYDROCHLORIDE 15 MG: 15 TABLET ORAL at 17:49

## 2025-06-01 RX ADMIN — ONDANSETRON 4 MG: 2 INJECTION, SOLUTION INTRAMUSCULAR; INTRAVENOUS at 01:23

## 2025-06-01 RX ADMIN — GUAIFENESIN 200 MG: 200 SOLUTION ORAL at 08:42

## 2025-06-01 RX ADMIN — BUDESONIDE AND FORMOTEROL FUMARATE DIHYDRATE 2 PUFF: 80; 4.5 AEROSOL RESPIRATORY (INHALATION) at 09:56

## 2025-06-01 RX ADMIN — OXYCODONE HYDROCHLORIDE 15 MG: 15 TABLET ORAL at 01:16

## 2025-06-01 RX ADMIN — PROMETHAZINE HYDROCHLORIDE 25 MG: 25 TABLET ORAL at 13:27

## 2025-06-01 RX ADMIN — BUDESONIDE AND FORMOTEROL FUMARATE DIHYDRATE 2 PUFF: 80; 4.5 AEROSOL RESPIRATORY (INHALATION) at 21:18

## 2025-06-01 RX ADMIN — OXYCODONE HYDROCHLORIDE 15 MG: 15 TABLET ORAL at 13:27

## 2025-06-01 RX ADMIN — GUAIFENESIN 200 MG: 200 SOLUTION ORAL at 01:16

## 2025-06-01 RX ADMIN — Medication 2 PUFF: at 13:19

## 2025-06-01 RX ADMIN — OXYCODONE HYDROCHLORIDE 15 MG: 15 TABLET ORAL at 08:42

## 2025-06-01 RX ADMIN — OXYCODONE HYDROCHLORIDE 15 MG: 15 TABLET ORAL at 21:37

## 2025-06-01 RX ADMIN — CEFEPIME 2000 MG: 2 INJECTION, POWDER, FOR SOLUTION INTRAVENOUS at 11:50

## 2025-06-01 RX ADMIN — CETIRIZINE HYDROCHLORIDE 10 MG: 10 TABLET, FILM COATED ORAL at 08:41

## 2025-06-01 RX ADMIN — CEFEPIME 2000 MG: 2 INJECTION, POWDER, FOR SOLUTION INTRAVENOUS at 02:55

## 2025-06-01 RX ADMIN — TIOTROPIUM BROMIDE INHALATION SPRAY 2 PUFF: 3.12 SPRAY, METERED RESPIRATORY (INHALATION) at 09:56

## 2025-06-01 RX ADMIN — ONDANSETRON 4 MG: 4 TABLET, ORALLY DISINTEGRATING ORAL at 08:42

## 2025-06-01 RX ADMIN — CEFEPIME 2000 MG: 2 INJECTION, POWDER, FOR SOLUTION INTRAVENOUS at 18:07

## 2025-06-01 RX ADMIN — GUAIFENESIN 200 MG: 200 SOLUTION ORAL at 13:28

## 2025-06-01 RX ADMIN — TRAZODONE HYDROCHLORIDE 25 MG: 50 TABLET ORAL at 21:38

## 2025-06-01 RX ADMIN — ENOXAPARIN SODIUM 40 MG: 100 INJECTION SUBCUTANEOUS at 17:49

## 2025-06-01 RX ADMIN — GUAIFENESIN 200 MG: 200 SOLUTION ORAL at 21:37

## 2025-06-01 RX ADMIN — Medication 2 PUFF: at 09:56

## 2025-06-01 RX ADMIN — FLUTICASONE PROPIONATE 2 SPRAY: 50 SPRAY, METERED NASAL at 21:38

## 2025-06-01 RX ADMIN — FAMOTIDINE 40 MG: 20 TABLET, FILM COATED ORAL at 08:41

## 2025-06-01 RX ADMIN — GUAIFENESIN 200 MG: 200 SOLUTION ORAL at 17:49

## 2025-06-01 ASSESSMENT — PAIN SCALES - WONG BAKER
WONGBAKER_NUMERICALRESPONSE: NO HURT

## 2025-06-01 ASSESSMENT — PAIN DESCRIPTION - ORIENTATION
ORIENTATION: MID

## 2025-06-01 ASSESSMENT — PAIN DESCRIPTION - LOCATION
LOCATION: COCCYX
LOCATION: COCCYX;LEG
LOCATION: COCCYX

## 2025-06-01 ASSESSMENT — PAIN DESCRIPTION - ONSET
ONSET: ON-GOING

## 2025-06-01 ASSESSMENT — PAIN DESCRIPTION - FREQUENCY
FREQUENCY: CONTINUOUS

## 2025-06-01 ASSESSMENT — PAIN DESCRIPTION - DESCRIPTORS
DESCRIPTORS: ACHING;DISCOMFORT
DESCRIPTORS: ACHING

## 2025-06-01 ASSESSMENT — PAIN DESCRIPTION - PAIN TYPE
TYPE: CHRONIC PAIN;ACUTE PAIN
TYPE: CHRONIC PAIN

## 2025-06-01 ASSESSMENT — PAIN SCALES - GENERAL
PAINLEVEL_OUTOF10: 9
PAINLEVEL_OUTOF10: 8
PAINLEVEL_OUTOF10: 9
PAINLEVEL_OUTOF10: 8
PAINLEVEL_OUTOF10: 8
PAINLEVEL_OUTOF10: 9
PAINLEVEL_OUTOF10: 7
PAINLEVEL_OUTOF10: 8

## 2025-06-01 ASSESSMENT — PAIN - FUNCTIONAL ASSESSMENT
PAIN_FUNCTIONAL_ASSESSMENT: ACTIVITIES ARE NOT PREVENTED

## 2025-06-01 NOTE — PROGRESS NOTES
PROGRESS NOTE      Patient:  Susan K [facility]urgess Meyers  Unit/Bed:6K-28/028-A  YOB: 1965  MRN: [id number]   Acct: [id number]    PCP: Torri George, APRN - CNP    Date of Admission: 5/17/2025 LOS: 15    Date of Evaluation:  6/1/2025    Anticipated Discharge: Pending placement     Assessment/Plan:       Generalized weakness, chronic BLE weakness -- chronic weakness/debility due to medical conditions as mentioned below + pseudomonal UTI POA, she also has a history of esophageal adenocarcinoma plus thoracic cord compression/discitis.  Patient initially presented to [facility] <24 hrs as she was unable to take care of herself after returning home from SNF in [place] on 5/16.    s/p cefepime 5/17 - 5/23 for her pseudomonal UTI POA(now recurrent see below)   Continue J-tube feeds per recommendations of dietitian.  [Was accidentally dislodged now replaced again and tolerating tube feeds]  Palliative eval was completed for goals of care discussion and continues to remain a full code  continue PT/OT -- rec SNF but preferred SNF not in insurance network-- ?retry placement with SNF in network --?if looking at additional SNF's per SS note but might not be a viable option anymore given on improvement with limited ambulation after 3 months and SNF earlier and potential plan to talk with  5/23 about possible DC home with magdalene lift and HH already in place.  CM continues to work toward dispo planning. Patient not yet medically stable for discharge given sepsis w/u and IV abx as in #2.  Sepsis due to Pseudomonas UTI On AM of 5/29 meets SIRS 3/4 with fever 100.6, tachycardia up to 128 bpm, and leukocytosis on AM labs. Initial unknown source but suspect CAUTI versus nosocomial pneumonia. Patient appears more congested on examination but lung sounds at bases are clear. Chronically debilitated and not using Acapella/IS. Catheter was exchanged on 5/17 and tx appropriately for Psudomonal UTI with cefepime at that time.  any chest pain, shortness of breath or palpitations          PMH, SURGICAL HX, FH, SOCIAL HX reviewed and updated as needed.    Medications:  Reviewed    Infusion Medications    dextrose       Scheduled Medications    albuterol sulfate HFA  2 puff Inhalation TID RT    cefepime  2,000 mg IntraVENous Q8H    glycopyrrolate  2 mg Per G Tube TID    fluticasone  2 spray Each Nostril BID    tiotropium  2 puff Inhalation Daily RT    famotidine  40 mg Per J Tube Daily    budesonide-formoterol  2 puff Inhalation BID RT    traZODone  25 mg Per J Tube Nightly    [Held by provider] metoprolol tartrate  12.5 mg Per J Tube BID    cetirizine  10 mg Per J Tube Daily    enoxaparin  40 mg SubCUTAneous Q24H    insulin lispro  0-8 Units SubCUTAneous Q4H     PRN Meds: diatrizoate meglumine-sodium, acetaminophen, oxyCODONE, albuterol, ondansetron, ondansetron, promethazine, guaiFENesin, glucose, dextrose bolus **OR** dextrose bolus, glucagon (rDNA), dextrose      Intake/Output Summary (Last 24 hours) at 6/1/2025 1055  Last data filed at 6/1/2025 0617  Gross per 24 hour   Intake 1798 ml   Output 750 ml   Net 1048 ml       Exam:  BP (!) 144/76   Pulse 77   Temp 98.6 °F (37 °C) (Oral)   Resp 20   Ht 1.6 m (5' 3\")   Wt 64.7 kg (142 lb 10.2 oz)   SpO2 90%   BMI 25.27 kg/m²     Physical Exam  Constitutional:       General: She is not in acute distress.     Appearance: Normal appearance. She is normal weight. She is ill-appearing (Chronically ill).   HENT:      Head: Normocephalic and atraumatic.   Eyes:      General: No scleral icterus.     Conjunctiva/sclera: Conjunctivae normal.   Cardiovascular:      Rate and Rhythm: Normal rate and regular rhythm.      Heart sounds: Normal heart sounds. No murmur heard.     No friction rub.   Pulmonary:      Effort: Pulmonary effort is normal. No respiratory distress.      Breath sounds: No stridor. No wheezing or rhonchi.   Abdominal:      General: Bowel sounds are normal. There is no distension.

## 2025-06-01 NOTE — RT PROTOCOL NOTE
RT Inhaler-Nebulizer Bronchodilator Protocol Note    There is a bronchodilator order in the chart from a provider indicating to follow the RT Bronchodilator Protocol and there is an “Initiate RT Inhaler-Nebulizer Bronchodilator Protocol” order as well (see protocol at bottom of note).    CXR Findings:  No results found.    The findings from the last RT Protocol Assessment were as follows:   History Pulmonary Disease: Chronic pulmonary disease  Respiratory Pattern: Regular pattern and RR 12-20 bpm  Breath Sounds: Inspiratory and expiratory or bilateral wheezing and/or rhonchi  Cough: Strong, productive  Indication for Bronchodilator Therapy: None  Bronchodilator Assessment Score: 9    Aerosolized bronchodilator medication orders have been revised according to the RT Inhaler-Nebulizer Bronchodilator Protocol below.    Respiratory Therapist to perform RT Therapy Protocol Assessment initially then follow the protocol.  Repeat RT Therapy Protocol Assessment PRN for score 0-3 or on second treatment, BID, and PRN for scores above 3.    No Indications - adjust the frequency to every 6 hours PRN wheezing or bronchospasm, if no treatments needed after 48 hours then discontinue using Per Protocol order mode.     If indication present, adjust the RT bronchodilator orders based on the Bronchodilator Assessment Score as indicated below.  Use Inhaler orders unless patient has one or more of the following: on home nebulizer, not able to hold breath for 10 seconds, is not alert and oriented, cannot activate and use MDI correctly, or respiratory rate 25 breaths per minute or more, then use the equivalent nebulizer order(s) with same Frequency and PRN reasons based on the score.  If a patient is on this medication at home then do not decrease Frequency below that used at home.    0-3 - enter or revise RT bronchodilator order(s) to equivalent RT Bronchodilator order with Frequency of every 4 hours PRN for wheezing or increased work of  breathing using Per Protocol order mode.        4-6 - enter or revise RT Bronchodilator order(s) to two equivalent RT bronchodilator orders with one order with BID Frequency and one order with Frequency of every 4 hours PRN wheezing or increased work of breathing using Per Protocol order mode.        7-10 - enter or revise RT Bronchodilator order(s) to two equivalent RT bronchodilator orders with one order with TID Frequency and one order with Frequency of every 4 hours PRN wheezing or increased work of breathing using Per Protocol order mode.       11-13 - enter or revise RT Bronchodilator order(s) to one equivalent RT bronchodilator order with QID Frequency and an Albuterol order with Frequency of every 4 hours PRN wheezing or increased work of breathing using Per Protocol order mode.      Greater than 13 - enter or revise RT Bronchodilator order(s) to one equivalent RT bronchodilator order with every 4 hours Frequency and an Albuterol order with Frequency of every 2 hours PRN wheezing or increased work of breathing using Per Protocol order mode.     RT to enter RT Home Evaluation for COPD & MDI Assessment order using Per Protocol order mode.    Electronically signed by Torri Cooper RCP on 6/1/2025 at 10:01 AM

## 2025-06-02 PROBLEM — Z51.5 PALLIATIVE CARE PATIENT: Status: ACTIVE | Noted: 2025-06-02

## 2025-06-02 LAB
ANION GAP SERPL CALC-SCNC: 11 MEQ/L (ref 8–16)
BUN SERPL-MCNC: 21 MG/DL (ref 8–23)
CALCIUM SERPL-MCNC: 9.5 MG/DL (ref 8.8–10.2)
CHLORIDE SERPL-SCNC: 100 MEQ/L (ref 98–111)
CO2 SERPL-SCNC: 22 MEQ/L (ref 22–29)
CREAT SERPL-MCNC: 0.6 MG/DL (ref 0.5–0.9)
DEPRECATED RDW RBC AUTO: 53.1 FL (ref 35–45)
ERYTHROCYTE [DISTWIDTH] IN BLOOD BY AUTOMATED COUNT: 17.2 % (ref 11.5–14.5)
GFR SERPL CREATININE-BSD FRML MDRD: > 90 ML/MIN/1.73M2
GLUCOSE BLD STRIP.AUTO-MCNC: 114 MG/DL (ref 70–108)
GLUCOSE BLD STRIP.AUTO-MCNC: 130 MG/DL (ref 70–108)
GLUCOSE BLD STRIP.AUTO-MCNC: 131 MG/DL (ref 70–108)
GLUCOSE BLD STRIP.AUTO-MCNC: 136 MG/DL (ref 70–108)
GLUCOSE BLD STRIP.AUTO-MCNC: 145 MG/DL (ref 70–108)
GLUCOSE BLD STRIP.AUTO-MCNC: 161 MG/DL (ref 70–108)
GLUCOSE SERPL-MCNC: 145 MG/DL (ref 74–109)
HCT VFR BLD AUTO: 29.6 % (ref 37–47)
HGB BLD-MCNC: 9 GM/DL (ref 12–16)
MCH RBC QN AUTO: 26 PG (ref 26–33)
MCHC RBC AUTO-ENTMCNC: 30.4 GM/DL (ref 32.2–35.5)
MCV RBC AUTO: 85.5 FL (ref 81–99)
PLATELET # BLD AUTO: 353 THOU/MM3 (ref 130–400)
PMV BLD AUTO: 8.7 FL (ref 9.4–12.4)
POTASSIUM SERPL-SCNC: 4.6 MEQ/L (ref 3.5–5.2)
RBC # BLD AUTO: 3.46 MILL/MM3 (ref 4.2–5.4)
SODIUM SERPL-SCNC: 133 MEQ/L (ref 135–145)
WBC # BLD AUTO: 7.5 THOU/MM3 (ref 4.8–10.8)

## 2025-06-02 PROCEDURE — 6370000000 HC RX 637 (ALT 250 FOR IP): Performed by: STUDENT IN AN ORGANIZED HEALTH CARE EDUCATION/TRAINING PROGRAM

## 2025-06-02 PROCEDURE — 99233 SBSQ HOSP IP/OBS HIGH 50: CPT | Performed by: STUDENT IN AN ORGANIZED HEALTH CARE EDUCATION/TRAINING PROGRAM

## 2025-06-02 PROCEDURE — 94640 AIRWAY INHALATION TREATMENT: CPT

## 2025-06-02 PROCEDURE — 6360000002 HC RX W HCPCS: Performed by: STUDENT IN AN ORGANIZED HEALTH CARE EDUCATION/TRAINING PROGRAM

## 2025-06-02 PROCEDURE — 94669 MECHANICAL CHEST WALL OSCILL: CPT

## 2025-06-02 PROCEDURE — 99223 1ST HOSP IP/OBS HIGH 75: CPT

## 2025-06-02 PROCEDURE — 2500000003 HC RX 250 WO HCPCS: Performed by: STUDENT IN AN ORGANIZED HEALTH CARE EDUCATION/TRAINING PROGRAM

## 2025-06-02 PROCEDURE — 6370000000 HC RX 637 (ALT 250 FOR IP): Performed by: INTERNAL MEDICINE

## 2025-06-02 PROCEDURE — 1200000000 HC SEMI PRIVATE

## 2025-06-02 PROCEDURE — 85027 COMPLETE CBC AUTOMATED: CPT

## 2025-06-02 PROCEDURE — 6360000002 HC RX W HCPCS

## 2025-06-02 PROCEDURE — 80048 BASIC METABOLIC PNL TOTAL CA: CPT

## 2025-06-02 PROCEDURE — 6360000002 HC RX W HCPCS: Performed by: INTERNAL MEDICINE

## 2025-06-02 PROCEDURE — 82948 REAGENT STRIP/BLOOD GLUCOSE: CPT

## 2025-06-02 PROCEDURE — 6370000000 HC RX 637 (ALT 250 FOR IP)

## 2025-06-02 PROCEDURE — 2580000003 HC RX 258: Performed by: STUDENT IN AN ORGANIZED HEALTH CARE EDUCATION/TRAINING PROGRAM

## 2025-06-02 PROCEDURE — 36415 COLL VENOUS BLD VENIPUNCTURE: CPT

## 2025-06-02 RX ORDER — METHADONE HYDROCHLORIDE 5 MG/5ML
2.5 SOLUTION ORAL EVERY 8 HOURS SCHEDULED
Refills: 0 | Status: DISCONTINUED | OUTPATIENT
Start: 2025-06-02 | End: 2025-06-05 | Stop reason: HOSPADM

## 2025-06-02 RX ORDER — GUAIFENESIN/DEXTROMETHORPHAN 100-10MG/5
5 SYRUP ORAL EVERY 4 HOURS PRN
Status: DISCONTINUED | OUTPATIENT
Start: 2025-06-02 | End: 2025-06-05 | Stop reason: HOSPADM

## 2025-06-02 RX ORDER — BENZONATATE 100 MG/1
100 CAPSULE ORAL 3 TIMES DAILY PRN
Status: DISCONTINUED | OUTPATIENT
Start: 2025-06-02 | End: 2025-06-02

## 2025-06-02 RX ADMIN — TIOTROPIUM BROMIDE INHALATION SPRAY 2 PUFF: 3.12 SPRAY, METERED RESPIRATORY (INHALATION) at 08:12

## 2025-06-02 RX ADMIN — PROMETHAZINE HYDROCHLORIDE 25 MG: 25 TABLET ORAL at 05:29

## 2025-06-02 RX ADMIN — CEFEPIME 2000 MG: 2 INJECTION, POWDER, FOR SOLUTION INTRAVENOUS at 17:56

## 2025-06-02 RX ADMIN — OXYCODONE HYDROCHLORIDE 15 MG: 15 TABLET ORAL at 17:53

## 2025-06-02 RX ADMIN — ENOXAPARIN SODIUM 40 MG: 100 INJECTION SUBCUTANEOUS at 17:53

## 2025-06-02 RX ADMIN — OXYCODONE HYDROCHLORIDE 15 MG: 15 TABLET ORAL at 23:27

## 2025-06-02 RX ADMIN — OXYCODONE HYDROCHLORIDE 15 MG: 15 TABLET ORAL at 10:28

## 2025-06-02 RX ADMIN — BUDESONIDE AND FORMOTEROL FUMARATE DIHYDRATE 2 PUFF: 80; 4.5 AEROSOL RESPIRATORY (INHALATION) at 08:12

## 2025-06-02 RX ADMIN — CEFEPIME 2000 MG: 2 INJECTION, POWDER, FOR SOLUTION INTRAVENOUS at 02:03

## 2025-06-02 RX ADMIN — Medication 2 PUFF: at 20:47

## 2025-06-02 RX ADMIN — GUAIFENESIN 200 MG: 200 SOLUTION ORAL at 01:56

## 2025-06-02 RX ADMIN — GUAIFENESIN 200 MG: 200 SOLUTION ORAL at 10:28

## 2025-06-02 RX ADMIN — GUAIFENESIN 200 MG: 200 SOLUTION ORAL at 14:43

## 2025-06-02 RX ADMIN — CEFEPIME 2000 MG: 2 INJECTION, POWDER, FOR SOLUTION INTRAVENOUS at 10:28

## 2025-06-02 RX ADMIN — GUAIFENESIN 200 MG: 200 SOLUTION ORAL at 05:29

## 2025-06-02 RX ADMIN — METHADONE HYDROCHLORIDE 2.5 MG: 5 SOLUTION ORAL at 21:48

## 2025-06-02 RX ADMIN — ONDANSETRON 4 MG: 4 TABLET, ORALLY DISINTEGRATING ORAL at 17:53

## 2025-06-02 RX ADMIN — ONDANSETRON 4 MG: 2 INJECTION, SOLUTION INTRAMUSCULAR; INTRAVENOUS at 11:44

## 2025-06-02 RX ADMIN — Medication 2 PUFF: at 08:12

## 2025-06-02 RX ADMIN — OXYCODONE HYDROCHLORIDE 15 MG: 15 TABLET ORAL at 01:56

## 2025-06-02 RX ADMIN — OXYCODONE HYDROCHLORIDE 15 MG: 15 TABLET ORAL at 14:43

## 2025-06-02 RX ADMIN — METHADONE HYDROCHLORIDE 2.5 MG: 5 SOLUTION ORAL at 17:53

## 2025-06-02 RX ADMIN — CETIRIZINE HYDROCHLORIDE 10 MG: 10 TABLET, FILM COATED ORAL at 14:43

## 2025-06-02 RX ADMIN — OXYCODONE HYDROCHLORIDE 15 MG: 15 TABLET ORAL at 05:29

## 2025-06-02 RX ADMIN — Medication 1750 MG: at 22:06

## 2025-06-02 RX ADMIN — FAMOTIDINE 40 MG: 20 TABLET, FILM COATED ORAL at 10:28

## 2025-06-02 RX ADMIN — GUAIFENESIN 200 MG: 200 SOLUTION ORAL at 17:53

## 2025-06-02 RX ADMIN — GUAIFENESIN 200 MG: 200 SOLUTION ORAL at 21:17

## 2025-06-02 ASSESSMENT — PAIN - FUNCTIONAL ASSESSMENT
PAIN_FUNCTIONAL_ASSESSMENT: ACTIVITIES ARE NOT PREVENTED
PAIN_FUNCTIONAL_ASSESSMENT: PREVENTS OR INTERFERES SOME ACTIVE ACTIVITIES AND ADLS
PAIN_FUNCTIONAL_ASSESSMENT: ACTIVITIES ARE NOT PREVENTED
PAIN_FUNCTIONAL_ASSESSMENT: PREVENTS OR INTERFERES WITH MANY ACTIVE NOT PASSIVE ACTIVITIES

## 2025-06-02 ASSESSMENT — PAIN DESCRIPTION - DESCRIPTORS
DESCRIPTORS: ACHING
DESCRIPTORS: ACHING
DESCRIPTORS: BURNING
DESCRIPTORS: ACHING
DESCRIPTORS: ACHING
DESCRIPTORS: BURNING
DESCRIPTORS: ACHING

## 2025-06-02 ASSESSMENT — PAIN SCALES - GENERAL
PAINLEVEL_OUTOF10: 8
PAINLEVEL_OUTOF10: 9
PAINLEVEL_OUTOF10: 10
PAINLEVEL_OUTOF10: 8
PAINLEVEL_OUTOF10: 8

## 2025-06-02 ASSESSMENT — PAIN DESCRIPTION - LOCATION
LOCATION: GENERALIZED
LOCATION: COCCYX

## 2025-06-02 ASSESSMENT — PAIN DESCRIPTION - ORIENTATION
ORIENTATION: POSTERIOR
ORIENTATION: RIGHT;LEFT
ORIENTATION: MID
ORIENTATION: POSTERIOR
ORIENTATION: MID
ORIENTATION: POSTERIOR

## 2025-06-02 ASSESSMENT — PAIN SCALES - WONG BAKER: WONGBAKER_NUMERICALRESPONSE: NO HURT

## 2025-06-02 NOTE — CARE COORDINATION
6/2/25, 1:07 PM EDT    DISCHARGE ON GOING EVALUATION    [facility] day: 16  Location: -28/028-A Reason for admit: Generalized weakness [R53.1]     Procedures:   5/30 GJ tube replaced    Imaging since last note: none    Barriers to Discharge: Chest physiotherapy. Mucinex. Nebs. Inhaler. IV cefepime. Lovenox. Pain control. Required prn Zofran.     PCP: Torri George APRN - CNP  Readmission Risk Score: 14.4    Patient Goals/Plan/Treatment Preferences: Await placement. Prior to admission, from home with spouse, [facility] and [facility] for infusions.

## 2025-06-02 NOTE — PROGRESS NOTES
extravasation.      This document has been electronically signed by: Armand Orellana DO on    05/30/2025 06:37 AM      XR CHEST (2 VW)   Final Result   1. Normal heart size. An esophageal stent is present. There has been prior   posterior thoracic surgery.   2. No effusion seen. No infiltrates are seen..            **This report has been created using voice recognition software.  It may contain   minor errors which are inherent in voice recognition technology.**            Electronically signed by Dr. Leonard Oakley      XR CHEST PORTABLE   Final Result      No acute intrathoracic process.               **This report has been created using voice recognition software. It may contain   minor errors which are inherent in voice recognition technology.**            Electronically signed by Dr. Mushtaq Mccarthy      XR CHEST (2 VW)   Final Result   1. Normal heart size. 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 Apparent prior thoracic surgery since prior study.   2. Mild atelectasis/pneumonia inferomedial aspect right lung base.            **This report has been created using voice recognition software.  It may contain   minor errors which are inherent in voice recognition technology.**            Electronically signed by Dr. Leonard Oakley          Diet: Diet NPO  ADULT TUBE FEEDING; Jejunostomy; Other Tube Feeding (specify); [employer] 1.2 Glucose Support; Supplied from home; Continuous; 35; Yes; 10; Q 4 hours; 55; 125; Q 6 hours    Microbiology: yes -urine culture 5/19 grew Pseudomonas aeruginosa sensitive to cefepime  Antibiotics: yes -  cefepime for total duration of 7 days from 5/17-5/24  Start cefepime 5/29 for 7 days  Start vancomycin 6/2 for 7 days     Steroids: no    Telemetry: []Yes / [x]No  Telemetry Review of past 24 hours: Not Applicable    LDA: []CVC / []PICC / []Midline / [x]Abreu / []Drains / []Mediport / [x]PIV / [x]J-tube    Labs (still needed?): [x]Yes / []No  IVF (still needed?):  []Yes / [x]No    Level of care: []Step Down / [x]Med-Surg  Bed Status: [x]Inpatient / []Observation    DVT Prophylaxis: [x] Lovenox / [] Heparin / [] SCDs / [] Already on Systemic Anticoagulation / [] None     PT/OT: [x]Yes / []No    Disposition:    [] Home       [] TCU       [] Rehab       [] Psych       [x] SNF       [] Long Term Care Facility       [x] Other-Pending     Code Status: Full Code      An electronic signature was used to authenticate this note  - Sagrario Isidro DO  on 6/2/2025 at 4:37 PM

## 2025-06-02 NOTE — CARE COORDINATION
6/2/25, 12:05 PM EDT    DISCHARGE PLANNING EVALUATION    SW left message with Christin in admissions to call SW.     2:43 PM update:    CRYSTAL spoke with pt, advised that this SW is not able to secure a nursing home facility.  Discussed that this SW has left several messages for her spouse to call to discuss discharge planning.  CRYSTAL advised that pt is anticipated discharge home Thursday after her completion of IV ATB.      Pt will need new Home Health orders for [facility].  Pts Tube Feed is with [facility].      Pt will require ambulance transport home

## 2025-06-02 NOTE — PROGRESS NOTES
[facility] [place]   Pharmacy Pharmacokinetic Monitoring Service - Vancomycin     Susan K Sturgess Meyers is a 60 y.o. female starting on vancomycin therapy for Nosocomial pneumonia. Pharmacy consulted by Dr. Isidro for monitoring and adjustment.    Target Concentration: Goal AUC/ALEX 400-600 mg*hr/L    Additional Antimicrobials: Cefepime    Pertinent Laboratory Values:   Wt Readings from Last 1 Encounters:   06/02/25 65.5 kg (144 lb 6.4 oz)     Temp Readings from Last 1 Encounters:   06/02/25 98.7 °F (37.1 °C) (Oral)     Estimated Creatinine Clearance: 91 mL/min (based on SCr of 0.6 mg/dL).  Recent Labs     05/31/25  0721 06/02/25  0836   CREATININE 0.6 0.6   BUN 19 21   WBC 5.3 7.5     Pertinent Cultures:  Date Source Results   5/29 Trach tube MRSA   MRSA Nasal Swab:  Negative but culture positive    Plan:  Dosing recommendations based on Bayesian software  Start vancomycin 1750 mg loading dose, followed by 1000 mg IV q12h for anticipated AUC of 471 and trough concentration of 11.8 at steady state  Renal labs as indicated   Vancomycin concentration ordered for tomorrow at 1500  Pharmacy will monitor renal function and adjust therapy as indicated    Thank you for the consult,  Tanna Gonzáles PharmD 6/2/2025 6:11 PM

## 2025-06-02 NOTE — CONSULTS
With better pain control, we will hopefully get her closer to achieving this goal.  She does have a history of esophageal stents, she reports that every time she coughs this causes her to vomit a small amount.  She reports this has become normal for her.  She is currently on Robitussin and chest physiotherapy to help with her cough.  She was on Robinul scheduled at 1 point.  We may discuss a scopolamine patch in the future, however this may thicken her secretions and make them harder to get out which may make her coughing worse.  She reports that she does have occasional anxiety, she does not feel that this is severe enough to need maintenance medication.  She reports that she has had a dose of anxiety medication in the past that worked well.  Per EMR, she has not been on anything for anxiety other than Ativan once last year in March.  We will monitor this for now and may consider a low-dose of anxiety medication in the future.  Palliative Care will continue to follow the patient while they are hospitalized. Patient will continue to follow with palliative care in the palliative care clinic on discharge.         Principal Problem:    Sepsis ([facility])  Active Problems:    General weakness    Chronic hyponatremia    Type 2 diabetes mellitus with insulin therapy ([facility])    History of esophageal cancer    History of ovarian cancer    Generalized weakness    COPD without exacerbation ([facility])    Sacral decubitus ulcer    History of paroxysmal supraventricular tachycardia    Severe malnutrition    Debility    Urinary tract infection associated with indwelling urethral catheter    Bilateral leg weakness    Inability to perform activities of daily living    Atelectasis of right lung    Chronic cough    History of pulmonary embolism    Chronic diastolic CHF (congestive heart failure) ([facility])    Decubitus ulcer of coccyx, stage IV ([facility])    H/O esophagectomy    Thoracic nerve root injury    History of discitis    Gastroesophageal reflux  disease    Pseudomonas urinary tract infection    SIRS (systemic inflammatory response syndrome) (HCC)    Indwelling Abreu catheter present    Jejunostomy tube present (HCC)    Intractable pain    Malfunction of jejunostomy tube (HCC)    Parainfluenza infection    Palliative care patient  Resolved Problems:    * No resolved hospital problems. *       Continue current plan of care  Start methadone 2.5 mg every 8 hours scheduled for uncontrolled pain-last EKG on May 29, 2025 with QTc of 430  Continue Oxycodone IR 15 mg every 3 hours as needed for uncontrolled pain  Continue Zofran IV and PO every 6 hours as needed for nausea and vomiting  Will refer to Palliative Care Clinic on discharge  Will refer to Spiritual Care for health care power of  and/or living will completion  The Palliative Care team will continue to provide support to the patient and their family while they are hospitalized   Further goals of care discussions will depend on the clinical course  Palliative Care will continue to follow the patient while they are hospitalized   Palliative care will follow peripherally and as needed during this hospitalization please feel free to [employer] or call the palliative care team if there are any further issues  Please [employer]erve or call the Palliative Care team with any questions or concerns  PDMP reviewed    CURRENT CODE STATUS:  Full Code No additional code details     Case reviewed and discussed with Dr. Lopez    Parts of this note may have been dictated by use of voice recognition software and electronically transcribed. The note may contain errors not detected in proofreading    Electronically signed by ADDISON Hdez CNP on 6/2/2025 at 4:42 PM           Palliative Care Office: [phone]

## 2025-06-02 NOTE — PLAN OF CARE
Problem: Chronic Conditions and Co-morbidities  Goal: Patient's chronic conditions and co-morbidity symptoms are monitored and maintained or improved  Outcome: Progressing     Problem: Discharge Planning  Goal: Discharge to home or other facility with appropriate resources  Outcome: Progressing     Problem: Safety - Adult  Goal: Free from fall injury  Outcome: Progressing     Problem: Skin/Tissue Integrity  Goal: Skin integrity remains intact  Description: 1.  Monitor for areas of redness and/or skin breakdown2.  Assess vascular access sites hourly3.  Every 4-6 hours minimum:  Change oxygen saturation probe site4.  Every 4-6 hours:  If on nasal continuous positive airway pressure, respiratory therapy assess nares and determine need for appliance change or resting period  Outcome: Progressing     Problem: Nutrition Deficit:  Goal: Optimize nutritional status  Outcome: Progressing     Problem: Respiratory - Adult  Goal: Clear lung sounds  Outcome: Progressing  Goal: Achieves optimal ventilation and oxygenation  Outcome: Progressing     Problem: Gastrointestinal - Adult  Goal: Maintains adequate nutritional intake  Outcome: Progressing     Problem: Metabolic/Fluid and Electrolytes - Adult  Goal: Glucose maintained within prescribed range  Outcome: Progressing     Problem: Genitourinary - Adult  Goal: Urinary catheter remains patent  Outcome: Progressing     Problem: Pain  Goal: Verbalizes/displays adequate comfort level or baseline comfort level  Outcome: Progressing

## 2025-06-03 PROBLEM — J15.212 PNEUMONIA DUE TO METHICILLIN RESISTANT STAPHYLOCOCCUS AUREUS (MRSA) (HCC): Status: ACTIVE | Noted: 2025-06-03

## 2025-06-03 LAB
ANION GAP SERPL CALC-SCNC: 11 MEQ/L (ref 8–16)
BACTERIA BLD AEROBE CULT: NORMAL
BACTERIA BLD AEROBE CULT: NORMAL
BUN SERPL-MCNC: 20 MG/DL (ref 8–23)
CALCIUM SERPL-MCNC: 9.5 MG/DL (ref 8.8–10.2)
CHLORIDE SERPL-SCNC: 101 MEQ/L (ref 98–111)
CO2 SERPL-SCNC: 22 MEQ/L (ref 22–29)
CREAT SERPL-MCNC: 0.6 MG/DL (ref 0.5–0.9)
GFR SERPL CREATININE-BSD FRML MDRD: > 90 ML/MIN/1.73M2
GLUCOSE BLD STRIP.AUTO-MCNC: 124 MG/DL (ref 70–108)
GLUCOSE BLD STRIP.AUTO-MCNC: 146 MG/DL (ref 70–108)
GLUCOSE BLD STRIP.AUTO-MCNC: 149 MG/DL (ref 70–108)
GLUCOSE BLD STRIP.AUTO-MCNC: 175 MG/DL (ref 70–108)
GLUCOSE SERPL-MCNC: 129 MG/DL (ref 74–109)
POTASSIUM SERPL-SCNC: 4.4 MEQ/L (ref 3.5–5.2)
REASON FOR REJECTION: NORMAL
REJECTED TEST: NORMAL
SODIUM SERPL-SCNC: 134 MEQ/L (ref 135–145)
VANCOMYCIN SERPL-MCNC: 23.3 UG/ML (ref 10–39.9)

## 2025-06-03 PROCEDURE — 94640 AIRWAY INHALATION TREATMENT: CPT

## 2025-06-03 PROCEDURE — 6370000000 HC RX 637 (ALT 250 FOR IP): Performed by: STUDENT IN AN ORGANIZED HEALTH CARE EDUCATION/TRAINING PROGRAM

## 2025-06-03 PROCEDURE — 6360000002 HC RX W HCPCS: Performed by: STUDENT IN AN ORGANIZED HEALTH CARE EDUCATION/TRAINING PROGRAM

## 2025-06-03 PROCEDURE — 94669 MECHANICAL CHEST WALL OSCILL: CPT

## 2025-06-03 PROCEDURE — 1200000000 HC SEMI PRIVATE

## 2025-06-03 PROCEDURE — 6360000002 HC RX W HCPCS: Performed by: INTERNAL MEDICINE

## 2025-06-03 PROCEDURE — 2580000003 HC RX 258

## 2025-06-03 PROCEDURE — 36415 COLL VENOUS BLD VENIPUNCTURE: CPT

## 2025-06-03 PROCEDURE — 6370000000 HC RX 637 (ALT 250 FOR IP)

## 2025-06-03 PROCEDURE — 6360000002 HC RX W HCPCS

## 2025-06-03 PROCEDURE — 80202 ASSAY OF VANCOMYCIN: CPT

## 2025-06-03 PROCEDURE — 82948 REAGENT STRIP/BLOOD GLUCOSE: CPT

## 2025-06-03 PROCEDURE — 6370000000 HC RX 637 (ALT 250 FOR IP): Performed by: INTERNAL MEDICINE

## 2025-06-03 PROCEDURE — 2580000003 HC RX 258: Performed by: STUDENT IN AN ORGANIZED HEALTH CARE EDUCATION/TRAINING PROGRAM

## 2025-06-03 PROCEDURE — 97530 THERAPEUTIC ACTIVITIES: CPT

## 2025-06-03 PROCEDURE — 99233 SBSQ HOSP IP/OBS HIGH 50: CPT | Performed by: STUDENT IN AN ORGANIZED HEALTH CARE EDUCATION/TRAINING PROGRAM

## 2025-06-03 PROCEDURE — 94761 N-INVAS EAR/PLS OXIMETRY MLT: CPT

## 2025-06-03 PROCEDURE — 99233 SBSQ HOSP IP/OBS HIGH 50: CPT

## 2025-06-03 PROCEDURE — 97110 THERAPEUTIC EXERCISES: CPT

## 2025-06-03 PROCEDURE — 80048 BASIC METABOLIC PNL TOTAL CA: CPT

## 2025-06-03 PROCEDURE — 97535 SELF CARE MNGMENT TRAINING: CPT

## 2025-06-03 RX ORDER — DIPHENHYDRAMINE HYDROCHLORIDE 50 MG/ML
25 INJECTION, SOLUTION INTRAMUSCULAR; INTRAVENOUS ONCE
Status: DISCONTINUED | OUTPATIENT
Start: 2025-06-03 | End: 2025-06-05 | Stop reason: HOSPADM

## 2025-06-03 RX ORDER — BUDESONIDE 0.5 MG/2ML
0.5 INHALANT ORAL
Status: DISCONTINUED | OUTPATIENT
Start: 2025-06-03 | End: 2025-06-05 | Stop reason: HOSPADM

## 2025-06-03 RX ADMIN — GUAIFENESIN SYRUP AND DEXTROMETHORPHAN 5 ML: 100; 10 SYRUP ORAL at 00:42

## 2025-06-03 RX ADMIN — CETIRIZINE HYDROCHLORIDE 10 MG: 10 TABLET, FILM COATED ORAL at 09:59

## 2025-06-03 RX ADMIN — ALBUTEROL SULFATE 2.5 MG: 2.5 SOLUTION RESPIRATORY (INHALATION) at 20:46

## 2025-06-03 RX ADMIN — FLUTICASONE PROPIONATE 2 SPRAY: 50 SPRAY, METERED NASAL at 09:58

## 2025-06-03 RX ADMIN — ENOXAPARIN SODIUM 40 MG: 100 INJECTION SUBCUTANEOUS at 17:23

## 2025-06-03 RX ADMIN — OXYCODONE HYDROCHLORIDE 15 MG: 15 TABLET ORAL at 11:12

## 2025-06-03 RX ADMIN — CEFEPIME 2000 MG: 2 INJECTION, POWDER, FOR SOLUTION INTRAVENOUS at 02:02

## 2025-06-03 RX ADMIN — FAMOTIDINE 40 MG: 20 TABLET, FILM COATED ORAL at 09:59

## 2025-06-03 RX ADMIN — OXYCODONE HYDROCHLORIDE 15 MG: 15 TABLET ORAL at 17:23

## 2025-06-03 RX ADMIN — TRAZODONE HYDROCHLORIDE 25 MG: 50 TABLET ORAL at 22:02

## 2025-06-03 RX ADMIN — METHADONE HYDROCHLORIDE 2.5 MG: 5 SOLUTION ORAL at 04:48

## 2025-06-03 RX ADMIN — GUAIFENESIN SYRUP AND DEXTROMETHORPHAN 5 ML: 100; 10 SYRUP ORAL at 04:48

## 2025-06-03 RX ADMIN — Medication 2 PUFF: at 13:59

## 2025-06-03 RX ADMIN — CEFEPIME 2000 MG: 2 INJECTION, POWDER, FOR SOLUTION INTRAVENOUS at 18:53

## 2025-06-03 RX ADMIN — OXYCODONE HYDROCHLORIDE 15 MG: 15 TABLET ORAL at 05:50

## 2025-06-03 RX ADMIN — VANCOMYCIN HYDROCHLORIDE 1000 MG: 1 INJECTION, POWDER, LYOPHILIZED, FOR SOLUTION INTRAVENOUS at 09:55

## 2025-06-03 RX ADMIN — BUDESONIDE 500 MCG: 0.5 INHALANT RESPIRATORY (INHALATION) at 20:51

## 2025-06-03 RX ADMIN — METHADONE HYDROCHLORIDE 2.5 MG: 5 SOLUTION ORAL at 22:01

## 2025-06-03 RX ADMIN — TIOTROPIUM BROMIDE INHALATION SPRAY 2 PUFF: 3.12 SPRAY, METERED RESPIRATORY (INHALATION) at 07:53

## 2025-06-03 RX ADMIN — CEFEPIME 2000 MG: 2 INJECTION, POWDER, FOR SOLUTION INTRAVENOUS at 11:11

## 2025-06-03 RX ADMIN — GUAIFENESIN SYRUP AND DEXTROMETHORPHAN 5 ML: 100; 10 SYRUP ORAL at 11:12

## 2025-06-03 RX ADMIN — VANCOMYCIN HYDROCHLORIDE 1000 MG: 1 INJECTION, POWDER, LYOPHILIZED, FOR SOLUTION INTRAVENOUS at 23:03

## 2025-06-03 RX ADMIN — Medication 2 PUFF: at 07:53

## 2025-06-03 RX ADMIN — GUAIFENESIN SYRUP AND DEXTROMETHORPHAN 5 ML: 100; 10 SYRUP ORAL at 17:23

## 2025-06-03 RX ADMIN — METHADONE HYDROCHLORIDE 2.5 MG: 5 SOLUTION ORAL at 13:42

## 2025-06-03 ASSESSMENT — PAIN SCALES - GENERAL
PAINLEVEL_OUTOF10: 7
PAINLEVEL_OUTOF10: 9
PAINLEVEL_OUTOF10: 7
PAINLEVEL_OUTOF10: 8

## 2025-06-03 ASSESSMENT — PAIN DESCRIPTION - DESCRIPTORS
DESCRIPTORS: ACHING
DESCRIPTORS: ACHING

## 2025-06-03 ASSESSMENT — PAIN DESCRIPTION - LOCATION
LOCATION: ABDOMEN
LOCATION: COCCYX
LOCATION: BUTTOCKS
LOCATION: ABDOMEN

## 2025-06-03 ASSESSMENT — PAIN DESCRIPTION - ORIENTATION
ORIENTATION: MID
ORIENTATION: MID

## 2025-06-03 ASSESSMENT — PAIN DESCRIPTION - FREQUENCY: FREQUENCY: CONTINUOUS

## 2025-06-03 ASSESSMENT — PAIN - FUNCTIONAL ASSESSMENT: PAIN_FUNCTIONAL_ASSESSMENT: PREVENTS OR INTERFERES SOME ACTIVE ACTIVITIES AND ADLS

## 2025-06-03 ASSESSMENT — PAIN DESCRIPTION - ONSET: ONSET: ON-GOING

## 2025-06-03 ASSESSMENT — PAIN DESCRIPTION - PAIN TYPE: TYPE: CHRONIC PAIN

## 2025-06-03 NOTE — RT PROTOCOL NOTE
RT Inhaler-Nebulizer Bronchodilator Protocol Note    There is a bronchodilator order in the chart from a provider indicating to follow the RT Bronchodilator Protocol and there is an “Initiate RT Inhaler-Nebulizer Bronchodilator Protocol” order as well (see protocol at bottom of note).    CXR Findings:  No results found.    The findings from the last RT Protocol Assessment were as follows:   History Pulmonary Disease: Chronic pulmonary disease  Respiratory Pattern: Regular pattern and RR 12-20 bpm  Breath Sounds: Inspiratory and expiratory or bilateral wheezing and/or rhonchi  Cough: Strong, productive  Indication for Bronchodilator Therapy: Wheezing associated with pulm disorder  Bronchodilator Assessment Score: 9    Aerosolized bronchodilator medication orders have been revised according to the RT Inhaler-Nebulizer Bronchodilator Protocol below.    Respiratory Therapist to perform RT Therapy Protocol Assessment initially then follow the protocol.  Repeat RT Therapy Protocol Assessment PRN for score 0-3 or on second treatment, BID, and PRN for scores above 3.    No Indications - adjust the frequency to every 6 hours PRN wheezing or bronchospasm, if no treatments needed after 48 hours then discontinue using Per Protocol order mode.     If indication present, adjust the RT bronchodilator orders based on the Bronchodilator Assessment Score as indicated below.  Use Inhaler orders unless patient has one or more of the following: on home nebulizer, not able to hold breath for 10 seconds, is not alert and oriented, cannot activate and use MDI correctly, or respiratory rate 25 breaths per minute or more, then use the equivalent nebulizer order(s) with same Frequency and PRN reasons based on the score.  If a patient is on this medication at home then do not decrease Frequency below that used at home.    0-3 - enter or revise RT bronchodilator order(s) to equivalent RT Bronchodilator order with Frequency of every 4 hours PRN  for wheezing or increased work of breathing using Per Protocol order mode.        4-6 - enter or revise RT Bronchodilator order(s) to two equivalent RT bronchodilator orders with one order with BID Frequency and one order with Frequency of every 4 hours PRN wheezing or increased work of breathing using Per Protocol order mode.        7-10 - enter or revise RT Bronchodilator order(s) to two equivalent RT bronchodilator orders with one order with TID Frequency and one order with Frequency of every 4 hours PRN wheezing or increased work of breathing using Per Protocol order mode.       11-13 - enter or revise RT Bronchodilator order(s) to one equivalent RT bronchodilator order with QID Frequency and an Albuterol order with Frequency of every 4 hours PRN wheezing or increased work of breathing using Per Protocol order mode.      Greater than 13 - enter or revise RT Bronchodilator order(s) to one equivalent RT bronchodilator order with every 4 hours Frequency and an Albuterol order with Frequency of every 2 hours PRN wheezing or increased work of breathing using Per Protocol order mode.     RT to enter RT Home Evaluation for COPD & MDI Assessment order using Per Protocol order mode.    Electronically signed by Pancho Loving RCP on 6/3/2025 at 7:54 AM

## 2025-06-03 NOTE — PROGRESS NOTES
Comprehensive Nutrition Assessment    Type and Reason for Visit: Reassess, Nutrition support    Nutrition Recommendations/Plan:   Continue home tube feeding regimen of [employer] 1.2 Glucose Support at 55 ml/hr x 24 hours/day.  Tubefeeding formula brought in by family per patient preference (other formulas cause diarrhea per patient)-supplies through [facility] (formula not on formulary at [facility]).  Pour 440 mL of [employer] 1.2 Glucose Support in tube feeding bag. This feeding system is \"open\" and can only hang for 8 hours at a time. Feeding set must be changed q 8 hours (a total of 3x/day). Plan to use a total of ~5.25 cartons of formula/day.   Continue free water flush of 125 mls every 6 hours if no IVF's or per Provider.    NPO.   Note anticipating likely home at discharge. Tubefeeding supplies through [facility] PTA. Reinforced home tubefeeding regimen and free water flush recommendations with patient, provided handout as well on 5/30.     Malnutrition Assessment:  Malnutrition Status: Severe malnutrition  Context: Chronic Illness       Findings of the 6 clinical characteristics of malnutrition:  Energy Intake:   (pt states she was sometimes not getting entire cycle of TF at [facility])  Weight Loss:  Greater than 10% over 6 months     Body Fat Loss:  Mild body fat loss Orbital, Buccal region, Triceps   Muscle Mass Loss:  Mild muscle mass loss Temples (temporalis), Clavicles (pectoralis & deltoids), Scapula (trapezius)  Fluid Accumulation:  No fluid accumulation     Strength:  Not Performed    Nutrition Assessment:    Pt. severely malnourished AEB criteria listed above.  At risk for further nutritional compromise r/t admit with generalized weakness -  unable to care for pt at home - discharged from SNF x1 day, N/V 2/2 esophageal stent, NPO d/t dysphagia-reliant on enteral feedings PTA for nutrition support, UTI, chronic cough, chronic hyponatremia, J-tube fell out 5/29/25, and underlying medical condition  (PMHx: COPD, T2DM, esophageal cancer - s/p esophagectomy, esophageal fistula, s/p J-Tube placement 2/9/24, ovarian cancer 2011, former smoker, CHF, chronic hyponatremia).      Nutrition Related Findings:    Pt. Report/Treatments/Miscellaneous:   6/3: Patient seen, laying in bed with emesis bag in lap. She reports that she is having nausea related to the phlegm. She is tolerating the tube feeds just fine. She denies any further questions.   5/30: Nursing reports patient's J-tube and shepard \"fell-out\" last night. Shepard placed in J-tube track, IR plans to replace J-tube today and then will resume tubefeedings. Patient started on IV antibiotics. Talked with Care Manager and LSW, likely patient will be discharged to home. Patient seen, reports continued phlegm causes gaggy and then either dry heaves or has to spit phlegm out, not induced by tubefeedings. Reinforced current tubefeeding regimen-patient voiced understanding. Nursing reports improved urine output today with adjustments with water flushes.   GI Status: Last BM 6/1  Wound: Stage IV (chronic pressure injury)   Edema:  trace , per flowsheet   Pertinent Labs:   Lab Results   Component Value Date    LABA1C 6.7 (H) 05/17/2025    LABA1C 9.7 (H) 03/31/2024     Recent Labs     06/02/25  0836   *   K 4.6      GLUCOSE 145*   BUN 21   CREATININE 0.6     Pertinent Medications: lovenox, pepcid, insulin    Current Nutrition Intake & Therapies:    Recent PO intake: NPO  Diet NPO  ADULT TUBE FEEDING; Jejunostomy; Other Tube Feeding (specify); [email or web address] 1.2 Glucose Support; Supplied from home; Continuous; 35; Yes; 10; Q 4 hours; 55; 125; Q 6 hours  Current Tube Feeding (TF) Orders:  Feeding Route: Jejunostomy (s/p Open j tube placement 2024)  Formula: Other Tube Feeding ([employer] 1.2 - Glucose Support)  Schedule: Cyclic  Feeding Regimen: [employer] 1.2 Glucose Support at 55 mL/hr x24 hours/day; Feeding set changed q 8 hours (total of 3x/day)  Additives/Modulars:

## 2025-06-03 NOTE — PROGRESS NOTES
[facility]  [facility] RENAL TELEMETRY 6K  Occupational Therapy  Daily Note    Discharge Recommendations: Home with Home Health OT  Equipment Recommendations: Yes Faye lift, Hospital bed?       Time In: 1630  Time Out: 1710  Timed Code Treatment Minutes: 40 Minutes  Minutes: 40          Date: 6/3/2025  Patient Name: Susan K Sturgess Meyers,   Gender: female      Room: [address] [place] [address]  MRN: [id number]  : 1965  (60 y.o.)  Referring Practitioner: Juan Gautam MD  Diagnosis: Sepsis (HCC)  Additional Pertinent Hx: Per EMR: \"60-year-old female with past medical history of COPD, atrial fibrillation, type 2 diabetes, hyponatremia, anemia, esophageal adenocarcinoma, thoracic cord compression, vertebral osteomyelitis, allergic rhinitis, sacral decubitus ulcer, ovarian cancer, and physical deconditioning who presented to [facility] 1 day after being discharged from SNF in [place] due to not being able to take care of herself at home and due to weakness.  Patient reports weakness in the lower extremities is worse than in the upper extremities and has been ongoing, did not improve with PT OT at SNF, per patient she required additional treatment however was declined.  In the ED CXR noted mild atelectasis versus pneumonia in the inferior medial aspect of the right lung base.  UA noted leukocyte esterases urine culture with preliminary nonfermenting gram-negative bacilli. Patient has a catheter.  She was started on cefepime.  Legionella and strep antigens ordered. Dietitian was consulted for management of J-tube feeds.  Social work was consulted for placement.\"    Restrictions/Precautions:  Restrictions/Precautions: NPO, Fall Risk, General Precautions  Position Activity Restriction  Other Position/Activity Restrictions: J-tube, sacral wound      Social/Functional History:  Lives With: Spouse, Son  Type of Home: House  Home Layout: Two level, Able to Live on Main level with bedroom/bathroom  Home Access: Stairs to enter  demonstrate.    ADDITIONAL ACTIVITIES:  Therapeutic Exercise: Pt completed hip abduction/adduction and heel stretches with knees flexed and straight. Tolerated 10 reps of the ROM and 6 reps each of the slow stretching  Exercises completed to increase her strength, decrease muscle tightness and joint stiffness.  Functional Outcome Measures:    AM-PAC Inpatient Daily Activity Raw Score: 10     Modified Trinity:  Current Functional Status:  Not Applicable    Education:  Learners: Patient  Importance of Increasing Activity and benefit of using light blankets and of pumping her ankles often to help prevent further contracture of her heel chords    ASSESSMENT:     Activity Tolerance:  Patient tolerance of  treatment: Limited by pain  Pt had a pain med following the session.  She had pillows positioned under both buttocks to help float her tailbone      Plan: Times Per Week: 5x  Times Per Day: Once a day  Current Treatment Recommendations: Strengthening, Balance training, Functional mobility training, Endurance training, Wheelchair mobility training, Patient/Caregiver education & training, Equipment evaluation, education, & procurement, Self-Care / ADL, Safety education & training, Pain management    Goals  Short Term Goals  Time Frame for Short Term Goals: Until discharge  Short Term Goal 1: Pt will complete BUE light resistive exercises with min vcs for technique to increase indep and endurance with all self cares and transfers.  Short Term Goal 2: Pt will complete dynamic sitting EOB x 25 minutes with SBA to increase indep and endurance with all self cares.  Short Term Goal 3: Pt will complete functional transfers to/from various surfaces with Mod A +2 to increase indep with toileting.  Short Term Goal 4: Pt will complete UB dressing with Set up to increase indep within home environment.    Following session, patient left in safe position in bed, with alarm, and call light within reach

## 2025-06-03 NOTE — PROGRESS NOTES
PROGRESS NOTE      Patient:  Susan K Sturgess Meyers  Unit/Bed:6K-28/028-A  YOB: 1965  MRN: [id number]   Acct: [id number]    PCP: Torri George, APRN - CNP    Date of Admission: 5/17/2025 LOS: 17    Date of Evaluation:  6/3/2025    Anticipated Discharge: Pending placement     Assessment/Plan:       Generalized weakness, chronic BLE weakness -- chronic weakness/debility due to medical conditions as mentioned below + pseudomonal UTI POA, she also has a history of esophageal adenocarcinoma plus thoracic cord compression/discitis.  Patient initially presented to [facility] <24 hrs as she was unable to take care of herself after returning home from SNF in [place] on 5/16.    s/p cefepime 5/17 - 5/23 for her pseudomonal UTI POA(now recurrent see below)   Continue J-tube feeds per recommendations of dietitian.  [Was accidentally dislodged now replaced again and tolerating tube feeds]  Palliative eval was completed for goals of care discussion and continues to remain a full code  continue PT/OT -- rec SNF but preferred SNF not in insurance network-- ?retry placement with SNF in network --?if looking at additional SNF's per SS note but might not be a viable option anymore given on improvement with limited ambulation after 3 months and SNF earlier and potential plan to talk with  5/23 about possible DC home with magdalene lift and HH already in place.  CM continues to work toward dispo planning. Patient not yet medically stable for discharge given sepsis w/u and IV abx as in #2.  Sepsis due to Pseudomonas UTI. On AM of 5/29 meets SIRS 3/4 with fever 100.6, tachycardia up to 128 bpm, and leukocytosis on AM labs. Initial unknown source but suspect CAUTI versus nosocomial pneumonia. Patient appears more congested on examination but lung sounds at bases are clear. Chronically debilitated and not using Acapella/IS. Catheter was exchanged on 5/17 and tx appropriately for Psudomonal UTI with cefepime at that  aeruginosa sensitive to cefepime  Antibiotics: yes -  cefepime for total duration of 7 days from 5/17-5/24  Start cefepime 5/29 for 7 days  Start vancomycin 6/2 for 7 days     Steroids: no    Telemetry: []Yes / [x]No  Telemetry Review of past 24 hours: Not Applicable    LDA: []CVC / []PICC / []Midline / [x]Abreu / []Drains / []Mediport / [x]PIV / [x]J-tube    Labs (still needed?): [x]Yes / []No  IVF (still needed?): []Yes / [x]No    Level of care: []Step Down / [x]Med-Surg  Bed Status: [x]Inpatient / []Observation    DVT Prophylaxis: [x] Lovenox / [] Heparin / [] SCDs / [] Already on Systemic Anticoagulation / [] None     PT/OT: [x]Yes / []No    Disposition:    [x] Home       [] TCU       [] Rehab       [] Psych       [] SNF       [] Long Term Care Facility       [x] Other-Pending     Code Status: Full Code      An electronic signature was used to authenticate this note  - Sagrario Isidro DO  on 6/3/2025 at 7:53 AM

## 2025-06-03 NOTE — PROGRESS NOTES
Provider Progress Note        Patient:   Susan K Sturgess Meyers  YOB: 1965  Age:  60 y.o.  Room:  [facility]28/028-A  MRN:  [id number]   Acct: [id number]  PCP: Torri George, APRN - CNP    Date of Admission:  5/17/2025  9:09 AM  Date of Service:  6/3/2025    Reason for Consult: Symptom Management    History Obtained From:-  Patient, Electronic Medical Record, and Patient's primary nurse             Subjective   Susan K Sturgess Meyers was seen in their room today for follow up with the palliative care team. There was not family present at the bedside. Patient is complaining of pain, shortness of breath, nausea, and secretions. Patient denies insomnia, fatigue, drowsiness, decreased appetite, vomiting, constipation, diarrhea, depression, anxiety, and agitation. They have methadone 2.5 mg every 8 hours for scheduled symptom relief. From 8 AM yesterday to 8 AM today, they have used oxycodone IR 15 mg 5 times, Zofran 4 mg twice for as needed symptom relief.  Their comfort medications are working well to control their symptoms.  They did get a good night sleep last night. The patient is eating or receiving tube feeds. They have had a bowel movement in the last 24 hours.       Objective   Vitals:-    /72   Pulse 100   Temp 99 °F (37.2 °C) (Oral)   Resp 20   Ht 1.6 m (5' 3\")   Wt 65 kg (143 lb 4.8 oz)   SpO2 92%   BMI 25.38 kg/m²     Physical Exam  Constitutional:       General: She is not in acute distress.     Appearance: She is ill-appearing.   HENT:      Head: Normocephalic and atraumatic.      Right Ear: External ear normal.      Left Ear: External ear normal.      Nose: No rhinorrhea.   Eyes:      General:         Right eye: No discharge.         Left eye: No discharge.   Cardiovascular:      Rate and Rhythm: Tachycardia present.   Pulmonary:      Effort: Tachypnea present. No respiratory distress.   Musculoskeletal:      Cervical back: Neck supple.  or call the Palliative Care team with any questions or concerns  PDMP reviewed    CURRENT CODE STATUS:  Full Code No additional code details     Case reviewed and discussed with Dr. Lopez    Parts of this note may have been dictated by use of voice recognition software and electronically transcribed. The note may contain errors not detected in proofreading    Electronically signed by ADDISON Hdez CNP on 6/3/2025 at 10:34 AM           Palliative Care Office: [phone]

## 2025-06-03 NOTE — RT PROTOCOL NOTE
RT Nebulizer Bronchodilator Protocol Note    There is a bronchodilator order in the chart from a provider indicating to follow the RT Bronchodilator Protocol and there is an “Initiate RT Bronchodilator Protocol” order as well (see protocol at bottom of note).    CXR Findings:  No results found.    The findings from the last RT Protocol Assessment were as follows:  Smoking: Chronic pulmonary disease  Respiratory Pattern: Regular pattern and RR 12-20 bpm  Breath Sounds: Inspiratory and expiratory or bilateral wheezing and/or rhonchi  Cough: Strong, spontaneous, non-productive  Indication for Bronchodilator Therapy: Wheezing associated with pulm disorder  Bronchodilator Assessment Score: 8    Aerosolized bronchodilator medication orders have been revised according to the RT Nebulizer Bronchodilator Protocol below.    Respiratory Therapist to perform RT Therapy Protocol Assessment initially then follow the protocol.  Repeat RT Therapy Protocol Assessment PRN for score 0-3 or on second treatment, BID, and PRN for scores above 3.    No Indications - adjust the frequency to every 6 hours PRN wheezing or bronchospasm, if no treatments needed after 48 hours then discontinue using Per Protocol order mode.     If indication present, adjust the RT bronchodilator orders based on the Bronchodilator Assessment Score as indicated below.  If a patient is on this medication at home then do not decrease Frequency below that used at home.    0-3 - enter or revise RT bronchodilator order(s) to equivalent RT Bronchodilator order with Frequency of every 4 hours PRN for wheezing or increased work of breathing using Per Protocol order mode.       4-6 - enter or revise RT Bronchodilator order(s) to two equivalent RT bronchodilator orders with one order with BID Frequency and one order with Frequency of every 4 hours PRN wheezing or increased work of breathing using Per Protocol order mode.         7-10 - enter or revise RT Bronchodilator

## 2025-06-03 NOTE — PROGRESS NOTES
[facility]   Pharmacy Pharmacokinetic Monitoring Service - Vancomycin    Indication: Nosocomial Pneumonia  Target Concentration: Goal AUC/ALEX 400-600 mg*hr/L  Day of Therapy: 2  Additional Antimicrobials: cefepime    Pertinent Laboratory Values:   Wt Readings from Last 1 Encounters:   06/03/25 65 kg (143 lb 4.8 oz)     Temp Readings from Last 1 Encounters:   06/03/25 98.1 °F (36.7 °C) (Oral)     Estimated Creatinine Clearance: 90 mL/min (based on SCr of 0.6 mg/dL).  Recent Labs     06/02/25  0836   CREATININE 0.6   BUN 21   WBC 7.5     Pertinent Cultures:  Date Source Results   5/29 Trach tube MRSA   MRSA Nasal Swab:  Negative but culture positive    Recent vancomycin administrations                     vancomycin (VANCOCIN) 1,000 mg in sodium chloride 0.9 % 250 mL IVPB ([id number]) (mg) 1,000 mg New Bag 06/03/25 0955    vancomycin (VANCOCIN) 1750 mg in sodium chloride 0.9 % 500 mL IVPB (mg) 1,750 mg New Bag 06/02/25 2206                  Assessment:  Date/Time Current Dose Concentration Timing of Concentration AUC C trough,ss   6/3 @ 1529 1000 mg q12h 23.3 ~ 5 hr 33 min 561 14.3   Note: Serum concentrations collected for AUC dosing may appear elevated if collected in close proximity to the dose administered, this is not necessarily an indication of toxicity    Plan:  Current dosing regimen is therapeutic  Continue current dose of 1000 mg q12h  Repeat vancomycin concentration not ordered at this time  Pharmacy will monitor renal function and adjust therapy as indicated.    Thank you for the consult,  Tanna Gonzáles PharmD 6/3/2025 4:12 PM

## 2025-06-03 NOTE — PROGRESS NOTES
Spiritual Health History and Assessment/Progress Note  [facility]    Spiritual/Emotional Needs, Advance Care Planning,  ,  ,      Name: Susan K Sturgess Meyers MRN: [id number]    Age: 60 y.o.     Sex: female   Language: English   Catholic: Scientologist   Sepsis (HCC)     Date: 6/3/2025            Total Time Calculated: (P) 10 min              Spiritual Assessment continued in [facility] RENAL TELEMETRY 6K        Referral/Consult From: Multi-disciplinary team   Encounter Overview/Reason: Spiritual/Emotional Needs, Advance Care Planning  Service Provided For: Patient    Kelly, Belief, Meaning:   Patient identifies as spiritual  Family/Friends identify as spiritual      Importance and Influence:  Patient has spiritual/personal beliefs that influence decisions regarding their health  Family/Friends have spiritual/personal beliefs that influence decisions regarding the patient's health    Community:  Patient feels well-supported. Support system includes: Spouse/Partner and Children  Family/Friends feel well-supported. Support system includes: Spouse/Partner and Children    Assessment and Plan of Care:   During my encounter with the 60 yr old patient, I gave the patient a copy of the Advance Directive as requested. I assess the pt's spiritual needs. The pt shared that she would like to review the documents before completion.     Patient Interventions include: Facilitated expression of thoughts and feelings  Family/Friends Interventions include: Facilitated expression of thoughts and feelings    Patient Plan of Care: Spiritual Care available upon further referral  Family/Friends Plan of Care: Spiritual Care available upon further referral    Electronically signed by MALINI Vanegas on 6/3/2025 at 1:12 PM

## 2025-06-04 LAB
GLUCOSE BLD STRIP.AUTO-MCNC: 115 MG/DL (ref 70–108)
GLUCOSE BLD STRIP.AUTO-MCNC: 125 MG/DL (ref 70–108)
GLUCOSE BLD STRIP.AUTO-MCNC: 134 MG/DL (ref 70–108)
GLUCOSE BLD STRIP.AUTO-MCNC: 139 MG/DL (ref 70–108)
GLUCOSE BLD STRIP.AUTO-MCNC: 141 MG/DL (ref 70–108)
GLUCOSE BLD STRIP.AUTO-MCNC: 172 MG/DL (ref 70–108)

## 2025-06-04 PROCEDURE — 97530 THERAPEUTIC ACTIVITIES: CPT

## 2025-06-04 PROCEDURE — 6360000002 HC RX W HCPCS: Performed by: STUDENT IN AN ORGANIZED HEALTH CARE EDUCATION/TRAINING PROGRAM

## 2025-06-04 PROCEDURE — 82948 REAGENT STRIP/BLOOD GLUCOSE: CPT

## 2025-06-04 PROCEDURE — 6370000000 HC RX 637 (ALT 250 FOR IP): Performed by: STUDENT IN AN ORGANIZED HEALTH CARE EDUCATION/TRAINING PROGRAM

## 2025-06-04 PROCEDURE — 6360000002 HC RX W HCPCS: Performed by: INTERNAL MEDICINE

## 2025-06-04 PROCEDURE — 6360000002 HC RX W HCPCS

## 2025-06-04 PROCEDURE — 94640 AIRWAY INHALATION TREATMENT: CPT

## 2025-06-04 PROCEDURE — 99232 SBSQ HOSP IP/OBS MODERATE 35: CPT | Performed by: FAMILY MEDICINE

## 2025-06-04 PROCEDURE — 6370000000 HC RX 637 (ALT 250 FOR IP)

## 2025-06-04 PROCEDURE — 2580000003 HC RX 258

## 2025-06-04 PROCEDURE — 99233 SBSQ HOSP IP/OBS HIGH 50: CPT

## 2025-06-04 PROCEDURE — 97110 THERAPEUTIC EXERCISES: CPT

## 2025-06-04 PROCEDURE — 2580000003 HC RX 258: Performed by: STUDENT IN AN ORGANIZED HEALTH CARE EDUCATION/TRAINING PROGRAM

## 2025-06-04 PROCEDURE — 1200000000 HC SEMI PRIVATE

## 2025-06-04 PROCEDURE — 6370000000 HC RX 637 (ALT 250 FOR IP): Performed by: INTERNAL MEDICINE

## 2025-06-04 RX ADMIN — CETIRIZINE HYDROCHLORIDE 10 MG: 10 TABLET, FILM COATED ORAL at 08:55

## 2025-06-04 RX ADMIN — METHADONE HYDROCHLORIDE 2.5 MG: 5 SOLUTION ORAL at 14:22

## 2025-06-04 RX ADMIN — GUAIFENESIN SYRUP AND DEXTROMETHORPHAN 5 ML: 100; 10 SYRUP ORAL at 06:05

## 2025-06-04 RX ADMIN — Medication 2 PUFF: at 22:01

## 2025-06-04 RX ADMIN — FAMOTIDINE 40 MG: 20 TABLET, FILM COATED ORAL at 08:56

## 2025-06-04 RX ADMIN — GUAIFENESIN SYRUP AND DEXTROMETHORPHAN 5 ML: 100; 10 SYRUP ORAL at 21:10

## 2025-06-04 RX ADMIN — OXYCODONE HYDROCHLORIDE 15 MG: 15 TABLET ORAL at 17:44

## 2025-06-04 RX ADMIN — GUAIFENESIN SYRUP AND DEXTROMETHORPHAN 5 ML: 100; 10 SYRUP ORAL at 10:51

## 2025-06-04 RX ADMIN — PROMETHAZINE HYDROCHLORIDE 25 MG: 25 TABLET ORAL at 14:22

## 2025-06-04 RX ADMIN — GUAIFENESIN SYRUP AND DEXTROMETHORPHAN 5 ML: 100; 10 SYRUP ORAL at 00:09

## 2025-06-04 RX ADMIN — CEFEPIME 2000 MG: 2 INJECTION, POWDER, FOR SOLUTION INTRAVENOUS at 18:46

## 2025-06-04 RX ADMIN — FLUTICASONE PROPIONATE 2 SPRAY: 50 SPRAY, METERED NASAL at 21:02

## 2025-06-04 RX ADMIN — GUAIFENESIN SYRUP AND DEXTROMETHORPHAN 5 ML: 100; 10 SYRUP ORAL at 14:22

## 2025-06-04 RX ADMIN — METHADONE HYDROCHLORIDE 2.5 MG: 5 SOLUTION ORAL at 21:02

## 2025-06-04 RX ADMIN — METHADONE HYDROCHLORIDE 2.5 MG: 5 SOLUTION ORAL at 06:05

## 2025-06-04 RX ADMIN — ENOXAPARIN SODIUM 40 MG: 100 INJECTION SUBCUTANEOUS at 17:44

## 2025-06-04 RX ADMIN — CEFEPIME 2000 MG: 2 INJECTION, POWDER, FOR SOLUTION INTRAVENOUS at 02:30

## 2025-06-04 RX ADMIN — VANCOMYCIN HYDROCHLORIDE 1000 MG: 1 INJECTION, POWDER, LYOPHILIZED, FOR SOLUTION INTRAVENOUS at 10:51

## 2025-06-04 RX ADMIN — OXYCODONE HYDROCHLORIDE 15 MG: 15 TABLET ORAL at 08:56

## 2025-06-04 RX ADMIN — ONDANSETRON 4 MG: 2 INJECTION, SOLUTION INTRAMUSCULAR; INTRAVENOUS at 06:13

## 2025-06-04 RX ADMIN — OXYCODONE HYDROCHLORIDE 15 MG: 15 TABLET ORAL at 11:59

## 2025-06-04 RX ADMIN — FLUTICASONE PROPIONATE 2 SPRAY: 50 SPRAY, METERED NASAL at 08:56

## 2025-06-04 RX ADMIN — PROMETHAZINE HYDROCHLORIDE 25 MG: 25 TABLET ORAL at 08:56

## 2025-06-04 RX ADMIN — VANCOMYCIN HYDROCHLORIDE 1000 MG: 1 INJECTION, POWDER, LYOPHILIZED, FOR SOLUTION INTRAVENOUS at 22:49

## 2025-06-04 RX ADMIN — TRAZODONE HYDROCHLORIDE 25 MG: 50 TABLET ORAL at 21:02

## 2025-06-04 RX ADMIN — BUDESONIDE 500 MCG: 0.5 INHALANT RESPIRATORY (INHALATION) at 22:01

## 2025-06-04 RX ADMIN — OXYCODONE HYDROCHLORIDE 15 MG: 15 TABLET ORAL at 00:15

## 2025-06-04 RX ADMIN — CEFEPIME 2000 MG: 2 INJECTION, POWDER, FOR SOLUTION INTRAVENOUS at 11:59

## 2025-06-04 ASSESSMENT — PAIN SCALES - WONG BAKER
WONGBAKER_NUMERICALRESPONSE: NO HURT

## 2025-06-04 ASSESSMENT — PAIN DESCRIPTION - LOCATION
LOCATION: COCCYX
LOCATION: GENERALIZED
LOCATION: COCCYX
LOCATION: GENERALIZED;COCCYX

## 2025-06-04 ASSESSMENT — PAIN SCALES - GENERAL
PAINLEVEL_OUTOF10: 6
PAINLEVEL_OUTOF10: 7
PAINLEVEL_OUTOF10: 8
PAINLEVEL_OUTOF10: 8
PAINLEVEL_OUTOF10: 6
PAINLEVEL_OUTOF10: 0
PAINLEVEL_OUTOF10: 0
PAINLEVEL_OUTOF10: 8
PAINLEVEL_OUTOF10: 6
PAINLEVEL_OUTOF10: 7
PAINLEVEL_OUTOF10: 6

## 2025-06-04 ASSESSMENT — PAIN DESCRIPTION - DESCRIPTORS
DESCRIPTORS: SORE
DESCRIPTORS: SORE
DESCRIPTORS: ACHING

## 2025-06-04 NOTE — RT PROTOCOL NOTE
RT Inhaler-Nebulizer Bronchodilator Protocol Note    There is a bronchodilator order in the chart from a provider indicating to follow the RT Bronchodilator Protocol and there is an “Initiate RT Inhaler-Nebulizer Bronchodilator Protocol” order as well (see protocol at bottom of note).    CXR Findings:  No results found.    The findings from the last RT Protocol Assessment were as follows:   History Pulmonary Disease: Chronic pulmonary disease  Respiratory Pattern: Regular pattern and RR 12-20 bpm  Breath Sounds: Inspiratory and expiratory or bilateral wheezing and/or rhonchi  Cough: Strong, productive  Indication for Bronchodilator Therapy: Decreased or absent breath sounds, On home bronchodilators, Wheezing associated with pulm disorder  Bronchodilator Assessment Score: 9    Aerosolized bronchodilator medication orders have been revised according to the RT Inhaler-Nebulizer Bronchodilator Protocol below.    Respiratory Therapist to perform RT Therapy Protocol Assessment initially then follow the protocol.  Repeat RT Therapy Protocol Assessment PRN for score 0-3 or on second treatment, BID, and PRN for scores above 3.    No Indications - adjust the frequency to every 6 hours PRN wheezing or bronchospasm, if no treatments needed after 48 hours then discontinue using Per Protocol order mode.     If indication present, adjust the RT bronchodilator orders based on the Bronchodilator Assessment Score as indicated below.  Use Inhaler orders unless patient has one or more of the following: on home nebulizer, not able to hold breath for 10 seconds, is not alert and oriented, cannot activate and use MDI correctly, or respiratory rate 25 breaths per minute or more, then use the equivalent nebulizer order(s) with same Frequency and PRN reasons based on the score.  If a patient is on this medication at home then do not decrease Frequency below that used at home.    0-3 - enter or revise RT bronchodilator order(s) to equivalent

## 2025-06-04 NOTE — PLAN OF CARE
Problem: Chronic Conditions and Co-morbidities  Goal: Patient's chronic conditions and co-morbidity symptoms are monitored and maintained or improved  Outcome: Progressing     Problem: Discharge Planning  Goal: Discharge to home or other facility with appropriate resources  Outcome: Progressing     Problem: Safety - Adult  Goal: Free from fall injury  Outcome: Progressing     Problem: Skin/Tissue Integrity  Goal: Skin integrity remains intact  Description: 1.  Monitor for areas of redness and/or skin breakdown2.  Assess vascular access sites hourly3.  Every 4-6 hours minimum:  Change oxygen saturation probe site4.  Every 4-6 hours:  If on nasal continuous positive airway pressure, respiratory therapy assess nares and determine need for appliance change or resting period  Outcome: Progressing     Problem: Nutrition Deficit:  Goal: Optimize nutritional status  6/3/2025 3304 by Alyssa Guy RN  Outcome: Progressing     Problem: Respiratory - Adult  Goal: Clear lung sounds  Outcome: Progressing     Problem: Respiratory - Adult  Goal: Achieves optimal ventilation and oxygenation  Outcome: Progressing  Flowsheets (Taken 6/3/2025 1359 by Pancho Loving RCP)  Achieves optimal ventilation and oxygenation: Assess for changes in respiratory status     Problem: Pain  Goal: Verbalizes/displays adequate comfort level or baseline comfort level  Outcome: Progressing     Problem: Gastrointestinal - Adult  Goal: Maintains adequate nutritional intake  Outcome: Progressing     Problem: Metabolic/Fluid and Electrolytes - Adult  Goal: Glucose maintained within prescribed range  Outcome: Progressing     Problem: Genitourinary - Adult  Goal: Urinary catheter remains patent  Outcome: Progressing

## 2025-06-04 NOTE — CARE COORDINATION
6/4/25, 2:33 PM EDT    DISCHARGE PLANNING EVALUATION    Discussed discharge tomorrow during IDR with attending.  SW was asked to arrange transportation for afternoon after IV ATB.    LACP scheduled for  at 4 pm.  SW met with pt and spouse.  Spouse is agreeable.  Faye lift was delivered yesterday.    CRYSTAL notified Bridget with [facility], they can retrieve orders from [facility].  CM to notify [facility] for tube feed.

## 2025-06-04 NOTE — PROGRESS NOTES
Provider Progress Note        Patient:   Susan K Sturgess Meyers  YOB: 1965  Age:  60 y.o.  Room:  [facility]28/028-A  MRN:  [id number]   Acct: [id number]  PCP: Torri George APRN - CNP    Date of Admission:  5/17/2025  9:09 AM  Date of Service:  6/4/2025    Reason for Consult: Symptom Management    History Obtained From:-  Patient, Electronic Medical Record, and Patient's primary nurse             Subjective   Susan K Sturgess Meyers was seen in their room today for follow up with the palliative care team. There was not family present at the bedside. Patient is complaining of pain, shortness of breath, and secretions. Patient denies insomnia, decreased appetite, nausea, vomiting, constipation, depression, and anxiety. They have methadone 2.5 mg 3 times daily for scheduled symptom relief. From 8 AM yesterday to 8 AM today, they have used oxycodone IR 15 mg 3 times Zofran 4 mg once for as needed symptom relief.  Their comfort medications are working well to control their symptoms.  They did get a good night sleep last night. The patient is eating or receiving tube feeds. They have had a bowel movement in the last 24 hours.       Objective   Vitals:-    /78   Pulse 83   Temp 97.8 °F (36.6 °C) (Oral)   Resp 15   Ht 1.6 m (5' 3\")   Wt 66.3 kg (146 lb 2.6 oz)   SpO2 96%   BMI 25.89 kg/m²     Physical Exam  Constitutional:       General: She is not in acute distress.     Appearance: She is ill-appearing.   HENT:      Head: Normocephalic and atraumatic.      Right Ear: External ear normal.      Left Ear: External ear normal.      Nose: No rhinorrhea.   Eyes:      General:         Right eye: No discharge.         Left eye: No discharge.   Cardiovascular:      Rate and Rhythm: Normal rate.   Pulmonary:      Effort: Pulmonary effort is normal. No respiratory distress.   Musculoskeletal:      Cervical back: Neck supple.   Psychiatric:         Mood and Affect: Affect is  with indwelling urethral catheter    Bilateral leg weakness    Inability to perform activities of daily living    Atelectasis of right lung    Chronic cough    History of pulmonary embolism    Chronic diastolic CHF (congestive heart failure) ([facility])    Decubitus ulcer of coccyx, stage IV ([facility])    H/O esophagectomy    Thoracic nerve root injury    History of discitis    Gastroesophageal reflux disease    Pseudomonas urinary tract infection    SIRS (systemic inflammatory response syndrome) ([facility])    Indwelling Abreu catheter present    Jejunostomy tube present ([facility])    Intractable pain    Malfunction of jejunostomy tube ([facility])    Parainfluenza infection    Palliative care patient    Pneumonia due to methicillin resistant Staphylococcus aureus (MRSA) ([facility])  Resolved Problems:    * No resolved hospital problems. *       Continue current plan of care  Continue methadone 2.5 mg every 8 hours scheduled for uncontrolled pain  Continue Oxycodone IR 15 mg every 3 hours as needed for uncontrolled pain  Continue Zofran IV and PO every 6 hours as needed for nausea and vomiting  Continue Phenergan 25 mg every 6 hours as needed for nausea/vomiting  Will refer to Palliative Care Clinic on discharge  Will refer to Spiritual Care for health care power of  and/or living will completion  The Palliative Care team will continue to provide support to the patient and their family while they are hospitalized   Further goals of care discussions will depend on the clinical course  Palliative Care will continue to follow the patient while they are hospitalized   Palliative care will follow peripherally and as needed during this hospitalization please feel free to [facility]erve or call the palliative care team if there are any further issues  Please PerfectServe or call the Palliative Care team with any questions or concerns  PDMP reviewed    CURRENT CODE STATUS:  Full Code No additional code details     Case reviewed and discussed with

## 2025-06-04 NOTE — PROGRESS NOTES
[facility]  INPATIENT PHYSICAL THERAPY  DAILY NOTE  [facility] RENAL TELEMETRY 6K - 6K-28/028-A      Discharge Recommendations:  planning on returning home w/ 24 hour family assist and home health services, pt may be appropriate for LTAC   Equipment Recommendations: No  Defer to next facility   - magdalene was delivered for home going            Time In: 1415  Time Out: 1455  Timed Code Treatment Minutes: 40 Minutes  Minutes: 40          Date: 2025  Patient Name: Susan K Sturgess Meyers,  Gender:  female        MRN: [id number]  : 1965  (60 y.o.)     Referring Practitioner: Juan Gautam MD  Diagnosis: Sepsis (HCC)  Additional Pertinent Hx: Per EMR: \"60-year-old female with past medical history of COPD, atrial fibrillation, type 2 diabetes, hyponatremia, anemia, esophageal adenocarcinoma, thoracic cord compression, vertebral osteomyelitis, allergic rhinitis, sacral decubitus ulcer, ovarian cancer, and physical deconditioning who presented to [facility] 1 day after being discharged from SNF in [place] due to not being able to take care of herself at home and due to weakness.  Patient reports weakness in the lower extremities is worse than in the upper extremities and has been ongoing, did not improve with PT OT at SNF, per patient she required additional treatment however was declined.  In the ED CXR noted mild atelectasis versus pneumonia in the inferior medial aspect of the right lung base.  UA noted leukocyte esterases urine culture with preliminary nonfermenting gram-negative bacilli. Patient has a catheter.  She was started on cefepime.  Legionella and strep antigens ordered. Dietitian was consulted for management of J-tube feeds.  Social work was consulted for placement.\"     Prior Level of Function:  Lives With: Spouse, Son  Type of Home: House  Home Layout: Two level, Able to Live on Main level with bedroom/bathroom  Home Access: Stairs to enter without rails, Ramped entrance (Spouse bought ramp  Co-Treatment, Equipment evaluation, education, & procurement  General Plan:  (5x GM)    Education:  Learners: Patient  Patient Education: Plan of Care, Home Exercise Program, Bed Mobility, Education Related to Potential Risks and Complications Due to Impairment/Illness/Injury, Education Related to Prevention of Recurrence of Impairment/Illness/Injury    Goals:  Patient Goals : would like to be able to walk a little on her own  Short Term Goals  Time Frame for Short Term Goals: Prior to hospital d/c  Short Term Goal 1: Pt will perform rolling L <> R with Pepito and use of bed railing for repositioning in bed  Short Term Goal 2: Pt will perform supine <> sitting EOB via log roll method with use of bed railing and Pepito to get in and out of bed  Short Term Goal 3: Pt will tolerate sitting EOB 20min or greater for improved posturing, and tolerance to upright positioning  Short Term Goal 4: Pt will demo ability to direct care during mobilization and repositioning, 90% of the time to direct care.  Short Term Goal 5: Pt will perform STS in [facility] with Pepito x 1 to progress transfers  Long Term Goals  Time Frame for Long Term Goals : NA due to short ELOS    Following session, patient left in safe position in bed, with alarm, and call light within reach

## 2025-06-04 NOTE — CARE COORDINATION
6/4/25, 9:01 AM EDT    DISCHARGE ON GOING EVALUATION    Cammy OROSCO Sturgess [facility] day: 18  Location: 6-28/028-A Reason for admit: Generalized weakness [R53.1]     Procedures:   5/30 GJ tube replaced     Imaging since last note: none    Barriers to Discharge: PT/OT,  Mucinex. Nebs. Inhaler. IV cefepime. Lovenox. Pain control. Required prn Zofran. TF via gj tube, Dietician following. Nebs, IV Maxipime, Lovenox, Pepcid, diabetes management, pain and nausea control, IV Vancomycin    PCP: Torri George, ADDISON Villalpando CNP  Readmission Risk Score: 14.3    Patient Goals/Plan/Treatment Preferences: Home with spouse. [facility] for TF, [facility]. SW following. Refer to CRYSTAL note.

## 2025-06-04 NOTE — PROGRESS NOTES
UTI with cefepime at that time. On exam, catheter with significant amount of sediment. Pt reports more pelvic \"pressure\" similar to prior UTI. Sent sepsis w/u with BCx x2, lactic acid, PCT, pneumonia panel, MRSA screen. Exchange shepard catheter and send for UA w/ culture. Obtain CXR.   CXR unchanged and w/o acute process  PCT elevated to 0.25, LA WNL at 0.8  PNA panel +parainfluenza virus, + gram-positive cocci  Resp culture with +MRSA isolate and moderate growth -- added Vancomycin on 6/2/25 -- will transition to doxycyline on discharge (per sensitivities)  BCx collected 5/29 - NGTD  5/29 repeat UCx growing Pseudomonas again, continue cefepime for second course   Again, encourage pulmonary hygiene with patient including IS, Acapella, will have RT use vibratory vest  Continue Cefepime x7 days (5/29-6/5)  Continue Vancomycin x7 days (6/2-6/9)   Pseudomonal UTI (POA) due to chronic indwelling Shepard catheter: Chronic Shepard that was kinked on arrival and was exchanged on 5/17/2025.  Urine culture grew Pseudomonas aeruginosa that was susceptible to cefepime.    S/p tx with cefepime 5/17 - 5/23  Legionella + strep pneumo antigen were negative.    5/29 - Shepard exchanged for sepsis w/u as above   5/30 - Shepard catheter \"fell out\" overnight. Replaced in AM and draining well. Continue shepard care.  Mild atelectasis in the inferior medial aspect of the right lung, chronic cough: Seen on chest x-ray completed on 5/17/2025, which showed normal heart size with aortic endograft that is present in the descending thoracic aorta.  Also shows mild atelectasis/pneumonia inferior medial aspect of right lung base.  Repeat CXR 5/24 with no acute process without infiltrates/fluid  Continues with increased \"phlegm\" - ?GI secretions with stent/fistula thus trial robinul 1 mg per J tube 3 x day started 5/24 and monitor if helps -- reports some improvement, but ?increases cough at night  Stable on RA since admission   Continue pulmonary hygiene      5/30/25 -> See nursing note dated to 5/30 for additional information. J-tube and Abreu catheter found dislodged by nursing staff. Patient unaware of how they might have gotten displaced. Abreu catheter was replaced and IR replaced J-tube    6/2/25 -> receiving IV abx as above. Will need to decide on dispo at time of completion of abx course. CM to call NH for update and  for plan.     6/3/25 -> There is some concern of patient compliance with medical therapy and lack of willingness to participate in care. Tentative plan for discharge home on Thursday. Faye lift has been obtained. Scheduled to finish IV cefepime 6/5 PM. Vancomycin can be transitioned to Doxycycline through J-tube.     Chief Complaint: Unable to care for self at home     Hospital Course:   \"60-year-old female with past medical history of COPD, atrial fibrillation, type 2 diabetes, hyponatremia, anemia, esophageal adenocarcinoma, thoracic cord compression, vertebral osteomyelitis, allergic rhinitis, sacral decubitus ulcer, ovarian cancer, and physical deconditioning who presented to [facility] 1 day after being discharged from SNF in [place] due to not being able to take care of herself at home and due to weakness. Patient reports weakness in the lower extremities is worse than in the upper extremities and has been ongoing, did not improve with PT OT at SNF, per patient she required additional treatment however was declined. In the ED CXR noted mild atelectasis versus pneumonia in the inferior medial aspect of the right lung base. UA noted leukocyte esterases urine culture with preliminary nonfermenting gram-negative bacilli. Patient has a catheter. She was started on cefepime. Legionella and strep antigens ordered. Dietitian was consulted for management of J-tube feeds. Social work was consulted for placement.\"     Subjective/HPI:   Patient seen and evaluated this AM. Reports she continues with her secretions and chronic cough.  Largely

## 2025-06-04 NOTE — PLAN OF CARE
PATIENT IS RETURNING OUR CALL. PHONE NUMBER -[phone]   airway pressure, respiratory therapy assess nares and determine need for appliance change or resting period  Outcome: Progressing  Flowsheets (Taken 6/4/2025 1824)  Skin Integrity Remains Intact:   Monitor for areas of redness and/or skin breakdown   Positioning devices   Turn and reposition as indicated     Problem: Nutrition Deficit:  Goal: Optimize nutritional status  Outcome: Progressing  Flowsheets (Taken 6/4/2025 1824)  Nutrient intake appropriate for improving, restoring, or maintaining nutritional needs:   Assess nutritional status and recommend course of action   Provide specific nutrition education to patient or family as appropriate   Recommend, monitor, and adjust tube feedings and TPN/PPN based on assessed needs     Problem: Respiratory - Adult  Goal: Clear lung sounds  Outcome: Progressing  Goal: Achieves optimal ventilation and oxygenation  Outcome: Progressing  Flowsheets (Taken 6/4/2025 1824)  Achieves optimal ventilation and oxygenation:   Assess for changes in respiratory status   Assess for changes in mentation and behavior   Position to facilitate oxygenation and minimize respiratory effort   Oxygen supplementation based on oxygen saturation or arterial blood gases   Respiratory therapy support as indicated     Problem: Pain  Goal: Verbalizes/displays adequate comfort level or baseline comfort level  Outcome: Progressing  Flowsheets (Taken 6/4/2025 1824)  Verbalizes/displays adequate comfort level or baseline comfort level:   Encourage patient to monitor pain and request assistance   Assess pain using appropriate pain scale   Administer analgesics based on type and severity of pain and evaluate response   Implement non-pharmacological measures as appropriate and evaluate response   Notify Licensed Independent Practitioner if interventions unsuccessful or patient reports new pain     Problem: Gastrointestinal - Adult  Goal: Maintains adequate nutritional intake  Outcome: Progressing  Flowsheets  (Taken 6/4/2025 1824)  Maintains adequate nutritional intake:   Monitor intake and output, weight and lab values   Obtain nutritional consult as needed     Problem: Metabolic/Fluid and Electrolytes - Adult  Goal: Glucose maintained within prescribed range  Outcome: Progressing  Flowsheets (Taken 6/4/2025 1824)  Glucose maintained within prescribed range:   Monitor blood glucose as ordered   Assess for signs and symptoms of hyperglycemia and hypoglycemia   Administer ordered medications to maintain glucose within target range     Problem: Genitourinary - Adult  Goal: Urinary catheter remains patent  Outcome: Progressing  Flowsheets (Taken 6/4/2025 1824)  Urinary catheter remains patent:   Assess patency of urinary catheter   Irrigate catheter per Licensed Independent Practitioner order if indicated and notify Licensed Independent Practitioner if unable to irrigate   Assess need for a larger catheter size or a 3-way catheter for continuous bladder irrigation   Care plan reviewed with patient. Patient verbalizes understanding of plan of care and contributes to goal setting.

## 2025-06-05 VITALS
BODY MASS INDEX: 25.94 KG/M2 | DIASTOLIC BLOOD PRESSURE: 70 MMHG | WEIGHT: 146.39 LBS | RESPIRATION RATE: 20 BRPM | TEMPERATURE: 98.2 F | OXYGEN SATURATION: 90 % | HEART RATE: 94 BPM | SYSTOLIC BLOOD PRESSURE: 104 MMHG | HEIGHT: 63 IN

## 2025-06-05 LAB
ALBUMIN SERPL BCG-MCNC: 2.7 G/DL (ref 3.4–4.9)
ALP SERPL-CCNC: 125 U/L (ref 38–126)
ALT SERPL W/O P-5'-P-CCNC: 7 U/L (ref 10–35)
ANION GAP SERPL CALC-SCNC: 11 MEQ/L (ref 8–16)
ANION GAP SERPL CALC-SCNC: 9 MEQ/L (ref 8–16)
AST SERPL-CCNC: 13 U/L (ref 10–35)
BASOPHILS ABSOLUTE: 0 THOU/MM3 (ref 0–0.1)
BASOPHILS NFR BLD AUTO: 0.7 %
BILIRUB SERPL-MCNC: < 0.2 MG/DL (ref 0.3–1.2)
BUN SERPL-MCNC: 22 MG/DL (ref 8–23)
BUN SERPL-MCNC: 22 MG/DL (ref 8–23)
CALCIUM SERPL-MCNC: 8.9 MG/DL (ref 8.8–10.2)
CALCIUM SERPL-MCNC: 9.8 MG/DL (ref 8.8–10.2)
CHLORIDE SERPL-SCNC: 102 MEQ/L (ref 98–111)
CHLORIDE SERPL-SCNC: 103 MEQ/L (ref 98–111)
CO2 SERPL-SCNC: 19 MEQ/L (ref 22–29)
CO2 SERPL-SCNC: 23 MEQ/L (ref 22–29)
CREAT SERPL-MCNC: 0.7 MG/DL (ref 0.5–0.9)
CREAT SERPL-MCNC: 0.8 MG/DL (ref 0.5–0.9)
DEPRECATED RDW RBC AUTO: 54.3 FL (ref 35–45)
EKG ATRIAL RATE: 92 BPM
EKG P AXIS: 25 DEGREES
EKG P-R INTERVAL: 166 MS
EKG Q-T INTERVAL: 340 MS
EKG QRS DURATION: 70 MS
EKG QTC CALCULATION (BAZETT): 420 MS
EKG R AXIS: -18 DEGREES
EKG T AXIS: 22 DEGREES
EKG VENTRICULAR RATE: 92 BPM
EOSINOPHIL NFR BLD AUTO: 5 %
EOSINOPHILS ABSOLUTE: 0.3 THOU/MM3 (ref 0–0.4)
ERYTHROCYTE [DISTWIDTH] IN BLOOD BY AUTOMATED COUNT: 17.1 % (ref 11.5–14.5)
GFR SERPL CREATININE-BSD FRML MDRD: 84 ML/MIN/1.73M2
GFR SERPL CREATININE-BSD FRML MDRD: > 90 ML/MIN/1.73M2
GLUCOSE BLD STRIP.AUTO-MCNC: 121 MG/DL (ref 70–108)
GLUCOSE BLD STRIP.AUTO-MCNC: 128 MG/DL (ref 70–108)
GLUCOSE BLD STRIP.AUTO-MCNC: 136 MG/DL (ref 70–108)
GLUCOSE BLD STRIP.AUTO-MCNC: 147 MG/DL (ref 70–108)
GLUCOSE SERPL-MCNC: 124 MG/DL (ref 74–109)
GLUCOSE SERPL-MCNC: 129 MG/DL (ref 74–109)
HCT VFR BLD AUTO: 32.8 % (ref 37–47)
HGB BLD-MCNC: 9.7 GM/DL (ref 12–16)
IMM GRANULOCYTES # BLD AUTO: 0.13 THOU/MM3 (ref 0–0.07)
IMM GRANULOCYTES NFR BLD AUTO: 1.9 %
LYMPHOCYTES ABSOLUTE: 0.9 THOU/MM3 (ref 1–4.8)
LYMPHOCYTES NFR BLD AUTO: 12.6 %
MAGNESIUM SERPL-MCNC: 2.1 MG/DL (ref 1.6–2.6)
MCH RBC QN AUTO: 26 PG (ref 26–33)
MCHC RBC AUTO-ENTMCNC: 29.6 GM/DL (ref 32.2–35.5)
MCV RBC AUTO: 87.9 FL (ref 81–99)
MONOCYTES ABSOLUTE: 0.8 THOU/MM3 (ref 0.4–1.3)
MONOCYTES NFR BLD AUTO: 12 %
NEUTROPHILS ABSOLUTE: 4.6 THOU/MM3 (ref 1.8–7.7)
NEUTROPHILS NFR BLD AUTO: 67.8 %
NRBC BLD AUTO-RTO: 0 /100 WBC
PHOSPHATE SERPL-MCNC: 3.1 MG/DL (ref 2.5–4.5)
PLATELET # BLD AUTO: 360 THOU/MM3 (ref 130–400)
PMV BLD AUTO: 8.9 FL (ref 9.4–12.4)
POTASSIUM SERPL-SCNC: 4.3 MEQ/L (ref 3.5–5.2)
POTASSIUM SERPL-SCNC: 4.8 MEQ/L (ref 3.5–5.2)
PROT SERPL-MCNC: 6 G/DL (ref 6.4–8.3)
RBC # BLD AUTO: 3.73 MILL/MM3 (ref 4.2–5.4)
SODIUM SERPL-SCNC: 133 MEQ/L (ref 135–145)
SODIUM SERPL-SCNC: 134 MEQ/L (ref 135–145)
VANCOMYCIN SERPL-MCNC: 26 UG/ML (ref 10–39.9)
WBC # BLD AUTO: 6.8 THOU/MM3 (ref 4.8–10.8)

## 2025-06-05 PROCEDURE — 2580000003 HC RX 258: Performed by: STUDENT IN AN ORGANIZED HEALTH CARE EDUCATION/TRAINING PROGRAM

## 2025-06-05 PROCEDURE — 6360000002 HC RX W HCPCS: Performed by: STUDENT IN AN ORGANIZED HEALTH CARE EDUCATION/TRAINING PROGRAM

## 2025-06-05 PROCEDURE — 93010 ELECTROCARDIOGRAM REPORT: CPT | Performed by: INTERNAL MEDICINE

## 2025-06-05 PROCEDURE — 80202 ASSAY OF VANCOMYCIN: CPT

## 2025-06-05 PROCEDURE — 80053 COMPREHEN METABOLIC PANEL: CPT

## 2025-06-05 PROCEDURE — 94640 AIRWAY INHALATION TREATMENT: CPT

## 2025-06-05 PROCEDURE — 6370000000 HC RX 637 (ALT 250 FOR IP)

## 2025-06-05 PROCEDURE — 83735 ASSAY OF MAGNESIUM: CPT

## 2025-06-05 PROCEDURE — 36415 COLL VENOUS BLD VENIPUNCTURE: CPT

## 2025-06-05 PROCEDURE — 6360000002 HC RX W HCPCS

## 2025-06-05 PROCEDURE — 99233 SBSQ HOSP IP/OBS HIGH 50: CPT

## 2025-06-05 PROCEDURE — 6370000000 HC RX 637 (ALT 250 FOR IP): Performed by: INTERNAL MEDICINE

## 2025-06-05 PROCEDURE — 82948 REAGENT STRIP/BLOOD GLUCOSE: CPT

## 2025-06-05 PROCEDURE — 6370000000 HC RX 637 (ALT 250 FOR IP): Performed by: STUDENT IN AN ORGANIZED HEALTH CARE EDUCATION/TRAINING PROGRAM

## 2025-06-05 PROCEDURE — 85025 COMPLETE CBC W/AUTO DIFF WBC: CPT

## 2025-06-05 PROCEDURE — 99239 HOSP IP/OBS DSCHRG MGMT >30: CPT | Performed by: FAMILY MEDICINE

## 2025-06-05 PROCEDURE — 84100 ASSAY OF PHOSPHORUS: CPT

## 2025-06-05 PROCEDURE — 93005 ELECTROCARDIOGRAM TRACING: CPT

## 2025-06-05 RX ORDER — OXYCODONE HYDROCHLORIDE 15 MG/1
15 TABLET ORAL EVERY 4 HOURS PRN
Refills: 0 | Status: DISCONTINUED | OUTPATIENT
Start: 2025-06-05 | End: 2025-06-05 | Stop reason: HOSPADM

## 2025-06-05 RX ORDER — DOXYCYCLINE 25 MG/5ML
100 POWDER, FOR SUSPENSION ORAL 2 TIMES DAILY
Qty: 160 ML | Refills: 0 | Status: SHIPPED | OUTPATIENT
Start: 2025-06-05 | End: 2025-06-09

## 2025-06-05 RX ORDER — OXYBUTYNIN CHLORIDE 5 MG/1
5 TABLET ORAL 3 TIMES DAILY
Qty: 90 TABLET | Refills: 3 | Status: SHIPPED | OUTPATIENT
Start: 2025-06-05

## 2025-06-05 RX ORDER — FLUTICASONE PROPIONATE 50 MCG
1 SPRAY, SUSPENSION (ML) NASAL DAILY
Qty: 16 G | Refills: 1 | Status: SHIPPED | OUTPATIENT
Start: 2025-06-05

## 2025-06-05 RX ORDER — TRAZODONE HYDROCHLORIDE 50 MG/1
25 TABLET ORAL NIGHTLY
Qty: 30 TABLET | Refills: 0 | Status: SHIPPED | OUTPATIENT
Start: 2025-06-05

## 2025-06-05 RX ORDER — BUDESONIDE 0.5 MG/2ML
0.5 INHALANT ORAL
Qty: 60 EACH | Refills: 3 | Status: SHIPPED | OUTPATIENT
Start: 2025-06-05

## 2025-06-05 RX ORDER — ALBUTEROL SULFATE 90 UG/1
2 INHALANT RESPIRATORY (INHALATION)
Status: DISCONTINUED | OUTPATIENT
Start: 2025-06-05 | End: 2025-06-05 | Stop reason: HOSPADM

## 2025-06-05 RX ORDER — METHADONE HYDROCHLORIDE 5 MG/5ML
2.5 SOLUTION ORAL EVERY 8 HOURS SCHEDULED
Qty: 52.5 ML | Refills: 0 | Status: SHIPPED | OUTPATIENT
Start: 2025-06-05 | End: 2025-06-12

## 2025-06-05 RX ORDER — OXYBUTYNIN CHLORIDE 5 MG/1
5 TABLET ORAL 3 TIMES DAILY
Status: DISCONTINUED | OUTPATIENT
Start: 2025-06-05 | End: 2025-06-05 | Stop reason: HOSPADM

## 2025-06-05 RX ORDER — METHADONE HYDROCHLORIDE 5 MG/5ML
2.5 SOLUTION ORAL EVERY 8 HOURS SCHEDULED
Qty: 52.5 ML | Refills: 0 | Status: SHIPPED | OUTPATIENT
Start: 2025-06-05 | End: 2025-06-05

## 2025-06-05 RX ORDER — OXYCODONE HYDROCHLORIDE 15 MG/1
15 TABLET ORAL EVERY 4 HOURS PRN
Qty: 42 TABLET | Refills: 0 | Status: SHIPPED | OUTPATIENT
Start: 2025-06-05 | End: 2025-06-12

## 2025-06-05 RX ADMIN — GUAIFENESIN SYRUP AND DEXTROMETHORPHAN 5 ML: 100; 10 SYRUP ORAL at 11:59

## 2025-06-05 RX ADMIN — GUAIFENESIN SYRUP AND DEXTROMETHORPHAN 5 ML: 100; 10 SYRUP ORAL at 08:01

## 2025-06-05 RX ADMIN — OXYCODONE HYDROCHLORIDE 15 MG: 15 TABLET ORAL at 08:01

## 2025-06-05 RX ADMIN — Medication 750 MG: at 10:40

## 2025-06-05 RX ADMIN — OXYCODONE HYDROCHLORIDE 15 MG: 15 TABLET ORAL at 00:15

## 2025-06-05 RX ADMIN — CEFEPIME 2000 MG: 2 INJECTION, POWDER, FOR SOLUTION INTRAVENOUS at 11:59

## 2025-06-05 RX ADMIN — OXYCODONE HYDROCHLORIDE 15 MG: 15 TABLET ORAL at 18:24

## 2025-06-05 RX ADMIN — Medication 2 PUFF: at 07:59

## 2025-06-05 RX ADMIN — METHADONE HYDROCHLORIDE 2.5 MG: 5 SOLUTION ORAL at 05:38

## 2025-06-05 RX ADMIN — PROMETHAZINE HYDROCHLORIDE 25 MG: 25 TABLET ORAL at 00:15

## 2025-06-05 RX ADMIN — ONDANSETRON 4 MG: 4 TABLET, ORALLY DISINTEGRATING ORAL at 03:28

## 2025-06-05 RX ADMIN — GUAIFENESIN SYRUP AND DEXTROMETHORPHAN 5 ML: 100; 10 SYRUP ORAL at 18:24

## 2025-06-05 RX ADMIN — CEFEPIME 2000 MG: 2 INJECTION, POWDER, FOR SOLUTION INTRAVENOUS at 01:51

## 2025-06-05 RX ADMIN — ALBUTEROL SULFATE 2 PUFF: 90 AEROSOL, METERED RESPIRATORY (INHALATION) at 15:42

## 2025-06-05 RX ADMIN — GUAIFENESIN SYRUP AND DEXTROMETHORPHAN 5 ML: 100; 10 SYRUP ORAL at 03:28

## 2025-06-05 RX ADMIN — OXYCODONE HYDROCHLORIDE 15 MG: 15 TABLET ORAL at 11:59

## 2025-06-05 RX ADMIN — PROMETHAZINE HYDROCHLORIDE 25 MG: 25 TABLET ORAL at 05:38

## 2025-06-05 RX ADMIN — OXYBUTYNIN CHLORIDE 5 MG: 5 TABLET ORAL at 10:40

## 2025-06-05 RX ADMIN — FAMOTIDINE 40 MG: 20 TABLET, FILM COATED ORAL at 08:01

## 2025-06-05 RX ADMIN — METHADONE HYDROCHLORIDE 2.5 MG: 5 SOLUTION ORAL at 15:06

## 2025-06-05 RX ADMIN — BUDESONIDE 500 MCG: 0.5 INHALANT RESPIRATORY (INHALATION) at 07:59

## 2025-06-05 RX ADMIN — ONDANSETRON 4 MG: 4 TABLET, ORALLY DISINTEGRATING ORAL at 18:24

## 2025-06-05 RX ADMIN — PROMETHAZINE HYDROCHLORIDE 25 MG: 25 TABLET ORAL at 11:59

## 2025-06-05 RX ADMIN — OXYCODONE HYDROCHLORIDE 15 MG: 15 TABLET ORAL at 03:28

## 2025-06-05 RX ADMIN — CETIRIZINE HYDROCHLORIDE 10 MG: 10 TABLET, FILM COATED ORAL at 08:00

## 2025-06-05 ASSESSMENT — PAIN - FUNCTIONAL ASSESSMENT
PAIN_FUNCTIONAL_ASSESSMENT: PREVENTS OR INTERFERES SOME ACTIVE ACTIVITIES AND ADLS
PAIN_FUNCTIONAL_ASSESSMENT: PREVENTS OR INTERFERES WITH MANY ACTIVE NOT PASSIVE ACTIVITIES

## 2025-06-05 ASSESSMENT — PAIN DESCRIPTION - LOCATION
LOCATION: COCCYX
LOCATION: COCCYX;BUTTOCKS
LOCATION: COCCYX
LOCATION: GENERALIZED;COCCYX
LOCATION: COCCYX

## 2025-06-05 ASSESSMENT — PAIN SCALES - WONG BAKER
WONGBAKER_NUMERICALRESPONSE: NO HURT
WONGBAKER_NUMERICALRESPONSE: NO HURT

## 2025-06-05 ASSESSMENT — PAIN SCALES - GENERAL
PAINLEVEL_OUTOF10: 0
PAINLEVEL_OUTOF10: 9
PAINLEVEL_OUTOF10: 8
PAINLEVEL_OUTOF10: 0
PAINLEVEL_OUTOF10: 9
PAINLEVEL_OUTOF10: 6
PAINLEVEL_OUTOF10: 8
PAINLEVEL_OUTOF10: 7
PAINLEVEL_OUTOF10: 0
PAINLEVEL_OUTOF10: 9

## 2025-06-05 ASSESSMENT — PAIN DESCRIPTION - DESCRIPTORS
DESCRIPTORS: SHARP;SORE
DESCRIPTORS: SORE
DESCRIPTORS: ACHING
DESCRIPTORS: SORE
DESCRIPTORS: SORE

## 2025-06-05 ASSESSMENT — PAIN DESCRIPTION - ORIENTATION: ORIENTATION: MID

## 2025-06-05 NOTE — RT PROTOCOL NOTE
RT Inhaler-Nebulizer Bronchodilator Protocol Note    There is a bronchodilator order in the chart from a provider indicating to follow the RT Bronchodilator Protocol and there is an “Initiate RT Inhaler-Nebulizer Bronchodilator Protocol” order as well (see protocol at bottom of note).    CXR Findings:  No results found.    The findings from the last RT Protocol Assessment were as follows:   History Pulmonary Disease: Chronic pulmonary disease  Respiratory Pattern: Regular pattern and RR 12-20 bpm  Breath Sounds: Inspiratory and expiratory or bilateral wheezing and/or rhonchi  Cough: Strong, productive  Indication for Bronchodilator Therapy: Decreased or absent breath sounds  Bronchodilator Assessment Score: 9    Aerosolized bronchodilator medication orders have been revised according to the RT Inhaler-Nebulizer Bronchodilator Protocol below.    Respiratory Therapist to perform RT Therapy Protocol Assessment initially then follow the protocol.  Repeat RT Therapy Protocol Assessment PRN for score 0-3 or on second treatment, BID, and PRN for scores above 3.    No Indications - adjust the frequency to every 6 hours PRN wheezing or bronchospasm, if no treatments needed after 48 hours then discontinue using Per Protocol order mode.     If indication present, adjust the RT bronchodilator orders based on the Bronchodilator Assessment Score as indicated below.  Use Inhaler orders unless patient has one or more of the following: on home nebulizer, not able to hold breath for 10 seconds, is not alert and oriented, cannot activate and use MDI correctly, or respiratory rate 25 breaths per minute or more, then use the equivalent nebulizer order(s) with same Frequency and PRN reasons based on the score.  If a patient is on this medication at home then do not decrease Frequency below that used at home.    0-3 - enter or revise RT bronchodilator order(s) to equivalent RT Bronchodilator order with Frequency of every 4 hours PRN for

## 2025-06-05 NOTE — PROCEDURES
PROCEDURE NOTE  Date: 6/5/2025   Name: Susan K Sturgess Meyers  YOB: 1965    Procedures        EKG was completed and handed to RN

## 2025-06-05 NOTE — PROGRESS NOTES
Discharge teaching and instructions for diagnosis/procedure of sepsis completed with patient using teachback method. AVS reviewed. Prescriptions given to patient. Patient voiced understanding regarding prescriptions, follow up appointments, and care of self at home. Discharged via cart/stretcher to  home with support per  .

## 2025-06-05 NOTE — PLAN OF CARE
Problem: Respiratory - Adult  Goal: Clear lung sounds  6/4/2025 2205 by Yenifer Dailey RCP  Outcome: Progressing    Problem: Respiratory - Adult  Goal: Achieves optimal ventilation and oxygenation  6/4/2025 2205 by Yenifer Dailey RCP  Outcome: Progressing

## 2025-06-05 NOTE — PROGRESS NOTES
[facility]   Pharmacy Pharmacokinetic Monitoring Service - Vancomycin    Indication: HAP  Target Concentration: Goal AUC/ALEX 400-600 mg*hr/L  Day of Therapy: 4  Additional Antimicrobials: Cefepime    Pertinent Laboratory Values:   Wt Readings from Last 1 Encounters:   06/05/25 66.4 kg (146 lb 6.2 oz)     Temp Readings from Last 1 Encounters:   06/05/25 98.2 °F (36.8 °C) (Oral)     Estimated Creatinine Clearance: 68 mL/min (based on SCr of 0.8 mg/dL).  Recent Labs     06/03/25  1907 06/05/25  0654   CREATININE 0.6 0.8   BUN 20 22   WBC  --  6.8     Pertinent Cultures:  Date Source Results   5/29 Trach tube MRSA   MRSA Nasal Swab:  Negative but culture positive    Recent vancomycin administrations                     vancomycin (VANCOCIN) 1,000 mg in sodium chloride 0.9 % 250 mL IVPB ([id number]) ()  Restarted 06/04/25 2313     1,000 mg New Bag  2249     1,000 mg New Bag  1051     1,000 mg New Bag 06/03/25 2303     1,000 mg New Bag  0955    vancomycin (VANCOCIN) 1750 mg in sodium chloride 0.9 % 500 mL IVPB (mg) 1,750 mg New Bag 06/02/25 2206                  Assessment:  Date/Time Current Dose Concentration Timing of Concentration AUC C trough,ss   6/5 @ 0654 1000 mg q12h 26 ~ 8 hr 718 20.3   Note: Serum concentrations collected for AUC dosing may appear elevated if collected in close proximity to the dose administered, this is not necessarily an indication of toxicity    Plan:  Current dosing regimen is supra-therapeutic  Decrease dose to 750 mg q12h for estimated AUC of 544 & trough of 15.4  Repeat vancomycin concentration ordered for tomorrow with AM labs  Pharmacy will monitor renal function and adjust therapy as indicated.    Thank you for the consult,  Tanna Gonzáles PharmD 6/5/2025 8:51 AM

## 2025-06-05 NOTE — PROGRESS NOTES
Provider Progress Note        Patient:   Susan K Sturgess Meyers  YOB: 1965  Age:  60 y.o.  Room:  [facility]28/028-A  MRN:  [id number]   Acct: [id number]  PCP: Torri George APRN - CNP    Date of Admission:  5/17/2025  9:09 AM  Date of Service:  6/5/2025    Reason for Consult: Symptom Management    History Obtained From:-  Patient, Electronic Medical Record, and Patient's primary nurse             Subjective   Susan K Sturgess Meyers was seen in their room today for follow up with the palliative care team. There was not family present at the bedside. Patient is complaining of cough and vomiting. Patient denies pain, shortness of breath, fatigue, drowsiness, decreased appetite, nausea, constipation, diarrhea, depression, and anxiety. They have methadone 2.5 mg three times daily for scheduled symptom relief. From 8 AM yesterday to 8 AM today, they have used zofran 4 mg once, oxycodone IR 15 mg five times  for as needed symptom relief.  Their comfort medications are working well to control their symptoms.  They did not get a good night sleep last night. The patient is eating or receiving tube feeds. They have had a bowel movement in the last 24 hours.       Objective   Vitals:-    /77   Pulse 88   Temp 98.2 °F (36.8 °C) (Oral)   Resp 22   Ht 1.6 m (5' 3\")   Wt 66.4 kg (146 lb 6.2 oz)   SpO2 91%   BMI 25.93 kg/m²     Physical Exam  Constitutional:       General: She is not in acute distress.     Appearance: She is ill-appearing.   HENT:      Head: Normocephalic and atraumatic.      Right Ear: External ear normal.      Left Ear: External ear normal.      Nose: No rhinorrhea.   Eyes:      General:         Right eye: No discharge.         Left eye: No discharge.   Cardiovascular:      Rate and Rhythm: Normal rate.   Pulmonary:      Effort: Pulmonary effort is normal. No respiratory distress.   Musculoskeletal:      Cervical back: Neck supple.   Psychiatric:          failure) ([facility])    Decubitus ulcer of coccyx, stage IV ([facility])    H/O esophagectomy    Thoracic nerve root injury    History of discitis    Gastroesophageal reflux disease    Pseudomonas urinary tract infection    SIRS (systemic inflammatory response syndrome) ([facility])    Indwelling Abreu catheter present    Jejunostomy tube present (HCC)    Intractable pain    Malfunction of jejunostomy tube ([facility])    Parainfluenza infection    Palliative care patient    Pneumonia due to methicillin resistant Staphylococcus aureus (MRSA) ([facility])  Resolved Problems:    * No resolved hospital problems. *       Continue current plan of care  Palliative care prescriptions sent to patient's preferred pharmacy  Continue methadone 2.5 mg every 8 hours scheduled for uncontrolled pain  EKG ordered prior to discharge to reassess qtc since starting the methadone  Change Oxycodone IR 15 mg every 3 hours to every 4 hours as needed for uncontrolled pain  Continue Zofran IV and PO every 6 hours as needed for nausea and vomiting  Continue Phenergan 25 mg every 6 hours as needed for nausea/vomiting  Will refer to Palliative Care Clinic on discharge  Palliative Care will continue to follow the patient while they are hospitalized   Please [employer] or call the Palliative Care team with any questions or concerns  PDMP reviewed    CURRENT CODE STATUS:  Full Code No additional code details     Case reviewed and discussed with Dr. Lopez    Parts of this note may have been dictated by use of voice recognition software and electronically transcribed. The note may contain errors not detected in proofreading    Electronically signed by ADDISON Hdez CNP on 6/5/2025 at 10:14 AM           Palliative Care Office: [phone]

## 2025-06-05 NOTE — DISCHARGE SUMMARY
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  She did undergo bone biopsy on November 2024 which was negative for malignancy but with +infection and did have thoracic fusion please see below.  Follows [facility] as outpatient.  Was recently discharged from SNF => still unable to take care of herself and having lower extremity weakness. Does not qualify for SNF at this time. Encouraged to apply for Medicaid. Home with home health and Faye lift delivered to home.   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  Patient was following infectious disease and Spine surgery at [facility], per chart review patient was finished with course of outpatient antibiotics.  Patient was scheduled for MRI of thoracic spine however missed her appointment and is currently rescheduled for October 2025.  Patient states she needs sedation/intubation for lying flat to prevent aspiration for the MRI.  Allergic rhinitis: Chronic history continue Zyrtec daily per J-tube and flonase added 5/21  GERD: Continue Pepcid 40 mg per J-tube  Insomnia: Continue trazodone 25 mg per J-tube  Stage IV Decubitus ulcer coccyx -- POA -  Cont tx with saline/gauze covered by sacral foam dressing changed daily and prn with offloading measures  Physical deconditioning, inability to perform ADLs: : Due to chronic medical conditions as mentioned above, PT/OT currently following. Dispo as above.   Severe malnutrition -- noted per dietician note, has J-tube with hx of esophogeal cancer and esophagectomy.       Exam:     Vitals:  Vitals:    06/05/25 0700 06/05/25 0800 06/05/25 0831 06/05/25 1108   BP: 107/77   104/70   Pulse: 88   94   Resp: 20  22 21   Temp: 98.2 °F (36.8 °C)   98.2 °F (36.8 °C)  known as: ROXICODONE  Replaced by: oxyCODONE 15 MG immediate release tablet               Where to Get Your Medications        These medications were sent to [facility] - [place], [place] - [address] - P [phone] - F [phone]  [address], [place] [address]      Phone: [phone]   budesonide 0.5 MG/2ML nebulizer suspension  doxycycline Monohydrate 25 MG/5ML Susr suspension  fluticasone 50 MCG/ACT nasal spray  oxyBUTYnin 5 MG tablet  oxyCODONE 15 MG immediate release tablet  tiotropium-olodaterol 2.5-2.5 MCG/ACT Aers  traZODone 50 MG tablet       These medications were sent to [id number] - [facility] [place] - [address] - P [phone] - F [phone] [address] [place] [address]      Phone: [phone]   methadone 5 MG/5ML solution         Time Spent on discharge is more than 45 minutes in the examination, evaluation, counseling and review of medications and discharge plan.        Signed:    Thank you Torri George, APRN - CNP for the opportunity to be involved in this patient's care.    Electronically signed by Sagrario Isidro DO on 6/5/2025 at 11:50 AM

## 2025-06-05 NOTE — PLAN OF CARE
Problem: Respiratory - Adult  Goal: Clear lung sounds  6/5/2025 0805 by Avelina Agosto RCP  Outcome: Progressing     Problem: Respiratory - Adult  Goal: Achieves optimal ventilation and oxygenation  6/5/2025 0805 by Avelina Agosto RCP  Outcome: Progressing

## 2025-06-05 NOTE — PLAN OF CARE
Problem: Chronic Conditions and Co-morbidities  Goal: Patient's chronic conditions and co-morbidity symptoms are monitored and maintained or improved  6/4/2025 2145 by Rosa Meza RN  Outcome: Progressing     Problem: Discharge Planning  Goal: Discharge to home or other facility with appropriate resources  6/4/2025 2145 by Rosa Meza RN  Outcome: Progressing     Problem: Safety - Adult  Goal: Free from fall injury  6/4/2025 2145 by Rosa Meza RN  Outcome: Progressing     Problem: Skin/Tissue Integrity  Goal: Skin integrity remains intact  Description: 1.  Monitor for areas of redness and/or skin breakdown2.  Assess vascular access sites hourly3.  Every 4-6 hours minimum:  Change oxygen saturation probe site4.  Every 4-6 hours:  If on nasal continuous positive airway pressure, respiratory therapy assess nares and determine need for appliance change or resting period  6/4/2025 2145 by Rosa Meza RN  Outcome: Progressing     Problem: Nutrition Deficit:  Goal: Optimize nutritional status  6/4/2025 2145 by Rosa Meza RN  Outcome: Progressing     Problem: Respiratory - Adult  Goal: Clear lung sounds  6/4/2025 2145 by Rosa Meza RN  Outcome: Progressing     Problem: Respiratory - Adult  Goal: Achieves optimal ventilation and oxygenation  6/4/2025 2145 by Rosa Meza RN  Outcome: Progressing     Problem: Pain  Goal: Verbalizes/displays adequate comfort level or baseline comfort level  6/4/2025 2145 by Rosa Meza RN  Outcome: Progressing     Problem: Gastrointestinal - Adult  Goal: Maintains adequate nutritional intake  6/4/2025 2145 by Rosa Meza RN  Outcome: Progressing     Problem: Metabolic/Fluid and Electrolytes - Adult  Goal: Glucose maintained within prescribed range  6/4/2025 2145 by Rosa Meza RN  Outcome: Progressing     Problem: Genitourinary - Adult  Goal: Urinary catheter remains patent  6/4/2025 2145 by Rosa Meza RN  Outcome:  Progressing

## 2025-06-05 NOTE — CARE COORDINATION
6/5/25, 10:06 AM EDT    Patient goals/plan/ treatment preferences discussed by  and .  Patient goals/plan/ treatment preferences reviewed with patient/ family.  Patient/ family verbalize understanding of discharge plan and are in agreement with goal/plan/treatment preferences.  Understanding was demonstrated using the teach back method.  AVS provided by RN at time of discharge, which includes all necessary medical information pertaining to the patients current course of illness, treatment, post-discharge goals of care, and treatment preferences.     Services At/After Discharge: Home Health    CRYSTAL updated Bridget at [facility] regarding discharge today and [facility] managing TF.  Patient will not be discharging with any IV Antibiotics.  SW spoke with Patient and answered discharge questions.

## 2025-06-06 ENCOUNTER — HOSPITAL ENCOUNTER (EMERGENCY)
Age: 60
Discharge: HOME | End: 2025-06-07
Attending: EMERGENCY MEDICINE
Payer: COMMERCIAL

## 2025-06-06 DIAGNOSIS — Z46.6 URINARY CATHETER (FOLEY) CHANGE REQUIRED: Primary | ICD-10-CM

## 2025-06-06 LAB
BACTERIA URNS QL MICRO: ABNORMAL /HPF
BILIRUB UR QL STRIP.AUTO: NEGATIVE
CASTS #/AREA URNS LPF: ABNORMAL /LPF
CASTS 2: ABNORMAL /LPF
CHARACTER UR: ABNORMAL
COLOR, UA: YELLOW
CRYSTALS URNS MICRO: ABNORMAL
EPITHELIAL CELLS, UA: ABNORMAL /HPF
GLUCOSE UR QL STRIP.AUTO: NEGATIVE MG/DL
HGB UR QL STRIP.AUTO: ABNORMAL
KETONES UR QL STRIP.AUTO: NEGATIVE
MISCELLANEOUS 2: ABNORMAL
MISCELLANEOUS LAB TEST RESULT: ABNORMAL
NITRITE UR QL STRIP: NEGATIVE
PH UR STRIP.AUTO: 5.5 [PH] (ref 5–9)
PROT UR STRIP.AUTO-MCNC: 30 MG/DL
RBC URINE: ABNORMAL /HPF
RENAL EPI CELLS #/AREA URNS HPF: ABNORMAL /[HPF]
SP GR UR REFRACT.AUTO: 1.01 (ref 1–1.03)
UROBILINOGEN, URINE: 0.2 EU/DL (ref 0–1)
WBC #/AREA URNS HPF: ABNORMAL /HPF
WBC #/AREA URNS HPF: ABNORMAL /[HPF]
YEAST LIKE FUNGI URNS QL MICRO: ABNORMAL

## 2025-06-06 PROCEDURE — 81001 URINALYSIS AUTO W/SCOPE: CPT

## 2025-06-06 PROCEDURE — 87086 URINE CULTURE/COLONY COUNT: CPT

## 2025-06-06 PROCEDURE — 51702 INSERT TEMP BLADDER CATH: CPT

## 2025-06-06 PROCEDURE — 51798 US URINE CAPACITY MEASURE: CPT

## 2025-06-06 PROCEDURE — 6370000000 HC RX 637 (ALT 250 FOR IP): Performed by: EMERGENCY MEDICINE

## 2025-06-06 PROCEDURE — 99283 EMERGENCY DEPT VISIT LOW MDM: CPT

## 2025-06-06 RX ORDER — GUAIFENESIN/DEXTROMETHORPHAN 100-10MG/5
5 SYRUP ORAL ONCE
Status: COMPLETED | OUTPATIENT
Start: 2025-06-06 | End: 2025-06-06

## 2025-06-06 RX ORDER — ONDANSETRON 4 MG/1
4 TABLET, ORALLY DISINTEGRATING ORAL ONCE
Status: COMPLETED | OUTPATIENT
Start: 2025-06-06 | End: 2025-06-06

## 2025-06-06 RX ORDER — OXYCODONE HYDROCHLORIDE 5 MG/1
15 TABLET ORAL ONCE
Refills: 0 | Status: COMPLETED | OUTPATIENT
Start: 2025-06-06 | End: 2025-06-06

## 2025-06-06 RX ADMIN — OXYCODONE HYDROCHLORIDE 15 MG: 5 TABLET ORAL at 22:08

## 2025-06-06 RX ADMIN — ONDANSETRON 4 MG: 4 TABLET, ORALLY DISINTEGRATING ORAL at 22:08

## 2025-06-06 RX ADMIN — GUAIFENESIN SYRUP AND DEXTROMETHORPHAN 5 ML: 100; 10 SYRUP ORAL at 22:09

## 2025-06-06 ASSESSMENT — PAIN SCALES - GENERAL
PAINLEVEL_OUTOF10: 7

## 2025-06-06 ASSESSMENT — PAIN - FUNCTIONAL ASSESSMENT
PAIN_FUNCTIONAL_ASSESSMENT: 0-10

## 2025-06-06 ASSESSMENT — PAIN DESCRIPTION - LOCATION: LOCATION: PELVIS;OTHER (COMMENT)

## 2025-06-06 NOTE — ED PROVIDER NOTES

## 2025-06-06 NOTE — ED TRIAGE NOTES
Pt to ED from home via [facility] c/o shepard catheter problems and pelvic pain. Pt reports she was admitted about 2 weeks ago and discharged last night. States this morning she noticed decreased output from shepard catheter with an increase in pelvic pain. Currently rates pain 7/10.

## 2025-06-06 NOTE — ED NOTES
Pt resting in bed. Vitals assessed. RR easy and unlabored. Report from Cornelia MCDONALD. Assumed care at this time. Call light in reach. Urine sample sent.

## 2025-06-06 NOTE — ED PROVIDER NOTES
[facility] EMERGENCY DEPARTMENT  EMERGENCY DEPARTMENT ENCOUNTER          Pt Name: Susan K Sturgess Meyers  MRN: [id number]  Birthdate 1965  Date of evaluation: 6/6/2025  Resident Physician: Brandan Marcano MD EM Resident PGY-3  Attending Physician: Hortencia Christensen MD      CHIEF COMPLAINT       Chief Complaint   Patient presents with    Urinary Catheter     Decreased output    Hip Pain         HISTORY OF PRESENT ILLNESS    HPI  Susan K Sturgess Meyers is a 60 y.o. female who presents to the emergency department from home, brought in by EMS for evaluation of suprapubic pain, inability to pass urine.    Patient states that she has had a chronic indwelling urinary Abreu catheter for the past few months after having a back surgery where she had weakness in her legs.  States that she has PT and OT is able to move her legs but has continued to have this Abreu catheter in place.  States that she also had esophagectomy s/p stent for which she was recently admitted to the hospital discharge last night after an extended course of antibiotics to cover for pneumonia and UTI.  States after being home today she felt more pressure and pain in the suprapubic area and had no urine output.  Patient has had extensive hospital stays and SNF placement after her back surgery and has not been able to return home for extended amount of time.  She was home less than 24 hours prior to returning back to the ED today.      The patient has no other acute complaints at this time.    ROS negative except as stated above.    PAST MEDICAL AND SURGICAL HISTORY     Past Medical History:   Diagnosis Date    Asthma, moderate persistent     DM2 (diabetes mellitus, type 2) (HCC)     History of esophageal cancer     Esophagus    Ovarian cancer in remission 2011    age 46, no XRT or Chemo per patient, pt stated 11/23/2022 that she didn't actually have Ovarian CA     Past Surgical History:   Procedure Laterality Date    COLONOSCOPY  02/17/2023

## 2025-06-07 VITALS
RESPIRATION RATE: 16 BRPM | TEMPERATURE: 98.3 F | HEART RATE: 67 BPM | DIASTOLIC BLOOD PRESSURE: 61 MMHG | WEIGHT: 146.39 LBS | BODY MASS INDEX: 25.94 KG/M2 | HEIGHT: 63 IN | SYSTOLIC BLOOD PRESSURE: 107 MMHG | OXYGEN SATURATION: 95 %

## 2025-06-07 ASSESSMENT — PAIN - FUNCTIONAL ASSESSMENT
PAIN_FUNCTIONAL_ASSESSMENT: NONE - DENIES PAIN
PAIN_FUNCTIONAL_ASSESSMENT: NONE - DENIES PAIN

## 2025-06-07 NOTE — DISCHARGE INSTRUCTIONS
You were seen evaluate emergency department today for urinary Abreu not working and suprapubic pain.  The Abreu catheter was exchanged with well over a liter of urine out.  Your urinalysis is currently in process and will be followed up.    Follow-up with your primary care and establish care with urology for management of your chronic indwelling Abreu catheter.    Return to ED at anytime for inability to urinate, any other worrisome changes or concerns.

## 2025-06-07 NOTE — ED NOTES
Pt resting in bed. RR easy and unlabored. Vitals assessed. No needs voiced at this time. Call light in reach.

## 2025-06-07 NOTE — ED NOTES
Pt resting in bed. D/C instructions reviewed. Vitals assessed. Transport set up for pt and pt notified of ETA. Pt denies additional needs. Call light in reach.

## 2025-06-07 NOTE — ED NOTES
Pt resting in bed with eyes closed. RR easy and unlabored. Vitals assessed. No additional needs voiced. Call light in reach.

## 2025-06-07 NOTE — ED NOTES
Pt resting in bed. Vitals assessed. RR easy and unlabored. Pt denies additional needs. Call light in reach. No additional needs voiced.

## 2025-06-07 NOTE — ED NOTES
Pt medicated through [facility] at this time. Vitals assessed. RR easy and unlabored. Pt denies additional needs. Call light in reach.

## 2025-06-08 LAB
BACTERIA UR CULT: ABNORMAL
ORGANISM: ABNORMAL

## 2025-06-09 ENCOUNTER — TELEPHONE (OUTPATIENT)
Dept: PHARMACY | Age: 60
End: 2025-06-09

## 2025-06-09 NOTE — TELEPHONE ENCOUNTER
Pharmacy Note  ED Culture Follow-up    Susan K Sturgess Meyers is a 60 y.o. female.     Allergies: Demerol hcl [meperidine], Codeine, Entex lq [phenylephrine-guaifenesin], Penicillins, and Sulfa antibiotics     Labs:  Lab Results   Component Value Date    BUN 22 06/05/2025    CREATININE 0.7 06/05/2025    WBC 6.8 06/05/2025     Estimated Creatinine Clearance: 78 mL/min (based on SCr of 0.7 mg/dL).    Current antimicrobials:   none    ASSESSMENT:  Micro results:   Urine culture: positive for Candida albicans     PLAN:  Need for intervention: Yes  Discussed with: Bridget Murrell PA-C  Chosen treatment:    Start fluconazole 200 mg daily x 14 days    Patient response:   Call attempt #1, did not reach patient    Called/sent in prescription to: Not applicable    Please call with any questions. Ext. [phone]    Slime Worthington RPH, PharmD 3:21 PM 6/9/2025

## 2025-06-10 RX ORDER — FLUCONAZOLE 100 MG/1
200 TABLET ORAL DAILY
Qty: 28 TABLET | Refills: 0 | Status: SHIPPED | OUTPATIENT
Start: 2025-06-10 | End: 2025-06-24

## 2025-06-10 NOTE — TELEPHONE ENCOUNTER
Patient made aware of the positive results and need to treat with fluconazole.  Patient verbalized understanding and prescription sent to [facility] per request.

## 2025-06-23 ENCOUNTER — TELEMEDICINE (OUTPATIENT)
Dept: UROLOGY | Age: 60
End: 2025-06-23
Payer: COMMERCIAL

## 2025-06-23 DIAGNOSIS — Z97.8 INDWELLING FOLEY CATHETER PRESENT: Primary | ICD-10-CM

## 2025-06-23 PROCEDURE — 99213 OFFICE O/P EST LOW 20 MIN: CPT

## 2025-06-23 NOTE — PROGRESS NOTES
6/23/2025    TELEHEALTH EVALUATION -- Audio    Encounter Date: 06/23/25       Subjective   History of Present Illness  The patient presents via telephone for evaluation of urinary issues.    Pt is s/p T7-T8 decompression and T6-T11 posterior lateral fusion for washout of the lung causing thoracic cord compression in 12/2024. Thoracic cord compression and saddle paraesthesia since 1/2025- infectious etiology. Pt with shepard catheter that was initially placed due to thoracic compression syndrome and saddle paresthesia, which resulted in leg weakness. Prior to this, she was able to urinate without the aid of a catheter.  The catheter has repeatedly been upsized due to complications regarding dislodgment. She reports no history of recurring urinary tract infections and maintains that the drainage bag is kept at a lower level to prevent kinking. She reports no presence of blood in her urine. She expresses a desire to discontinue the use of the catheter once her mobility improves.  She is currently on oxybutynin 5 mg for bladder spasms associated with the catheter, which appears to be effective.    Pt with esophageal adenocarcinoma s/p esophagectomy at [facility] on 12/25/2023 complicated by an esophageal fistula requiring multiple stent placements and esophageal endovac. She is currently on a feeding tube and does not consume anything orally.     She also has a history of ovarian cancer and is under surveillance at [place].     She reports that the stage IV decubitus ulcer on her buttocks is gradually improving.    General medical health is notable for IDDM2 and COPD. Hx of PE. Social hx of smoking.    Review of Systems   Genitourinary:  Negative for hematuria.       Prior to Visit Medications    Medication Sig Taking? Authorizing Provider   fluconazole (DIFLUCAN) 100 MG tablet Take 2 tablets by mouth daily for 14 days  Bridget Murrell PA-C   oxyCODONE (OXY-IR) 15 MG immediate release tablet 1 tablet by Per J Tube route

## 2025-06-24 ENCOUNTER — TELEPHONE (OUTPATIENT)
Dept: UROLOGY | Age: 60
End: 2025-06-24

## 2025-06-24 NOTE — TELEPHONE ENCOUNTER
Per checkout note shepard catheter orders faxed to [facility]

## 2025-06-26 ENCOUNTER — TELEPHONE (OUTPATIENT)
Dept: PALLATIVE CARE | Age: 60
End: 2025-06-26

## 2025-06-26 NOTE — TELEPHONE ENCOUNTER
Torri George NP is asking if you can call her at the office [phone]. Torri reports her nurse can get her out of a room when you call. Torri WINCHESTER is the patient's PCP and would like to discuss Hospice before your VV at 2:30 pm today with patient.

## 2025-07-02 DIAGNOSIS — Z51.5 PALLIATIVE CARE PATIENT: ICD-10-CM

## 2025-07-02 DIAGNOSIS — L89.159 PRESSURE INJURY OF SKIN OF SACRAL REGION, UNSPECIFIED INJURY STAGE: ICD-10-CM

## 2025-07-02 DIAGNOSIS — Z85.01 HISTORY OF ESOPHAGEAL CANCER: ICD-10-CM

## 2025-07-02 RX ORDER — OXYCODONE HYDROCHLORIDE 15 MG/1
15 TABLET ORAL EVERY 4 HOURS PRN
Qty: 42 TABLET | Refills: 0 | Status: CANCELLED | OUTPATIENT
Start: 2025-07-02 | End: 2025-07-09

## 2025-07-02 RX ORDER — OXYCODONE HYDROCHLORIDE 10 MG/1
10 TABLET ORAL EVERY 4 HOURS PRN
Qty: 42 TABLET | Refills: 0 | Status: SHIPPED | OUTPATIENT
Start: 2025-07-02 | End: 2025-07-09

## 2025-07-02 NOTE — TELEPHONE ENCOUNTER
Will send 7 day supply, must follow up with virtual visit tomorrow. Sending in 10mg tablets because this is the last order that she filled. Will re- discuss tomorrow during appt

## 2025-07-02 NOTE — TELEPHONE ENCOUNTER
Patient calling for refill of her oxycodone. She states she is completely out and is hurting. She was scheduled last week for virtual but it was canceled. New appt sched for tomorrow VV at 11am    She is bedridden and unable to come in per patient     [facility] to [facility]

## 2025-07-03 ENCOUNTER — TELEMEDICINE (OUTPATIENT)
Dept: PALLATIVE CARE | Age: 60
End: 2025-07-03
Payer: COMMERCIAL

## 2025-07-03 DIAGNOSIS — Z85.01 HISTORY OF ESOPHAGEAL CANCER: ICD-10-CM

## 2025-07-03 DIAGNOSIS — I50.32 CHRONIC DIASTOLIC CHF (CONGESTIVE HEART FAILURE) (HCC): ICD-10-CM

## 2025-07-03 DIAGNOSIS — J44.9 COPD WITHOUT EXACERBATION (HCC): Primary | ICD-10-CM

## 2025-07-03 DIAGNOSIS — R53.1 GENERAL WEAKNESS: ICD-10-CM

## 2025-07-03 DIAGNOSIS — L89.154 DECUBITUS ULCER OF COCCYX, STAGE IV (HCC): ICD-10-CM

## 2025-07-03 DIAGNOSIS — E43 SEVERE MALNUTRITION: Chronic | ICD-10-CM

## 2025-07-03 DIAGNOSIS — Z51.5 PALLIATIVE CARE PATIENT: ICD-10-CM

## 2025-07-03 PROCEDURE — 99214 OFFICE O/P EST MOD 30 MIN: CPT

## 2025-07-03 NOTE — PROGRESS NOTES
Susan K Sturgess Meyers is a 60 y.o. female who presents to the palliative care clinic today for:  Chief Complaint   Patient presents with    Follow-up       HPI:   Susan K Sturgess Meyers is currently being seen in the palliative care clinic for specialized medical care focused on relieving symptoms, supporting complex decision making improving quality of life related to their Heart failure, COPD, and history of cancer and decubitus ulcer. Susan K Sturgess Meyers presents to the palliative care clinic today for hospital follow-up of symptoms related to her chronic medical conditions.  She was discharged from the hospital on methadone and oxycodone IR 15 mg every 4 hours.  It does not appear that she ever filled these.  She reached out to our clinic for a refill prior to her appointment, order placed for oxycodone IR 10 mg as this was the last dose that was filled.  She continues to live at home with her , continuing to have significant difficulty getting out of bed.    Symptoms:  Pain: They report having \"tail bone\" pain. The pain is described as sharp. They rate their pain as an 8 on scale of 1-10. They have No Medications for scheduled pain relief. Over the last 24 hours, they have used oxycodone IR 10 mg every 4-8 hours for as needed pain relief. Their pain medications are working well to control their pain.  Shortness of breath: Their shortness of breath is about the same.  Sleep: They are not sleeping well due to pain. She is out of her oxycodone.  She does take trazodone as well, reports this typically works when her pain is controlled  Fatigue/Drowsiness: Patient reports fatigue. Weakness in her legs  Appetite: she is tolerating tube feeds  Wt. Loss: Patient's weight is stable.  Patient denies weight loss.  Dry Mouth: Patient reports dry mouth.  Nausea/Vomiting: Patient reports nausea. Patient denies vomiting. She is taking antiemetics for this, this is helping. She is unsure which

## 2025-07-09 ENCOUNTER — TRANSCRIBE ORDERS (OUTPATIENT)
Dept: ADMINISTRATIVE | Age: 60
End: 2025-07-09

## 2025-07-09 DIAGNOSIS — K22.89 ESOPHAGEAL FISTULA: Primary | ICD-10-CM

## 2025-07-09 DIAGNOSIS — C15.9 ESOPHAGEAL ADENOCARCINOMA (HCC): ICD-10-CM

## 2025-07-24 DIAGNOSIS — Z51.5 PALLIATIVE CARE PATIENT: ICD-10-CM

## 2025-07-24 DIAGNOSIS — L89.159 PRESSURE INJURY OF SKIN OF SACRAL REGION, UNSPECIFIED INJURY STAGE: ICD-10-CM

## 2025-07-24 DIAGNOSIS — Z85.01 HISTORY OF ESOPHAGEAL CANCER: ICD-10-CM

## 2025-07-24 RX ORDER — OXYCODONE HYDROCHLORIDE 10 MG/1
10 TABLET ORAL EVERY 4 HOURS PRN
Qty: 84 TABLET | Refills: 0 | Status: SHIPPED | OUTPATIENT
Start: 2025-07-24 | End: 2025-08-07

## 2025-07-24 NOTE — TELEPHONE ENCOUNTER
Pt called requesting medication refill: oxycodone. Pt states she has been out for a week and thought she could go with out the medication but now she is experiencing some bad pain. Pt states she tried tylenol that is not helping at all.      Encourage pt to call a few days prior to being out so that we can get the refill sent in.     [facility]

## 2025-08-07 ENCOUNTER — TELEMEDICINE (OUTPATIENT)
Dept: PALLATIVE CARE | Age: 60
End: 2025-08-07
Payer: COMMERCIAL

## 2025-08-07 DIAGNOSIS — I50.32 CHRONIC DIASTOLIC CHF (CONGESTIVE HEART FAILURE) (HCC): ICD-10-CM

## 2025-08-07 DIAGNOSIS — J44.9 COPD WITHOUT EXACERBATION (HCC): ICD-10-CM

## 2025-08-07 DIAGNOSIS — Z85.01 HISTORY OF ESOPHAGEAL CANCER: ICD-10-CM

## 2025-08-07 DIAGNOSIS — L89.154 DECUBITUS ULCER OF COCCYX, STAGE IV (HCC): Primary | ICD-10-CM

## 2025-08-07 DIAGNOSIS — R53.1 GENERAL WEAKNESS: ICD-10-CM

## 2025-08-07 PROCEDURE — 99214 OFFICE O/P EST MOD 30 MIN: CPT

## 2025-08-22 DIAGNOSIS — Z51.5 PALLIATIVE CARE PATIENT: ICD-10-CM

## 2025-08-22 DIAGNOSIS — Z85.01 HISTORY OF ESOPHAGEAL CANCER: ICD-10-CM

## 2025-08-22 DIAGNOSIS — L89.159 PRESSURE INJURY OF SKIN OF SACRAL REGION, UNSPECIFIED INJURY STAGE: ICD-10-CM

## 2025-08-22 RX ORDER — OXYCODONE HYDROCHLORIDE 10 MG/1
10 TABLET ORAL EVERY 4 HOURS PRN
Qty: 84 TABLET | Refills: 0 | Status: SHIPPED | OUTPATIENT
Start: 2025-08-22 | End: 2025-09-05

## (undated) DEVICE — Device: Brand: BALLOON3

## (undated) DEVICE — BIOGUARD A/W CLEANING ADAPTER